# Patient Record
Sex: FEMALE | Race: BLACK OR AFRICAN AMERICAN | NOT HISPANIC OR LATINO
[De-identification: names, ages, dates, MRNs, and addresses within clinical notes are randomized per-mention and may not be internally consistent; named-entity substitution may affect disease eponyms.]

---

## 2017-05-28 ENCOUNTER — HOSPITAL (OUTPATIENT)
Dept: OTHER | Age: 62
End: 2017-05-28
Attending: FAMILY MEDICINE

## 2017-05-28 ENCOUNTER — CHARTING TRANS (OUTPATIENT)
Dept: OTHER | Age: 62
End: 2017-05-28

## 2017-05-28 ENCOUNTER — LAB SERVICES (OUTPATIENT)
Dept: OTHER | Age: 62
End: 2017-05-28

## 2017-05-28 LAB
AMORPH SED URNS QL MICRO: ABNORMAL
ANALYZER ANC (IANC): ABNORMAL
ANALYZER ANC (IANC): ABNORMAL
ANION GAP SERPL CALC-SCNC: 13 MMOL/L (ref 10–20)
ANION GAP SERPL CALC-SCNC: 13 MMOL/L (ref 10–20)
APPEARANCE UR: CLEAR
BACTERIA #/AREA URNS HPF: ABNORMAL /HPF
BASOPHILS # BLD: 0 K/MCL (ref 0–0.3)
BASOPHILS # BLD: 0 THOUSAND/MCL (ref 0–0.3)
BASOPHILS NFR BLD: 0 %
BASOPHILS NFR BLD: 0 %
BILIRUB UR QL: NEGATIVE
BUN SERPL-MCNC: 14 MG/DL (ref 6–20)
BUN SERPL-MCNC: 14 MG/DL (ref 6–20)
BUN/CREAT SERPL: 17 (ref 7–25)
BUN/CREAT SERPL: 17 (ref 7–25)
CALCIUM SERPL-MCNC: 9.9 MG/DL (ref 8.4–10.2)
CALCIUM SERPL-MCNC: 9.9 MG/DL (ref 8.4–10.2)
CAOX CRY URNS QL MICRO: ABNORMAL
CHLORIDE SERPL-SCNC: 101 MMOL/L (ref 98–107)
CHLORIDE: 101 MMOL/L (ref 98–107)
CO2 SERPL-SCNC: 25 MMOL/L (ref 21–32)
CO2 SERPL-SCNC: 25 MMOL/L (ref 21–32)
COLOR UR: ABNORMAL
CREAT SERPL-MCNC: 0.83 MG/DL (ref 0.51–0.95)
CREAT SERPL-MCNC: 0.83 MG/DL (ref 0.51–0.95)
DIFFERENTIAL METHOD BLD: ABNORMAL
DIFFERENTIAL METHOD BLD: ABNORMAL
EOSINOPHIL # BLD: 0.2 K/MCL (ref 0.1–0.5)
EOSINOPHIL # BLD: 0.2 THOUSAND/MCL (ref 0.1–0.5)
EOSINOPHIL NFR BLD: 2 %
EOSINOPHIL NFR BLD: 2 %
EPITH CASTS #/AREA URNS LPF: ABNORMAL /[LPF]
ERYTHROCYTE [DISTWIDTH] IN BLOOD: 18.6 % (ref 11–15)
ERYTHROCYTE [DISTWIDTH] IN BLOOD: 18.6 % (ref 11–15)
FATTY CASTS #/AREA URNS LPF: ABNORMAL /[LPF]
GLUCOSE SERPL-MCNC: 133 MG/DL (ref 65–99)
GLUCOSE SERPL-MCNC: 133 MG/DL (ref 65–99)
GLUCOSE UR-MCNC: NEGATIVE MG/DL
GRAN CASTS #/AREA URNS LPF: ABNORMAL /[LPF]
HEMATOCRIT: 38.3 % (ref 36–46.5)
HEMATOCRIT: 38.3 % (ref 36–46.5)
HEMOGLOBIN: 12.6 G/DL (ref 12–15.5)
HGB BLD-MCNC: 12.6 GM/DL (ref 12–15.5)
HGB UR QL: ABNORMAL
HYALINE CASTS #/AREA URNS LPF: ABNORMAL /LPF (ref 0–5)
KETONES UR-MCNC: NEGATIVE MG/DL
LEUKOCYTE ESTERASE UR QL STRIP: NEGATIVE
LYMPHOCYTES # BLD: 3.8 K/MCL (ref 1–4)
LYMPHOCYTES # BLD: 3.8 THOUSAND/MCL (ref 1–4)
LYMPHOCYTES NFR BLD: 27 %
LYMPHOCYTES NFR BLD: 27 %
MAGNESIUM SERPL-MCNC: 1.6 MG/DL (ref 1.7–2.4)
MAGNESIUM SERPL-MCNC: 1.6 MG/DL (ref 1.7–2.4)
MCH RBC QN AUTO: 25.4 PG (ref 26–34)
MCHC RBC AUTO-ENTMCNC: 32.9 GM/DL (ref 32–36.5)
MCV RBC AUTO: 77.1 FL (ref 78–100)
MEAN CORPUSCULAR HEMOGLOBIN: 25.4 PG (ref 26–34)
MEAN CORPUSCULAR HGB CONC: 32.9 G/DL (ref 32–36.5)
MEAN CORPUSCULAR VOLUME: 77.1 FL (ref 78–100)
MIXED CELL CASTS #/AREA URNS LPF: ABNORMAL /[LPF]
MONOCYTES # BLD: 1.1 K/MCL (ref 0.3–0.9)
MONOCYTES # BLD: 1.1 THOUSAND/MCL (ref 0.3–0.9)
MONOCYTES NFR BLD: 8 %
MONOCYTES NFR BLD: 8 %
MUCOUS THREADS URNS QL MICRO: ABNORMAL
NEUTROPHILS # BLD: 9 K/MCL (ref 1.8–7.7)
NEUTROPHILS # BLD: 9 THOUSAND/MCL (ref 1.8–7.7)
NEUTROPHILS NFR BLD: 63 %
NEUTROPHILS NFR BLD: 63 %
NEUTS SEG NFR BLD: ABNORMAL
NEUTS SEG NFR BLD: ABNORMAL %
NITRITE UR QL: NEGATIVE
NRBC (NRBCRE): ABNORMAL
PERCENT NRBC: ABNORMAL
PH UR: 6 UNIT (ref 5–7)
PLATELET # BLD: 440 THOUSAND/MCL (ref 140–450)
PLATELET COUNT: 440 K/MCL (ref 140–450)
POTASSIUM SERPL-SCNC: 4.2 MMOL/L (ref 3.4–5.1)
POTASSIUM SERPL-SCNC: 4.2 MMOL/L (ref 3.4–5.1)
PROT UR QL: NEGATIVE MG/DL
RBC # BLD: 4.97 MILLION/MCL (ref 4–5.2)
RBC #/AREA URNS HPF: ABNORMAL /HPF (ref 0–3)
RBC CASTS #/AREA URNS LPF: ABNORMAL /[LPF]
RED CELL COUNT: 4.97 MIL/MCL (ref 4–5.2)
RENAL EPI CELLS #/AREA URNS HPF: ABNORMAL /[HPF]
SODIUM SERPL-SCNC: 135 MMOL/L (ref 135–145)
SODIUM SERPL-SCNC: 135 MMOL/L (ref 135–145)
SP GR UR: <1.005 (ref 1–1.03)
SPECIMEN SOURCE: ABNORMAL
SPERM URNS QL MICRO: ABNORMAL
SQUAMOUS #/AREA URNS HPF: ABNORMAL /HPF (ref 0–5)
T VAGINALIS URNS QL MICRO: ABNORMAL
TRI-PHOS CRY URNS QL MICRO: ABNORMAL
TROPONIN I SERPL HS-MCNC: <0.02 NG/ML
URATE CRY URNS QL MICRO: ABNORMAL
URINE REFLEX: ABNORMAL
URNS CMNT MICRO: ABNORMAL
UROBILINOGEN UR QL: 0.2 MG/DL (ref 0–1)
WAXY CASTS #/AREA URNS LPF: ABNORMAL /[LPF]
WBC # BLD: 14.1 THOUSAND/MCL (ref 4.2–11)
WBC #/AREA URNS HPF: ABNORMAL /HPF (ref 0–5)
WBC CASTS #/AREA URNS LPF: ABNORMAL /[LPF]
WHITE BLOOD COUNT: 14.1 K/MCL (ref 4.2–11)
YEAST HYPHAE URNS QL MICRO: ABNORMAL
YEAST URNS QL MICRO: ABNORMAL

## 2017-05-29 ENCOUNTER — DIAGNOSTIC TRANS (OUTPATIENT)
Dept: OTHER | Age: 62
End: 2017-05-29

## 2017-09-12 ENCOUNTER — APPOINTMENT (OUTPATIENT)
Dept: GENERAL RADIOLOGY | Facility: HOSPITAL | Age: 62
End: 2017-09-12
Attending: EMERGENCY MEDICINE
Payer: MEDICARE

## 2017-09-12 ENCOUNTER — HOSPITAL ENCOUNTER (EMERGENCY)
Facility: HOSPITAL | Age: 62
Discharge: LEFT BEFORE DISCHARGE | End: 2017-09-12
Attending: EMERGENCY MEDICINE
Payer: MEDICARE

## 2017-09-12 VITALS
DIASTOLIC BLOOD PRESSURE: 82 MMHG | OXYGEN SATURATION: 95 % | TEMPERATURE: 98 F | WEIGHT: 169 LBS | BODY MASS INDEX: 31.1 KG/M2 | SYSTOLIC BLOOD PRESSURE: 177 MMHG | HEIGHT: 62 IN | HEART RATE: 96 BPM | RESPIRATION RATE: 18 BRPM

## 2017-09-12 DIAGNOSIS — R07.9 CHEST PAIN, UNSPECIFIED TYPE: ICD-10-CM

## 2017-09-12 DIAGNOSIS — R19.7 DIARRHEA, UNSPECIFIED TYPE: Primary | ICD-10-CM

## 2017-09-12 DIAGNOSIS — R53.83 OTHER FATIGUE: ICD-10-CM

## 2017-09-12 LAB
ALBUMIN SERPL-MCNC: 2.8 G/DL (ref 3.5–4.8)
ALP LIVER SERPL-CCNC: 94 U/L (ref 50–130)
ALT SERPL-CCNC: 20 U/L (ref 14–54)
AST SERPL-CCNC: 15 U/L (ref 15–41)
BASOPHILS # BLD AUTO: 0.05 X10(3) UL (ref 0–0.1)
BASOPHILS NFR BLD AUTO: 0.3 %
BILIRUB SERPL-MCNC: 0.3 MG/DL (ref 0.1–2)
BILIRUB UR QL STRIP.AUTO: NEGATIVE
BUN BLD-MCNC: 10 MG/DL (ref 8–20)
CALCIUM BLD-MCNC: 9.4 MG/DL (ref 8.3–10.3)
CHLORIDE: 103 MMOL/L (ref 101–111)
CLARITY UR REFRACT.AUTO: CLEAR
CO2: 22 MMOL/L (ref 22–32)
COLOR UR AUTO: YELLOW
CREAT BLD-MCNC: 0.88 MG/DL (ref 0.55–1.02)
EOSINOPHIL # BLD AUTO: 0.13 X10(3) UL (ref 0–0.3)
EOSINOPHIL NFR BLD AUTO: 0.9 %
ERYTHROCYTE [DISTWIDTH] IN BLOOD BY AUTOMATED COUNT: 19.7 % (ref 11.5–16)
GLUCOSE BLD-MCNC: 205 MG/DL (ref 70–99)
GLUCOSE UR STRIP.AUTO-MCNC: 150 MG/DL
HAV IGM SER QL: 1.7 MG/DL (ref 1.7–3)
HCT VFR BLD AUTO: 34.5 % (ref 34–50)
HGB BLD-MCNC: 11.1 G/DL (ref 12–16)
IMMATURE GRANULOCYTE COUNT: 0.12 X10(3) UL (ref 0–1)
IMMATURE GRANULOCYTE RATIO %: 0.8 %
KETONES UR STRIP.AUTO-MCNC: NEGATIVE MG/DL
LEUKOCYTE ESTERASE UR QL STRIP.AUTO: NEGATIVE
LYMPHOCYTES # BLD AUTO: 1.95 X10(3) UL (ref 0.9–4)
LYMPHOCYTES NFR BLD AUTO: 13.4 %
M PROTEIN MFR SERPL ELPH: 8.2 G/DL (ref 6.1–8.3)
MCH RBC QN AUTO: 25.1 PG (ref 27–33.2)
MCHC RBC AUTO-ENTMCNC: 32.2 G/DL (ref 31–37)
MCV RBC AUTO: 77.9 FL (ref 81–100)
MONOCYTES # BLD AUTO: 0.87 X10(3) UL (ref 0.1–0.6)
MONOCYTES NFR BLD AUTO: 6 %
NEUTROPHIL ABS PRELIM: 11.45 X10 (3) UL (ref 1.3–6.7)
NEUTROPHILS # BLD AUTO: 11.45 X10(3) UL (ref 1.3–6.7)
NEUTROPHILS NFR BLD AUTO: 78.6 %
NITRITE UR QL STRIP.AUTO: NEGATIVE
PH UR STRIP.AUTO: 6 [PH] (ref 4.5–8)
PLATELET # BLD AUTO: 388 10(3)UL (ref 150–450)
POTASSIUM SERPL-SCNC: 3.8 MMOL/L (ref 3.6–5.1)
PROT UR STRIP.AUTO-MCNC: NEGATIVE MG/DL
RBC # BLD AUTO: 4.43 X10(6)UL (ref 3.8–5.1)
RED CELL DISTRIBUTION WIDTH-SD: 54.6 FL (ref 35.1–46.3)
SODIUM SERPL-SCNC: 137 MMOL/L (ref 136–144)
SP GR UR STRIP.AUTO: 1.01 (ref 1–1.03)
TROPONIN: <0.046 NG/ML (ref ?–0.05)
UROBILINOGEN UR STRIP.AUTO-MCNC: <2 MG/DL
WBC # BLD AUTO: 14.6 X10(3) UL (ref 4–13)

## 2017-09-12 PROCEDURE — 99285 EMERGENCY DEPT VISIT HI MDM: CPT

## 2017-09-12 PROCEDURE — 84484 ASSAY OF TROPONIN QUANT: CPT | Performed by: EMERGENCY MEDICINE

## 2017-09-12 PROCEDURE — 83735 ASSAY OF MAGNESIUM: CPT | Performed by: EMERGENCY MEDICINE

## 2017-09-12 PROCEDURE — 80053 COMPREHEN METABOLIC PANEL: CPT | Performed by: EMERGENCY MEDICINE

## 2017-09-12 PROCEDURE — 96361 HYDRATE IV INFUSION ADD-ON: CPT

## 2017-09-12 PROCEDURE — 85025 COMPLETE CBC W/AUTO DIFF WBC: CPT | Performed by: EMERGENCY MEDICINE

## 2017-09-12 PROCEDURE — 96374 THER/PROPH/DIAG INJ IV PUSH: CPT

## 2017-09-12 PROCEDURE — 93005 ELECTROCARDIOGRAM TRACING: CPT

## 2017-09-12 PROCEDURE — 93010 ELECTROCARDIOGRAM REPORT: CPT

## 2017-09-12 PROCEDURE — 71010 XR CHEST AP PORTABLE  (CPT=71010): CPT | Performed by: EMERGENCY MEDICINE

## 2017-09-12 PROCEDURE — 81001 URINALYSIS AUTO W/SCOPE: CPT | Performed by: EMERGENCY MEDICINE

## 2017-09-12 RX ORDER — OMEPRAZOLE 20 MG/1
40 CAPSULE, DELAYED RELEASE ORAL EVERY MORNING
COMMUNITY
End: 2019-06-04

## 2017-09-12 RX ORDER — ONDANSETRON 2 MG/ML
4 INJECTION INTRAMUSCULAR; INTRAVENOUS ONCE
Status: COMPLETED | OUTPATIENT
Start: 2017-09-12 | End: 2017-09-12

## 2017-09-12 RX ORDER — AMLODIPINE, VALSARTAN AND HYDROCHLOROTHIAZIDE 10; 160; 12.5 MG/1; MG/1; MG/1
1 TABLET ORAL DAILY
COMMUNITY
End: 2018-12-03

## 2017-09-12 RX ORDER — HYDROXYCHLOROQUINE SULFATE 200 MG/1
200 TABLET, FILM COATED ORAL 2 TIMES DAILY
COMMUNITY
End: 2019-04-11 | Stop reason: ALTCHOICE

## 2017-09-13 LAB
ATRIAL RATE: 92 BPM
ATRIAL RATE: 96 BPM
P AXIS: 43 DEGREES
P AXIS: 47 DEGREES
P-R INTERVAL: 130 MS
P-R INTERVAL: 132 MS
Q-T INTERVAL: 360 MS
Q-T INTERVAL: 370 MS
QRS DURATION: 82 MS
QRS DURATION: 82 MS
QTC CALCULATION (BEZET): 445 MS
QTC CALCULATION (BEZET): 467 MS
R AXIS: -2 DEGREES
R AXIS: 1 DEGREES
T AXIS: -16 DEGREES
T AXIS: -25 DEGREES
VENTRICULAR RATE: 92 BPM
VENTRICULAR RATE: 96 BPM

## 2017-09-13 NOTE — ED PROVIDER NOTES
Patient Seen in: [facility] Emergency Department    History   Patient presents with:  Medication Reaction    Stated Complaint: think she is having adverse reaction to hydroxychloroquine     HPI    Patient is a 22-year-old woman history of rheumatoid °F (36.7 °C) (Temporal)   Resp 18   Ht 157.5 cm (5' 2\")   Wt 76.7 kg   SpO2 95%   BMI 30.91 kg/m²         Physical Exam   Constitutional: She is oriented to person, place, and time. She appears well-developed and well-nourished. No distress.    HENT:   Lupis Villeda Neutrophil Absolute 11.45 (*)     Monocyte Absolute 0.87 (*)     All other components within normal limits   TROPONIN I - Normal   MAGNESIUM - Normal   CBC WITH DIFFERENTIAL WITH PLATELET    Narrative:      The following orders were created for panel order ---------                               -----------         ------                     CBC W/ DIFFERENTIAL[[id number]]          Abnormal            Final result                 Please view results for these tests on the individual orders.    C. DIFFICILE(T (primary encounter diagnosis)  Other fatigue  Chest pain, unspecified type    Disposition:  Discharge    Follow-up:  Nonstaff, Physician  [address]    In 2 days  Return to the ER if you feel worse in any way, Return to the ER if

## 2017-09-13 NOTE — ED INITIAL ASSESSMENT (HPI)
Pt states she started taking Hydroxychloroquine 200 mg BID yesterday. \"I started having diarrhea and also pain to my abd. \" Pt states pain to RLQ, (+) nausea, no vomiting.  She denies urinary sxs, denies radiation of pain

## 2017-09-13 NOTE — ED NOTES
Pt requesting to ambulate to BR, c/o midsternal chest pain, states \"feels like indigestion\". Repeat EKG ordered, MD aware.   NAD

## 2018-02-26 ENCOUNTER — APPOINTMENT (OUTPATIENT)
Dept: GENERAL RADIOLOGY | Facility: HOSPITAL | Age: 63
End: 2018-02-26
Attending: EMERGENCY MEDICINE
Payer: MEDICARE

## 2018-02-26 ENCOUNTER — HOSPITAL ENCOUNTER (EMERGENCY)
Facility: HOSPITAL | Age: 63
Discharge: HOME | End: 2018-02-26
Attending: EMERGENCY MEDICINE
Payer: MEDICARE

## 2018-02-26 VITALS
SYSTOLIC BLOOD PRESSURE: 204 MMHG | WEIGHT: 170 LBS | BODY MASS INDEX: 31 KG/M2 | HEART RATE: 92 BPM | RESPIRATION RATE: 20 BRPM | DIASTOLIC BLOOD PRESSURE: 90 MMHG | TEMPERATURE: 98 F | OXYGEN SATURATION: 100 %

## 2018-02-26 DIAGNOSIS — J20.9 ACUTE BRONCHITIS, UNSPECIFIED ORGANISM: Primary | ICD-10-CM

## 2018-02-26 PROCEDURE — 71046 X-RAY EXAM CHEST 2 VIEWS: CPT | Performed by: EMERGENCY MEDICINE

## 2018-02-26 PROCEDURE — 99283 EMERGENCY DEPT VISIT LOW MDM: CPT

## 2018-02-26 RX ORDER — AZITHROMYCIN 250 MG/1
TABLET, FILM COATED ORAL
Qty: 1 PACKAGE | Refills: 0 | Status: SHIPPED | OUTPATIENT
Start: 2018-02-26 | End: 2018-03-03

## 2018-02-26 NOTE — ED NOTES
Pt BP noted. MD aware. Reports not taking her BP meds this morning. Denies headache, visual changes, dizziness.

## 2018-02-26 NOTE — ED INITIAL ASSESSMENT (HPI)
Pt presents with complaint of cough x 1 year states when she cough this am noted a little blood streak no yang

## 2018-02-26 NOTE — ED PROVIDER NOTES
Patient Seen in: [facility] Emergency Department    History   Patient presents with:  Cough/URI    Stated Complaint: Cough  Patient is a 80-year-old female smoker with a history of hypertension and rheumatoid arthritis who presents for evaluation of Mouth/Throat: Oropharynx is clear and moist.   Eyes: Conjunctivae and EOM are normal. Pupils are equal, round, and reactive to light. Neck: Normal range of motion. Neck supple. Cardiovascular: Normal rate, regular rhythm and normal heart sounds.     P medications    azithromycin (ZITHROMAX Z-ELMER) 250 MG Oral Tab  500 mg once followed by 250 mg daily x 4 days  Qty: 1 Package Refills: 0

## 2018-07-02 ENCOUNTER — HOSPITAL ENCOUNTER (OUTPATIENT)
Dept: ULTRASOUND IMAGING | Facility: HOSPITAL | Age: 63
Discharge: HOME | End: 2018-07-02
Attending: INTERNAL MEDICINE
Payer: MEDICARE

## 2018-07-02 DIAGNOSIS — I10 HTN (HYPERTENSION): ICD-10-CM

## 2018-07-02 DIAGNOSIS — I70.1 RENAL ARTERY STENOSIS (HCC): ICD-10-CM

## 2018-07-02 PROCEDURE — 93975 VASCULAR STUDY: CPT | Performed by: INTERNAL MEDICINE

## 2018-07-02 PROCEDURE — 76775 US EXAM ABDO BACK WALL LIM: CPT | Performed by: INTERNAL MEDICINE

## 2018-09-20 ENCOUNTER — APPOINTMENT (OUTPATIENT)
Dept: CT IMAGING | Facility: HOSPITAL | Age: 63
End: 2018-09-20
Payer: MEDICARE

## 2018-09-20 ENCOUNTER — APPOINTMENT (OUTPATIENT)
Dept: GENERAL RADIOLOGY | Facility: HOSPITAL | Age: 63
End: 2018-09-20
Payer: MEDICARE

## 2018-09-20 ENCOUNTER — HOSPITAL ENCOUNTER (EMERGENCY)
Facility: HOSPITAL | Age: 63
Discharge: HOME | End: 2018-09-21
Payer: MEDICARE

## 2018-09-20 DIAGNOSIS — R51.9 ACUTE NONINTRACTABLE HEADACHE, UNSPECIFIED HEADACHE TYPE: ICD-10-CM

## 2018-09-20 DIAGNOSIS — I10 HYPERTENSION, UNSPECIFIED TYPE: Primary | ICD-10-CM

## 2018-09-20 LAB
ALBUMIN SERPL-MCNC: 2.8 G/DL (ref 3.5–4.8)
ALBUMIN/GLOB SERPL: 0.5 {RATIO} (ref 1–2)
ALP LIVER SERPL-CCNC: 112 U/L (ref 50–130)
ALT SERPL-CCNC: 21 U/L (ref 14–54)
ANION GAP SERPL CALC-SCNC: 9 MMOL/L (ref 0–18)
APTT PPP: 26.9 SECONDS (ref 26.1–34.6)
BASOPHILS # BLD AUTO: 0.04 X10(3) UL (ref 0–0.1)
BASOPHILS NFR BLD AUTO: 0.4 %
BILIRUB SERPL-MCNC: 0.4 MG/DL (ref 0.1–2)
BUN BLD-MCNC: 10 MG/DL (ref 8–20)
BUN/CREAT SERPL: 10.9 (ref 10–20)
CALCIUM BLD-MCNC: 9.3 MG/DL (ref 8.3–10.3)
CHLORIDE SERPL-SCNC: 102 MMOL/L (ref 101–111)
CO2 SERPL-SCNC: 23 MMOL/L (ref 22–32)
CREAT BLD-MCNC: 0.92 MG/DL (ref 0.55–1.02)
EOSINOPHIL # BLD AUTO: 0.15 X10(3) UL (ref 0–0.3)
EOSINOPHIL NFR BLD AUTO: 1.4 %
ERYTHROCYTE [DISTWIDTH] IN BLOOD BY AUTOMATED COUNT: 19.3 % (ref 11.5–16)
GLOBULIN PLAS-MCNC: 5.9 G/DL (ref 2.5–4)
GLUCOSE BLD-MCNC: 230 MG/DL (ref 70–99)
HCT VFR BLD AUTO: 38.3 % (ref 34–50)
HGB BLD-MCNC: 12 G/DL (ref 12–16)
IMMATURE GRANULOCYTE COUNT: 0.05 X10(3) UL (ref 0–1)
IMMATURE GRANULOCYTE RATIO %: 0.5 %
INR BLD: 0.98 (ref 0.9–1.1)
LYMPHOCYTES # BLD AUTO: 2.19 X10(3) UL (ref 0.9–4)
LYMPHOCYTES NFR BLD AUTO: 20.4 %
M PROTEIN MFR SERPL ELPH: 8.7 G/DL (ref 6.1–8.3)
MCH RBC QN AUTO: 24 PG (ref 27–33.2)
MCHC RBC AUTO-ENTMCNC: 31.3 G/DL (ref 31–37)
MCV RBC AUTO: 76.6 FL (ref 81–100)
MONOCYTES # BLD AUTO: 0.68 X10(3) UL (ref 0.1–1)
MONOCYTES NFR BLD AUTO: 6.3 %
NEUTROPHIL ABS PRELIM: 7.61 X10 (3) UL (ref 1.3–6.7)
NEUTROPHILS # BLD AUTO: 7.61 X10(3) UL (ref 1.3–6.7)
NEUTROPHILS NFR BLD AUTO: 71 %
OSMOLALITY SERPL CALC.SUM OF ELEC: 284 MOSM/KG (ref 275–295)
PLATELET # BLD AUTO: 434 10(3)UL (ref 150–450)
PSA SERPL DL<=0.01 NG/ML-MCNC: 13.4 SECONDS (ref 12.4–14.7)
RBC # BLD AUTO: 5 X10(6)UL (ref 3.8–5.1)
RED CELL DISTRIBUTION WIDTH-SD: 51.2 FL (ref 35.1–46.3)
SODIUM SERPL-SCNC: 134 MMOL/L (ref 136–144)
TROPONIN I SERPL-MCNC: <0.046 NG/ML (ref ?–0.05)
WBC # BLD AUTO: 10.7 X10(3) UL (ref 4–13)

## 2018-09-20 PROCEDURE — 96361 HYDRATE IV INFUSION ADD-ON: CPT

## 2018-09-20 PROCEDURE — 96374 THER/PROPH/DIAG INJ IV PUSH: CPT

## 2018-09-20 PROCEDURE — 93010 ELECTROCARDIOGRAM REPORT: CPT

## 2018-09-20 PROCEDURE — 99285 EMERGENCY DEPT VISIT HI MDM: CPT

## 2018-09-20 PROCEDURE — 85025 COMPLETE CBC W/AUTO DIFF WBC: CPT

## 2018-09-20 PROCEDURE — 71045 X-RAY EXAM CHEST 1 VIEW: CPT

## 2018-09-20 PROCEDURE — 80053 COMPREHEN METABOLIC PANEL: CPT

## 2018-09-20 PROCEDURE — 70450 CT HEAD/BRAIN W/O DYE: CPT

## 2018-09-20 PROCEDURE — 96375 TX/PRO/DX INJ NEW DRUG ADDON: CPT

## 2018-09-20 PROCEDURE — 85610 PROTHROMBIN TIME: CPT

## 2018-09-20 PROCEDURE — 93005 ELECTROCARDIOGRAM TRACING: CPT

## 2018-09-20 PROCEDURE — 85730 THROMBOPLASTIN TIME PARTIAL: CPT

## 2018-09-20 PROCEDURE — 84484 ASSAY OF TROPONIN QUANT: CPT

## 2018-09-20 RX ORDER — METOPROLOL TARTRATE 5 MG/5ML
5 INJECTION INTRAVENOUS ONCE
Status: COMPLETED | OUTPATIENT
Start: 2018-09-20 | End: 2018-09-20

## 2018-09-20 RX ORDER — DIPHENHYDRAMINE HYDROCHLORIDE 50 MG/ML
25 INJECTION INTRAMUSCULAR; INTRAVENOUS ONCE
Status: COMPLETED | OUTPATIENT
Start: 2018-09-20 | End: 2018-09-20

## 2018-09-20 RX ORDER — SODIUM CHLORIDE 9 MG/ML
1000 INJECTION, SOLUTION INTRAVENOUS ONCE
Status: COMPLETED | OUTPATIENT
Start: 2018-09-20 | End: 2018-09-21

## 2018-09-20 RX ORDER — METOCLOPRAMIDE HYDROCHLORIDE 5 MG/ML
5 INJECTION INTRAMUSCULAR; INTRAVENOUS ONCE
Status: COMPLETED | OUTPATIENT
Start: 2018-09-20 | End: 2018-09-20

## 2018-09-21 VITALS
RESPIRATION RATE: 16 BRPM | HEIGHT: 62 IN | TEMPERATURE: 97 F | SYSTOLIC BLOOD PRESSURE: 182 MMHG | OXYGEN SATURATION: 97 % | BODY MASS INDEX: 31.28 KG/M2 | WEIGHT: 170 LBS | HEART RATE: 78 BPM | DIASTOLIC BLOOD PRESSURE: 84 MMHG

## 2018-09-21 LAB
ATRIAL RATE: 101 BPM
P AXIS: 39 DEGREES
P-R INTERVAL: 134 MS
Q-T INTERVAL: 356 MS
QRS DURATION: 80 MS
QTC CALCULATION (BEZET): 461 MS
R AXIS: -6 DEGREES
T AXIS: -32 DEGREES
VENTRICULAR RATE: 101 BPM

## 2018-09-21 NOTE — ED INITIAL ASSESSMENT (HPI)
Patient states she had a headache tonight which she gets when her blood pressure is high. Patient took all her medications but felt her BP was still high and came into the ER. She states she also feels dizzy with positional changes.

## 2018-09-21 NOTE — ED PROVIDER NOTES
Patient Seen in: [facility] Emergency Department    History   Patient presents with:  Hypertension (cardiovascular): pt states she \"didn't check it but has a HA and knows it's high\"    Stated Complaint: HTN    HPI    This pleasant 70-year-old with 96.9 °F (36.1 °C)   Temp src 09/20/18 2246 Temporal   SpO2 09/20/18 2245 98 %   O2 Device 09/20/18 2246 None (Room air)       Current:BP (!) 182/84   Pulse 78   Temp 96.9 °F (36.1 °C) (Temporal)   Resp 16   Ht 157.5 cm (5' 2\")   Wt 77.1 kg   SpO2 97%   BM ACTIVATED - Normal   TROPONIN I - Normal   CBC WITH DIFFERENTIAL WITH PLATELET    Narrative: The following orders were created for panel order CBC WITH DIFFERENTIAL WITH PLATELET.   Procedure                               Abnormality         Status radiograph was obtained. COMPARISON:  [facility] , XR CHEST PA + LAT CHEST (CPT=71046), 2/26/2018, 7:16.   INDICATIONS:  HTN  PATIENT STATED HISTORY: (As transcribed by Technologist)   Patient  with high blood pressure pt. states she had a headache tonight whi Reasonable over the counter and prescription treatment options and Physician follow up plan was discussed. The patient is discharged home in good condition.             Disposition and Plan     Clinical Impression:  Hypertension, unspecified type  (onelia

## 2018-11-28 ENCOUNTER — HOSPITAL ENCOUNTER (EMERGENCY)
Facility: HOSPITAL | Age: 63
Discharge: HOME | End: 2018-11-28
Attending: EMERGENCY MEDICINE
Payer: MEDICARE

## 2018-11-28 VITALS
HEIGHT: 62 IN | SYSTOLIC BLOOD PRESSURE: 167 MMHG | OXYGEN SATURATION: 99 % | HEART RATE: 108 BPM | BODY MASS INDEX: 31.28 KG/M2 | DIASTOLIC BLOOD PRESSURE: 77 MMHG | WEIGHT: 170 LBS | RESPIRATION RATE: 16 BRPM | TEMPERATURE: 98 F

## 2018-11-28 DIAGNOSIS — R73.9 HYPERGLYCEMIA: ICD-10-CM

## 2018-11-28 DIAGNOSIS — J01.90 ACUTE SINUSITIS, RECURRENCE NOT SPECIFIED, UNSPECIFIED LOCATION: ICD-10-CM

## 2018-11-28 DIAGNOSIS — R42 DIZZINESS: Primary | ICD-10-CM

## 2018-11-28 PROCEDURE — 99285 EMERGENCY DEPT VISIT HI MDM: CPT | Performed by: EMERGENCY MEDICINE

## 2018-11-28 PROCEDURE — 93005 ELECTROCARDIOGRAM TRACING: CPT

## 2018-11-28 PROCEDURE — 80053 COMPREHEN METABOLIC PANEL: CPT | Performed by: EMERGENCY MEDICINE

## 2018-11-28 PROCEDURE — 96361 HYDRATE IV INFUSION ADD-ON: CPT | Performed by: EMERGENCY MEDICINE

## 2018-11-28 PROCEDURE — 82962 GLUCOSE BLOOD TEST: CPT

## 2018-11-28 PROCEDURE — 83036 HEMOGLOBIN GLYCOSYLATED A1C: CPT | Performed by: EMERGENCY MEDICINE

## 2018-11-28 PROCEDURE — 96360 HYDRATION IV INFUSION INIT: CPT | Performed by: EMERGENCY MEDICINE

## 2018-11-28 PROCEDURE — 85025 COMPLETE CBC W/AUTO DIFF WBC: CPT | Performed by: EMERGENCY MEDICINE

## 2018-11-28 PROCEDURE — 85025 COMPLETE CBC W/AUTO DIFF WBC: CPT

## 2018-11-28 PROCEDURE — 84443 ASSAY THYROID STIM HORMONE: CPT | Performed by: EMERGENCY MEDICINE

## 2018-11-28 PROCEDURE — 93010 ELECTROCARDIOGRAM REPORT: CPT | Performed by: EMERGENCY MEDICINE

## 2018-11-28 PROCEDURE — 80053 COMPREHEN METABOLIC PANEL: CPT

## 2018-11-28 PROCEDURE — 84439 ASSAY OF FREE THYROXINE: CPT | Performed by: EMERGENCY MEDICINE

## 2018-11-28 RX ORDER — SODIUM CHLORIDE 9 MG/ML
125 INJECTION, SOLUTION INTRAVENOUS CONTINUOUS
Status: DISCONTINUED | OUTPATIENT
Start: 2018-11-28 | End: 2018-11-28

## 2018-11-28 RX ORDER — MECLIZINE HYDROCHLORIDE 25 MG/1
25 TABLET ORAL 3 TIMES DAILY PRN
Qty: 20 TABLET | Refills: 0 | Status: SHIPPED | OUTPATIENT
Start: 2018-11-28 | End: 2019-04-11 | Stop reason: ALTCHOICE

## 2018-11-28 RX ORDER — POTASSIUM CHLORIDE 20 MEQ/1
40 TABLET, EXTENDED RELEASE ORAL ONCE
Status: DISCONTINUED | OUTPATIENT
Start: 2018-11-28 | End: 2018-11-28

## 2018-11-28 RX ORDER — AZITHROMYCIN 250 MG/1
TABLET, FILM COATED ORAL
Qty: 1 PACKAGE | Refills: 0 | Status: SHIPPED | OUTPATIENT
Start: 2018-11-28 | End: 2019-01-03 | Stop reason: ALTCHOICE

## 2018-11-28 NOTE — ED PROVIDER NOTES
Patient Seen in: [facility] Emergency Department    History   Patient presents with:  Dizziness (neurologic)    Stated Complaint: episodes of dizziness, associated with changes in blood sugar    HPI    60-year-old female complaining of dizziness.   Tiffanie Morgan Pulse 102   Resp 20   Temp 98.1 °F (36.7 °C)   Temp src Temporal   SpO2 100 %   O2 Device None (Room air)       Current:BP (!) 167/77   Pulse 108   Temp 98.1 °F (36.7 °C) (Temporal)   Resp 16   Ht 157.5 cm (5' 2\")   Wt 77.1 kg   SpO2 99%   BMI 31.09 kg/ components within normal limits   T4, FREE (S) - Normal   CBC WITH DIFFERENTIAL WITH PLATELET    Narrative: The following orders were created for panel order CBC WITH DIFFERENTIAL WITH PLATELET.   Procedure                               Abnormality sinusitis, recurrence not specified, unspecified location    Disposition:  Discharge  11/28/2018  4:42 pm    Follow-up:  Via Steven Razo , Physician  [address] [facility]          Soni Masterson, [address] [place] [address]

## 2018-11-28 NOTE — ED INITIAL ASSESSMENT (HPI)
Pt states she has been having episodes of dizziness since early November. Pt states she has also been having fluctuations in blood glucose.

## 2018-12-20 PROBLEM — E11.9 TYPE 2 DIABETES MELLITUS WITHOUT COMPLICATION, WITHOUT LONG-TERM CURRENT USE OF INSULIN (HCC): Status: ACTIVE | Noted: 2018-12-20

## 2018-12-20 PROBLEM — Z80.0 FH: GASTRIC CANCER: Status: ACTIVE | Noted: 2018-12-20

## 2018-12-20 PROBLEM — D50.0 IRON DEFICIENCY ANEMIA DUE TO CHRONIC BLOOD LOSS: Status: ACTIVE | Noted: 2018-12-20

## 2018-12-20 PROBLEM — Z79.52 ON PREDNISONE THERAPY: Status: ACTIVE | Noted: 2018-12-20

## 2018-12-20 PROBLEM — M06.9 RHEUMATOID ARTHRITIS, INVOLVING UNSPECIFIED SITE, UNSPECIFIED RHEUMATOID FACTOR PRESENCE: Status: ACTIVE | Noted: 2018-12-20

## 2018-12-20 PROCEDURE — 82043 UR ALBUMIN QUANTITATIVE: CPT | Performed by: INTERNAL MEDICINE

## 2018-12-20 PROCEDURE — 86803 HEPATITIS C AB TEST: CPT | Performed by: INTERNAL MEDICINE

## 2018-12-20 PROCEDURE — 82570 ASSAY OF URINE CREATININE: CPT | Performed by: INTERNAL MEDICINE

## 2019-04-04 PROCEDURE — 84165 PROTEIN E-PHORESIS SERUM: CPT | Performed by: INTERNAL MEDICINE

## 2019-04-04 PROCEDURE — 86334 IMMUNOFIX E-PHORESIS SERUM: CPT | Performed by: INTERNAL MEDICINE

## 2019-04-04 PROCEDURE — 86480 TB TEST CELL IMMUN MEASURE: CPT | Performed by: INTERNAL MEDICINE

## 2019-04-04 PROCEDURE — 83883 ASSAY NEPHELOMETRY NOT SPEC: CPT | Performed by: INTERNAL MEDICINE

## 2019-04-04 PROCEDURE — 80074 ACUTE HEPATITIS PANEL: CPT | Performed by: INTERNAL MEDICINE

## 2019-04-11 PROCEDURE — 88175 CYTOPATH C/V AUTO FLUID REDO: CPT | Performed by: OBSTETRICS & GYNECOLOGY

## 2019-04-11 PROCEDURE — 87624 HPV HI-RISK TYP POOLED RSLT: CPT | Performed by: OBSTETRICS & GYNECOLOGY

## 2019-04-17 RX ORDER — BIOTIN 1 MG
1 TABLET ORAL DAILY
COMMUNITY
End: 2020-12-16 | Stop reason: ALTCHOICE

## 2019-04-17 RX ORDER — SODIUM CHLORIDE, SODIUM LACTATE, POTASSIUM CHLORIDE, CALCIUM CHLORIDE 600; 310; 30; 20 MG/100ML; MG/100ML; MG/100ML; MG/100ML
INJECTION, SOLUTION INTRAVENOUS CONTINUOUS
Status: CANCELLED | OUTPATIENT
Start: 2019-04-17

## 2019-05-06 ENCOUNTER — HOSPITAL ENCOUNTER (OUTPATIENT)
Facility: HOSPITAL | Age: 64
Setting detail: HOSPITAL OUTPATIENT SURGERY
Discharge: HOME | End: 2019-05-06
Attending: INTERNAL MEDICINE | Admitting: INTERNAL MEDICINE
Payer: MEDICARE

## 2019-05-06 ENCOUNTER — ANESTHESIA EVENT (OUTPATIENT)
Dept: ENDOSCOPY | Facility: HOSPITAL | Age: 64
End: 2019-05-06
Payer: MEDICARE

## 2019-05-06 ENCOUNTER — ANESTHESIA (OUTPATIENT)
Dept: ENDOSCOPY | Facility: HOSPITAL | Age: 64
End: 2019-05-06
Payer: MEDICARE

## 2019-05-06 VITALS
WEIGHT: 155 LBS | HEIGHT: 62 IN | OXYGEN SATURATION: 99 % | DIASTOLIC BLOOD PRESSURE: 72 MMHG | HEART RATE: 89 BPM | RESPIRATION RATE: 18 BRPM | BODY MASS INDEX: 28.52 KG/M2 | SYSTOLIC BLOOD PRESSURE: 132 MMHG | TEMPERATURE: 98 F

## 2019-05-06 DIAGNOSIS — Z79.52 ON PREDNISONE THERAPY: ICD-10-CM

## 2019-05-06 DIAGNOSIS — R12 HEARTBURN: ICD-10-CM

## 2019-05-06 DIAGNOSIS — R13.19 ESOPHAGEAL DYSPHAGIA: ICD-10-CM

## 2019-05-06 DIAGNOSIS — Z12.11 SCREEN FOR COLON CANCER: ICD-10-CM

## 2019-05-06 PROCEDURE — 0DB98ZX EXCISION OF DUODENUM, VIA NATURAL OR ARTIFICIAL OPENING ENDOSCOPIC, DIAGNOSTIC: ICD-10-PCS | Performed by: INTERNAL MEDICINE

## 2019-05-06 PROCEDURE — 88305 TISSUE EXAM BY PATHOLOGIST: CPT | Performed by: INTERNAL MEDICINE

## 2019-05-06 PROCEDURE — 88342 IMHCHEM/IMCYTCHM 1ST ANTB: CPT | Performed by: INTERNAL MEDICINE

## 2019-05-06 PROCEDURE — 0DBM8ZX EXCISION OF DESCENDING COLON, VIA NATURAL OR ARTIFICIAL OPENING ENDOSCOPIC, DIAGNOSTIC: ICD-10-PCS | Performed by: INTERNAL MEDICINE

## 2019-05-06 PROCEDURE — 0DB68ZX EXCISION OF STOMACH, VIA NATURAL OR ARTIFICIAL OPENING ENDOSCOPIC, DIAGNOSTIC: ICD-10-PCS | Performed by: INTERNAL MEDICINE

## 2019-05-06 PROCEDURE — 0DBP8ZX EXCISION OF RECTUM, VIA NATURAL OR ARTIFICIAL OPENING ENDOSCOPIC, DIAGNOSTIC: ICD-10-PCS | Performed by: INTERNAL MEDICINE

## 2019-05-06 PROCEDURE — 82962 GLUCOSE BLOOD TEST: CPT

## 2019-05-06 PROCEDURE — 0DB58ZX EXCISION OF ESOPHAGUS, VIA NATURAL OR ARTIFICIAL OPENING ENDOSCOPIC, DIAGNOSTIC: ICD-10-PCS | Performed by: INTERNAL MEDICINE

## 2019-05-06 RX ORDER — DEXTROSE MONOHYDRATE 25 G/50ML
50 INJECTION, SOLUTION INTRAVENOUS
Status: DISCONTINUED | OUTPATIENT
Start: 2019-05-06 | End: 2019-05-06

## 2019-05-06 RX ORDER — SODIUM CHLORIDE, SODIUM LACTATE, POTASSIUM CHLORIDE, CALCIUM CHLORIDE 600; 310; 30; 20 MG/100ML; MG/100ML; MG/100ML; MG/100ML
INJECTION, SOLUTION INTRAVENOUS CONTINUOUS
Status: DISCONTINUED | OUTPATIENT
Start: 2019-05-06 | End: 2019-05-06

## 2019-05-06 NOTE — ANESTHESIA POSTPROCEDURE EVALUATION
[address] [facility] Patient Status:  Hospital Outpatient Surgery   Age/Gender 59year old female MRN [id number]   Location [address] [place] [address]. Attending Radha Richard MD   Hosp Day # 0 PCP Saranyaberonica Otero MD       Anesthesia Post-o

## 2019-05-06 NOTE — H&P
[facility] Department of  Gastroenterology  Update Health History :       Odalis James  female   Amanda Dimas MD     [id number]  4/7/1955 Primary Care Physician  Americo Elizondo MD        HPI :  Patient was seen initially in December 2018 closes up             HAD IN CONJUNCTION WITH ASA- SO AVOIDS BOTH OF             THESE- FACE SWELLED UP  Oxycodone               PAIN, NAUSEA ONLY  Pregabalin              DIARRHEA, RASH    Comment:AND DIARRHEA  Enbrel                  RASH  Fish-Derived P intact      Assessment :  As above    Plan:   Upper endoscopy, possible dilation, and colonoscopy.   The risks and benefits of the procedure were discussed in detail with the patient, alternatives and options were all discussed, all questions were answered,

## 2019-05-09 NOTE — PROGRESS NOTES
5/9/2019  [address] [address]    Dear Jemal Garcia,       Here are the biopsy/pathology findings from your recent EGD (upper endoscopy) and colonoscopy:     The biopsy/pathology findings from your upper endosc

## 2019-07-24 PROCEDURE — 83883 ASSAY NEPHELOMETRY NOT SPEC: CPT | Performed by: INTERNAL MEDICINE

## 2019-07-24 PROCEDURE — 86334 IMMUNOFIX E-PHORESIS SERUM: CPT | Performed by: INTERNAL MEDICINE

## 2019-07-24 PROCEDURE — 84165 PROTEIN E-PHORESIS SERUM: CPT | Performed by: INTERNAL MEDICINE

## 2019-08-29 PROBLEM — J43.2 CENTRILOBULAR EMPHYSEMA (HCC): Status: ACTIVE | Noted: 2019-08-29

## 2019-09-20 ENCOUNTER — ANESTHESIA (OUTPATIENT)
Dept: ENDOSCOPY | Facility: HOSPITAL | Age: 64
End: 2019-09-20

## 2019-09-20 ENCOUNTER — HOSPITAL ENCOUNTER (OUTPATIENT)
Facility: HOSPITAL | Age: 64
Setting detail: HOSPITAL OUTPATIENT SURGERY
Discharge: HOME | End: 2019-09-20
Attending: INTERNAL MEDICINE | Admitting: INTERNAL MEDICINE
Payer: MEDICARE

## 2019-09-20 ENCOUNTER — ANESTHESIA EVENT (OUTPATIENT)
Dept: ENDOSCOPY | Facility: HOSPITAL | Age: 64
End: 2019-09-20

## 2019-09-20 ENCOUNTER — APPOINTMENT (OUTPATIENT)
Dept: GENERAL RADIOLOGY | Facility: HOSPITAL | Age: 64
End: 2019-09-20
Attending: INTERNAL MEDICINE
Payer: MEDICARE

## 2019-09-20 VITALS
DIASTOLIC BLOOD PRESSURE: 61 MMHG | WEIGHT: 148 LBS | RESPIRATION RATE: 18 BRPM | TEMPERATURE: 98 F | OXYGEN SATURATION: 95 % | BODY MASS INDEX: 27.23 KG/M2 | SYSTOLIC BLOOD PRESSURE: 107 MMHG | HEIGHT: 62 IN | HEART RATE: 83 BPM

## 2019-09-20 LAB
BASOPHIL BRONCHIAL WASHING: 0 %
EOSINOPHIL BRONCHIAL WASHING: 0 %
GLUCOSE BLD-MCNC: 108 MG/DL (ref 70–99)
LYMPHOCYTE BRONCHIAL WASHING: 10 %
MON/MACROPHAGE BRONCHIAL WASH: 81 %
NEUTROPHILS BRONCHIAL WASHING: 9 %
RBC # FLD: 222.5 /MM3
RBC BRONCH FOR MAN CT: 2445 /MM3
TOTAL CELLS COUNTED: 100

## 2019-09-20 PROCEDURE — 87206 SMEAR FLUORESCENT/ACID STAI: CPT | Performed by: INTERNAL MEDICINE

## 2019-09-20 PROCEDURE — 87205 SMEAR GRAM STAIN: CPT | Performed by: INTERNAL MEDICINE

## 2019-09-20 PROCEDURE — 87116 MYCOBACTERIA CULTURE: CPT | Performed by: INTERNAL MEDICINE

## 2019-09-20 PROCEDURE — 87075 CULTR BACTERIA EXCEPT BLOOD: CPT | Performed by: INTERNAL MEDICINE

## 2019-09-20 PROCEDURE — 88312 SPECIAL STAINS GROUP 1: CPT | Performed by: INTERNAL MEDICINE

## 2019-09-20 PROCEDURE — 89051 BODY FLUID CELL COUNT: CPT | Performed by: INTERNAL MEDICINE

## 2019-09-20 PROCEDURE — 89050 BODY FLUID CELL COUNT: CPT | Performed by: INTERNAL MEDICINE

## 2019-09-20 PROCEDURE — 87176 TISSUE HOMOGENIZATION CULTR: CPT | Performed by: INTERNAL MEDICINE

## 2019-09-20 PROCEDURE — 0B9H8ZX DRAINAGE OF LUNG LINGULA, VIA NATURAL OR ARTIFICIAL OPENING ENDOSCOPIC, DIAGNOSTIC: ICD-10-PCS | Performed by: INTERNAL MEDICINE

## 2019-09-20 PROCEDURE — 87102 FUNGUS ISOLATION CULTURE: CPT | Performed by: INTERNAL MEDICINE

## 2019-09-20 PROCEDURE — 0BBJ8ZX EXCISION OF LEFT LOWER LUNG LOBE, VIA NATURAL OR ARTIFICIAL OPENING ENDOSCOPIC, DIAGNOSTIC: ICD-10-PCS | Performed by: INTERNAL MEDICINE

## 2019-09-20 PROCEDURE — 82962 GLUCOSE BLOOD TEST: CPT

## 2019-09-20 PROCEDURE — 88305 TISSUE EXAM BY PATHOLOGIST: CPT | Performed by: INTERNAL MEDICINE

## 2019-09-20 PROCEDURE — 87076 CULTURE ANAEROBE IDENT EACH: CPT | Performed by: INTERNAL MEDICINE

## 2019-09-20 PROCEDURE — 88104 CYTOPATH FL NONGYN SMEARS: CPT | Performed by: INTERNAL MEDICINE

## 2019-09-20 PROCEDURE — 87071 CULTURE AEROBIC QUANT OTHER: CPT | Performed by: INTERNAL MEDICINE

## 2019-09-20 PROCEDURE — 87070 CULTURE OTHR SPECIMN AEROBIC: CPT | Performed by: INTERNAL MEDICINE

## 2019-09-20 PROCEDURE — 71045 X-RAY EXAM CHEST 1 VIEW: CPT | Performed by: INTERNAL MEDICINE

## 2019-09-20 PROCEDURE — 88321 CONSLTJ&REPRT SLD PREP ELSWR: CPT | Performed by: INTERNAL MEDICINE

## 2019-09-20 RX ORDER — DEXTROSE MONOHYDRATE 25 G/50ML
50 INJECTION, SOLUTION INTRAVENOUS
Status: DISCONTINUED | OUTPATIENT
Start: 2019-09-20 | End: 2019-09-20

## 2019-09-20 RX ORDER — LIDOCAINE HYDROCHLORIDE 20 MG/ML
INJECTION, SOLUTION INFILTRATION; PERINEURAL
Status: DISCONTINUED | OUTPATIENT
Start: 2019-09-20 | End: 2019-09-20

## 2019-09-20 RX ORDER — SODIUM CHLORIDE, SODIUM LACTATE, POTASSIUM CHLORIDE, CALCIUM CHLORIDE 600; 310; 30; 20 MG/100ML; MG/100ML; MG/100ML; MG/100ML
INJECTION, SOLUTION INTRAVENOUS CONTINUOUS
Status: DISCONTINUED | OUTPATIENT
Start: 2019-09-20 | End: 2019-09-20

## 2019-09-20 NOTE — OPERATIVE REPORT
Bronchoscopy procedure report    Preop diagnosis: abnl ct chest  Postop diagnosis:  abnl CT chest  Procedure performed: Bronchoscopy, Diagnostic  Bronchoalveolar lavage, BAL  Transbronchial biopsy    Sedation used: MAC- please see their documentation  Mode

## 2019-09-20 NOTE — ANESTHESIA PREPROCEDURE EVALUATION
PRE-OP EVALUATION    Patient Name: Cyn Listen    Pre-op Diagnosis: ABNORMAL CT CHEST    Procedure(s):  BRONCHOSCOPY WITH BRONCHOALVEOLAR LAVAGE AND TRANSBRONCHIAL BIOPSY    Surgeon(s) and Role:     * Lis Brantley MD - Primary    Pre-op vital Sarahi Pandey MD at [facility] ENDOSCOPY   • ESOPHAGOGASTRODUODENOSCOPY (EGD) N/A 5/6/2019    Performed by Nobie Brittle, MD at [facility] ENDOSCOPY   • NEEDLE BIOPSY LEFT  2017   • REMOVAL GALLBLADDER     • TOTAL KNEE REPLACEMENT Bilateral    • UPPER GI ENDOSCOPY,EXAM

## 2019-09-20 NOTE — ANESTHESIA POSTPROCEDURE EVALUATION
[address] [facility] Patient Status:  Hospital Outpatient Surgery   Age/Gender 59year old female MRN [id number]   Location [address] [facility]. Attending Krunal Oakes MD   Hosp Day # 0 PCP Varinder Elias MD       Anesthesia P

## 2019-09-20 NOTE — H&P
Clinic note from 8/29 reviewed  Denies changes in symptoms at this time  Continues to smoke 1/2 ppd      BP (!) 191/98 (BP Location: Left arm)   Pulse 101   Temp 98.4 °F (36.9 °C) (Oral)   Resp 16   Ht 5' 2\" (1.575 m)   Wt 148 lb (67.1 kg)   SpO2 100%   B

## 2019-09-24 LAB — NON GYNE INTERPRETATION: NEGATIVE

## 2019-12-06 RX ORDER — DEXTROSE MONOHYDRATE 25 G/50ML
50 INJECTION, SOLUTION INTRAVENOUS
Status: CANCELLED | OUTPATIENT
Start: 2019-12-06

## 2019-12-06 RX ORDER — ACETAMINOPHEN 500 MG
1000 TABLET ORAL ONCE
Status: CANCELLED | OUTPATIENT
Start: 2019-12-06 | End: 2019-12-06

## 2019-12-27 ENCOUNTER — LABORATORY ENCOUNTER (OUTPATIENT)
Dept: LAB | Facility: HOSPITAL | Age: 64
End: 2019-12-27
Payer: MEDICARE

## 2019-12-27 ENCOUNTER — APPOINTMENT (OUTPATIENT)
Dept: LAB | Facility: HOSPITAL | Age: 64
End: 2019-12-27
Payer: MEDICARE

## 2019-12-27 DIAGNOSIS — M16.11 PRIMARY OSTEOARTHRITIS OF RIGHT HIP: ICD-10-CM

## 2019-12-27 PROCEDURE — 36415 COLL VENOUS BLD VENIPUNCTURE: CPT

## 2019-12-27 PROCEDURE — 93005 ELECTROCARDIOGRAM TRACING: CPT

## 2019-12-27 PROCEDURE — 93010 ELECTROCARDIOGRAM REPORT: CPT | Performed by: INTERNAL MEDICINE

## 2019-12-27 PROCEDURE — 86901 BLOOD TYPING SEROLOGIC RH(D): CPT

## 2019-12-27 PROCEDURE — 80048 BASIC METABOLIC PNL TOTAL CA: CPT

## 2019-12-27 PROCEDURE — 86900 BLOOD TYPING SEROLOGIC ABO: CPT

## 2019-12-27 PROCEDURE — 87081 CULTURE SCREEN ONLY: CPT

## 2019-12-27 PROCEDURE — 86850 RBC ANTIBODY SCREEN: CPT

## 2019-12-27 PROCEDURE — 85025 COMPLETE CBC W/AUTO DIFF WBC: CPT

## 2020-01-02 NOTE — H&P
[facility]    PATIENT'S NAME: Mirella Pena   ATTENDING PHYSICIAN: Floresita Lindo M.D.    PATIENT ACCOUNT#:   [de-identified]    LOCATION:    MEDICAL RECORD #:   [id number]       YOB: 1955  ADMISSION DATE:       01/10/2020    HISTORY AND left benign biopsy, 1991; cholecystectomy; colonoscopy; right and left knee replacements by Dr. Alan Anthony at [facility]; upper GI endoscopy. MEDICATIONS:  Atenolol, prednisone, Norvasc, Prilosec, vitamin D, metformin, aspirin.     ALLERGIES:  Celecoxib, diarrhea,

## 2020-01-10 ENCOUNTER — ANESTHESIA (OUTPATIENT)
Dept: SURGERY | Facility: HOSPITAL | Age: 65
DRG: 470 | End: 2020-01-10
Payer: MEDICARE

## 2020-01-10 ENCOUNTER — ANESTHESIA EVENT (OUTPATIENT)
Dept: SURGERY | Facility: HOSPITAL | Age: 65
DRG: 470 | End: 2020-01-10
Payer: MEDICARE

## 2020-01-10 ENCOUNTER — HOSPITAL ENCOUNTER (INPATIENT)
Facility: HOSPITAL | Age: 65
LOS: 3 days | Discharge: NURSING, REHAB OR LONG-TERM CARE | DRG: 470 | End: 2020-01-13
Attending: ORTHOPAEDIC SURGERY | Admitting: ORTHOPAEDIC SURGERY
Payer: MEDICARE

## 2020-01-10 ENCOUNTER — APPOINTMENT (OUTPATIENT)
Dept: GENERAL RADIOLOGY | Facility: HOSPITAL | Age: 65
DRG: 470 | End: 2020-01-10
Attending: ORTHOPAEDIC SURGERY
Payer: MEDICARE

## 2020-01-10 DIAGNOSIS — M16.11 PRIMARY OSTEOARTHRITIS OF RIGHT HIP: Primary | ICD-10-CM

## 2020-01-10 LAB
GLUCOSE BLD-MCNC: 110 MG/DL (ref 70–99)
GLUCOSE BLD-MCNC: 116 MG/DL (ref 70–99)
GLUCOSE BLD-MCNC: 136 MG/DL (ref 70–99)
GLUCOSE BLD-MCNC: 144 MG/DL (ref 70–99)
GLUCOSE BLD-MCNC: 147 MG/DL (ref 70–99)

## 2020-01-10 PROCEDURE — 82962 GLUCOSE BLOOD TEST: CPT

## 2020-01-10 PROCEDURE — 73501 X-RAY EXAM HIP UNI 1 VIEW: CPT | Performed by: ORTHOPAEDIC SURGERY

## 2020-01-10 PROCEDURE — 88342 IMHCHEM/IMCYTCHM 1ST ANTB: CPT | Performed by: ORTHOPAEDIC SURGERY

## 2020-01-10 PROCEDURE — 0SR901A REPLACEMENT OF RIGHT HIP JOINT WITH METAL SYNTHETIC SUBSTITUTE, UNCEMENTED, OPEN APPROACH: ICD-10-PCS | Performed by: ORTHOPAEDIC SURGERY

## 2020-01-10 PROCEDURE — 88365 INSITU HYBRIDIZATION (FISH): CPT | Performed by: ORTHOPAEDIC SURGERY

## 2020-01-10 PROCEDURE — 88304 TISSUE EXAM BY PATHOLOGIST: CPT | Performed by: ORTHOPAEDIC SURGERY

## 2020-01-10 PROCEDURE — 88341 IMHCHEM/IMCYTCHM EA ADD ANTB: CPT | Performed by: ORTHOPAEDIC SURGERY

## 2020-01-10 PROCEDURE — 88311 DECALCIFY TISSUE: CPT | Performed by: ORTHOPAEDIC SURGERY

## 2020-01-10 PROCEDURE — 3E0T3BZ INTRODUCTION OF ANESTHETIC AGENT INTO PERIPHERAL NERVES AND PLEXI, PERCUTANEOUS APPROACH: ICD-10-PCS | Performed by: ANESTHESIOLOGY

## 2020-01-10 DEVICE — TM MOD CUP 50MM CLUSTER: Type: IMPLANTABLE DEVICE | Site: HIP | Status: FUNCTIONAL

## 2020-01-10 DEVICE — BIOLOX® DELTA, CERAMIC FEMORAL HEAD, S, Ø 36/-3.5, TAPER 12/14
Type: IMPLANTABLE DEVICE | Site: HIP | Status: FUNCTIONAL
Brand: BIOLOX® DELTA

## 2020-01-10 DEVICE — BONE SCREW 6.5X40 SELF-TAP: Type: IMPLANTABLE DEVICE | Site: HIP | Status: FUNCTIONAL

## 2020-01-10 DEVICE — BONE SCREW 6.5X25 SELF-TAP: Type: IMPLANTABLE DEVICE | Site: HIP | Status: FUNCTIONAL

## 2020-01-10 DEVICE — SYSTEM THR TRB MTL XLPE CUP HD: Type: IMPLANTABLE DEVICE | Status: FUNCTIONAL

## 2020-01-10 DEVICE — MOD CUP NEU LNR LG 50/52/54X36: Type: IMPLANTABLE DEVICE | Site: HIP | Status: FUNCTIONAL

## 2020-01-10 DEVICE — IMPLANTABLE DEVICE: Type: IMPLANTABLE DEVICE | Site: HIP | Status: FUNCTIONAL

## 2020-01-10 RX ORDER — ACETAMINOPHEN 325 MG/1
650 TABLET ORAL ONCE
Status: COMPLETED | OUTPATIENT
Start: 2020-01-10 | End: 2020-01-10

## 2020-01-10 RX ORDER — HYDROMORPHONE HYDROCHLORIDE 1 MG/ML
0.4 INJECTION, SOLUTION INTRAMUSCULAR; INTRAVENOUS; SUBCUTANEOUS EVERY 5 MIN PRN
Status: DISCONTINUED | OUTPATIENT
Start: 2020-01-10 | End: 2020-01-10 | Stop reason: HOSPADM

## 2020-01-10 RX ORDER — TIZANIDINE 4 MG/1
4 TABLET ORAL 3 TIMES DAILY PRN
Status: DISCONTINUED | OUTPATIENT
Start: 2020-01-10 | End: 2020-01-11

## 2020-01-10 RX ORDER — LOSARTAN POTASSIUM 100 MG/1
100 TABLET ORAL DAILY
Status: DISCONTINUED | OUTPATIENT
Start: 2020-01-10 | End: 2020-01-13

## 2020-01-10 RX ORDER — PREDNISONE 1 MG/1
5 TABLET ORAL 2 TIMES DAILY
COMMUNITY
End: 2020-02-11

## 2020-01-10 RX ORDER — OXYCODONE HYDROCHLORIDE 5 MG/1
5 TABLET ORAL EVERY 4 HOURS PRN
Status: DISCONTINUED | OUTPATIENT
Start: 2020-01-10 | End: 2020-01-10

## 2020-01-10 RX ORDER — PHENYLEPHRINE HCL 10 MG/ML
VIAL (ML) INJECTION AS NEEDED
Status: DISCONTINUED | OUTPATIENT
Start: 2020-01-10 | End: 2020-01-10 | Stop reason: SURG

## 2020-01-10 RX ORDER — SENNOSIDES 8.6 MG
17.2 TABLET ORAL NIGHTLY
Status: DISCONTINUED | OUTPATIENT
Start: 2020-01-10 | End: 2020-01-13

## 2020-01-10 RX ORDER — HYDROCODONE BITARTRATE AND ACETAMINOPHEN 10; 325 MG/1; MG/1
1 TABLET ORAL EVERY 4 HOURS PRN
Status: DISCONTINUED | OUTPATIENT
Start: 2020-01-10 | End: 2020-01-13

## 2020-01-10 RX ORDER — OXYCODONE HYDROCHLORIDE 15 MG/1
15 TABLET ORAL EVERY 4 HOURS PRN
Status: DISCONTINUED | OUTPATIENT
Start: 2020-01-10 | End: 2020-01-10

## 2020-01-10 RX ORDER — CEFAZOLIN SODIUM/WATER 2 G/20 ML
2 SYRINGE (ML) INTRAVENOUS ONCE
Status: COMPLETED | OUTPATIENT
Start: 2020-01-10 | End: 2020-01-10

## 2020-01-10 RX ORDER — ONDANSETRON 2 MG/ML
4 INJECTION INTRAMUSCULAR; INTRAVENOUS EVERY 4 HOURS PRN
Status: ACTIVE | OUTPATIENT
Start: 2020-01-10 | End: 2020-01-12

## 2020-01-10 RX ORDER — MIDAZOLAM HYDROCHLORIDE 1 MG/ML
INJECTION INTRAMUSCULAR; INTRAVENOUS AS NEEDED
Status: DISCONTINUED | OUTPATIENT
Start: 2020-01-10 | End: 2020-01-10 | Stop reason: SURG

## 2020-01-10 RX ORDER — PANTOPRAZOLE SODIUM 40 MG/1
40 TABLET, DELAYED RELEASE ORAL
Status: DISCONTINUED | OUTPATIENT
Start: 2020-01-11 | End: 2020-01-13

## 2020-01-10 RX ORDER — PREDNISONE 1 MG/1
5 TABLET ORAL 2 TIMES DAILY WITH MEALS
Status: DISCONTINUED | OUTPATIENT
Start: 2020-01-10 | End: 2020-01-13

## 2020-01-10 RX ORDER — AMLODIPINE BESYLATE 5 MG/1
10 TABLET ORAL DAILY
Status: DISCONTINUED | OUTPATIENT
Start: 2020-01-10 | End: 2020-01-13

## 2020-01-10 RX ORDER — SODIUM CHLORIDE 9 MG/ML
INJECTION, SOLUTION INTRAVENOUS CONTINUOUS
Status: DISCONTINUED | OUTPATIENT
Start: 2020-01-10 | End: 2020-01-13

## 2020-01-10 RX ORDER — HYDROMORPHONE HYDROCHLORIDE 1 MG/ML
0.8 INJECTION, SOLUTION INTRAMUSCULAR; INTRAVENOUS; SUBCUTANEOUS EVERY 2 HOUR PRN
Status: DISCONTINUED | OUTPATIENT
Start: 2020-01-10 | End: 2020-01-11

## 2020-01-10 RX ORDER — OMEPRAZOLE 40 MG/1
40 CAPSULE, DELAYED RELEASE ORAL DAILY
COMMUNITY
End: 2020-01-22

## 2020-01-10 RX ORDER — SODIUM PHOSPHATE, DIBASIC AND SODIUM PHOSPHATE, MONOBASIC 7; 19 G/133ML; G/133ML
1 ENEMA RECTAL ONCE AS NEEDED
Status: DISCONTINUED | OUTPATIENT
Start: 2020-01-10 | End: 2020-01-13

## 2020-01-10 RX ORDER — DIPHENHYDRAMINE HYDROCHLORIDE 50 MG/ML
12.5 INJECTION INTRAMUSCULAR; INTRAVENOUS EVERY 4 HOURS PRN
Status: DISCONTINUED | OUTPATIENT
Start: 2020-01-10 | End: 2020-01-13

## 2020-01-10 RX ORDER — AMLODIPINE, VALSARTAN AND HYDROCHLOROTHIAZIDE 10; 160; 25 MG/1; MG/1; MG/1
1 TABLET ORAL DAILY
Status: DISCONTINUED | OUTPATIENT
Start: 2020-01-10 | End: 2020-01-10 | Stop reason: SDUPTHER

## 2020-01-10 RX ORDER — AMLODIPINE, VALSARTAN AND HYDROCHLOROTHIAZIDE 10; 160; 25 MG/1; MG/1; MG/1
1 TABLET ORAL DAILY
COMMUNITY
Start: 2020-01-07 | End: 2020-02-11

## 2020-01-10 RX ORDER — ONDANSETRON 2 MG/ML
4 INJECTION INTRAMUSCULAR; INTRAVENOUS AS NEEDED
Status: DISCONTINUED | OUTPATIENT
Start: 2020-01-10 | End: 2020-01-10 | Stop reason: HOSPADM

## 2020-01-10 RX ORDER — ACETAMINOPHEN 325 MG/1
650 TABLET ORAL 4 TIMES DAILY
Status: DISCONTINUED | OUTPATIENT
Start: 2020-01-10 | End: 2020-01-10

## 2020-01-10 RX ORDER — NALOXONE HYDROCHLORIDE 0.4 MG/ML
80 INJECTION, SOLUTION INTRAMUSCULAR; INTRAVENOUS; SUBCUTANEOUS AS NEEDED
Status: DISCONTINUED | OUTPATIENT
Start: 2020-01-10 | End: 2020-01-10 | Stop reason: HOSPADM

## 2020-01-10 RX ORDER — CEFAZOLIN SODIUM/WATER 2 G/20 ML
2 SYRINGE (ML) INTRAVENOUS EVERY 8 HOURS
Status: COMPLETED | OUTPATIENT
Start: 2020-01-10 | End: 2020-01-11

## 2020-01-10 RX ORDER — BUPIVACAINE HYDROCHLORIDE 7.5 MG/ML
INJECTION, SOLUTION INTRASPINAL AS NEEDED
Status: DISCONTINUED | OUTPATIENT
Start: 2020-01-10 | End: 2020-01-10 | Stop reason: SURG

## 2020-01-10 RX ORDER — METOCLOPRAMIDE HYDROCHLORIDE 5 MG/ML
10 INJECTION INTRAMUSCULAR; INTRAVENOUS EVERY 6 HOURS PRN
Status: ACTIVE | OUTPATIENT
Start: 2020-01-10 | End: 2020-01-12

## 2020-01-10 RX ORDER — OXYCODONE HYDROCHLORIDE 10 MG/1
10 TABLET ORAL EVERY 4 HOURS PRN
Status: DISCONTINUED | OUTPATIENT
Start: 2020-01-10 | End: 2020-01-10

## 2020-01-10 RX ORDER — HYDROCHLOROTHIAZIDE 25 MG/1
25 TABLET ORAL DAILY
Status: DISCONTINUED | OUTPATIENT
Start: 2020-01-10 | End: 2020-01-13

## 2020-01-10 RX ORDER — DEXTROSE MONOHYDRATE 25 G/50ML
50 INJECTION, SOLUTION INTRAVENOUS
Status: DISCONTINUED | OUTPATIENT
Start: 2020-01-10 | End: 2020-01-13

## 2020-01-10 RX ORDER — DOCUSATE SODIUM 100 MG/1
100 CAPSULE, LIQUID FILLED ORAL 2 TIMES DAILY
Status: DISCONTINUED | OUTPATIENT
Start: 2020-01-10 | End: 2020-01-13

## 2020-01-10 RX ORDER — BISACODYL 10 MG
10 SUPPOSITORY, RECTAL RECTAL
Status: DISCONTINUED | OUTPATIENT
Start: 2020-01-10 | End: 2020-01-13

## 2020-01-10 RX ORDER — SCOLOPAMINE TRANSDERMAL SYSTEM 1 MG/1
1 PATCH, EXTENDED RELEASE TRANSDERMAL ONCE
Status: COMPLETED | OUTPATIENT
Start: 2020-01-10 | End: 2020-01-13

## 2020-01-10 RX ORDER — TRAMADOL HYDROCHLORIDE 50 MG/1
50 TABLET ORAL EVERY 12 HOURS
Status: DISCONTINUED | OUTPATIENT
Start: 2020-01-10 | End: 2020-01-13

## 2020-01-10 RX ORDER — POLYETHYLENE GLYCOL 3350 17 G/17G
17 POWDER, FOR SOLUTION ORAL DAILY PRN
Status: DISCONTINUED | OUTPATIENT
Start: 2020-01-10 | End: 2020-01-13

## 2020-01-10 RX ORDER — HYDROCODONE BITARTRATE AND ACETAMINOPHEN 10; 325 MG/1; MG/1
1-2 TABLET ORAL EVERY 6 HOURS PRN
Qty: 30 TABLET | Refills: 0 | Status: SHIPPED | OUTPATIENT
Start: 2020-01-10 | End: 2020-09-10 | Stop reason: ALTCHOICE

## 2020-01-10 RX ORDER — HYDROMORPHONE HYDROCHLORIDE 1 MG/ML
0.4 INJECTION, SOLUTION INTRAMUSCULAR; INTRAVENOUS; SUBCUTANEOUS EVERY 2 HOUR PRN
Status: DISPENSED | OUTPATIENT
Start: 2020-01-10 | End: 2020-01-12

## 2020-01-10 RX ORDER — DEXTROSE MONOHYDRATE 25 G/50ML
50 INJECTION, SOLUTION INTRAVENOUS
Status: DISCONTINUED | OUTPATIENT
Start: 2020-01-10 | End: 2020-01-10 | Stop reason: HOSPADM

## 2020-01-10 RX ORDER — ATENOLOL 50 MG/1
50 TABLET ORAL DAILY
Status: DISCONTINUED | OUTPATIENT
Start: 2020-01-10 | End: 2020-01-13

## 2020-01-10 RX ORDER — ACETAMINOPHEN 325 MG/1
TABLET ORAL
Status: COMPLETED
Start: 2020-01-10 | End: 2020-01-10

## 2020-01-10 RX ORDER — SODIUM CHLORIDE, SODIUM LACTATE, POTASSIUM CHLORIDE, CALCIUM CHLORIDE 600; 310; 30; 20 MG/100ML; MG/100ML; MG/100ML; MG/100ML
INJECTION, SOLUTION INTRAVENOUS CONTINUOUS
Status: DISCONTINUED | OUTPATIENT
Start: 2020-01-10 | End: 2020-01-10 | Stop reason: HOSPADM

## 2020-01-10 RX ORDER — HYDROMORPHONE HYDROCHLORIDE 1 MG/ML
0.2 INJECTION, SOLUTION INTRAMUSCULAR; INTRAVENOUS; SUBCUTANEOUS EVERY 2 HOUR PRN
Status: DISPENSED | OUTPATIENT
Start: 2020-01-10 | End: 2020-01-12

## 2020-01-10 RX ORDER — ACETAMINOPHEN 325 MG/1
650 TABLET ORAL EVERY 4 HOURS PRN
Status: DISCONTINUED | OUTPATIENT
Start: 2020-01-10 | End: 2020-01-11

## 2020-01-10 RX ORDER — SODIUM CHLORIDE, SODIUM LACTATE, POTASSIUM CHLORIDE, CALCIUM CHLORIDE 600; 310; 30; 20 MG/100ML; MG/100ML; MG/100ML; MG/100ML
INJECTION, SOLUTION INTRAVENOUS CONTINUOUS
Status: DISCONTINUED | OUTPATIENT
Start: 2020-01-10 | End: 2020-01-10

## 2020-01-10 RX ORDER — DIPHENHYDRAMINE HCL 25 MG
25 CAPSULE ORAL EVERY 4 HOURS PRN
Status: DISCONTINUED | OUTPATIENT
Start: 2020-01-10 | End: 2020-01-13

## 2020-01-10 RX ORDER — DIPHENHYDRAMINE HYDROCHLORIDE 50 MG/ML
25 INJECTION INTRAMUSCULAR; INTRAVENOUS ONCE AS NEEDED
Status: ACTIVE | OUTPATIENT
Start: 2020-01-10 | End: 2020-01-10

## 2020-01-10 RX ORDER — LIDOCAINE HYDROCHLORIDE 10 MG/ML
INJECTION, SOLUTION EPIDURAL; INFILTRATION; INTRACAUDAL; PERINEURAL AS NEEDED
Status: DISCONTINUED | OUTPATIENT
Start: 2020-01-10 | End: 2020-01-10 | Stop reason: SURG

## 2020-01-10 RX ADMIN — SODIUM CHLORIDE, SODIUM LACTATE, POTASSIUM CHLORIDE, CALCIUM CHLORIDE: 600; 310; 30; 20 INJECTION, SOLUTION INTRAVENOUS at 09:05:00

## 2020-01-10 RX ADMIN — SODIUM CHLORIDE, SODIUM LACTATE, POTASSIUM CHLORIDE, CALCIUM CHLORIDE: 600; 310; 30; 20 INJECTION, SOLUTION INTRAVENOUS at 08:16:00

## 2020-01-10 RX ADMIN — PHENYLEPHRINE HCL 50 MCG: 10 MG/ML VIAL (ML) INJECTION at 08:40:00

## 2020-01-10 RX ADMIN — SODIUM CHLORIDE, SODIUM LACTATE, POTASSIUM CHLORIDE, CALCIUM CHLORIDE: 600; 310; 30; 20 INJECTION, SOLUTION INTRAVENOUS at 09:04:00

## 2020-01-10 RX ADMIN — LIDOCAINE HYDROCHLORIDE 50 MG: 10 INJECTION, SOLUTION EPIDURAL; INFILTRATION; INTRACAUDAL; PERINEURAL at 08:29:00

## 2020-01-10 RX ADMIN — BUPIVACAINE HYDROCHLORIDE 1.6 ML: 7.5 INJECTION, SOLUTION INTRASPINAL at 08:20:00

## 2020-01-10 RX ADMIN — CEFAZOLIN SODIUM/WATER 2 G: 2 G/20 ML SYRINGE (ML) INTRAVENOUS at 08:33:00

## 2020-01-10 RX ADMIN — MIDAZOLAM HYDROCHLORIDE 2 MG: 1 INJECTION INTRAMUSCULAR; INTRAVENOUS at 08:16:00

## 2020-01-10 NOTE — ANESTHESIA POSTPROCEDURE EVALUATION
[address] Patient Status:  Surgery Admit - Inpt   Age/Gender 59year old female MRN [id number]   Location [address] [facility] Attending Ramon Carlos MD   [facility] Day # 0 PCP Colt Bernstein MD       Anesthesia Post-op Note

## 2020-01-10 NOTE — PLAN OF CARE
S/p Lt PUNEET  Admitted via bed. Oriented to room. States allergy to Oxycodone. Anesthesia notified- pain meds changed. Dr. Susan maguire.

## 2020-01-10 NOTE — ANESTHESIA PROCEDURE NOTES
Regional Block  Performed by: Anjali Hebert MD  Authorized by: Anjali Hebert MD       General Information and Staff    Start Time:  1/10/2020 8:24 AM  End Time:  1/10/2020 8:26 AM  Anesthesiologist:  Anjali Hebert MD  Performed by:   Anesthes

## 2020-01-10 NOTE — INTERVAL H&P NOTE
Pre-op Diagnosis: Primary osteoarthritis of right hip [M16.11]    The above referenced H&P was reviewed by SILVERIO Samuels on 3/45/5197, the patient was examined and no significant changes have occurred in the patient's condition since the H&P was perfo

## 2020-01-10 NOTE — BRIEF OP NOTE
Pre-Operative Diagnosis: Primary osteoarthritis of right hip [M16.11]     Post-Operative Diagnosis: Primary osteoarthritis of right hip [M16.11]      Procedure Performed:   Procedure(s):  RIGHT TOTAL HIP ARTHROPLASTY    Surgeon(s) and Role:     * Meghan Muir

## 2020-01-10 NOTE — ANESTHESIA PROCEDURE NOTES
Spinal Block  Performed by: Matthew Orlando MD  Authorized by: Matthew Orlando MD       General Information and Staff    Start Time:  1/10/2020 8:20 AM  End Time:  1/10/2020 8:23 AM  Anesthesiologist:  Matthew Orlando MD  Performed by:   Anesthesio

## 2020-01-10 NOTE — CONSULTS
[facility] Hospitalist Team  Initial Consult          Assessment/Plan:       S/P L PUNEET  - Plan per surgery. Continue PT/OT. Pain is currently controlled.        DM2- hold oral, start low dose ssi    RA- cont prednisone    HTN- cont bp meds    GERD- ppi    Preven arthritis ([address]    • Visual impairment     glasses      Past Surgical History:   Procedure Laterality Date   • BENIGN BIOPSY LEFT  1991   • BRONCHOSCOPY N/A 9/20/2019    Performed by Beverley Schlatter, MD at [facility] ENDOSCOPY   • CHOLECYSTECTOMY     • Oniel Moraes atraumatic. Eyes:  Sclera anicteric, No conjunctival pallor, EOMs intact. Nose: Nares normal. Septum midline. Mucosa normal. No drainage.    Throat: Lips, mucosa, and tongue normal. Teeth and gums normal.   Neck: Supple, symmetrical, trachea midline

## 2020-01-10 NOTE — ANESTHESIA PREPROCEDURE EVALUATION
PRE-OP EVALUATION    Patient Name: Shanda Olivia    Pre-op Diagnosis: Primary osteoarthritis of right hip [M16.11]    Procedure(s):  RIGHT TOTAL HIP ARTHROPLASTY    Surgeon(s) and Role:     Kiki Christopher MD - Primary    Pre-op vitals reviewed.   Temp Cardiovascular      ECG reviewed.   Exercise tolerance: poor           (+) hypertension                                     Endo/Other      (+) diabetes         (+) anemia              (+) rheumatoid arthritis     Pulmonary to auscultation bilaterally. Other findings            ASA: 3   Plan: spinal and regional  NPO status verified and     Post-procedure pain management plan discussed with surgeon and patient.     Comment: Risks and benefits of neuraxial anesthe

## 2020-01-10 NOTE — PHYSICAL THERAPY NOTE
Attempted to see pt x2 for PT evaluation, 1400 and 1500, pt cont to have minimal sensation right thigh/hip, unable to perform quality right quad set, and unable to perform right SAQ, insufficient motor return to participate with therapy.   Will re attempt 1

## 2020-01-11 LAB
DEPRECATED RDW RBC AUTO: 50.7 FL (ref 35.1–46.3)
ERYTHROCYTE [DISTWIDTH] IN BLOOD BY AUTOMATED COUNT: 18.7 % (ref 11–15)
GLUCOSE BLD-MCNC: 136 MG/DL (ref 70–99)
GLUCOSE BLD-MCNC: 159 MG/DL (ref 70–99)
GLUCOSE BLD-MCNC: 202 MG/DL (ref 70–99)
GLUCOSE BLD-MCNC: 241 MG/DL (ref 70–99)
HCT VFR BLD AUTO: 31.7 % (ref 35–48)
HGB BLD-MCNC: 9.9 G/DL (ref 12–16)
MCH RBC QN AUTO: 23.7 PG (ref 26–34)
MCHC RBC AUTO-ENTMCNC: 31.2 G/DL (ref 31–37)
MCV RBC AUTO: 75.8 FL (ref 80–100)
PLATELET # BLD AUTO: 360 10(3)UL (ref 150–450)
RBC # BLD AUTO: 4.18 X10(6)UL (ref 3.8–5.3)
WBC # BLD AUTO: 14 X10(3) UL (ref 4–11)

## 2020-01-11 PROCEDURE — 97150 GROUP THERAPEUTIC PROCEDURES: CPT

## 2020-01-11 PROCEDURE — 97162 PT EVAL MOD COMPLEX 30 MIN: CPT

## 2020-01-11 PROCEDURE — 97116 GAIT TRAINING THERAPY: CPT

## 2020-01-11 PROCEDURE — 85027 COMPLETE CBC AUTOMATED: CPT | Performed by: PHYSICIAN ASSISTANT

## 2020-01-11 PROCEDURE — 97166 OT EVAL MOD COMPLEX 45 MIN: CPT

## 2020-01-11 PROCEDURE — 82962 GLUCOSE BLOOD TEST: CPT

## 2020-01-11 PROCEDURE — 97535 SELF CARE MNGMENT TRAINING: CPT

## 2020-01-11 RX ORDER — TIZANIDINE 2 MG/1
2 TABLET ORAL 3 TIMES DAILY PRN
Status: DISCONTINUED | OUTPATIENT
Start: 2020-01-11 | End: 2020-01-13

## 2020-01-11 RX ORDER — ACETAMINOPHEN 325 MG/1
650 TABLET ORAL EVERY 4 HOURS PRN
Status: DISCONTINUED | OUTPATIENT
Start: 2020-01-11 | End: 2020-01-13

## 2020-01-11 RX ORDER — ACETAMINOPHEN 325 MG/1
325 TABLET ORAL EVERY 4 HOURS PRN
Status: DISCONTINUED | OUTPATIENT
Start: 2020-01-11 | End: 2020-01-13

## 2020-01-11 NOTE — PROGRESS NOTES
[address] [facility] Patient Status:  Inpatient    1955 MRN [id number]   [facility] 3SW-A Attending Kwabena Rachel MD   Hosp Day # 1 PCP Azucena Wan MD         S:  Patient doing well. No nausea.   No SOB, CP or calf No

## 2020-01-11 NOTE — PROGRESS NOTES
Post Op Day 1 Ortho Note: Right PUNEET    Status Post Nerve Block:  Type of Nerve Block: Right Fascia Iliaca  Single Injection Nerve Block    Post op review: No evidence of immediate block related complications, No paresthesia noted, Able to lift leg(s), Able

## 2020-01-11 NOTE — PHYSICAL THERAPY NOTE
PHYSICAL THERAPY HIP EVALUATION - INPATIENT     Room Number: 373/373-A  Evaluation Date: 1/11/2020  Type of Evaluation: Initial  Physician Order: PT Eval and Treat    Presenting Problem: s/p right PUNEET 1/10/2020  Reason for Therapy: Mobility Dysfunction and move that leg\"    Patient self-stated goal is to go home with her sister, discussed recommendation currently for LINDA due to difficulty at this time with all fxal mobility.     OBJECTIVE     Fall Risk: High fall risk    WEIGHT BEARING RESTRICTION  Weight Be Standardized Score (AM-PAC Scale): 33.86   CMS Modifier (G-Code): CL    FUNCTIONAL ABILITY STATUS  Gait Assessment   Gait Assistance: Dependent assistance(actual mod a with verbal and manual cueing)  Distance (ft): 3  Assistive Device: Rolling walker  Pa evaluation, the patient presents with the following impairments right LE pain, dec ROM and strength right LE, dec activity tolerance, and pt currently requires mod/max a for all fxal mobility, limited tolerance for gait.   These impairments and comorbiditie

## 2020-01-11 NOTE — OCCUPATIONAL THERAPY NOTE
OCCUPATIONAL THERAPY EVALUATION - INPATIENT     Room Number: 373/373-A  Evaluation Date: 1/11/2020  Type of Evaluation: Initial  Presenting Problem: L PUNEET elective 1/10/20    Physician Order: IP Consult to Occupational Therapy  Reason for Therapy: ADL/IADL crutches in home depending on what she had near by. Pt uses [employer] delivery for grocery and home goods. Pt manages own laundry, cooking (tv dinners), and light housekeeping. SUBJECTIVE   \"My sister was going to come stay with me when I get home. \" Toileting, which includes using toilet, bedpan or urinal? : A Lot  -   Putting on and taking off regular upper body clothing?: A Little  -   Taking care of personal grooming such as brushing teeth?: A Little  -   Eating meals?: None    AM-PAC Score:  Score hip precautions and AE/RW use and incorporation into basic ADLs. . These deficits impact the patient’s ability to participate in  functional transfers, functional mobility, LB ADLs including toileting, dressing, and bathing, instrumental activities of daily sit-stand transfers w/ supervision keeping hip precautions  Pt will participate in tub/shower transfer training/education. Pt will recall all hip precautions w/o cueing.

## 2020-01-11 NOTE — PROGRESS NOTES
[facility] Hospitalist Team  Progress Note      Madina Mcgill  4/7/1955    Assessment/Plan:          S/P L PUNEET  - Plan per surgery. Continue PT/OT.   Pain is currently controlled.        DM2- hold oral, start low dose ssi     RA- cont prednisone    Anemia- exp Potassium  100 mg Oral Daily    And   • hydrochlorothiazide  25 mg Oral Daily     Continuous Infusions:   • sodium chloride 125 mL/hr at 01/10/20 1005     PRN: tiZANidine HCl, acetaminophen **OR** acetaminophen, sodium chloride 0.9%, PEG 3350, magnesium hy

## 2020-01-11 NOTE — CM/SW NOTE
60 yo sp total hip replacement. Post op protocol order for discharge planning. No preop JOint journey plan noted. PT is recommending LINDA.     HOME SITUATION  Type of Home: Apartment   Home Layout: (second floor elevator bldg)     Lives With: Alone(sist

## 2020-01-11 NOTE — PHYSICAL THERAPY NOTE
PHYSICAL THERAPY HIP TREATMENT NOTE - INPATIENT      Room Number: 373/373-A     Session: 1&2  Number of Visits to Meet Established Goals: 3    Presenting Problem: s/p right PUNEET 1/10/2020  History related to current admission: Pt admitted 1/10/2020 for elec (including adjusting bedclothes, sheets and blankets)?: A Lot   -   Sitting down on and standing up from a chair with arms (e.g., wheelchair, bedside commode, etc.): A Lot   -   Moving from lying on back to sitting on the side of the bed?: A Lot   How much with UE. Pt remains fearful of bearing weight on right LE to advance left LE. Manual assist to flex right knee and advance, then to shift wt onto right to advance left LE, this improved with practice.   Pt does admit that she is apprehensive about standin training;Range of motion;Strengthening;Transfer training  Rehab Potential : Good  Frequency (Obs): BID    CURRENT GOALS  Goal #1  Patient is able to demonstrate supine - sit EOB @ level: supervision      Goal #2  Patient is able to demonstrate transfers Si

## 2020-01-11 NOTE — PLAN OF CARE
Pain was intense after nerve block worn off. Given oral and IV pain med with good relief. Able to dorsi/plantar flex ankles with mod strength. KI to be worn when up. VSS, spo2 wnl on RA. MF cdi to rt hip. PT/OT, d/c plan TBD.

## 2020-01-11 NOTE — PLAN OF CARE
Oxycodone changed to Hydrocodone yest, tolerating w/o any nausea or rash, Dbfrpjdbs=855, covered per sliding scale, states has been on Prednisone for RA, PT /OT to see, knee immobilizer in room, states rt leg still feels heavy, declined ice bag, Mcf dsg cl

## 2020-01-12 LAB
DEPRECATED RDW RBC AUTO: 52.1 FL (ref 35.1–46.3)
ERYTHROCYTE [DISTWIDTH] IN BLOOD BY AUTOMATED COUNT: 18.6 % (ref 11–15)
GLUCOSE BLD-MCNC: 118 MG/DL (ref 70–99)
GLUCOSE BLD-MCNC: 150 MG/DL (ref 70–99)
GLUCOSE BLD-MCNC: 180 MG/DL (ref 70–99)
GLUCOSE BLD-MCNC: 194 MG/DL (ref 70–99)
HCT VFR BLD AUTO: 31.2 % (ref 35–48)
HGB BLD-MCNC: 9.8 G/DL (ref 12–16)
MCH RBC QN AUTO: 24.1 PG (ref 26–34)
MCHC RBC AUTO-ENTMCNC: 31.4 G/DL (ref 31–37)
MCV RBC AUTO: 76.8 FL (ref 80–100)
PLATELET # BLD AUTO: 347 10(3)UL (ref 150–450)
RBC # BLD AUTO: 4.06 X10(6)UL (ref 3.8–5.3)
WBC # BLD AUTO: 17.7 X10(3) UL (ref 4–11)

## 2020-01-12 PROCEDURE — 85027 COMPLETE CBC AUTOMATED: CPT | Performed by: PHYSICIAN ASSISTANT

## 2020-01-12 PROCEDURE — 97535 SELF CARE MNGMENT TRAINING: CPT

## 2020-01-12 PROCEDURE — 97150 GROUP THERAPEUTIC PROCEDURES: CPT

## 2020-01-12 PROCEDURE — 82962 GLUCOSE BLOOD TEST: CPT

## 2020-01-12 PROCEDURE — 97116 GAIT TRAINING THERAPY: CPT

## 2020-01-12 NOTE — PROGRESS NOTES
[facility] Hospitalist Team  Progress Note      Rand Martir  4/7/1955    Assessment/Plan:          S/P L PUNEET  - Plan per surgery. Continue PT/OT.   Pain is currently controlled.        DM2- hold oral, start low dose ssi     RA- cont prednisone    Leukocytosi Pantoprazole Sodium  40 mg Oral QAM AC   • predniSONE  5 mg Oral BID with meals   • Insulin Aspart Pen  1-5 Units Subcutaneous TID CC and HS   • amLODIPine Besylate  10 mg Oral Daily    And   • Losartan Potassium  100 mg Oral Daily    And   • hydrochloroth

## 2020-01-12 NOTE — PLAN OF CARE
Pain relief adequate with oral medication. VSS, afebrile, spo2 96% on RA. Encouraged use of IS and ankle pump exercise. MF to rt hip cdi. Plan is d/c home with [place] today, pt refused LINDA.

## 2020-01-12 NOTE — HOME CARE LIAISON
MET WITH PTNT AND OFFERED CHOICE  OF AGENCIES. PTNT AGREEABLE TO [facility]. MET WITH PTNT TO DISCUSS HOME HEALTH SERVICES AND COVERAGE CRITERIA. PTNT AGREEABLE TO [facility]. PTNT GIVEN RESIDENTIAL BROCHURE.  RESIDENTIAL WITH PROVIDE SN/PT ON DISC

## 2020-01-12 NOTE — PROGRESS NOTES
Post Op Day 2 Ortho Note: Right PUNEET    Assessed patient in chair. Rates pain 0-2/10 at rest and 3-4/10 with activity. States pain is managed with medications; denies itching/nausea/dizziness. Able to bear weight on sx leg; equal sensation in BLE.      No

## 2020-01-12 NOTE — PLAN OF CARE
Per ot/pt recommending lana, pt not wiling ot go to [facility], therefore home health has been set up, pt stating she will have help 24hrs once she does go home, ot/pt recommending then that pt gets an additional day of therapy, ortho pa in formed, pt to stay over

## 2020-01-12 NOTE — PHYSICAL THERAPY NOTE
PHYSICAL THERAPY HIP TREATMENT NOTE - INPATIENT      Room Number: 373/373-A     Session: 3  Number of Visits to Meet Established Goals: 3    Presenting Problem: s/p right PUNEET 1/10/2020  History related to current admission: Pt admitted 1/10/2020 for electi currently have. ..  -   Turning over in bed (including adjusting bedclothes, sheets and blankets)?: A Little   -   Sitting down on and standing up from a chair with arms (e.g., wheelchair, bedside commode, etc.): A Little   -   Moving from lying on back to was present no   is a no    Patient End of Session: Up in chair; With [facility] staff;Needs met;Call light within reach;RN aware of session/findings; All patient questions and concerns addressed    ASSESSMENT   Pt participated in group PT BID today with fair t

## 2020-01-12 NOTE — CM/SW NOTE
MSW reviewed chart. [facility]. Per CM note on 1/11 PT rec is [facility], but patient unsure. MSW attempted to reach pt via room phone. Will attempt to see for Final DC plan of LINDA or C as time allows.     Rn paged MSW to set

## 2020-01-12 NOTE — PROGRESS NOTES
[address] [facility] Patient Status:  Inpatient    1955 MRN [id number]   [facility] 3SW-A Attending Kwabena Rachel MD   Hosp Day # 2 PCP Azucena Wan MD         S:  Patient doing well. No nausea.   No SOB, CP or calf

## 2020-01-12 NOTE — OCCUPATIONAL THERAPY NOTE
OCCUPATIONAL THERAPY TREATMENT NOTE - INPATIENT     Room Number: 373/373-A  Session: 1   Number of Visits to Meet Established Goals: 5    Presenting Problem: L PUNEET elective 1/10/20    History related to current admission: : Pt is 59year old female admitte lower body clothing?: A Lot  -   Bathing (including washing, rinsing, drying)?: A Lot  -   Toileting, which includes using toilet, bedpan or urinal? : A Lot  -   Putting on and taking off regular upper body clothing?: A Little  -   Taking care of personal Treatment Plan: Balance activities; Energy conservation/work simplification techniques;ADL training; Compensatory technique education;Patient/Family training;Equipment eval/education; Endurance training;Functional transfer training  Rehab Potential : Lisseth Daly

## 2020-01-13 VITALS
SYSTOLIC BLOOD PRESSURE: 109 MMHG | OXYGEN SATURATION: 98 % | WEIGHT: 147.06 LBS | HEART RATE: 74 BPM | TEMPERATURE: 99 F | RESPIRATION RATE: 20 BRPM | HEIGHT: 62 IN | BODY MASS INDEX: 27.06 KG/M2 | DIASTOLIC BLOOD PRESSURE: 71 MMHG

## 2020-01-13 LAB
ANION GAP SERPL CALC-SCNC: 9 MMOL/L (ref 0–18)
BUN BLD-MCNC: 17 MG/DL (ref 7–18)
BUN/CREAT SERPL: 20.5 (ref 10–20)
CALCIUM BLD-MCNC: 9.4 MG/DL (ref 8.5–10.1)
CHLORIDE SERPL-SCNC: 100 MMOL/L (ref 98–112)
CO2 SERPL-SCNC: 24 MMOL/L (ref 21–32)
CREAT BLD-MCNC: 0.83 MG/DL (ref 0.55–1.02)
DEPRECATED RDW RBC AUTO: 50.7 FL (ref 35.1–46.3)
ERYTHROCYTE [DISTWIDTH] IN BLOOD BY AUTOMATED COUNT: 18.6 % (ref 11–15)
GLUCOSE BLD-MCNC: 118 MG/DL (ref 70–99)
GLUCOSE BLD-MCNC: 168 MG/DL (ref 70–99)
GLUCOSE BLD-MCNC: 189 MG/DL (ref 70–99)
HCT VFR BLD AUTO: 32.2 % (ref 35–48)
HGB BLD-MCNC: 9.9 G/DL (ref 12–16)
MCH RBC QN AUTO: 23.6 PG (ref 26–34)
MCHC RBC AUTO-ENTMCNC: 30.7 G/DL (ref 31–37)
MCV RBC AUTO: 76.7 FL (ref 80–100)
OSMOLALITY SERPL CALC.SUM OF ELEC: 283 MOSM/KG (ref 275–295)
PLATELET # BLD AUTO: 386 10(3)UL (ref 150–450)
POTASSIUM SERPL-SCNC: 3.1 MMOL/L (ref 3.5–5.1)
RBC # BLD AUTO: 4.2 X10(6)UL (ref 3.8–5.3)
SODIUM SERPL-SCNC: 133 MMOL/L (ref 136–145)
WBC # BLD AUTO: 15.7 X10(3) UL (ref 4–11)

## 2020-01-13 PROCEDURE — 80048 BASIC METABOLIC PNL TOTAL CA: CPT | Performed by: HOSPITALIST

## 2020-01-13 PROCEDURE — 97150 GROUP THERAPEUTIC PROCEDURES: CPT

## 2020-01-13 PROCEDURE — 85027 COMPLETE CBC AUTOMATED: CPT | Performed by: PHYSICIAN ASSISTANT

## 2020-01-13 PROCEDURE — 97535 SELF CARE MNGMENT TRAINING: CPT

## 2020-01-13 PROCEDURE — 82962 GLUCOSE BLOOD TEST: CPT

## 2020-01-13 PROCEDURE — 97116 GAIT TRAINING THERAPY: CPT

## 2020-01-13 RX ORDER — POTASSIUM CHLORIDE 20 MEQ/1
40 TABLET, EXTENDED RELEASE ORAL EVERY 4 HOURS
Status: DISCONTINUED | OUTPATIENT
Start: 2020-01-13 | End: 2020-01-13

## 2020-01-13 RX ORDER — MAGNESIUM OXIDE 400 MG (241.3 MG MAGNESIUM) TABLET
400 TABLET ONCE
Status: COMPLETED | OUTPATIENT
Start: 2020-01-13 | End: 2020-01-13

## 2020-01-13 NOTE — OCCUPATIONAL THERAPY NOTE
OCCUPATIONAL THERAPY TREATMENT NOTE - INPATIENT     Room Number: 373/373-A  Session: 2/5  Number of Visits to Meet Established Goals: 5    Presenting Problem: L PUNEET elective 1/10/20    History related to current admission: : Pt is 59year old female admitt washing, rinsing, drying)?: A Lot  -   Toileting, which includes using toilet, bedpan or urinal? : A Little  -   Putting on and taking off regular upper body clothing?: A Little  -   Taking care of personal grooming such as brushing teeth?: A Little  -   E training;Functional transfer training  Rehab Potential : Fair  Frequency (Obs): 3-5x/week       OT Goals:   ADL Goals  Pt will perform LB dressing w/ supervision and AE as needed keeping hip precautions  -on going 1/13  Pt will perform toileting: with supe

## 2020-01-13 NOTE — PLAN OF CARE
Received awake, alert and oriented x 4. Reported having minimal pain to right surgical hip. Right hip incision with Aquacel dressing in place, clean, dry and intact. Declined to take oral narcotic, prefers to have Tylenol only.   Dr. Bonnie Hodge seen patient

## 2020-01-13 NOTE — DISCHARGE SUMMARY
Discharge Summary  Patient ID:  Stephanie Mcpherson  [id number]  59year old  4/7/1955    Admit date: 1/10/2020    Discharge date and time: 1/13/20    Attending Physician: No att. providers found     Reason for admission: Primary osteoarthritis of right hip [M

## 2020-01-13 NOTE — OPERATIVE REPORT
[facility]    PATIENT'S NAME: Alondra Haynes   ATTENDING PHYSICIAN: Nathan Dominguez M.D. OPERATING PHYSICIAN: Nathan Dominguez M.D.    PATIENT ACCOUNT#:   [de-identified]    LOCATION:  [address] [place] [address]  MEDICAL RECORD #:   [id number]       DATE OF BIRTH:  0 gloved digit. Self-retaining Charnley retractor was positioned with small blades. The hip was placed in marked internal rotation. Exposure was made difficult by the tight iliotibial band which was released.   Bovie cautery was used to release the short e calcar. The neck was fairly proud and later in the surgical procedure in order to not lengthen the leg, I cut the neck shorter and went back to the size 4 broach with excellent rotational stability. I withdrew the broach.   I turned my attention towards t femoral component was impacted in position. I had excellent rotational stability. I then chose a 36 mm ceramic head with a -3.5 mm neck. I impacted the head onto the trunnion of the femoral component.   I reduced the hip, had excellent stability with ful

## 2020-01-13 NOTE — PROGRESS NOTES
NURSING DISCHARGE NOTE    Discharged Nursing home via Wheelchair. Accompanied by Support staff  Belongings Taken by patient/family. Report called to [facility] [place] and given to [employer]. Transfer paper sent with the [facility] .

## 2020-01-13 NOTE — CM/SW NOTE
Spoke with [employer] from admissions at [facility] who confirmed pt can be accepted for admission today to private room. Await medical clearance for DC. / to remain available for support and/or discharge planning.      Ashley Castillo

## 2020-01-13 NOTE — PHYSICAL THERAPY NOTE
PHYSICAL THERAPY HIP TREATMENT NOTE - INPATIENT      Room Number: 373/373-A     Session: 4  Number of Visits to Meet Established Goals: 3    Presenting Problem: s/p right PUNEET 1/10/2020  History related to current admission: Pt admitted 1/10/2020 for electi difficulty does the patient currently have. ..  -   Turning over in bed (including adjusting bedclothes, sheets and blankets)?: A Little   -   Sitting down on and standing up from a chair with arms (e.g., wheelchair, bedside commode, etc.): A Little   -   M Comments:  Pt participated in group session, tolerance was fair    was present no      Patient End of Session: Up in chair;Needs met;Call light within reach;RN aware of session/findings; All patient questions and concerns addressed    ASSESSMEN

## 2020-01-13 NOTE — PROGRESS NOTES
[facility] hospitalist daily note    Pt was seen/examined on 1/13/20    S; no chest pain, no SOB, no abd pain, no nausea/emesis  No diarrhea  No burning with urination  Per pt she is passing gas and urinating    Medications in Epic    PE       01/13/20  0800   BP take other drugs when taking pain medication.  Seek medical attention if any questions or concerns  Do not exceed 4 grams acetaminophen within 24 hours from ALL sources    Total time spent on care > 35 minutes with ~50% time face to face    Brent Bhandari MD

## 2020-01-14 NOTE — CM/SW NOTE
01/13/20 1400   Discharge disposition   Expected discharge disposition Skilled Nurs   Name of Tucker Troy   Discharge transportation [facility] 1/13/2020

## 2020-02-17 PROBLEM — Z96.641 STATUS POST RIGHT HIP REPLACEMENT: Status: ACTIVE | Noted: 2020-02-17

## 2020-02-17 PROBLEM — M25.651 DECREASED RANGE OF RIGHT HIP MOVEMENT: Status: ACTIVE | Noted: 2020-02-17

## 2020-02-17 PROBLEM — M25.551 ACUTE PAIN OF RIGHT HIP: Status: ACTIVE | Noted: 2020-02-17

## 2020-09-14 PROBLEM — R80.9 TYPE 2 DIABETES MELLITUS WITH MICROALBUMINURIA, WITHOUT LONG-TERM CURRENT USE OF INSULIN  (HCC): Status: ACTIVE | Noted: 2018-12-20

## 2020-09-14 PROBLEM — E11.29 TYPE 2 DIABETES MELLITUS WITH MICROALBUMINURIA, WITHOUT LONG-TERM CURRENT USE OF INSULIN  (HCC): Status: ACTIVE | Noted: 2018-12-20

## 2020-09-14 PROBLEM — R80.9 TYPE 2 DIABETES MELLITUS WITH MICROALBUMINURIA, WITHOUT LONG-TERM CURRENT USE OF INSULIN (HCC): Status: ACTIVE | Noted: 2018-12-20

## 2020-09-14 PROBLEM — E11.29 TYPE 2 DIABETES MELLITUS WITH MICROALBUMINURIA, WITHOUT LONG-TERM CURRENT USE OF INSULIN (HCC): Status: ACTIVE | Noted: 2018-12-20

## 2020-09-14 PROBLEM — E11.59 HYPERTENSION ASSOCIATED WITH DIABETES (HCC): Status: ACTIVE | Noted: 2020-09-14

## 2020-09-14 PROBLEM — E78.5 HYPERLIPIDEMIA ASSOCIATED WITH TYPE 2 DIABETES MELLITUS  (HCC): Status: ACTIVE | Noted: 2020-09-14

## 2020-09-14 PROBLEM — E11.59 HYPERTENSION ASSOCIATED WITH DIABETES  (HCC): Status: ACTIVE | Noted: 2020-09-14

## 2020-09-14 PROBLEM — I15.2 HYPERTENSION ASSOCIATED WITH DIABETES (HCC): Status: ACTIVE | Noted: 2020-09-14

## 2020-09-14 PROBLEM — I15.2 HYPERTENSION ASSOCIATED WITH DIABETES  (HCC): Status: ACTIVE | Noted: 2020-09-14

## 2020-09-14 PROBLEM — E11.69 HYPERLIPIDEMIA ASSOCIATED WITH TYPE 2 DIABETES MELLITUS  (HCC): Status: ACTIVE | Noted: 2020-09-14

## 2020-09-14 PROBLEM — E78.5 HYPERLIPIDEMIA ASSOCIATED WITH TYPE 2 DIABETES MELLITUS (HCC): Status: ACTIVE | Noted: 2020-09-14

## 2020-09-14 PROBLEM — E11.69 HYPERLIPIDEMIA ASSOCIATED WITH TYPE 2 DIABETES MELLITUS (HCC): Status: ACTIVE | Noted: 2020-09-14

## 2020-09-14 PROBLEM — E11.65 TYPE 2 DIABETES MELLITUS WITH HYPERGLYCEMIA, WITHOUT LONG-TERM CURRENT USE OF INSULIN (HCC): Status: ACTIVE | Noted: 2020-09-14

## 2021-07-08 PROBLEM — E78.5 DYSLIPIDEMIA, GOAL LDL BELOW 100: Status: ACTIVE | Noted: 2021-07-08

## 2021-07-08 PROBLEM — I10 HYPERTENSION, UNSPECIFIED TYPE: Status: ACTIVE | Noted: 2020-09-14

## 2021-08-22 ENCOUNTER — APPOINTMENT (OUTPATIENT)
Dept: MRI IMAGING | Facility: HOSPITAL | Age: 66
End: 2021-08-22
Attending: EMERGENCY MEDICINE
Payer: MEDICARE

## 2021-08-22 ENCOUNTER — HOSPITAL ENCOUNTER (EMERGENCY)
Facility: HOSPITAL | Age: 66
Discharge: HOME | End: 2021-08-22
Attending: EMERGENCY MEDICINE
Payer: MEDICARE

## 2021-08-22 ENCOUNTER — APPOINTMENT (OUTPATIENT)
Dept: CT IMAGING | Facility: HOSPITAL | Age: 66
End: 2021-08-22
Attending: EMERGENCY MEDICINE
Payer: MEDICARE

## 2021-08-22 VITALS
OXYGEN SATURATION: 97 % | HEART RATE: 67 BPM | RESPIRATION RATE: 18 BRPM | HEIGHT: 62 IN | DIASTOLIC BLOOD PRESSURE: 85 MMHG | SYSTOLIC BLOOD PRESSURE: 120 MMHG | BODY MASS INDEX: 27.05 KG/M2 | TEMPERATURE: 98 F | WEIGHT: 147 LBS

## 2021-08-22 DIAGNOSIS — R42 DIZZINESS: Primary | ICD-10-CM

## 2021-08-22 LAB
ALBUMIN SERPL-MCNC: 2.7 G/DL (ref 3.4–5)
ALBUMIN/GLOB SERPL: 0.6 {RATIO} (ref 1–2)
ALP LIVER SERPL-CCNC: 78 U/L
ALT SERPL-CCNC: 17 U/L
ANION GAP SERPL CALC-SCNC: 5 MMOL/L (ref 0–18)
AST SERPL-CCNC: 11 U/L (ref 15–37)
ATRIAL RATE: 77 BPM
BASOPHILS # BLD AUTO: 0.05 X10(3) UL (ref 0–0.2)
BASOPHILS NFR BLD AUTO: 0.4 %
BILIRUB SERPL-MCNC: 0.3 MG/DL (ref 0.1–2)
BUN BLD-MCNC: 16 MG/DL (ref 7–18)
CALCIUM BLD-MCNC: 9 MG/DL (ref 8.5–10.1)
CHLORIDE SERPL-SCNC: 107 MMOL/L (ref 98–112)
CO2 SERPL-SCNC: 25 MMOL/L (ref 21–32)
CREAT BLD-MCNC: 0.86 MG/DL
EOSINOPHIL # BLD AUTO: 0.14 X10(3) UL (ref 0–0.7)
EOSINOPHIL NFR BLD AUTO: 1.2 %
ERYTHROCYTE [DISTWIDTH] IN BLOOD BY AUTOMATED COUNT: 19.2 %
GLOBULIN PLAS-MCNC: 4.8 G/DL (ref 2.8–4.4)
GLUCOSE BLD-MCNC: 134 MG/DL (ref 70–99)
HCT VFR BLD AUTO: 31.3 %
HGB BLD-MCNC: 9.4 G/DL
IMM GRANULOCYTES # BLD AUTO: 0.1 X10(3) UL (ref 0–1)
IMM GRANULOCYTES NFR BLD: 0.9 %
LYMPHOCYTES # BLD AUTO: 2.78 X10(3) UL (ref 1–4)
LYMPHOCYTES NFR BLD AUTO: 23.7 %
M PROTEIN MFR SERPL ELPH: 7.5 G/DL (ref 6.4–8.2)
MCH RBC QN AUTO: 22.8 PG (ref 26–34)
MCHC RBC AUTO-ENTMCNC: 30 G/DL (ref 31–37)
MCV RBC AUTO: 76 FL
MONOCYTES # BLD AUTO: 0.82 X10(3) UL (ref 0.1–1)
MONOCYTES NFR BLD AUTO: 7 %
NEUTROPHILS # BLD AUTO: 7.86 X10 (3) UL (ref 1.5–7.7)
NEUTROPHILS # BLD AUTO: 7.86 X10(3) UL (ref 1.5–7.7)
NEUTROPHILS NFR BLD AUTO: 66.8 %
OSMOLALITY SERPL CALC.SUM OF ELEC: 287 MOSM/KG (ref 275–295)
P AXIS: 33 DEGREES
P-R INTERVAL: 130 MS
PLATELET # BLD AUTO: 425 10(3)UL (ref 150–450)
POTASSIUM SERPL-SCNC: 3.6 MMOL/L (ref 3.5–5.1)
Q-T INTERVAL: 446 MS
QRS DURATION: 152 MS
QTC CALCULATION (BEZET): 504 MS
R AXIS: -9 DEGREES
RBC # BLD AUTO: 4.12 X10(6)UL
SODIUM SERPL-SCNC: 137 MMOL/L (ref 136–145)
T AXIS: -22 DEGREES
TROPONIN I SERPL-MCNC: <0.045 NG/ML (ref ?–0.04)
VENTRICULAR RATE: 77 BPM
WBC # BLD AUTO: 11.8 X10(3) UL (ref 4–11)

## 2021-08-22 PROCEDURE — A9575 INJ GADOTERATE MEGLUMI 0.1ML: HCPCS | Performed by: EMERGENCY MEDICINE

## 2021-08-22 PROCEDURE — 70553 MRI BRAIN STEM W/O & W/DYE: CPT | Performed by: EMERGENCY MEDICINE

## 2021-08-22 PROCEDURE — 93005 ELECTROCARDIOGRAM TRACING: CPT

## 2021-08-22 PROCEDURE — 70498 CT ANGIOGRAPHY NECK: CPT | Performed by: EMERGENCY MEDICINE

## 2021-08-22 PROCEDURE — 99285 EMERGENCY DEPT VISIT HI MDM: CPT

## 2021-08-22 PROCEDURE — 36415 COLL VENOUS BLD VENIPUNCTURE: CPT

## 2021-08-22 PROCEDURE — 70546 MR ANGIOGRAPH HEAD W/O&W/DYE: CPT | Performed by: EMERGENCY MEDICINE

## 2021-08-22 PROCEDURE — 80053 COMPREHEN METABOLIC PANEL: CPT | Performed by: EMERGENCY MEDICINE

## 2021-08-22 PROCEDURE — 70549 MR ANGIOGRAPH NECK W/O&W/DYE: CPT | Performed by: EMERGENCY MEDICINE

## 2021-08-22 PROCEDURE — 70496 CT ANGIOGRAPHY HEAD: CPT | Performed by: EMERGENCY MEDICINE

## 2021-08-22 PROCEDURE — 93010 ELECTROCARDIOGRAM REPORT: CPT

## 2021-08-22 PROCEDURE — 84484 ASSAY OF TROPONIN QUANT: CPT | Performed by: EMERGENCY MEDICINE

## 2021-08-22 PROCEDURE — 85025 COMPLETE CBC W/AUTO DIFF WBC: CPT | Performed by: EMERGENCY MEDICINE

## 2021-08-22 RX ORDER — MECLIZINE HYDROCHLORIDE 25 MG/1
25 TABLET ORAL ONCE
Status: COMPLETED | OUTPATIENT
Start: 2021-08-22 | End: 2021-08-22

## 2021-08-22 RX ORDER — MECLIZINE HYDROCHLORIDE 25 MG/1
25 TABLET ORAL 3 TIMES DAILY PRN
Qty: 30 TABLET | Refills: 0 | Status: SHIPPED | OUTPATIENT
Start: 2021-08-22

## 2021-08-22 NOTE — ED PROVIDER NOTES
Patient Seen in: [facility] Emergency Department      History   Patient presents with:  Dizziness    Stated Complaint: dizziness for 3 days    HPI/Subjective:   HPI       Patient is a 68-year-old woman who presents to emergency department today comp °C)   Temp src Temporal   SpO2 100 %   O2 Device None (Room air)       Current:/71   Pulse 53   Temp 97.6 °F (36.4 °C) (Temporal)   Resp 20   Ht 157.5 cm (5' 2\")   Wt 66.7 kg   SpO2 99%   BMI 26.89 kg/m²         Physical Exam  Vitals and nursing not Tenderness: There is no abdominal tenderness. Musculoskeletal:         General: No tenderness. Normal range of motion. Cervical back: Normal range of motion and neck supple. Skin:     General: Skin is warm and dry. Findings: No rash.    Neuro evidence of acute intracranial abnormality. No hemorrhage or mass.       Mild to moderate periventricular and subcortical regions and pontine of T2 hyperintensities likely representing chronic small vessel ischemic disease, no evidence for acute infarction 0

## 2021-08-22 NOTE — ED INITIAL ASSESSMENT (HPI)
Pt reports she is here for having dizzy episodes for the past 2-3 days. Patient reports right lower head pain. Denies chest pain or changes of vision.

## 2021-08-22 NOTE — ED PROVIDER NOTES
Patient Seen in: [facility] Emergency Department      History   Patient presents with:  Dizziness    Stated Complaint: dizziness for 3 days    HPI/Subjective:   HPI    Patient is a 22-year-old woman who presents to emergency department today complai place, and time. Motor: No abnormal muscle tone.       Coordination: Coordination normal.   Psychiatric:         Behavior: Behavior normal.              ED Course     Labs Reviewed   CBC WITH DIFFERENTIAL WITH PLATELET   TROPONIN I   COMP METABOLIC PAN

## 2021-09-22 NOTE — PROGRESS NOTES
Finally reached patient to advise her  wanted to see her in clinic. Patient stated she did not need to see , her primary care  Is taking care of this issue.

## 2021-09-29 PROBLEM — H81.11 BPPV (BENIGN PAROXYSMAL POSITIONAL VERTIGO), RIGHT: Status: ACTIVE | Noted: 2021-09-29

## 2023-03-22 ENCOUNTER — LAB REQUISITION (OUTPATIENT)
Age: 68
End: 2023-03-22
Payer: MEDICARE

## 2023-03-22 DIAGNOSIS — R59.0 LYMPHADENOPATHY, AXILLARY: ICD-10-CM

## 2023-03-22 PROCEDURE — 88341 IMHCHEM/IMCYTCHM EA ADD ANTB: CPT | Performed by: PATHOLOGY

## 2023-03-22 PROCEDURE — 88184 FLOWCYTOMETRY/ TC 1 MARKER: CPT | Performed by: PATHOLOGY

## 2023-03-22 PROCEDURE — 88185 FLOWCYTOMETRY/TC ADD-ON: CPT | Performed by: PATHOLOGY

## 2023-03-22 PROCEDURE — 88342 IMHCHEM/IMCYTCHM 1ST ANTB: CPT | Performed by: PATHOLOGY

## 2023-03-23 ENCOUNTER — LAB REQUISITION (OUTPATIENT)
Age: 68
End: 2023-03-23
Payer: MEDICARE

## 2023-03-23 DIAGNOSIS — D48.9 NEOPLASM OF UNCERTAIN BEHAVIOR: ICD-10-CM

## 2023-03-24 LAB
CD10 CELLS NFR SPEC: <1 %
CD10/CD19: <1 %
CD19 CELLS NFR SPEC: 18 %
CD19+/CD200+: 1 %
CD2 CELLS NFR SPEC: 80 %
CD20 CELLS NFR SPEC: 18 %
CD200 CELLS: 4 %
CD3 CELLS NFR SPEC: 79 %
CD3+/TCRGD+: 1 %
CD3+CD4+ CELLS NFR SPEC: 54 %
CD3+CD4+ CELLS/CD3+CD8+ CLL SPEC: 2.3
CD3+CD8+ CELLS NFR SPEC: 23 %
CD3-/CD56+: 1 %
CD34 CELLS NFR SPEC: <1 %
CD38 CELLS NFR SPEC: 1 %
CD45 CELLS NFR SPEC: 100 %
CD5 CELLS NFR SPEC: 80 %
CD5/CD19 CELLS: <1 %
CD7 CELLS NFR SPEC: 76 %
CELL SURF KAPPA/LAMBDA RATIO: 1.3
CELL SURF LAMBDA LIGHT CHAIN: 8 %
CELL SURFACE KAPPA LIGHT CHAIN: 10 %
TCR G-D CELLS NFR SPEC: 1 %

## 2024-01-26 ENCOUNTER — APPOINTMENT (OUTPATIENT)
Dept: CT IMAGING | Facility: HOSPITAL | Age: 69
End: 2024-01-26
Attending: EMERGENCY MEDICINE
Payer: MEDICARE

## 2024-01-26 ENCOUNTER — HOSPITAL ENCOUNTER (EMERGENCY)
Facility: HOSPITAL | Age: 69
Discharge: HOME | End: 2024-01-26
Attending: EMERGENCY MEDICINE
Payer: MEDICARE

## 2024-01-26 VITALS
HEIGHT: 61 IN | HEART RATE: 85 BPM | SYSTOLIC BLOOD PRESSURE: 134 MMHG | DIASTOLIC BLOOD PRESSURE: 77 MMHG | RESPIRATION RATE: 18 BRPM | WEIGHT: 153 LBS | OXYGEN SATURATION: 96 % | BODY MASS INDEX: 28.89 KG/M2 | TEMPERATURE: 98 F

## 2024-01-26 DIAGNOSIS — E87.1 HYPONATREMIA: ICD-10-CM

## 2024-01-26 DIAGNOSIS — K57.92 ACUTE DIVERTICULITIS: ICD-10-CM

## 2024-01-26 DIAGNOSIS — N28.9 RENAL INSUFFICIENCY: ICD-10-CM

## 2024-01-26 LAB
ALBUMIN SERPL-MCNC: 3 G/DL (ref 3.4–5)
ALBUMIN/GLOB SERPL: 0.6 {RATIO} (ref 1–2)
ALP LIVER SERPL-CCNC: 107 U/L
ANION GAP SERPL CALC-SCNC: 3 MMOL/L (ref 0–18)
AST SERPL-CCNC: 15 U/L (ref 15–37)
BASOPHILS # BLD AUTO: 0.04 X10(3) UL (ref 0–0.2)
BASOPHILS NFR BLD AUTO: 0.3 %
BILIRUB SERPL-MCNC: 0.5 MG/DL (ref 0.1–2)
BILIRUB UR QL STRIP.AUTO: NEGATIVE
BUN BLD-MCNC: 22 MG/DL (ref 9–23)
CALCIUM BLD-MCNC: 9.5 MG/DL (ref 8.5–10.1)
CHLORIDE SERPL-SCNC: 102 MMOL/L (ref 98–112)
CLARITY UR REFRACT.AUTO: CLEAR
CO2 SERPL-SCNC: 27 MMOL/L (ref 21–32)
CREAT BLD-MCNC: 1.33 MG/DL
EGFRCR SERPLBLD CKD-EPI 2021: 44 ML/MIN/1.73M2 (ref 60–?)
EOSINOPHIL # BLD AUTO: 0.04 X10(3) UL (ref 0–0.7)
EOSINOPHIL NFR BLD AUTO: 0.3 %
ERYTHROCYTE [DISTWIDTH] IN BLOOD BY AUTOMATED COUNT: 15 %
GLOBULIN PLAS-MCNC: 5 G/DL (ref 2.8–4.4)
GLUCOSE BLD-MCNC: 277 MG/DL (ref 70–99)
GLUCOSE UR STRIP.AUTO-MCNC: 500 MG/DL
HCT VFR BLD AUTO: 40.4 %
HGB BLD-MCNC: 13.6 G/DL
IMM GRANULOCYTES # BLD AUTO: 0.07 X10(3) UL (ref 0–1)
IMM GRANULOCYTES NFR BLD: 0.6 %
KETONES UR STRIP.AUTO-MCNC: NEGATIVE MG/DL
LEUKOCYTE ESTERASE UR QL STRIP.AUTO: NEGATIVE
LIPASE SERPL-CCNC: 39 U/L (ref 13–75)
LYMPHOCYTES # BLD AUTO: 1.96 X10(3) UL (ref 1–4)
LYMPHOCYTES NFR BLD AUTO: 16.7 %
MCH RBC QN AUTO: 28.2 PG (ref 26–34)
MCHC RBC AUTO-ENTMCNC: 33.7 G/DL (ref 31–37)
MCV RBC AUTO: 83.6 FL
MONOCYTES # BLD AUTO: 0.66 X10(3) UL (ref 0.1–1)
MONOCYTES NFR BLD AUTO: 5.6 %
NEUTROPHILS # BLD AUTO: 8.96 X10 (3) UL (ref 1.5–7.7)
NEUTROPHILS # BLD AUTO: 8.96 X10(3) UL (ref 1.5–7.7)
NEUTROPHILS NFR BLD AUTO: 76.5 %
NITRITE UR QL STRIP.AUTO: NEGATIVE
OSMOLALITY SERPL CALC.SUM OF ELEC: 287 MOSM/KG (ref 275–295)
PH UR STRIP.AUTO: 6 [PH] (ref 5–8)
PLATELET # BLD AUTO: 306 10(3)UL (ref 150–450)
POTASSIUM SERPL-SCNC: 4.4 MMOL/L (ref 3.5–5.1)
PROT SERPL-MCNC: 8 G/DL (ref 6.4–8.2)
PROT UR STRIP.AUTO-MCNC: 50 MG/DL
RBC # BLD AUTO: 4.83 X10(6)UL
SODIUM SERPL-SCNC: 132 MMOL/L (ref 136–145)
SP GR UR STRIP.AUTO: 1.01 (ref 1–1.03)
UROBILINOGEN UR STRIP.AUTO-MCNC: NORMAL MG/DL
WBC # BLD AUTO: 11.7 X10(3) UL (ref 4–11)

## 2024-01-26 PROCEDURE — 85025 COMPLETE CBC W/AUTO DIFF WBC: CPT | Performed by: EMERGENCY MEDICINE

## 2024-01-26 PROCEDURE — 81001 URINALYSIS AUTO W/SCOPE: CPT | Performed by: EMERGENCY MEDICINE

## 2024-01-26 PROCEDURE — 99285 EMERGENCY DEPT VISIT HI MDM: CPT

## 2024-01-26 PROCEDURE — 83690 ASSAY OF LIPASE: CPT | Performed by: EMERGENCY MEDICINE

## 2024-01-26 PROCEDURE — 74176 CT ABD & PELVIS W/O CONTRAST: CPT | Performed by: EMERGENCY MEDICINE

## 2024-01-26 PROCEDURE — 36415 COLL VENOUS BLD VENIPUNCTURE: CPT

## 2024-01-26 PROCEDURE — 99284 EMERGENCY DEPT VISIT MOD MDM: CPT

## 2024-01-26 PROCEDURE — 80053 COMPREHEN METABOLIC PANEL: CPT | Performed by: EMERGENCY MEDICINE

## 2024-01-26 RX ORDER — METRONIDAZOLE 500 MG/1
500 TABLET ORAL ONCE
Status: COMPLETED | OUTPATIENT
Start: 2024-01-26 | End: 2024-01-26

## 2024-01-26 RX ORDER — LEVOFLOXACIN 750 MG/1
750 TABLET, FILM COATED ORAL ONCE
Status: COMPLETED | OUTPATIENT
Start: 2024-01-26 | End: 2024-01-26

## 2024-01-26 RX ORDER — CEFPODOXIME PROXETIL 200 MG/1
400 TABLET, FILM COATED ORAL 2 TIMES DAILY
Qty: 28 TABLET | Refills: 0 | Status: SHIPPED | OUTPATIENT
Start: 2024-01-26 | End: 2024-02-02

## 2024-01-26 RX ORDER — METRONIDAZOLE 500 MG/1
500 TABLET ORAL 2 TIMES DAILY
Qty: 14 TABLET | Refills: 0 | Status: SHIPPED | OUTPATIENT
Start: 2024-01-26 | End: 2024-02-02

## 2024-01-26 RX ORDER — ONDANSETRON 4 MG/1
4 TABLET, ORALLY DISINTEGRATING ORAL EVERY 8 HOURS PRN
Qty: 20 TABLET | Refills: 0 | Status: SHIPPED | OUTPATIENT
Start: 2024-01-26 | End: 2024-02-02

## 2024-01-26 NOTE — ED INITIAL ASSESSMENT (HPI)
Pt arrives ambulatory, states she developed LLQ abdominal pain since yesterday. Normal BM's. Denies n/v/d. A&O x4

## 2024-01-26 NOTE — ED PROVIDER NOTES
Patient Seen in: [facility] Emergency Department      History     Chief Complaint   Patient presents with    Abdomen/Flank Pain     Stated Complaint: LLQ pain    Subjective:   HPI    Patient is a 60-year-old female presents emergency room for nausea left lower quadrant pain.  Symptoms started 2 days ago.  Describes a stinging pain left lower quadrant which radiates slightly to her left flank.  She gets pain for about 10 or 15 minutes and then goes away for about 10 or 15 minutes.  Patient denies fever, nausea, vomiting or diarrhea.  No urinary symptoms such as frequency, hematuria or dysuria.  Denies history of kidney stone in the past.  She has had prior cholecystectomy many years ago.  She denies any pain currently    Objective:   Past Medical History:   Diagnosis Date    Diabetes (HCC)     DVT (deep venous thrombosis) (HCC)     Essential hypertension     High blood pressure     Nicotine use disorder     Osteoarthritis     hip, wrist    Rheumatoid arthritis (HCC)     Visual impairment     glasses              Past Surgical History:   Procedure Laterality Date    BENIGN BIOPSY LEFT  1991    CHOLECYSTECTOMY      COLONOSCOPY N/A 05/06/2019    Procedure: COLONOSCOPY;  Surgeon: Freeman Oshea MD;  Location:  ENDOSCOPY    COLONOSCOPY      HIP REPLACEMENT SURGERY Right 01/10/2020    KNEE REPLACEMENT SURGERY      NEEDLE BIOPSY LEFT  2017    REMOVAL GALLBLADDER      TOTAL KNEE REPLACEMENT Bilateral     UPPER GI ENDOSCOPY,EXAM                  Social History     Socioeconomic History    Marital status: Single   Tobacco Use    Smoking status: Every Day     Packs/day: 0.50     Years: 40.00     Additional pack years: 0.00     Total pack years: 20.00     Types: Cigarettes     Start date: 1/29/1973    Smokeless tobacco: Never   Vaping Use    Vaping Use: Never used   Substance and Sexual Activity    Alcohol use: No    Drug use: No    Sexual activity: Not Currently     Social Determinants of Health     Physical  Activity: Insufficiently Active (3/9/2022)    Exercise Vital Sign     Days of Exercise per Week: 2 days     Minutes of Exercise per Session: 30 min    Social Connections              Review of Systems    Positive for stated complaint: LLQ pain  Other systems are as noted in HPI.  Constitutional and vital signs reviewed.      All other systems reviewed and negative except as noted above.    Physical Exam     ED Triage Vitals [01/26/24 0030]   BP (!) 202/81   Pulse 106   Resp 18   Temp 97.6 °F (36.4 °C)   Temp src Temporal   SpO2 97 %   O2 Device None (Room air)       Current:/77   Pulse 85   Temp 97.6 °F (36.4 °C) (Temporal)   Resp 18   Ht 154.9 cm (5' 1\")   Wt 69.4 kg   SpO2 96%   BMI 28.91 kg/m²         Physical Exam    GENERAL: No acute distress, well appearing and non-toxic, Alert and oriented X 3   HEENT: Normocephalic, atraumatic.  Moist mucous membranes.  Pupils equal round reactive to light and accommodation, extraocular motion is intact, sclerae white, conjunctiva is pink.  Oropharynx is unremarkable, no exudate.  NECK: Supple, trachea midline, no lymphadenopathy.   LUNG: Lungs clear to auscultation bilaterally, no wheezing, no rales, no rhonchi.  CARDIOVASCULAR: Regular rate and rhythm.  Normal S1S2.  No S3S4 or murmur.  ABDOMEN: Bowel sounds are present. Soft. nondistended, no pulsatile masses. nontender  MUSCULOSKELETAL: No calf tenderness.  Dorsalis and Posterior Tibial pulses present. No clubbing. No cyanosis.  No edema.   SKIN EXAMINATIoN: Warm and dry with normal appearance.  No rashes or lesions.  NEUROLOGICAL:  Motor strength intact all groups.  normal sensation, speech intact    ED Course     Labs Reviewed   URINALYSIS WITH CULTURE REFLEX - Abnormal; Notable for the following components:       Result Value    Glucose Urine 500 (*)     Blood Urine Trace (*)     Protein Urine 50 (*)     RBC Urine 3-5 (*)     Squamous Epi. Cells Few (*)     All other components within normal limits   COMP  METABOLIC PANEL (14) - Abnormal; Notable for the following components:    Glucose 277 (*)     Sodium 132 (*)     Creatinine 1.33 (*)     eGFR-Cr 44 (*)     Albumin 3.0 (*)     Globulin  5.0 (*)     A/G Ratio 0.6 (*)     All other components within normal limits   CBC W/ DIFFERENTIAL - Abnormal; Notable for the following components:    WBC 11.7 (*)     Neutrophil Absolute Prelim 8.96 (*)     Neutrophil Absolute 8.96 (*)     All other components within normal limits   LIPASE - Normal   CBC WITH DIFFERENTIAL WITH PLATELET    Narrative:     The following orders were created for panel order CBC With Differential With Platelet.  Procedure                               Abnormality         Status                     ---------                               -----------         ------                     CBC W/ DIFFERENTIAL[[id number]]          Abnormal            Final result                 Please view results for these tests on the individual orders.   RAINBOW DRAW LAVENDER   RAINBOW DRAW LIGHT GREEN   Sodium 132.  Creatinine 1.33.  White blood cell count 11.7          CT abdomen pelvis read by [facility] rad radiology showing very subtle fat haziness along the distal terminal ileum diverticulum in the right lower quadrant could be incidental or represent very mild acute uncomplicated small bowel diverticulitis.  No free air or perforation.  Appendix was visualized and normal.       Medications   levoFLOXacin (Levaquin) tab 750 mg (750 mg Oral Given 1/26/24 0316)   metRONIDAZOLE (Flagyl) tab 500 mg (500 mg Oral Given 1/26/24 0316)         MDM      Patient is a 68-year-old female presents emergency room for colicky left lower quadrant pain.  Differential includes kidney stone, diverticulitis, urinary tract infection.  Patient laboratory test performed showing leukocytosis with hyponatremia and slight renal insufficiency.  Patient CT scan performed showing normal appendix.  Potential right lower quadrant diverticulitis.  Patient  declined pain medication here and was asymptomatic on repeat exam.  Will treat with cefpodoxime and Flagyl for 7 days.  Recommend GI follow-up.  No surgical cause for symptoms noted.    Patient was screened and evaluated during this visit.   As a treating physician attending to the patient, I determined, within reasonable clinical confidence and prior to discharge, that an emergency medical condition was not or was no longer present.  There was no indication for further evaluation, treatment or admission on an emergency basis.  Comprehensive verbal and written discharge and follow-up instructions were provided to help prevent relapse or worsening.  Patient was instructed to follow-up with her primary care provider for further evaluation and treatment, but to return immediately to the ER for worsening, concerning, new, changing or persisting symptoms.  I discussed the case with the patient and they had no questions, complaints, or concerns.  Patient felt comfortable going home.                                       Medical Decision Making      Disposition and Plan     Clinical Impression:  1. Acute diverticulitis    2. Renal insufficiency    3. Hyponatremia         Disposition:  Discharge  1/26/2024  3:25 am    Follow-up:  Carlitos Restrepo MD  [address] [place] [id number]  [phone]    Follow up in 2 week(s)            Medications Prescribed:  Discharge Medication List as of 1/26/2024  3:27 AM        START taking these medications    Details   metRONIDAZOLE 500 MG Oral Tab Take 1 tablet (500 mg total) by mouth in the morning and 1 tablet (500 mg total) before bedtime. Do all this for 7 days., Normal, Disp-14 tablet, R-0      cefpodoxime 200 MG Oral Tab Take 2 tablets (400 mg total) by mouth 2 (two) times daily for 7 days., Normal, Disp-28 tablet, R-0      ondansetron 4 MG Oral Tablet Dispersible Take 1 tablet (4 mg total) by mouth every 8 (eight) hours as needed for Nausea., Normal, Disp-20  tablet, R-0

## 2024-01-26 NOTE — DISCHARGE INSTRUCTIONS
Begin antibiotics    Follow-up with GI specialist in a couple weeks    Ondansetron for nausea/vomiting

## 2024-06-06 RX ORDER — GLIPIZIDE 10 MG/1
10 TABLET ORAL
Status: ON HOLD | COMMUNITY
End: 2024-06-13

## 2024-06-06 RX ORDER — ASPIRIN 81 MG/1
81 TABLET, CHEWABLE ORAL DAILY
COMMUNITY
End: 2024-07-08

## 2024-06-06 RX ORDER — ROSUVASTATIN CALCIUM 10 MG/1
10 TABLET, COATED ORAL NIGHTLY
COMMUNITY

## 2024-06-06 RX ORDER — AZATHIOPRINE 50 MG/1
50 TABLET ORAL DAILY
COMMUNITY
End: 2024-07-08

## 2024-06-06 NOTE — PAT NURSING NOTE
PreOp Instructions     Date of Procedure: 06/13/24     Diet Instructions: Do not eat or drink anything after midnight     Medications: Take Aspirin 81 mg x 4 tablets the day of your procedure, Medications you are allowed to take can be taken with a sip of water the morning of your procedure     Other Medications: hold hydrochlorathiazide morning of procedure     Diabetic Instructions: Do not take morning dose of your diabetic medications, Metformin needs to be held 24 hours prior to procedure, your last dose should be the morning dose the day before procedure. Last dose Wednesday morning    Skin Prep: Shower with antibacterial soap using a clean washcloth, prior to procedure     Arrival Time: The day prior to your procedure you will receive a phone call before 6:00 pm with your arrival time. If you haven't received a phone call, please check your voicemail messages.    Driving After Procedure: If sedation is given, you WILL NOT be able to drive home. You will need a responsible adult  to drive you home.     Discharge Teaching: Your nurse will give you specific instructions before discharge, Most people can resume normal activities in 2-3 days, Any questions, please call the physician's office

## 2024-06-13 ENCOUNTER — HOSPITAL ENCOUNTER (INPATIENT)
Dept: INTERVENTIONAL RADIOLOGY/VASCULAR | Facility: HOSPITAL | Age: 69
LOS: 15 days | Discharge: HOME HEALTH | DRG: 252 | End: 2024-06-29
Attending: SURGERY | Admitting: SURGERY
Payer: MEDICARE

## 2024-06-13 ENCOUNTER — APPOINTMENT (OUTPATIENT)
Dept: INTERVENTIONAL RADIOLOGY/VASCULAR | Facility: HOSPITAL | Age: 69
DRG: 252 | End: 2024-06-13
Attending: SURGERY
Payer: MEDICARE

## 2024-06-13 ENCOUNTER — HOSPITAL ENCOUNTER (INPATIENT)
Dept: INTERVENTIONAL RADIOLOGY/VASCULAR | Facility: HOSPITAL | Age: 69
LOS: 15 days | Discharge: HOME | DRG: 252 | End: 2024-06-29
Attending: SURGERY | Admitting: SURGERY
Payer: MEDICARE

## 2024-06-13 DIAGNOSIS — I74.5 ILIAC ARTERY OCCLUSION, BILATERAL (HCC): ICD-10-CM

## 2024-06-13 LAB
ANION GAP SERPL CALC-SCNC: 9 MMOL/L (ref 0–18)
APTT PPP: >240 SECONDS (ref 23–36)
BUN BLD-MCNC: 24 MG/DL (ref 9–23)
CALCIUM BLD-MCNC: 7.7 MG/DL (ref 8.5–10.1)
CHLORIDE SERPL-SCNC: 106 MMOL/L (ref 98–112)
CO2 SERPL-SCNC: 19 MMOL/L (ref 21–32)
CREAT BLD-MCNC: 1.16 MG/DL
EGFRCR SERPLBLD CKD-EPI 2021: 51 ML/MIN/1.73M2 (ref 60–?)
ERYTHROCYTE [DISTWIDTH] IN BLOOD BY AUTOMATED COUNT: 15.6 %
EST. AVERAGE GLUCOSE BLD GHB EST-MCNC: 306 MG/DL (ref 68–126)
GLUCOSE BLD-MCNC: 194 MG/DL (ref 70–99)
GLUCOSE BLD-MCNC: 315 MG/DL (ref 70–99)
GLUCOSE BLD-MCNC: 351 MG/DL (ref 70–99)
HBA1C MFR BLD: 12.3 % (ref ?–5.7)
HCT VFR BLD AUTO: 31.3 %
HGB BLD-MCNC: 10 G/DL
ISTAT ACTIVATED CLOTTING TIME: 207 SECONDS (ref 74–137)
ISTAT ACTIVATED CLOTTING TIME: 244 SECONDS (ref 74–137)
ISTAT ACTIVATED CLOTTING TIME: 256 SECONDS (ref 74–137)
ISTAT ACTIVATED CLOTTING TIME: 262 SECONDS (ref 74–137)
MCH RBC QN AUTO: 29.1 PG (ref 26–34)
MCHC RBC AUTO-ENTMCNC: 31.9 G/DL (ref 31–37)
MCV RBC AUTO: 91 FL
OSMOLALITY SERPL CALC.SUM OF ELEC: 294 MOSM/KG (ref 275–295)
PLATELET # BLD AUTO: 254 10(3)UL (ref 150–450)
POTASSIUM SERPL-SCNC: 3.6 MMOL/L (ref 3.5–5.1)
RBC # BLD AUTO: 3.44 X10(6)UL
SODIUM SERPL-SCNC: 134 MMOL/L (ref 136–145)
WBC # BLD AUTO: 18.5 X10(3) UL (ref 4–11)

## 2024-06-13 PROCEDURE — 3E033XZ INTRODUCTION OF VASOPRESSOR INTO PERIPHERAL VEIN, PERCUTANEOUS APPROACH: ICD-10-PCS | Performed by: SURGERY

## 2024-06-13 PROCEDURE — 36140 INTRO NDL ICATH UPR/LXTR ART: CPT | Performed by: SURGERY

## 2024-06-13 PROCEDURE — 86920 COMPATIBILITY TEST SPIN: CPT

## 2024-06-13 PROCEDURE — 37184 PRIM ART M-THRMBC 1ST VSL: CPT | Performed by: SURGERY

## 2024-06-13 PROCEDURE — 047J3DZ DILATION OF LEFT EXTERNAL ILIAC ARTERY WITH INTRALUMINAL DEVICE, PERCUTANEOUS APPROACH: ICD-10-PCS | Performed by: SURGERY

## 2024-06-13 PROCEDURE — 85347 COAGULATION TIME ACTIVATED: CPT

## 2024-06-13 PROCEDURE — B44HZZ3 ULTRASONOGRAPHY OF BILATERAL LOWER EXTREMITY ARTERIES, INTRAVASCULAR: ICD-10-PCS | Performed by: SURGERY

## 2024-06-13 PROCEDURE — 37226 HC TRANSLUM ANGIOPLASTY W STENT FEM POP UNILAT: CPT | Performed by: SURGERY

## 2024-06-13 PROCEDURE — B44BZZ3 ULTRASONOGRAPHY OF OTHER INTRA-ABDOMINAL ARTERIES, INTRAVASCULAR: ICD-10-PCS | Performed by: SURGERY

## 2024-06-13 PROCEDURE — 3E03317 INTRODUCTION OF OTHER THROMBOLYTIC INTO PERIPHERAL VEIN, PERCUTANEOUS APPROACH: ICD-10-PCS | Performed by: SURGERY

## 2024-06-13 PROCEDURE — 047D3DZ DILATION OF LEFT COMMON ILIAC ARTERY WITH INTRALUMINAL DEVICE, PERCUTANEOUS APPROACH: ICD-10-PCS | Performed by: SURGERY

## 2024-06-13 PROCEDURE — 37253 INTRVASC US NONCORONARY ADDL: CPT | Performed by: SURGERY

## 2024-06-13 PROCEDURE — 86900 BLOOD TYPING SEROLOGIC ABO: CPT | Performed by: SURGERY

## 2024-06-13 PROCEDURE — 047K3DZ DILATION OF RIGHT FEMORAL ARTERY WITH INTRALUMINAL DEVICE, PERCUTANEOUS APPROACH: ICD-10-PCS | Performed by: SURGERY

## 2024-06-13 PROCEDURE — 75716 ARTERY X-RAYS ARMS/LEGS: CPT | Performed by: SURGERY

## 2024-06-13 PROCEDURE — 86850 RBC ANTIBODY SCREEN: CPT | Performed by: SURGERY

## 2024-06-13 PROCEDURE — 85730 THROMBOPLASTIN TIME PARTIAL: CPT | Performed by: SURGERY

## 2024-06-13 PROCEDURE — 82962 GLUCOSE BLOOD TEST: CPT

## 2024-06-13 PROCEDURE — 80048 BASIC METABOLIC PNL TOTAL CA: CPT | Performed by: SURGERY

## 2024-06-13 PROCEDURE — 37185 PRIM ART M-THRMBC SBSQ VSL: CPT | Performed by: SURGERY

## 2024-06-13 PROCEDURE — 047L3DZ DILATION OF LEFT FEMORAL ARTERY WITH INTRALUMINAL DEVICE, PERCUTANEOUS APPROACH: ICD-10-PCS | Performed by: SURGERY

## 2024-06-13 PROCEDURE — 37221 HC TRANSLUMINAL ANGIOPLASTY W STENT ILIAC UNILAT INIT: CPT | Performed by: SURGERY

## 2024-06-13 PROCEDURE — B41GYZZ FLUOROSCOPY OF LEFT LOWER EXTREMITY ARTERIES USING OTHER CONTRAST: ICD-10-PCS | Performed by: SURGERY

## 2024-06-13 PROCEDURE — 37228 HC TRANSLUMINAL ANGIO TIBIAL PERONEAL UNILAT INIT: CPT | Performed by: SURGERY

## 2024-06-13 PROCEDURE — B41CYZZ FLUOROSCOPY OF PELVIC ARTERIES USING OTHER CONTRAST: ICD-10-PCS | Performed by: SURGERY

## 2024-06-13 PROCEDURE — 99153 MOD SED SAME PHYS/QHP EA: CPT | Performed by: SURGERY

## 2024-06-13 PROCEDURE — 83036 HEMOGLOBIN GLYCOSYLATED A1C: CPT | Performed by: INTERNAL MEDICINE

## 2024-06-13 PROCEDURE — 99152 MOD SED SAME PHYS/QHP 5/>YRS: CPT | Performed by: SURGERY

## 2024-06-13 PROCEDURE — 37223 HC TRANSLUMINAL ANGIOPLASTY W STENT ILIAC EA ADDL: CPT | Performed by: SURGERY

## 2024-06-13 PROCEDURE — 37252 INTRVASC US NONCORONARY 1ST: CPT | Performed by: SURGERY

## 2024-06-13 PROCEDURE — 86901 BLOOD TYPING SEROLOGIC RH(D): CPT | Performed by: SURGERY

## 2024-06-13 PROCEDURE — 85027 COMPLETE CBC AUTOMATED: CPT | Performed by: SURGERY

## 2024-06-13 RX ORDER — CLOPIDOGREL BISULFATE 75 MG/1
75 TABLET ORAL DAILY
Qty: 90 TABLET | Refills: 0 | Status: SHIPPED | OUTPATIENT
Start: 2024-06-13 | End: 2024-09-11

## 2024-06-13 RX ORDER — NICOTINE POLACRILEX 4 MG
15 LOZENGE BUCCAL
Status: DISCONTINUED | OUTPATIENT
Start: 2024-06-13 | End: 2024-06-29

## 2024-06-13 RX ORDER — NITROGLYCERIN 20 MG/100ML
INJECTION INTRAVENOUS
Status: COMPLETED
Start: 2024-06-13 | End: 2024-06-13

## 2024-06-13 RX ORDER — PREDNISONE 5 MG/1
5 TABLET ORAL 2 TIMES DAILY
Status: DISCONTINUED | OUTPATIENT
Start: 2024-06-13 | End: 2024-06-14

## 2024-06-13 RX ORDER — LOSARTAN POTASSIUM 100 MG/1
100 TABLET ORAL DAILY
Status: DISCONTINUED | OUTPATIENT
Start: 2024-06-13 | End: 2024-06-14

## 2024-06-13 RX ORDER — HYDROCODONE BITARTRATE AND ACETAMINOPHEN 10; 325 MG/1; MG/1
2 TABLET ORAL EVERY 6 HOURS PRN
Status: DISCONTINUED | OUTPATIENT
Start: 2024-06-13 | End: 2024-06-21

## 2024-06-13 RX ORDER — HEPARIN SODIUM AND DEXTROSE 10000; 5 [USP'U]/100ML; G/100ML
INJECTION INTRAVENOUS CONTINUOUS
Status: DISCONTINUED | OUTPATIENT
Start: 2024-06-14 | End: 2024-06-19

## 2024-06-13 RX ORDER — MORPHINE SULFATE 4 MG/ML
6 INJECTION, SOLUTION INTRAMUSCULAR; INTRAVENOUS EVERY 2 HOUR PRN
Status: DISCONTINUED | OUTPATIENT
Start: 2024-06-13 | End: 2024-06-29

## 2024-06-13 RX ORDER — ATORVASTATIN CALCIUM 20 MG/1
20 TABLET, FILM COATED ORAL NIGHTLY
Status: DISCONTINUED | OUTPATIENT
Start: 2024-06-13 | End: 2024-06-13

## 2024-06-13 RX ORDER — NICOTINE POLACRILEX 4 MG
30 LOZENGE BUCCAL
Status: DISCONTINUED | OUTPATIENT
Start: 2024-06-13 | End: 2024-06-29

## 2024-06-13 RX ORDER — ATENOLOL 25 MG/1
50 TABLET ORAL DAILY
Status: DISCONTINUED | OUTPATIENT
Start: 2024-06-13 | End: 2024-06-14

## 2024-06-13 RX ORDER — ROSUVASTATIN CALCIUM 10 MG/1
10 TABLET, COATED ORAL NIGHTLY
Status: DISCONTINUED | OUTPATIENT
Start: 2024-06-13 | End: 2024-06-29

## 2024-06-13 RX ORDER — GLIPIZIDE 5 MG/1
10 TABLET ORAL
Status: DISCONTINUED | OUTPATIENT
Start: 2024-06-14 | End: 2024-06-13

## 2024-06-13 RX ORDER — HEPARIN SODIUM AND DEXTROSE 10000; 5 [USP'U]/100ML; G/100ML
18 INJECTION INTRAVENOUS ONCE
Status: COMPLETED | OUTPATIENT
Start: 2024-06-13 | End: 2024-06-14

## 2024-06-13 RX ORDER — GLIPIZIDE 10 MG/1
10 TABLET, FILM COATED, EXTENDED RELEASE ORAL 2 TIMES DAILY
Status: DISCONTINUED | OUTPATIENT
Start: 2024-06-13 | End: 2024-06-14

## 2024-06-13 RX ORDER — MORPHINE SULFATE 4 MG/ML
4 INJECTION, SOLUTION INTRAMUSCULAR; INTRAVENOUS EVERY 2 HOUR PRN
Status: DISCONTINUED | OUTPATIENT
Start: 2024-06-13 | End: 2024-06-29

## 2024-06-13 RX ORDER — GLIPIZIDE 5 MG/1
10 TABLET, FILM COATED, EXTENDED RELEASE ORAL 2 TIMES DAILY
COMMUNITY
Start: 2023-12-20

## 2024-06-13 RX ORDER — IODIXANOL 320 MG/ML
100 INJECTION, SOLUTION INTRAVASCULAR
Status: COMPLETED | OUTPATIENT
Start: 2024-06-13 | End: 2024-06-13

## 2024-06-13 RX ORDER — MORPHINE SULFATE 2 MG/ML
2 INJECTION, SOLUTION INTRAMUSCULAR; INTRAVENOUS EVERY 2 HOUR PRN
Status: DISCONTINUED | OUTPATIENT
Start: 2024-06-13 | End: 2024-06-29

## 2024-06-13 RX ORDER — LIDOCAINE HYDROCHLORIDE 10 MG/ML
INJECTION, SOLUTION EPIDURAL; INFILTRATION; INTRACAUDAL; PERINEURAL
Status: COMPLETED
Start: 2024-06-13 | End: 2024-06-13

## 2024-06-13 RX ORDER — ONDANSETRON 2 MG/ML
4 INJECTION INTRAMUSCULAR; INTRAVENOUS EVERY 6 HOURS PRN
Status: DISCONTINUED | OUTPATIENT
Start: 2024-06-13 | End: 2024-06-15 | Stop reason: SDUPTHER

## 2024-06-13 RX ORDER — AMLODIPINE BESYLATE 5 MG/1
10 TABLET ORAL DAILY
Status: DISCONTINUED | OUTPATIENT
Start: 2024-06-13 | End: 2024-06-14

## 2024-06-13 RX ORDER — HEPARIN SODIUM 5000 [USP'U]/ML
INJECTION, SOLUTION INTRAVENOUS; SUBCUTANEOUS
Status: COMPLETED
Start: 2024-06-13 | End: 2024-06-13

## 2024-06-13 RX ORDER — ASPIRIN 81 MG/1
81 TABLET, CHEWABLE ORAL DAILY
Status: DISCONTINUED | OUTPATIENT
Start: 2024-06-14 | End: 2024-06-27

## 2024-06-13 RX ORDER — CLOPIDOGREL BISULFATE 75 MG/1
75 TABLET ORAL DAILY
Status: DISCONTINUED | OUTPATIENT
Start: 2024-06-13 | End: 2024-06-29

## 2024-06-13 RX ORDER — MIDAZOLAM HYDROCHLORIDE 1 MG/ML
INJECTION INTRAMUSCULAR; INTRAVENOUS
Status: COMPLETED
Start: 2024-06-13 | End: 2024-06-13

## 2024-06-13 RX ORDER — CLOPIDOGREL BISULFATE 75 MG/1
TABLET ORAL
Status: COMPLETED
Start: 2024-06-13 | End: 2024-06-13

## 2024-06-13 RX ORDER — HEPARIN SODIUM AND DEXTROSE 10000; 5 [USP'U]/100ML; G/100ML
INJECTION INTRAVENOUS
Status: COMPLETED
Start: 2024-06-13 | End: 2024-06-13

## 2024-06-13 RX ORDER — HYDROCHLOROTHIAZIDE 25 MG/1
25 TABLET ORAL DAILY
Status: DISCONTINUED | OUTPATIENT
Start: 2024-06-13 | End: 2024-06-14

## 2024-06-13 RX ORDER — DEXTROSE MONOHYDRATE 25 G/50ML
50 INJECTION, SOLUTION INTRAVENOUS
Status: DISCONTINUED | OUTPATIENT
Start: 2024-06-13 | End: 2024-06-29

## 2024-06-13 RX ORDER — SODIUM CHLORIDE 9 MG/ML
INJECTION, SOLUTION INTRAVENOUS CONTINUOUS
Status: DISCONTINUED | OUTPATIENT
Start: 2024-06-13 | End: 2024-06-16

## 2024-06-13 RX ORDER — WATER 10 ML/10ML
INJECTION INTRAMUSCULAR; INTRAVENOUS; SUBCUTANEOUS
Status: COMPLETED
Start: 2024-06-13 | End: 2024-06-13

## 2024-06-13 RX ORDER — SODIUM CHLORIDE 9 MG/ML
INJECTION, SOLUTION INTRAVENOUS CONTINUOUS
Status: DISCONTINUED | OUTPATIENT
Start: 2024-06-13 | End: 2024-06-13

## 2024-06-13 RX ORDER — HYDROCODONE BITARTRATE AND ACETAMINOPHEN 10; 325 MG/1; MG/1
1 TABLET ORAL EVERY 6 HOURS PRN
Status: DISCONTINUED | OUTPATIENT
Start: 2024-06-13 | End: 2024-06-21

## 2024-06-13 RX ADMIN — MORPHINE SULFATE 2 MG: 2 INJECTION, SOLUTION INTRAMUSCULAR; INTRAVENOUS at 22:41:00

## 2024-06-13 RX ADMIN — IODIXANOL 50 ML: 320 INJECTION, SOLUTION INTRAVASCULAR at 13:05:00

## 2024-06-13 RX ADMIN — HEPARIN SODIUM AND DEXTROSE 18 UNITS/KG/HR: 10000; 5 INJECTION INTRAVENOUS at 20:05:00

## 2024-06-13 RX ADMIN — SODIUM CHLORIDE: 9 INJECTION, SOLUTION INTRAVENOUS at 11:40:00

## 2024-06-13 RX ADMIN — HEPARIN SODIUM AND DEXTROSE 1000 UNITS/HR: 10000; 5 INJECTION INTRAVENOUS at 22:23:00

## 2024-06-13 NOTE — DISCHARGE INSTRUCTIONS
81 mg aspirin daily lifelong  75 mg plaix daily for 90 days     HOME CARE INSTRUCTIONS FOLLOWING CORONARY ANGIOGRAPHY,  PERIPHERAL ANGIOGRAPHY, ANGIOPLASTY (PTCA/PTA) OR INSERTION  OF STENT IN THE CORONARY, CAROTID, AND/OR PERIPHERAL ARTERIES      Activity:   DO NOT drive after the procedure. You may resume driving late the following day according to the nurse or physician’s instructions   Plan on resting and relaxing tonight and tomorrow   Resume your normal activity after 48 hours, or as instructed by your physician   Do not lift anything over 10 pounds for the next 24 hours   Avoid sexual activity for the next 24 hours   Avoid drinking alcohol for the next 24 hours   If the groin site was used, avoid repeated stair use and excessive walking for the next 24 hours       What is Normal?   A small lump at the procedure site associated with mild tenderness when touched   The procedure site may be bruised or discolored   There may be a small amount of drainage on the bandage    Special Instructions:   Drink plenty of fluids during the next 24 hours to “flush” the contrast from your system   The bandage is to remain in place for 24 hours   Keep the bandage clean and dry   DO NOT submerge the procedure site for 72 hours (no bath tubs or pools).    After 24 hours, you must remove the bandage   You should shower after removing the bandage, and wash the procedure site gently with soap and water   If you choose to wear a bandage for a few days, make sure it remains clean and dry and that it is changed daily    When you should NOTIFY YOUR PHYSICIAN:   Bleeding can occur at the procedure site - both on the outside of the skin and/or beneath the surface of the skin   Swelling or a large lump at the procedure site can occur, which may be accompanied by moderate to severe pain. If either of the above occurs, lie down flat. Have someone apply pressure to the procedure site with both hands, as instructed by the nurse. Hold pressure  for 20 minutes and the bleeding should stop. Notify your physician of the occurrence. If the bleeding does not stop, call 911 and continue to apply pressure   If you experience signs of a fever, temperature >101 degrees, chills, infection (redness, swelling, thick yellow drainage, or a foul odor from the procedure site)   If you notice any numbness, tingling, or loss of feeling to your leg or foot for groin access    If you Received a Stent:  - You will remain on an antiplatelet drug and/or aspirin. Antiplatelet medications are usually taken for six months to one year and should not be stopped unless your cardiologist directs you to do so. These medications help to prevent blockage at the stent site. If another physician or dentist asks you to stop your antiplatelet medication, you need to consult your cardiologist first. Together, your cardiologist and other physician can discuss the risks that may be involved if you are not taking the antiplatelet medication.   - If a MRI is necessary, it may be done 4-6 weeks after your procedure. Verify this with your cardiologist.  - Keep the stent card with you at all times! If you need a MRI in the future, your stent card will need to be shown to the technologist before performing the MRI. A duplicate card CANNOT be reproduced.     Other:  - You may resume your present diet, unless otherwise specified by your physician.   - You may resume all of your medications as prescribed, unless otherwise directed by your physician. A list of your medications was provided to you at discharge.    Sometimes managing your health at home requires assistance.  The [facility] team has recognized your preference to use [facility].  They can be reached by phone at [phone].  The fax number for your reference is [phone].  A representative from the home health agency will contact you or your family to schedule your first visit.

## 2024-06-13 NOTE — BRIEF OP NOTE
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 US percutaneous access left common femoral artery   2. Selection of right common femoral artery and angiogram   3. Penumbra percutaneous thrombectomy of right common femoral artery, profunda, and SFA   4. Balloon angioplasty and stent of mid right SFA with 7x120 innova  5. Balloon angioplasty and stent of proximal SFA with 7x60  6. US percutaneous access right common femoral artery   7. Penumbra percutaneous thrombectomy of left SFA and left anterior tibial artery   8. Balloon angioplasty of left tp trunk and posterior tibial with 3x80  9. Balloon angioplasty and stent of left common femoral artery with 8x60 innova   10 IVUS of right common femoral artery, SFA, popliteal, and peroneal   11. IVUS of left external iliac artery, common femoral artery, SFA and popliteal       Anes 5 V 200 F start 1436 finish 1837    Assistant(s):        Surgical Findings:   Occlusion of right common femoral artery with embolism to SFA and profunda treated with penumbra  Right SFA with high grade stenosis treated with angioplasty and stent in proximal and mid SFA   Left SFA thrombus/embolus treated with penumbra   Left common femoral artery flow limiting dissection treated with stent      Specimen: none      Estimated Blood Loss: 200 mL     Yariel Du MD  6/13/2024  6:48 PM

## 2024-06-13 NOTE — BRIEF OP NOTE
Pre-Operative Diagnosis: left iliac chronic total occlusion, atherosclerosis with claudication     Post-Operative Diagnosis:  left iliac chronic total occlusion, atherosclerosis with claudication     Procedure Performed:   1. US percutaneous access right and left common femoral artery  2. Angiogram left iliac artery   3. Balloon angioplasty and stent of left common iliac artery and external iliac artery with 8x37 balloon expandable and 8x100 self expanding stent     Anesthesia 4 v 75 f start 1228 finish 1300    Assistant(s):        Surgical Findings:   Long segment external iliac artery chronic total occlusion      Specimen: none     Estimated Blood Loss: 50 mL    Yariel Du MD  6/13/2024  1:08 PM

## 2024-06-14 PROBLEM — I74.5 ILIAC ARTERY OCCLUSION, BILATERAL (HCC): Status: ACTIVE | Noted: 2024-06-14

## 2024-06-14 LAB
ANTIBODY SCREEN: NEGATIVE
APTT PPP: 146.9 SECONDS (ref 23–36)
APTT PPP: 58.5 SECONDS (ref 23–36)
APTT PPP: 98.6 SECONDS (ref 23–36)
BASOPHILS # BLD AUTO: 0.08 X10(3) UL (ref 0–0.2)
BASOPHILS NFR BLD AUTO: 0.4 %
EOSINOPHIL # BLD AUTO: 0.09 X10(3) UL (ref 0–0.7)
EOSINOPHIL NFR BLD AUTO: 0.4 %
ERYTHROCYTE [DISTWIDTH] IN BLOOD BY AUTOMATED COUNT: 15.9 %
GLUCOSE BLD-MCNC: 149 MG/DL (ref 70–99)
GLUCOSE BLD-MCNC: 168 MG/DL (ref 70–99)
GLUCOSE BLD-MCNC: 328 MG/DL (ref 70–99)
GLUCOSE BLD-MCNC: 441 MG/DL (ref 70–99)
HCT VFR BLD AUTO: 24.9 %
HGB BLD-MCNC: 8 G/DL
HGB BLD-MCNC: 8.2 G/DL
HGB BLD-MCNC: 9.4 G/DL
IMM GRANULOCYTES # BLD AUTO: 0.32 X10(3) UL (ref 0–1)
IMM GRANULOCYTES NFR BLD: 1.5 %
LYMPHOCYTES # BLD AUTO: 3.84 X10(3) UL (ref 1–4)
LYMPHOCYTES NFR BLD AUTO: 17.9 %
MCH RBC QN AUTO: 29.2 PG (ref 26–34)
MCHC RBC AUTO-ENTMCNC: 32.1 G/DL (ref 31–37)
MCV RBC AUTO: 90.9 FL
MONOCYTES # BLD AUTO: 1.29 X10(3) UL (ref 0.1–1)
MONOCYTES NFR BLD AUTO: 6 %
MRSA DNA SPEC QL NAA+PROBE: NEGATIVE
NEUTROPHILS # BLD AUTO: 15.85 X10 (3) UL (ref 1.5–7.7)
NEUTROPHILS # BLD AUTO: 15.85 X10(3) UL (ref 1.5–7.7)
NEUTROPHILS NFR BLD AUTO: 73.8 %
PLATELET # BLD AUTO: 235 10(3)UL (ref 150–450)
PLATELET # BLD AUTO: 235 10(3)UL (ref 150–450)
RBC # BLD AUTO: 2.74 X10(6)UL
RH BLOOD TYPE: POSITIVE
WBC # BLD AUTO: 21.5 X10(3) UL (ref 4–11)

## 2024-06-14 PROCEDURE — 85025 COMPLETE CBC W/AUTO DIFF WBC: CPT | Performed by: SURGERY

## 2024-06-14 PROCEDURE — 85018 HEMOGLOBIN: CPT | Performed by: HOSPITALIST

## 2024-06-14 PROCEDURE — 82962 GLUCOSE BLOOD TEST: CPT

## 2024-06-14 PROCEDURE — 85730 THROMBOPLASTIN TIME PARTIAL: CPT | Performed by: SURGERY

## 2024-06-14 PROCEDURE — 30233N1 TRANSFUSION OF NONAUTOLOGOUS RED BLOOD CELLS INTO PERIPHERAL VEIN, PERCUTANEOUS APPROACH: ICD-10-PCS | Performed by: SURGERY

## 2024-06-14 PROCEDURE — S0028 INJECTION, FAMOTIDINE, 20 MG: HCPCS | Performed by: HOSPITALIST

## 2024-06-14 PROCEDURE — 85049 AUTOMATED PLATELET COUNT: CPT | Performed by: SURGERY

## 2024-06-14 PROCEDURE — 87641 MR-STAPH DNA AMP PROBE: CPT | Performed by: HOSPITALIST

## 2024-06-14 PROCEDURE — 36430 TRANSFUSION BLD/BLD COMPNT: CPT

## 2024-06-14 RX ORDER — POTASSIUM CHLORIDE 1.5 G/1.58G
40 POWDER, FOR SOLUTION ORAL EVERY 4 HOURS
Status: DISCONTINUED | OUTPATIENT
Start: 2024-06-14 | End: 2024-06-14

## 2024-06-14 RX ORDER — INSULIN DEGLUDEC 100 U/ML
20 INJECTION, SOLUTION SUBCUTANEOUS DAILY
Status: DISCONTINUED | OUTPATIENT
Start: 2024-06-15 | End: 2024-06-29

## 2024-06-14 RX ORDER — FAMOTIDINE 20 MG/1
20 TABLET, FILM COATED ORAL DAILY
Status: DISCONTINUED | OUTPATIENT
Start: 2024-06-14 | End: 2024-06-29

## 2024-06-14 RX ORDER — FAMOTIDINE 10 MG/ML
20 INJECTION, SOLUTION INTRAVENOUS DAILY
Status: DISCONTINUED | OUTPATIENT
Start: 2024-06-14 | End: 2024-06-29

## 2024-06-14 RX ORDER — INSULIN DEGLUDEC 100 U/ML
10 INJECTION, SOLUTION SUBCUTANEOUS ONCE
Status: COMPLETED | OUTPATIENT
Start: 2024-06-14 | End: 2024-06-14

## 2024-06-14 RX ORDER — SODIUM CHLORIDE 9 MG/ML
INJECTION, SOLUTION INTRAVENOUS ONCE
Status: COMPLETED | OUTPATIENT
Start: 2024-06-14 | End: 2024-06-14

## 2024-06-14 RX ORDER — INSULIN DEGLUDEC 100 U/ML
10 INJECTION, SOLUTION SUBCUTANEOUS DAILY
Status: DISCONTINUED | OUTPATIENT
Start: 2024-06-14 | End: 2024-06-14

## 2024-06-14 RX ADMIN — SODIUM CHLORIDE: 9 INJECTION, SOLUTION INTRAVENOUS at 09:02:00

## 2024-06-14 RX ADMIN — INSULIN DEGLUDEC 10 UNITS: 100 INJECTION, SOLUTION SUBCUTANEOUS at 15:55:00

## 2024-06-14 RX ADMIN — ONDANSETRON 4 MG: 2 INJECTION INTRAMUSCULAR; INTRAVENOUS at 02:20:00

## 2024-06-14 RX ADMIN — FAMOTIDINE 20 MG: 10 INJECTION, SOLUTION INTRAVENOUS at 11:14:00

## 2024-06-14 RX ADMIN — HYDROCODONE BITARTRATE AND ACETAMINOPHEN 1 TABLET: 10; 325 TABLET ORAL at 23:41:00

## 2024-06-14 RX ADMIN — POTASSIUM CHLORIDE 40 MEQ: 1.5 POWDER, FOR SOLUTION ORAL at 08:57:00

## 2024-06-14 RX ADMIN — SODIUM CHLORIDE: 9 INJECTION, SOLUTION INTRAVENOUS at 19:43:00

## 2024-06-14 RX ADMIN — HEPARIN SODIUM AND DEXTROSE 900 UNITS/HR: 10000; 5 INJECTION INTRAVENOUS at 13:34:00

## 2024-06-14 RX ADMIN — CLOPIDOGREL BISULFATE 75 MG: 75 TABLET ORAL at 08:16:00

## 2024-06-14 RX ADMIN — HYDROCODONE BITARTRATE AND ACETAMINOPHEN 1 TABLET: 10; 325 TABLET ORAL at 03:21:00

## 2024-06-14 RX ADMIN — ROSUVASTATIN CALCIUM 10 MG: 10 TABLET, COATED ORAL at 20:25:00

## 2024-06-14 RX ADMIN — HEPARIN SODIUM AND DEXTROSE 800 UNITS/HR: 10000; 5 INJECTION INTRAVENOUS at 05:56:00

## 2024-06-14 RX ADMIN — SODIUM CHLORIDE: 9 INJECTION, SOLUTION INTRAVENOUS at 00:40:00

## 2024-06-14 RX ADMIN — SODIUM CHLORIDE: 9 INJECTION, SOLUTION INTRAVENOUS at 10:32:00

## 2024-06-14 RX ADMIN — INSULIN DEGLUDEC 10 UNITS: 100 INJECTION, SOLUTION SUBCUTANEOUS at 09:38:00

## 2024-06-14 RX ADMIN — ASPIRIN 81 MG: 81 TABLET, CHEWABLE ORAL at 08:15:00

## 2024-06-14 RX ADMIN — HEPARIN SODIUM AND DEXTROSE 900 UNITS/HR: 10000; 5 INJECTION INTRAVENOUS at 23:39:00

## 2024-06-14 RX ADMIN — PREDNISONE 5 MG: 5 TABLET ORAL at 08:15:00

## 2024-06-14 NOTE — SPIRITUAL CARE NOTE
Spiritual Care Visit Note    Patient Name: Emilie John Date of Spiritual Care Visit: 24   : 1955 Primary Dx: <principal problem not specified>       Referred By: Treatment team    Visit Type/Summary:     - PoA: Patient unable to complete PoAH at this time:  remains available for follow up.    Spiritual Care support can be requested via an Epic consult.   For urgent/immediate needs, please contact the On Call  at: [facility]: ext [phone]

## 2024-06-14 NOTE — PLAN OF CARE
Assumed pt care this am approx 0730. Pt is alert and oriented but falls asleep quickly, PITTS, neuro intact. L groin site soft, c/d/I. R groin site firm with ecchymosis, Dr. Du aware, site monitored closely with no changes. Pulses by doppler. Heparin gtt continued per pe/dvt protocol. Two units PRBC given as ordered. Able to titrate off levophed around 1600.

## 2024-06-14 NOTE — CONSULTS
[facility] H&P/Consult note       CC: post op med management  Asked to see pt by Dr Du     PCP: Tay Alexandra MD    History of Present Illness: Patient is a 69 year old female with PMH sig for HTN, HLD, DM2, CKD, RA on prednisone, PAD here for schedueld vascular procedure  She is currently in ICU, on pressors. Overnight had groin hematom , hypotension. She is drowsy but easily arousable. Complains of pain in her 'buttock'. No sob, cp. Follows commands.     PMH  Past Medical History:    Diabetes (HCC)    DVT (deep venous thrombosis) (HCC)    Essential hypertension    High blood pressure    Nicotine use disorder    Osteoarthritis    hip, wrist    Peripheral vascular disease (HCC)    Rheumatoid arthritis (HCC)    Visual impairment    glasses        PSH  Past Surgical History:   Procedure Laterality Date    Benign biopsy left  1991    Cholecystectomy      Colonoscopy N/A 05/06/2019    Procedure: COLONOSCOPY;  Surgeon: Freeman Oshea MD;  Location:  ENDOSCOPY    Colonoscopy      Hip replacement surgery Right 01/10/2020    Knee replacement surgery      Needle biopsy left  2017    Removal gallbladder      Total hip replacement      Total knee replacement Bilateral     Upper gi endoscopy,exam          ALL:  Allergies   Allergen Reactions    Celecoxib DIARRHEA and RASH     AND DIARRHEA      Esomeprazole DIARRHEA and RASH     DIARRHEA      Etanercept HIVES and RASH    Infliximab HIVES and ANAPHYLAXIS    Other ANAPHYLAXIS, SWELLING and HIVES     Throat closes up  HAD IN CONJUNCTION WITH ASA- SO AVOIDS BOTH OF THESE- FACE SWELLED UP    Oxycodone PAIN and NAUSEA ONLY    Pregabalin DIARRHEA and RASH     AND DIARRHEA      Enbrel RASH    Fish-Derived Products SWELLING    Orencia [Abatacept] ITCHING    Xeljanz [Tofacitinib] DIARRHEA        Home Medications:  Outpatient Medications Marked as Taking for the 6/13/24 encounter (Hospital Encounter) with  IVS  HYBRID   Medication Sig Dispense Refill    clopidogrel 75  MG Oral Tab Take 1 tablet (75 mg total) by mouth daily. 90 tablet 0    glipiZIDE ER 5 MG Oral Tablet 24 Hr Take 2 tablets (10 mg total) by mouth 2 (two) times daily.      aspirin 81 MG Oral Chew Tab Chew 1 tablet (81 mg total) by mouth daily.      azaTHIOprine 50 MG Oral Tab Take 1 tablet (50 mg total) by mouth daily.      rosuvastatin 10 MG Oral Tab Take 1 tablet (10 mg total) by mouth nightly.      amLODIPine 10 MG Oral Tab Take 1 tablet (10 mg total) by mouth daily. 90 tablet 1    hydroCHLOROthiazide 25 MG Oral Tab Take 1 tablet (25 mg total) by mouth daily. 90 tablet 1    valsartan 160 MG Oral Tab Take 1 tablet (160 mg total) by mouth daily. 90 tablet 1    atenolol 50 MG Oral Tab Take 1 tablet (50 mg total) by mouth daily. 90 tablet 1    metFORMIN 500 MG Oral Tab Take 2 tablets (1,000 mg total) by mouth 2 (two) times daily with meals. 2 tab twice a day 180 tablet 3    predniSONE 5 MG Oral Tab Take 1 tablet (5 mg total) by mouth 2 (two) times daily. 60 tablet 3         Soc Hx  Social History     Tobacco Use    Smoking status: Every Day     Current packs/day: 0.50     Average packs/day: 0.5 packs/day for 51.4 years (25.7 ttl pk-yrs)     Types: Cigarettes     Start date: 1/29/1973    Smokeless tobacco: Never   Substance Use Topics    Alcohol use: No        Fam Hx  Family History   Problem Relation Age of Onset    Other (CVA) Father     Cancer Mother 60        STOMACH CANCER    Cancer Brother         LUNG CANCER       Review of Systems  Comprehensive ROS reviewed and negative except for what's stated above.        OBJECTIVE:  /49   Pulse 103   Temp 97.6 °F (36.4 °C) (Temporal)   Resp (!) 36   Ht 5' 1\" (1.549 m)   Wt 149 lb (67.6 kg)   SpO2 98%   BMI 28.15 kg/m²   General:  Alert, no distress, appears stated age.   Head:  Normocephalic,    Eyes:  Sclera anicteric, No conjunctival pallor, EOMs intact.    Throat: dry   Neck: Supple,    Lungs:   Clear to auscultation bilaterally    Chest wall:  No  tenderness or deformity.   Heart:  Regular rate and rhythm    Abdomen:   Soft, NT/ND    Extremities: Atraumatic, bl groin with ecchymosis, swelling / hardness in R groin.    Skin: No visible rashes or lesions.    Neurologic: Follows commands, no facial asymmetry, no dysarhtria     Diagnostic Data:    CBC/Chem  Recent Labs   Lab 06/13/24 2013 06/14/24  0408   WBC 18.5*  --    HGB 10.0*  --    MCV 91.0  --    .0 235.0       Recent Labs   Lab 06/13/24 2014   *   K 3.6      CO2 19.0*   BUN 24*   CREATSERUM 1.16*   *   CA 7.7*       No results for input(s): \"ALT\", \"AST\", \"ALB\", \"AMYLASE\", \"LIPASE\", \"LDH\" in the last 168 hours.    Invalid input(s): \"ALPHOS\", \"TBIL\", \"DBIL\", \"TPROT\"    No results for input(s): \"TROP\" in the last 168 hours.         Radiology: CATH PV    Result Date: 6/13/2024  This exam has been completed. Please refer to Notes for the results to this procedure.       ASSESSMENT / PLAN:     Patient is a 69 year old female with PMH sig for HTN, HLD, DM2, CKD, RA on prednisone, PAD here for schedueld vascular procedure    Impression    -left iliac chronic occlusion with claudication sp balloon angioplasty and stent of left common iliac artery and external iliac artery 06/13  -right common femoral artery occlusion sp thrombectomy, balloon angioplasty, stent; thrombectomy of left SFA and left anterior tibial artery, balloon angioplasty of left TP trunk and posterior tibial 06/13  -post op pain  -post op leukocytosis    -post op shock, on pressors, possibly hemorrhagic  -left groin hematoma  -anemia    -DM2, uncontrolled - A1c 12s    -nicotine dependence, smokes 1ppd    -HTN  -HLD    -RA on chronic prednisone  -CKD 3, baseline Cr ~1.2    Plan    *vascular  -post op surgical management per vascular  -currently on heparin , plavix , aspirin    *post op hypotension / shock  -on pressors, wean off as able  -possibly from blood loss. Stable on pressors and per dw RN will start weaning. If  remains on pressors will add stress dose steroids given prednisone use.     *anemia  -preop hgb 13.9, post op 10-> 8  -transfuse today  -serial H/H    *DM 2  -uncontrolled  -follows with endo as outpt, recently glipizide was increased  -start ISS.   Add tresiba 10 units     *HTN hx  -hold home meds (at home on atenolol, hydrochlorothiazide, norvasc, losartan)    *HLD  -statin    DVT proph  On heparin    GI ppx  -pepcid          Further recommendations pending patient's clinical course. [facility] hospitalist to continue to follow patient while in house    Patient and/or patient's family given opportunity to ask questions and note understanding and agreeing with therapeutic plan as outlined    Avel Veras Hospitalist  [phone]  Answering Service: [phone]

## 2024-06-14 NOTE — PLAN OF CARE
Patient arrived to room 6619 about 1930, hypotensive upon arrival. Per Dr. Du, 0.9% bolus given and IV fluids started. Patient remained hypotensive, second bolus given and started on levo. L groin site C/D/I, soft, no hematoma. R groin soft but edematous after repositioning, Dr. Du notified. Bilateral pulses doppler overnight, see flowsheets. Heparin gtt adjusted per results, see MAR. Emesis/nausea x2 overnight, zofran given. Son at bedside on arrival, informed of plan of cares and updated on patient status overnight. No further concerns at this time.    Problem: NEUROLOGICAL - ADULT  Goal: Achieves stable or improved neurological status  Description: INTERVENTIONS  - Assess for and report changes in neurological status  - Initiate measures to prevent increased intracranial pressure  - Maintain blood pressure and fluid volume within ordered parameters to optimize cerebral perfusion and minimize risk of hemorrhage  - Monitor temperature, glucose, and sodium. Initiate appropriate interventions as ordered  Outcome: Progressing  Goal: Absence of seizures  Description: INTERVENTIONS  - Monitor for seizure activity  - Administer anti-seizure medications as ordered  - Monitor neurological status  Outcome: Progressing  Goal: Remains free of injury related to seizure activity  Description: INTERVENTIONS:  - Maintain airway, patient safety  and administer oxygen as ordered  - Monitor patient for seizure activity, document and report duration and description of seizure to MD/LIP  - If seizure occurs, turn patient to side and suction secretions as needed  - Reorient patient post seizure  - Seizure pads on all 4 side rails  - Instruct patient/family to notify RN of any seizure activity  - Instruct patient/family to call for assistance with activity based on assessment  Outcome: Progressing  Goal: Achieves maximal functionality and self care  Description: INTERVENTIONS  - Monitor swallowing and airway patency with patient  fatigue and changes in neurological status  - Encourage and assist patient to increase activity and self care with guidance from PT/OT  - Encourage visually impaired, hearing impaired and aphasic patients to use assistive/communication devices  Outcome: Progressing     Problem: Patient/Family Goals  Goal: Patient/Family Long Term Goal  Description: Patient's Long Term Goal: Stay out of hospital    Interventions:  - Med compliance, follow up appointments, PT/OT, pain management  - See additional Care Plan goals for specific interventions  Outcome: Progressing  Goal: Patient/Family Short Term Goal  Description: Patient's Short Term Goal: SBP maintain goal >90    Interventions:   - titrate levo to achieve goal, encourage PO fluids when more awake  - See additional Care Plan goals for specific interventions  Outcome: Progressing     Problem: PAIN - ADULT  Goal: Verbalizes/displays adequate comfort level or patient's stated pain goal  Description: INTERVENTIONS:  - Encourage pt to monitor pain and request assistance  - Assess pain using appropriate pain scale  - Administer analgesics based on type and severity of pain and evaluate response  - Implement non-pharmacological measures as appropriate and evaluate response  - Consider cultural and social influences on pain and pain management  - Manage/alleviate anxiety  - Utilize distraction and/or relaxation techniques  - Monitor for opioid side effects  - Notify MD/LIP if interventions unsuccessful or patient reports new pain  - Anticipate increased pain with activity and pre-medicate as appropriate  Outcome: Progressing     Problem: SAFETY ADULT - FALL  Goal: Free from fall injury  Description: INTERVENTIONS:  - Assess pt frequently for physical needs  - Identify cognitive and physical deficits and behaviors that affect risk of falls.  - [place] fall precautions as indicated by assessment.  - Educate pt/family on patient safety including physical limitations  - Instruct  pt to call for assistance with activity based on assessment  - Modify environment to reduce risk of injury  - Provide assistive devices as appropriate  - Consider OT/PT consult to assist with strengthening/mobility  - Encourage toileting schedule  Outcome: Progressing     Problem: DISCHARGE PLANNING  Goal: Discharge to home or other facility with appropriate resources  Description: INTERVENTIONS:  - Identify barriers to discharge w/pt and caregiver  - Include patient/family/discharge partner in discharge planning  - Arrange for needed discharge resources and transportation as appropriate  - Identify discharge learning needs (meds, wound care, etc)  - Arrange for interpreters to assist at discharge as needed  - Consider post-discharge preferences of patient/family/discharge partner  - Complete POLST form as appropriate  - Assess patient's ability to be responsible for managing their own health  - Refer to Case Management Department for coordinating discharge planning if the patient needs post-hospital services based on physician/LIP order or complex needs related to functional status, cognitive ability or social support system  Outcome: Progressing     Problem: SKIN/TISSUE INTEGRITY - ADULT  Goal: Skin integrity remains intact  Description: INTERVENTIONS  - Assess and document risk factors for pressure ulcer development  - Assess and document skin integrity  - Monitor for areas of redness and/or skin breakdown  - Initiate interventions, skin care algorithm/standards of care as needed  Outcome: Progressing  Goal: Incision(s), wounds(s) or drain site(s) healing without S/S of infection  Description: INTERVENTIONS:  - Assess and document risk factors for pressure ulcer development  - Assess and document skin integrity  - Assess and document dressing/incision, wound bed, drain sites and surrounding tissue  - Implement wound care per orders  - Initiate isolation precautions as appropriate  - Initiate Pressure Ulcer  prevention bundle as indicated  Outcome: Progressing     Problem: Impaired Functional Mobility  Goal: Achieve highest/safest level of mobility/gait  Description: Interventions:  - Assess patient's functional ability and stability  - Promote increasing activity/tolerance for mobility and gait  - Educate and engage patient/family in tolerated activity level and precautions    Outcome: Progressing     Problem: Impaired Activities of Daily Living  Goal: Achieve highest/safest level of independence in self care  Description: Interventions:  - Assess ability and encourage patient to participate in ADLs to maximize function  - Promote sitting position while performing ADLs such as feeding, grooming, and bathing  - Educate and encourage patient/family in tolerated functional activity level and precautions during self-care    Outcome: Progressing

## 2024-06-14 NOTE — PROGRESS NOTES
Resting comfortably  Having expected back pain  No pain in feet    No palpable retroperitoneal hematoma on exam   Left groin hematoma unchanged   Doppler signals in both feet - left -ankle anterior tibial artery , right post tib    Requiring vasopressors   Hemoglobin 10 last night - got 2 liters  Likely needs transfusion     Will prepare blood

## 2024-06-14 NOTE — PLAN OF CARE
Pt received from Neuro lab at 1440. VSS, NSR on monitor, normotensive. SBP goal 100-140. Mike and mari in place upon arrival. R groin accessed in neuro lab; dressing c/d/I, small hematoma present upon arrival, RN held pressure for 5 minutes after which hematoma dissipated, RN circled site on pt's groin for reference. Pedal pulses palpable. Neuros q1; L facial droop, L sided weakness, and LLE decreased sensation. D/t L facial droop RN unable to perform post-op dysphasia screen so deferred to SLP; Conrad SLP saw pt, cleared for regular cardiac diet with nectar thick liquids. Pt able to take pills one at a time with sips of water or apple sauce.     Problem: NEUROLOGICAL - ADULT  Goal: Achieves stable or improved neurological status  Description: INTERVENTIONS  - Assess for and report changes in neurological status  - Initiate measures to prevent increased intracranial pressure  - Maintain blood pressure and fluid volume within ordered parameters to optimize cerebral perfusion and minimize risk of hemorrhage  - Monitor temperature, glucose, and sodium. Initiate appropriate interventions as ordered  Outcome: Progressing  Goal: Absence of seizures  Description: INTERVENTIONS  - Monitor for seizure activity  - Administer anti-seizure medications as ordered  - Monitor neurological status  Outcome: Progressing  Goal: Remains free of injury related to seizure activity  Description: INTERVENTIONS:  - Maintain airway, patient safety  and administer oxygen as ordered  - Monitor patient for seizure activity, document and report duration and description of seizure to MD/LIP  - If seizure occurs, turn patient to side and suction secretions as needed  - Reorient patient post seizure  - Seizure pads on all 4 side rails  - Instruct patient/family to notify RN of any seizure activity  - Instruct patient/family to call for assistance with activity based on assessment  Outcome: Progressing  Goal: Achieves maximal functionality and self  care  Description: INTERVENTIONS  - Monitor swallowing and airway patency with patient fatigue and changes in neurological status  - Encourage and assist patient to increase activity and self care with guidance from PT/OT  - Encourage visually impaired, hearing impaired and aphasic patients to use assistive/communication devices  Outcome: Progressing

## 2024-06-15 ENCOUNTER — APPOINTMENT (OUTPATIENT)
Dept: CT IMAGING | Facility: HOSPITAL | Age: 69
DRG: 252 | End: 2024-06-15
Attending: SURGERY
Payer: MEDICARE

## 2024-06-15 ENCOUNTER — APPOINTMENT (OUTPATIENT)
Dept: ULTRASOUND IMAGING | Facility: HOSPITAL | Age: 69
DRG: 252 | End: 2024-06-15
Attending: SURGERY
Payer: MEDICARE

## 2024-06-15 ENCOUNTER — ANESTHESIA (OUTPATIENT)
Dept: CARDIAC SURGERY | Facility: HOSPITAL | Age: 69
DRG: 252 | End: 2024-06-15
Payer: MEDICARE

## 2024-06-15 ENCOUNTER — ANESTHESIA EVENT (OUTPATIENT)
Dept: CARDIAC SURGERY | Facility: HOSPITAL | Age: 69
DRG: 252 | End: 2024-06-15
Payer: MEDICARE

## 2024-06-15 LAB
ALBUMIN SERPL-MCNC: 1.7 G/DL (ref 3.4–5)
ALBUMIN/GLOB SERPL: 0.5 {RATIO} (ref 1–2)
ALP LIVER SERPL-CCNC: 53 U/L
ALT SERPL-CCNC: 22 U/L
ANION GAP SERPL CALC-SCNC: 6 MMOL/L (ref 0–18)
ANION GAP SERPL CALC-SCNC: 7 MMOL/L (ref 0–18)
APTT PPP: 140.5 SECONDS (ref 23–36)
APTT PPP: 25.5 SECONDS (ref 23–36)
AST SERPL-CCNC: 83 U/L (ref 15–37)
BASE EXCESS BLDA CALC-SCNC: -10.2 MMOL/L (ref ?–2)
BASE EXCESS BLDA CALC-SCNC: -11.6 MMOL/L (ref ?–2)
BILIRUB SERPL-MCNC: 0.3 MG/DL (ref 0.1–2)
BLOOD TYPE BARCODE: 5100
BODY TEMPERATURE: 98.6 F
BODY TEMPERATURE: 98.6 F
BUN BLD-MCNC: 26 MG/DL (ref 9–23)
BUN BLD-MCNC: 27 MG/DL (ref 9–23)
CA-I BLD-SCNC: 1.31 MMOL/L (ref 0.95–1.32)
CA-I BLD-SCNC: 1.34 MMOL/L (ref 0.95–1.32)
CALCIUM BLD-MCNC: 6.9 MG/DL (ref 8.5–10.1)
CALCIUM BLD-MCNC: 8 MG/DL (ref 8.5–10.1)
CHLORIDE SERPL-SCNC: 116 MMOL/L (ref 98–112)
CHLORIDE SERPL-SCNC: 121 MMOL/L (ref 98–112)
CO2 SERPL-SCNC: 14 MMOL/L (ref 21–32)
CO2 SERPL-SCNC: 15 MMOL/L (ref 21–32)
COHGB MFR BLD: 2.1 % SAT (ref 0–3)
COHGB MFR BLD: 2.2 % SAT (ref 0–3)
CREAT BLD-MCNC: 1.52 MG/DL
CREAT BLD-MCNC: 1.58 MG/DL
EGFRCR SERPLBLD CKD-EPI 2021: 35 ML/MIN/1.73M2 (ref 60–?)
EGFRCR SERPLBLD CKD-EPI 2021: 37 ML/MIN/1.73M2 (ref 60–?)
ERYTHROCYTE [DISTWIDTH] IN BLOOD BY AUTOMATED COUNT: 15.4 %
ERYTHROCYTE [DISTWIDTH] IN BLOOD BY AUTOMATED COUNT: 16 %
FIO2: 40 %
FIO2: 50 %
GLOBULIN PLAS-MCNC: 3.1 G/DL (ref 2.8–4.4)
GLUCOSE BLD-MCNC: 136 MG/DL (ref 70–99)
GLUCOSE BLD-MCNC: 142 MG/DL (ref 70–99)
GLUCOSE BLD-MCNC: 166 MG/DL (ref 70–99)
GLUCOSE BLD-MCNC: 167 MG/DL (ref 70–99)
GLUCOSE BLD-MCNC: 183 MG/DL (ref 70–99)
HCO3 BLDA-SCNC: 15.9 MEQ/L (ref 21–27)
HCO3 BLDA-SCNC: 17 MEQ/L (ref 21–27)
HCT VFR BLD AUTO: 23.4 %
HCT VFR BLD AUTO: 36.4 %
HGB BLD-MCNC: 10.5 G/DL
HGB BLD-MCNC: 11.7 G/DL
HGB BLD-MCNC: 12.2 G/DL
HGB BLD-MCNC: 7.5 G/DL
INR BLD: 1.12 (ref 0.8–1.2)
INSPIRATION SETTING TIME VENT: 0.9 %
ISTAT ACTIVATED CLOTTING TIME: 146 SECONDS (ref 74–137)
LACTATE BLD-SCNC: 0.6 MMOL/L (ref 0.5–2)
LACTATE BLD-SCNC: 0.8 MMOL/L (ref 0.5–2)
MAGNESIUM SERPL-MCNC: 1.7 MG/DL (ref 1.6–2.6)
MCH RBC QN AUTO: 29.2 PG (ref 26–34)
MCH RBC QN AUTO: 29.4 PG (ref 26–34)
MCHC RBC AUTO-ENTMCNC: 32.1 G/DL (ref 31–37)
MCHC RBC AUTO-ENTMCNC: 33.5 G/DL (ref 31–37)
MCV RBC AUTO: 87.1 FL
MCV RBC AUTO: 91.8 FL
METHGB MFR BLD: 0 % SAT (ref 0.4–1.5)
METHGB MFR BLD: 0.5 % SAT (ref 0.4–1.5)
OSMOLALITY SERPL CALC.SUM OF ELEC: 295 MOSM/KG (ref 275–295)
OSMOLALITY SERPL CALC.SUM OF ELEC: 299 MOSM/KG (ref 275–295)
OXYHGB MFR BLDA: 97 % (ref 92–100)
OXYHGB MFR BLDA: 97.2 % (ref 92–100)
PCO2 BLDA: 24 MM HG (ref 35–45)
PCO2 BLDA: 31 MM HG (ref 35–45)
PEEP: 5 CM H2O
PEEP: 5 CM H2O
PH BLDA: 7.27 [PH] (ref 7.35–7.45)
PH BLDA: 7.36 [PH] (ref 7.35–7.45)
PLATELET # BLD AUTO: 157 10(3)UL (ref 150–450)
PLATELET # BLD AUTO: 99 10(3)UL (ref 150–450)
PLATELETS.RETICULATED NFR BLD AUTO: 5.4 % (ref 0–7)
PO2 BLDA: 100 MM HG (ref 80–100)
PO2 BLDA: 88 MM HG (ref 80–100)
POTASSIUM BLD-SCNC: 4.2 MMOL/L (ref 3.6–5.1)
POTASSIUM BLD-SCNC: 4.4 MMOL/L (ref 3.6–5.1)
POTASSIUM SERPL-SCNC: 4.4 MMOL/L (ref 3.5–5.1)
POTASSIUM SERPL-SCNC: 4.7 MMOL/L (ref 3.5–5.1)
PROT SERPL-MCNC: 4.8 G/DL (ref 6.4–8.2)
PROTHROMBIN TIME: 14.4 SECONDS (ref 11.6–14.8)
RBC # BLD AUTO: 2.55 X10(6)UL
RBC # BLD AUTO: 4.18 X10(6)UL
SODIUM BLD-SCNC: 134 MMOL/L (ref 135–145)
SODIUM BLD-SCNC: 136 MMOL/L (ref 135–145)
SODIUM SERPL-SCNC: 138 MMOL/L (ref 136–145)
SODIUM SERPL-SCNC: 141 MMOL/L (ref 136–145)
TIDAL VOLUME: 450 ML
TIDAL VOLUME: 450 ML
UNIT VOLUME: 350 ML
VENT RATE: 12 /MIN
VENT RATE: 20 /MIN
WBC # BLD AUTO: 23.3 X10(3) UL (ref 4–11)
WBC # BLD AUTO: 25.7 X10(3) UL (ref 4–11)

## 2024-06-15 PROCEDURE — 83605 ASSAY OF LACTIC ACID: CPT | Performed by: INTERNAL MEDICINE

## 2024-06-15 PROCEDURE — 85730 THROMBOPLASTIN TIME PARTIAL: CPT | Performed by: SURGERY

## 2024-06-15 PROCEDURE — 83605 ASSAY OF LACTIC ACID: CPT | Performed by: STUDENT IN AN ORGANIZED HEALTH CARE EDUCATION/TRAINING PROGRAM

## 2024-06-15 PROCEDURE — 82962 GLUCOSE BLOOD TEST: CPT

## 2024-06-15 PROCEDURE — 83735 ASSAY OF MAGNESIUM: CPT | Performed by: HOSPITALIST

## 2024-06-15 PROCEDURE — 84295 ASSAY OF SERUM SODIUM: CPT

## 2024-06-15 PROCEDURE — 85018 HEMOGLOBIN: CPT | Performed by: HOSPITALIST

## 2024-06-15 PROCEDURE — 85014 HEMATOCRIT: CPT

## 2024-06-15 PROCEDURE — 82330 ASSAY OF CALCIUM: CPT

## 2024-06-15 PROCEDURE — 0KUQ07Z SUPPLEMENT RIGHT UPPER LEG MUSCLE WITH AUTOLOGOUS TISSUE SUBSTITUTE, OPEN APPROACH: ICD-10-PCS | Performed by: SURGERY

## 2024-06-15 PROCEDURE — 94002 VENT MGMT INPAT INIT DAY: CPT

## 2024-06-15 PROCEDURE — 82330 ASSAY OF CALCIUM: CPT | Performed by: STUDENT IN AN ORGANIZED HEALTH CARE EDUCATION/TRAINING PROGRAM

## 2024-06-15 PROCEDURE — 83050 HGB METHEMOGLOBIN QUAN: CPT | Performed by: STUDENT IN AN ORGANIZED HEALTH CARE EDUCATION/TRAINING PROGRAM

## 2024-06-15 PROCEDURE — 82803 BLOOD GASES ANY COMBINATION: CPT

## 2024-06-15 PROCEDURE — 85018 HEMOGLOBIN: CPT | Performed by: INTERNAL MEDICINE

## 2024-06-15 PROCEDURE — 84132 ASSAY OF SERUM POTASSIUM: CPT

## 2024-06-15 PROCEDURE — 76882 US LMTD JT/FCL EVL NVASC XTR: CPT | Performed by: SURGERY

## 2024-06-15 PROCEDURE — 82330 ASSAY OF CALCIUM: CPT | Performed by: INTERNAL MEDICINE

## 2024-06-15 PROCEDURE — 84132 ASSAY OF SERUM POTASSIUM: CPT | Performed by: INTERNAL MEDICINE

## 2024-06-15 PROCEDURE — 84295 ASSAY OF SERUM SODIUM: CPT | Performed by: INTERNAL MEDICINE

## 2024-06-15 PROCEDURE — 85347 COAGULATION TIME ACTIVATED: CPT

## 2024-06-15 PROCEDURE — 84295 ASSAY OF SERUM SODIUM: CPT | Performed by: STUDENT IN AN ORGANIZED HEALTH CARE EDUCATION/TRAINING PROGRAM

## 2024-06-15 PROCEDURE — 36430 TRANSFUSION BLD/BLD COMPNT: CPT

## 2024-06-15 PROCEDURE — 80053 COMPREHEN METABOLIC PANEL: CPT | Performed by: HOSPITALIST

## 2024-06-15 PROCEDURE — 82803 BLOOD GASES ANY COMBINATION: CPT | Performed by: INTERNAL MEDICINE

## 2024-06-15 PROCEDURE — 82803 BLOOD GASES ANY COMBINATION: CPT | Performed by: STUDENT IN AN ORGANIZED HEALTH CARE EDUCATION/TRAINING PROGRAM

## 2024-06-15 PROCEDURE — 85018 HEMOGLOBIN: CPT | Performed by: STUDENT IN AN ORGANIZED HEALTH CARE EDUCATION/TRAINING PROGRAM

## 2024-06-15 PROCEDURE — 84132 ASSAY OF SERUM POTASSIUM: CPT | Performed by: STUDENT IN AN ORGANIZED HEALTH CARE EDUCATION/TRAINING PROGRAM

## 2024-06-15 PROCEDURE — 04LK0ZZ OCCLUSION OF RIGHT FEMORAL ARTERY, OPEN APPROACH: ICD-10-PCS | Performed by: SURGERY

## 2024-06-15 PROCEDURE — 0KBQ0ZZ EXCISION OF RIGHT UPPER LEG MUSCLE, OPEN APPROACH: ICD-10-PCS | Performed by: SURGERY

## 2024-06-15 PROCEDURE — 82375 ASSAY CARBOXYHB QUANT: CPT | Performed by: INTERNAL MEDICINE

## 2024-06-15 PROCEDURE — 85027 COMPLETE CBC AUTOMATED: CPT | Performed by: HOSPITALIST

## 2024-06-15 PROCEDURE — 85610 PROTHROMBIN TIME: CPT | Performed by: SURGERY

## 2024-06-15 PROCEDURE — 82375 ASSAY CARBOXYHB QUANT: CPT | Performed by: STUDENT IN AN ORGANIZED HEALTH CARE EDUCATION/TRAINING PROGRAM

## 2024-06-15 PROCEDURE — 74174 CTA ABD&PLVS W/CONTRAST: CPT | Performed by: SURGERY

## 2024-06-15 PROCEDURE — 83050 HGB METHEMOGLOBIN QUAN: CPT | Performed by: INTERNAL MEDICINE

## 2024-06-15 PROCEDURE — 85027 COMPLETE CBC AUTOMATED: CPT | Performed by: SURGERY

## 2024-06-15 RX ORDER — LIDOCAINE HYDROCHLORIDE 10 MG/ML
INJECTION, SOLUTION EPIDURAL; INFILTRATION; INTRACAUDAL; PERINEURAL AS NEEDED
Status: DISCONTINUED | OUTPATIENT
Start: 2024-06-15 | End: 2024-06-15 | Stop reason: SURG

## 2024-06-15 RX ORDER — SODIUM CHLORIDE 9 MG/ML
INJECTION, SOLUTION INTRAVENOUS ONCE
Status: COMPLETED | OUTPATIENT
Start: 2024-06-15 | End: 2024-06-15

## 2024-06-15 RX ORDER — CEFAZOLIN SODIUM 1 G/3ML
INJECTION, POWDER, FOR SOLUTION INTRAMUSCULAR; INTRAVENOUS AS NEEDED
Status: DISCONTINUED | OUTPATIENT
Start: 2024-06-15 | End: 2024-06-15 | Stop reason: SURG

## 2024-06-15 RX ORDER — SODIUM CHLORIDE, SODIUM LACTATE, POTASSIUM CHLORIDE, CALCIUM CHLORIDE 600; 310; 30; 20 MG/100ML; MG/100ML; MG/100ML; MG/100ML
INJECTION, SOLUTION INTRAVENOUS CONTINUOUS
Status: DISCONTINUED | OUTPATIENT
Start: 2024-06-15 | End: 2024-06-16

## 2024-06-15 RX ORDER — CHLORHEXIDINE GLUCONATE ORAL RINSE 1.2 MG/ML
15 SOLUTION DENTAL
Status: DISCONTINUED | OUTPATIENT
Start: 2024-06-15 | End: 2024-06-16

## 2024-06-15 RX ORDER — CALCIUM CHLORIDE 100 MG/ML
INJECTION INTRAVENOUS; INTRAVENTRICULAR AS NEEDED
Status: DISCONTINUED | OUTPATIENT
Start: 2024-06-15 | End: 2024-06-15 | Stop reason: SURG

## 2024-06-15 RX ORDER — MIDAZOLAM HYDROCHLORIDE 1 MG/ML
1 INJECTION INTRAMUSCULAR; INTRAVENOUS EVERY 30 MIN PRN
Status: DISCONTINUED | OUTPATIENT
Start: 2024-06-15 | End: 2024-06-16

## 2024-06-15 RX ORDER — MIDAZOLAM HYDROCHLORIDE 1 MG/ML
INJECTION INTRAMUSCULAR; INTRAVENOUS AS NEEDED
Status: DISCONTINUED | OUTPATIENT
Start: 2024-06-15 | End: 2024-06-15 | Stop reason: SURG

## 2024-06-15 RX ORDER — CALCIUM CHLORIDE 100 MG/ML
2 INJECTION INTRAVENOUS; INTRAVENTRICULAR ONCE
Status: DISCONTINUED | OUTPATIENT
Start: 2024-06-15 | End: 2024-06-15 | Stop reason: HOSPADM

## 2024-06-15 RX ORDER — DEXAMETHASONE SODIUM PHOSPHATE 4 MG/ML
VIAL (ML) INJECTION AS NEEDED
Status: DISCONTINUED | OUTPATIENT
Start: 2024-06-15 | End: 2024-06-15 | Stop reason: SURG

## 2024-06-15 RX ORDER — PHENYLEPHRINE HCL 10 MG/ML
VIAL (ML) INJECTION AS NEEDED
Status: DISCONTINUED | OUTPATIENT
Start: 2024-06-15 | End: 2024-06-15 | Stop reason: SURG

## 2024-06-15 RX ORDER — ROCURONIUM BROMIDE 10 MG/ML
INJECTION, SOLUTION INTRAVENOUS AS NEEDED
Status: DISCONTINUED | OUTPATIENT
Start: 2024-06-15 | End: 2024-06-15 | Stop reason: SURG

## 2024-06-15 RX ORDER — ONDANSETRON 2 MG/ML
4 INJECTION INTRAMUSCULAR; INTRAVENOUS EVERY 6 HOURS PRN
Status: DISCONTINUED | OUTPATIENT
Start: 2024-06-15 | End: 2024-06-21

## 2024-06-15 RX ORDER — SODIUM CHLORIDE 9 MG/ML
INJECTION, SOLUTION INTRAVENOUS CONTINUOUS PRN
Status: DISCONTINUED | OUTPATIENT
Start: 2024-06-15 | End: 2024-06-15 | Stop reason: SURG

## 2024-06-15 RX ADMIN — PHENYLEPHRINE HCL 100 MCG: 10 MG/ML VIAL (ML) INJECTION at 14:38:00

## 2024-06-15 RX ADMIN — CEFAZOLIN SODIUM 2 G: 1 INJECTION, POWDER, FOR SOLUTION INTRAMUSCULAR; INTRAVENOUS at 15:11:00

## 2024-06-15 RX ADMIN — LIDOCAINE HYDROCHLORIDE 20 MG: 10 INJECTION, SOLUTION EPIDURAL; INFILTRATION; INTRACAUDAL; PERINEURAL at 14:35:00

## 2024-06-15 RX ADMIN — SODIUM CHLORIDE: 9 INJECTION, SOLUTION INTRAVENOUS at 14:28:00

## 2024-06-15 RX ADMIN — HEPARIN SODIUM AND DEXTROSE 700 UNITS/HR: 10000; 5 INJECTION INTRAVENOUS at 08:00:00

## 2024-06-15 RX ADMIN — CHLORHEXIDINE GLUCONATE ORAL RINSE 15 ML: 1.2 SOLUTION DENTAL at 21:35:00

## 2024-06-15 RX ADMIN — SODIUM CHLORIDE: 9 INJECTION, SOLUTION INTRAVENOUS at 15:07:00

## 2024-06-15 RX ADMIN — SODIUM CHLORIDE, SODIUM LACTATE, POTASSIUM CHLORIDE, CALCIUM CHLORIDE: 600; 310; 30; 20 INJECTION, SOLUTION INTRAVENOUS at 17:12:00

## 2024-06-15 RX ADMIN — INSULIN DEGLUDEC 20 UNITS: 100 INJECTION, SOLUTION SUBCUTANEOUS at 10:20:00

## 2024-06-15 RX ADMIN — SODIUM CHLORIDE: 9 INJECTION, SOLUTION INTRAVENOUS at 11:19:00

## 2024-06-15 RX ADMIN — CLOPIDOGREL BISULFATE 75 MG: 75 TABLET ORAL at 07:51:00

## 2024-06-15 RX ADMIN — SODIUM CHLORIDE: 9 INJECTION, SOLUTION INTRAVENOUS at 17:56:00

## 2024-06-15 RX ADMIN — PHENYLEPHRINE HCL 200 MCG: 10 MG/ML VIAL (ML) INJECTION at 14:47:00

## 2024-06-15 RX ADMIN — FAMOTIDINE 20 MG: 20 TABLET, FILM COATED ORAL at 07:51:00

## 2024-06-15 RX ADMIN — CALCIUM CHLORIDE 0.5 G: 100 INJECTION INTRAVENOUS; INTRAVENTRICULAR at 15:44:00

## 2024-06-15 RX ADMIN — DEXAMETHASONE SODIUM PHOSPHATE 10 MG: 4 MG/ML VIAL (ML) INJECTION at 15:20:00

## 2024-06-15 RX ADMIN — CALCIUM CHLORIDE 0.5 G: 100 INJECTION INTRAVENOUS; INTRAVENTRICULAR at 15:19:00

## 2024-06-15 RX ADMIN — ASPIRIN 81 MG: 81 TABLET, CHEWABLE ORAL at 07:51:00

## 2024-06-15 RX ADMIN — MORPHINE SULFATE 2 MG: 2 INJECTION, SOLUTION INTRAMUSCULAR; INTRAVENOUS at 12:05:00

## 2024-06-15 RX ADMIN — CALCIUM CHLORIDE 0.5 G: 100 INJECTION INTRAVENOUS; INTRAVENTRICULAR at 15:14:00

## 2024-06-15 RX ADMIN — CALCIUM CHLORIDE 0.5 G: 100 INJECTION INTRAVENOUS; INTRAVENTRICULAR at 15:28:00

## 2024-06-15 RX ADMIN — ROCURONIUM BROMIDE 50 MG: 10 INJECTION, SOLUTION INTRAVENOUS at 14:35:00

## 2024-06-15 RX ADMIN — SODIUM CHLORIDE: 9 INJECTION, SOLUTION INTRAVENOUS at 15:08:00

## 2024-06-15 RX ADMIN — MIDAZOLAM HYDROCHLORIDE 2 MG: 1 INJECTION INTRAMUSCULAR; INTRAVENOUS at 16:12:00

## 2024-06-15 RX ADMIN — PHENYLEPHRINE HCL 200 MCG: 10 MG/ML VIAL (ML) INJECTION at 14:49:00

## 2024-06-15 RX ADMIN — PHENYLEPHRINE HCL 100 MCG: 10 MG/ML VIAL (ML) INJECTION at 14:50:00

## 2024-06-15 RX ADMIN — SODIUM CHLORIDE: 9 INJECTION, SOLUTION INTRAVENOUS at 16:13:00

## 2024-06-15 RX ADMIN — PHENYLEPHRINE HCL 100 MCG: 10 MG/ML VIAL (ML) INJECTION at 14:43:00

## 2024-06-15 RX ADMIN — HYDROCODONE BITARTRATE AND ACETAMINOPHEN 2 TABLET: 10; 325 TABLET ORAL at 12:52:00

## 2024-06-15 RX ADMIN — Medication 50 ML: at 15:22:00

## 2024-06-15 RX ADMIN — PHENYLEPHRINE HCL 100 MCG: 10 MG/ML VIAL (ML) INJECTION at 14:39:00

## 2024-06-15 RX ADMIN — PHENYLEPHRINE HCL 200 MCG: 10 MG/ML VIAL (ML) INJECTION at 14:48:00

## 2024-06-15 NOTE — ANESTHESIA PROCEDURE NOTES
Central Line    Performed by: Dedrick Sewell MD  Authorized by: Dedrick Sewell MD    General Information and Staff    Procedure Start:   Procedure End:  6/15/2024 3:02 PM  Anesthesiologist:  Dedrick Sewell MD  Performed by:  Anesthesiologist  Patient Location:  OR  Indication: central venous access and CVP monitoring    Site Identification: real time ultrasound guided and image stored and retrievable        Procedure Detail    Patient Position:  Trendelenburg  Laterality:  Right  Site:  Internal jugular  Prep:  Chloraprep  Catheter Size:  9 Fr  Catheter Type:  MAC introducer  Number of Lumens:  Double lumen  Oximetric Catheter?: No    Procedure Detail: target vein identified, needle advanced into vein and blood aspirated and guidewire advanced into vein    Seldinger Technique?: Yes    Intravenous Verification: verified by ultrasound and venous blood return    Post Insertion: all ports aspirated, all ports flushed easily, guidewire was removed intact, line was sutured in place and dressing was applied      Assessment    Events: patient tolerated procedure well with no complications      PA Catheter Placement    PA Catheter Placed?: No      Additional Comments

## 2024-06-15 NOTE — OPERATIVE REPORT
Patients Name: Emilie John  Attending Physician: Yariel Du MD  Operating Physician: Safia Mcneil MD  CSN: [id number]     Location:  OR  MRN: [id number]    YOB: 1955  Admission Date: 6/13/2024  Operation Date: 6/15/2024    Vascular Surgery Operative Report   [facility]     Pre-Operative Diagnosis: Right common femoral pseudoaneurysm    Post-Operative Diagnosis: Ruptured femoral pseudoaneurysm     Procedure Performed:   Right groin exploration   Ligation of femoral artery branch   Sartorius muscle myoplasty     Anesthesia: General    Assistants: Tiffanie Lema SA    EBL: 200mL hematoma and fluid     Specimens: * No specimens in log *    Complications: None    Summary of Case: Ruptured pseudoaneurysm under extreme tension. Bleeding branch of femoral artery, ligated. No further bleeding noted. Closed using sartorius flap coverage    Indication for Procedure: Emilie is a 69-year-old female status post angiogram for iliac intervention.  She has had an expanding groin hematoma and presents with evidence of pseudoaneurysm on CT scan.  She presents today for an emergent groin evacuation and repair of pseudoaneurysm.  Risk and benefits of the procedure were explained to the patient she elected to proceed.    Description of Procedure: The patient was brought to the operating room, she was placed supine on the operating table.  General anesthesia was induced.  She immediately had a drop of her blood pressure to the 50s systolic.  An emergent central line was placed by the anesthesia team.  Her right groin was then prepped and draped in the usual sterile fashion.  I was unable to palpate anatomic landmarks due to the amount of swelling.  A skin incision was made directly over the bulge in the groin.  There was immediate rupture of the pseudoaneurysm given that it was under so much tension.  I immediately placed my hand into the cavity and held pressure to control any further bleeding.  The remainder  of the hematoma was suctioned and the cavity was irrigated.  There was a small branch of the femoral artery that was noted and this was ligated.  There was no further evidence of bleeding.  I was able to dissect down through the remainder of the tissues and identified the common femoral artery.  There was no evidence of any defect within the vessel.  The incision was copiously irrigated.  Due to the size of the hematoma there was no tissue to bring together.  As such the sartorius muscle was mobilized laterally and superiorly up to its insertion on the anterior superior iliac spine.  It was divided and brought to lay over the femoral artery and vein.  Care was taken to avoid direct injury to the nerve.  The muscle was sutured in place using a 2-0 Vicryl suture.  The muscle bed was irrigated copiously.  Floseal was placed within the cavity.  A Ankit drain was then placed in the muscle harvest site and secured in place using a 2-0 Vicryl suture.  I brought some of the soft tissue together using a 3-0 Vicryl suture.  The skin was closed using 2-0 nylon vertical mattress sutures.  A sterile dressing was applied.  The patient was taken to the surgical intensive care unit and remained intubated.    Safia Mcneil MD  6/15/2024  4:15 PM

## 2024-06-15 NOTE — ANESTHESIA POSTPROCEDURE EVALUATION
[facility]    Emilie John Patient Status:  Inpatient   Age/Gender 69 year old female MRN [id number]   Location [facility] 6NE-A Attending Yariel Du MD   Hosp Day # 1 PCP Tay Alexandra MD       Anesthesia Post-op Note    FEMORAL PSEUDOANEURYSM REPIAR, EVACUATION OF HEMATOMA    Procedure Summary       Date: 06/15/24 Room / Location:  [facility] 03 HYBRID /  CVOR    Anesthesia Start: 1427 Anesthesia Stop: 1632    Procedure: FEMORAL PSEUDOANEURYSM REPIAR, EVACUATION OF HEMATOMA (Right: Groin) Diagnosis: (Right pseudoaneurysm and hematoma)    Surgeons: Safia Mcneil MD Anesthesiologist: Dedrick Sewell MD    Anesthesia Type: general ASA Status: 3 - Emergent            Anesthesia Type: general    Vitals Value Taken Time   /72 06/15/24 1653   Temp 98.1 °F (36.7 °C) 06/15/24 1629   Pulse 74 06/15/24 1652   Resp 14 06/15/24 1653   SpO2 99 % 06/15/24 1652   Vitals shown include unfiled device data.    Patient Location: ICU    Anesthesia Type: general    Airway Patency: patent and intubated    Postop Pain Control: sedated until time of extubation    Mental Status: sedated until time of extubation    Nausea/Vomiting: none    Cardiopulmonary/Hydration status: stable euvolemic    Complications: no apparent anesthesia related complications    Postop vital signs: stable    Dental Exam: Unchanged from Preop    Sign out given to ICU staff.

## 2024-06-15 NOTE — ANESTHESIA PROCEDURE NOTES
Arterial Line    Performed by: Dedrick Sewell MD  Authorized by: Dedrick Sewell MD    General Information and Staff    Procedure Start:   Procedure End:  6/15/2024 2:47 PM  Anesthesiologist:  Dedrick Sewell MD  Performed By:  Anesthesiologist  Patient Location:  OR  Indication: continuous blood pressure monitoring    Site Identification: real time ultrasound guided    Preanesthetic Checklist: 2 patient identifiers, IV checked, risks and benefits discussed, monitors and equipment checked, pre-op evaluation, timeout performed, anesthesia consent and sterile technique used    Procedure Details    Catheter Size:  20 G  Catheter Length:  1 and 3/4 inch  Catheter Type:  Angiocath  Seldinger Technique?: No    Laterality:  Right  Site:  Radial artery  Site Prep: alcohol swabs    Line Secured:  Tape and Tegaderm    Assessment    Events: patient tolerated procedure well with no complications      Medications      Additional Comments

## 2024-06-15 NOTE — ANESTHESIA PREPROCEDURE EVALUATION
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  Temp: 97.3 °F (36.3 °C)  Pulse: 94  Resp: 17  BP: 133/61  SpO2: 100 %  Body mass index is 31.16 kg/m².    Current medications reviewed.  Hospital Medications:   [COMPLETED] sodium chloride 0.9% infusion   Intravenous Once    [COMPLETED] iopamidol 76% (ISOVUE-370) injection for power injector  100 mL Intravenous ONCE PRN    [COMPLETED] insulin aspart (NovoLOG) 100 Units/mL FlexPen 25 Units  25 Units Subcutaneous Once    [COMPLETED] sodium chloride 0.9% infusion   Intravenous Once    famotidine (Pepcid) tab 20 mg  20 mg Oral Daily    Or    famotidine (Pepcid) 20 mg/2mL injection 20 mg  20 mg Intravenous Daily    [COMPLETED] potassium chloride 40 mEq in 250mL sodium chloride 0.9% IVPB premix  40 mEq Intravenous Once    insulin aspart (NovoLOG) 100 Units/mL FlexPen 4-20 Units  4-20 Units Subcutaneous TID AC and HS    insulin degludec (Tresiba) 100 units/mL flextouch 20 Units  20 Units Subcutaneous Daily    hydrocortisone Na succinate PF (Solu-CORTEF) injection 50 mg  50 mg Intravenous Q8H LIZZ    [COMPLETED] insulin degludec (Tresiba) 100 units/mL flextouch 10 Units  10 Units Subcutaneous Once    [COMPLETED] lidocaine PF (Xylocaine-MPF) 1 % injection        [COMPLETED] heparin (Porcine) 5000 UNIT/ML injection        [COMPLETED] iodixanol (VISIPAQUE) 320 MG/ML injection 100 mL  100 mL Injection ONCE PRN    [COMPLETED] fentaNYL (Sublimaze) 50 mcg/mL injection        [COMPLETED] midazolam (Versed) 2 MG/2ML injection        [COMPLETED] heparin (Porcine) 5000 UNIT/ML injection        [COMPLETED] heparin (Porcine) 5000 UNIT/ML injection        [COMPLETED] clopidogrel (Plavix)  75 MG tab        [COMPLETED] fentaNYL (Sublimaze) 50 mcg/mL injection        [COMPLETED] lidocaine PF (Xylocaine-MPF) 1 % injection        [COMPLETED] heparin (Porcine) 5000 UNIT/ML injection        [COMPLETED] fentaNYL (Sublimaze) 50 mcg/mL injection        [COMPLETED] midazolam (Versed) 2 MG/2ML injection        [COMPLETED] heparin (Porcine) 5000 UNIT/ML injection        [COMPLETED] heparin (Porcine) 5000 UNIT/ML injection        [COMPLETED] sterile water for injection (PF) injection        [COMPLETED] sterile water for injection (PF) injection        [COMPLETED] alteplase (Activase) 2 mg injection        [COMPLETED] sterile water for injection (PF) injection        [COMPLETED] alteplase (Activase) 2 mg injection        [COMPLETED] Nitroglycerin in D5W 200-5 MCG/ML-% injection        [COMPLETED] heparin (Porcine) 5000 UNIT/ML injection        [COMPLETED] fentaNYL (Sublimaze) 50 mcg/mL injection        [COMPLETED] midazolam (Versed) 2 MG/2ML injection        [COMPLETED] heparin (Porcine) 5000 UNIT/ML injection        [COMPLETED] heparin (Porcine) 5000 UNIT/ML injection        [COMPLETED] midazolam (Versed) 2 MG/2ML injection        [COMPLETED] alteplase (Activase) 2 mg injection        [COMPLETED] sterile water for injection (PF) injection        [COMPLETED] heparin (Porcine) 5000 UNIT/ML injection        [COMPLETED] heparin (Porcine) 25,000 Units/250mL infusion premix        aspirin chewable tab 81 mg  81 mg Oral Daily    rosuvastatin (Crestor) tab 10 mg  10 mg Oral Nightly    [COMPLETED] heparin (Porcine) 39369 units/250mL infusion (PE/DVT/THROMBUS) INITIAL DOSE  18 Units/kg/hr Intravenous Once    heparin (Porcine) 72077 units/250mL infusion PE/DVT/THROMBUS CONTINUOUS  200-3,000 Units/hr Intravenous Continuous    HYDROcodone-acetaminophen (Norco)  MG per tab 1 tablet  1 tablet Oral Q6H PRN    Or    HYDROcodone-acetaminophen (Norco)  MG per tab 2 tablet  2 tablet Oral Q6H PRN    morphINE PF 2 MG/ML  injection 2 mg  2 mg Intravenous Q2H PRN    Or    morphINE PF 4 MG/ML injection 4 mg  4 mg Intravenous Q2H PRN    Or    morphINE PF 4 MG/ML injection 6 mg  6 mg Intravenous Q2H PRN    clopidogrel (Plavix) tab 75 mg  75 mg Oral Daily    glucose (Dex4) 15 GM/59ML oral liquid 15 g  15 g Oral Q15 Min PRN    Or    glucose (Glutose) 40% oral gel 15 g  15 g Oral Q15 Min PRN    Or    glucose-vitamin C (Dex-4) chewable tab 4 tablet  4 tablet Oral Q15 Min PRN    Or    dextrose 50% injection 50 mL  50 mL Intravenous Q15 Min PRN    Or    glucose (Dex4) 15 GM/59ML oral liquid 30 g  30 g Oral Q15 Min PRN    Or    glucose (Glutose) 40% oral gel 30 g  30 g Oral Q15 Min PRN    Or    glucose-vitamin C (Dex-4) chewable tab 8 tablet  8 tablet Oral Q15 Min PRN    [COMPLETED] sodium chloride 0.9 % IV bolus 1,000 mL  1,000 mL Intravenous Once    Followed by    sodium chloride 0.9% infusion   Intravenous Continuous    norepinephrine (Levophed) 4 mg/250mL infusion premix  0.5-30 mcg/min Intravenous Continuous    ondansetron (Zofran) 4 MG/2ML injection 4 mg  4 mg Intravenous Q6H PRN    [COMPLETED] sodium chloride 0.9 % IV bolus 1,000 mL  1,000 mL Intravenous Once       Outpatient Medications:     Medications Prior to Admission   Medication Sig Dispense Refill Last Dose    glipiZIDE ER 5 MG Oral Tablet 24 Hr Take 2 tablets (10 mg total) by mouth 2 (two) times daily.   Past Week    aspirin 81 MG Oral Chew Tab Chew 1 tablet (81 mg total) by mouth daily.   6/13/2024 at 0700    azaTHIOprine 50 MG Oral Tab Take 1 tablet (50 mg total) by mouth daily.   Past Week    rosuvastatin 10 MG Oral Tab Take 1 tablet (10 mg total) by mouth nightly.   Past Week    amLODIPine 10 MG Oral Tab Take 1 tablet (10 mg total) by mouth daily. 90 tablet 1 6/12/2024    hydroCHLOROthiazide 25 MG Oral Tab Take 1 tablet (25 mg total) by mouth daily. 90 tablet 1 Past Week    valsartan 160 MG Oral Tab Take 1 tablet (160 mg total) by mouth daily. 90 tablet 1 6/12/2024     atenolol 50 MG Oral Tab Take 1 tablet (50 mg total) by mouth daily. 90 tablet 1 6/12/2024    metFORMIN 500 MG Oral Tab Take 2 tablets (1,000 mg total) by mouth 2 (two) times daily with meals. 2 tab twice a day 180 tablet 3 Past Week    predniSONE 5 MG Oral Tab Take 1 tablet (5 mg total) by mouth 2 (two) times daily. 60 tablet 3 6/12/2024    Glucose Blood (ACCU-CHEK LUPE PLUS) In Vitro Strip Use to check blood sugar before and after breakfast and dinner (4x per day) 400 strip 3     [DISCONTINUED] Accu-Chek Softclix Lancets Does not apply Misc Use to test blood sugar before and after each meal (6x daily) 600 each 0        Allergies: Celecoxib, Esomeprazole, Etanercept, Infliximab, Other, Oxycodone, Pregabalin, Enbrel, Fish-derived products, Orencia [abatacept], and Xeljanz [tofacitinib]      Anesthesia Evaluation  Past Surgical History:   Procedure Laterality Date    Benign biopsy left  1991    Cholecystectomy      Colonoscopy N/A 05/06/2019    Procedure: COLONOSCOPY;  Surgeon: Freeman Oshea MD;  Location:  ENDOSCOPY    Colonoscopy      Hip replacement surgery Right 01/10/2020    Knee replacement surgery      Needle biopsy left  2017    Removal gallbladder      Total hip replacement      Total knee replacement Bilateral     Upper gi endoscopy,exam       Social History     Socioeconomic History    Marital status: Single   Tobacco Use    Smoking status: Every Day     Current packs/day: 0.50     Average packs/day: 0.5 packs/day for 51.4 years (25.7 ttl pk-yrs)     Types: Cigarettes     Start date: 1/29/1973    Smokeless tobacco: Never   Vaping Use    Vaping status: Never Used   Substance and Sexual Activity    Alcohol use: No    Drug use: No    Sexual activity: Not Currently     History   Drug Use No     Available pre-op labs reviewed.  Lab Results   Component Value Date    WBC 25.7 (H) 06/15/2024    RBC 2.55 (L) 06/15/2024    HGB 7.5 (L) 06/15/2024    HCT 23.4 (L) 06/15/2024    MCV 91.8 06/15/2024    MCH 29.4  06/15/2024    MCHC 32.1 06/15/2024    RDW 16.0 06/15/2024    .0 06/15/2024     Lab Results   Component Value Date     06/15/2024    K 4.7 06/15/2024     (H) 06/15/2024    CO2 15.0 (L) 06/15/2024    BUN 27 (H) 06/15/2024    CREATSERUM 1.58 (H) 06/15/2024     (H) 06/15/2024    CA 6.9 (L) 06/15/2024            Airway      Mallampati: II  Mouth opening: >3 FB  TM distance: 4 - 6 cm  Neck ROM: full Cardiovascular      Rhythm: regular  Rate: normal     Dental    Dentition appears grossly intact         Pulmonary    Pulmonary exam normal.  Breath sounds clear to auscultation bilaterally.               Other findings              ASA: 3 and emergent  Plan: general  NPO status verified and     Post-procedure pain management plan discussed with surgeon and patient.    Comment:  I explained intrinsic risks of general anesthesia, including nausea, dental damage, sore throat, mouth injury,and hoarseness from airway management.  All questions were answered and understanding was demonstrated of risks.  Informed permission was obtained to proceed as documented in the signed consent form.        Pt ate apple sauce ~2 hrs prior, will perform RSI given emergent nature of procedure and aspiration risk.     Plan/risks discussed with: patient  Use of blood product(s) discussed with: patient    Consented to blood products.          Present on Admission:  **None**

## 2024-06-15 NOTE — CONSULTS
[facility] Note:  Renal Adjustment for cefazolin (ANCEF)    Emilie Jonh is a 69 year old patient who has been prescribed cefazolin (ANCEF) 2 g every 8 hrs post-op x 2 days.  The estimated creatinine clearance is 25.4 mL/min (A) (based on SCr of 1.58 mg/dL (H)). The dose has been adjusted to cefazolin (ANCEF) 2 g every 12 hrs x2 days post-oop per hospital renal dose adjustment protocol for treatment of surgical prophylaxis.  Pharmacy will follow and adjust dose as warranted for additional renal function changes.    Thank you,    Melodie Vidal, PharmD  6/15/2024  6:06 PM

## 2024-06-15 NOTE — ANESTHESIA PROCEDURE NOTES
Airway  Date/Time: 6/15/2024 2:36 PM  Urgency: elective      General Information and Staff    Patient location during procedure: OR  Anesthesiologist: Dedrick Sewell MD  Performed: anesthesiologist   Performed by: Dedrick Sewell MD  Authorized by: Dedrick Sewell MD      Indications and Patient Condition  Indications for airway management: anesthesia  Spontaneous Ventilation: absent  Sedation level: deep  Preoxygenated: yes  Patient position: sniffing  Mask difficulty assessment: 0 - not attempted    Final Airway Details  Final airway type: endotracheal airway      Successful airway: ETT  Cuffed: yes   Successful intubation technique: direct laryngoscopy  Facilitating devices/methods: intubating stylet  Endotracheal tube insertion site: oral  Blade: Lela  Blade size: #3  ETT size (mm): 7.0    Cormack-Lehane Classification: grade IIB - view of arytenoids or posterior of glottis only  Placement verified by: capnometry   Measured from: teeth  ETT to teeth (cm): 22  Number of attempts at approach: 1    Additional Comments  Pt ate applesauce 2 hrs prior to procedure; emergent procedure; RSI performed with cricoid pressure maintained throughout, HOB elevated 30 deg, suction on standby.       Detail Level: Detailed Detail Level: Zone Detail Level: Simple Use Enhanced Medication Counseling?: No Erythromycin Pregnancy And Lactation Text: This medication is Pregnancy Category B and is considered safe during pregnancy. It is also excreted in breast milk. Birth Control Pills Counseling: Birth Control Pill Counseling: I discussed with the patient the potential side effects of OCPs including but not limited to increased risk of stroke, heart attack, thrombophlebitis, deep venous thrombosis, hepatic adenomas, breast changes, GI upset, headaches, and depression.  The patient verbalized understanding of the proper use and possible adverse effects of OCPs. All of the patient's questions and concerns were addressed. Sarecycline Counseling: Patient advised regarding possible photosensitivity and discoloration of the teeth, skin, lips, tongue and gums.  Patient instructed to avoid sunlight, if possible.  When exposed to sunlight, patients should wear protective clothing, sunglasses, and sunscreen.  The patient was instructed to call the office immediately if the following severe adverse effects occur:  hearing changes, easy bruising/bleeding, severe headache, or vision changes.  The patient verbalized understanding of the proper use and possible adverse effects of sarecycline.  All of the patient's questions and concerns were addressed. Tazorac Counseling:  Patient advised that medication is irritating and drying.  Patient may need to apply sparingly and wash off after an hour before eventually leaving it on overnight.  The patient verbalized understanding of the proper use and possible adverse effects of tazorac.  All of the patient's questions and concerns were addressed. Winlevi Pregnancy And Lactation Text: This medication is considered safe during pregnancy and breastfeeding. Doxycycline Pregnancy And Lactation Text: This medication is Pregnancy Category D and not consider safe during pregnancy. It is also excreted in breast milk but is considered safe for shorter treatment courses. Minocycline Counseling: Patient advised regarding possible photosensitivity and discoloration of the teeth, skin, lips, tongue and gums.  Patient instructed to avoid sunlight, if possible.  When exposed to sunlight, patients should wear protective clothing, sunglasses, and sunscreen.  The patient was instructed to call the office immediately if the following severe adverse effects occur:  hearing changes, easy bruising/bleeding, severe headache, or vision changes.  The patient verbalized understanding of the proper use and possible adverse effects of minocycline.  All of the patient's questions and concerns were addressed. Bactrim Counseling:  I discussed with the patient the risks of sulfa antibiotics including but not limited to GI upset, allergic reaction, drug rash, diarrhea, dizziness, photosensitivity, and yeast infections.  Rarely, more serious reactions can occur including but not limited to aplastic anemia, agranulocytosis, methemoglobinemia, blood dyscrasias, liver or kidney failure, lung infiltrates or desquamative/blistering drug rashes. Aklief Pregnancy And Lactation Text: It is unknown if this medication is safe to use during pregnancy.  It is unknown if this medication is excreted in breast milk.  Breastfeeding women should use the topical cream on the smallest area of the skin for the shortest time needed while breastfeeding.  Do not apply to nipple and areola. Tetracycline Pregnancy And Lactation Text: This medication is Pregnancy Category D and not consider safe during pregnancy. It is also excreted in breast milk. Topical Retinoid counseling:  Patient advised to apply a pea-sized amount only at bedtime and wait 30 minutes after washing their face before applying.  If too drying, patient may add a non-comedogenic moisturizer. The patient verbalized understanding of the proper use and possible adverse effects of retinoids.  All of the patient's questions and concerns were addressed. Topical Sulfur Applications Pregnancy And Lactation Text: This medication is Pregnancy Category C and has an unknown safety profile during pregnancy. It is unknown if this topical medication is excreted in breast milk. Azithromycin Counseling:  I discussed with the patient the risks of azithromycin including but not limited to GI upset, allergic reaction, drug rash, diarrhea, and yeast infections. Isotretinoin Counseling: Patient should get monthly blood tests, not donate blood, not drive at night if vision affected, not share medication, and not undergo elective surgery for 6 months after tx completed. Side effects reviewed, pt to contact office should one occur. Benzoyl Peroxide Counseling: Patient counseled that medicine may cause skin irritation and bleach clothing.  In the event of skin irritation, the patient was advised to reduce the amount of the drug applied or use it less frequently.   The patient verbalized understanding of the proper use and possible adverse effects of benzoyl peroxide.  All of the patient's questions and concerns were addressed. Topical Clindamycin Pregnancy And Lactation Text: This medication is Pregnancy Category B and is considered safe during pregnancy. It is unknown if it is excreted in breast milk. Birth Control Pills Pregnancy And Lactation Text: This medication should be avoided if pregnant and for the first 30 days post-partum. Dapsone Pregnancy And Lactation Text: This medication is Pregnancy Category C and is not considered safe during pregnancy or breast feeding. High Dose Vitamin A Counseling: Side effects reviewed, pt to contact office should one occur. Spironolactone Pregnancy And Lactation Text: This medication can cause feminization of the male fetus and should be avoided during pregnancy. The active metabolite is also found in breast milk. Azelaic Acid Counseling: Patient counseled that medicine may cause skin irritation and to avoid applying near the eyes.  In the event of skin irritation, the patient was advised to reduce the amount of the drug applied or use it less frequently.   The patient verbalized understanding of the proper use and possible adverse effects of azelaic acid.  All of the patient's questions and concerns were addressed. Tazorac Pregnancy And Lactation Text: This medication is not safe during pregnancy. It is unknown if this medication is excreted in breast milk. Aklief counseling:  Patient advised to apply a pea-sized amount only at bedtime and wait 30 minutes after washing their face before applying.  If too drying, patient may add a non-comedogenic moisturizer.  The most commonly reported side effects including irritation, redness, scaling, dryness, stinging, burning, itching, and increased risk of sunburn.  The patient verbalized understanding of the proper use and possible adverse effects of retinoids.  All of the patient's questions and concerns were addressed. Bactrim Pregnancy And Lactation Text: This medication is Pregnancy Category D and is known to cause fetal risk.  It is also excreted in breast milk. Topical Retinoid Pregnancy And Lactation Text: This medication is Pregnancy Category C. It is unknown if this medication is excreted in breast milk. Winlevi Counseling:  I discussed with the patient the risks of topical clascoterone including but not limited to erythema, scaling, itching, and stinging. Patient voiced their understanding. Erythromycin Counseling:  I discussed with the patient the risks of erythromycin including but not limited to GI upset, allergic reaction, drug rash, diarrhea, increase in liver enzymes, and yeast infections. Topical Sulfur Applications Counseling: Topical Sulfur Counseling: Patient counseled that this medication may cause skin irritation or allergic reactions.  In the event of skin irritation, the patient was advised to reduce the amount of the drug applied or use it less frequently.   The patient verbalized understanding of the proper use and possible adverse effects of topical sulfur application.  All of the patient's questions and concerns were addressed. Azithromycin Pregnancy And Lactation Text: This medication is considered safe during pregnancy and is also secreted in breast milk. Benzoyl Peroxide Pregnancy And Lactation Text: This medication is Pregnancy Category C. It is unknown if benzoyl peroxide is excreted in breast milk. Doxycycline Counseling:  Patient counseled regarding possible photosensitivity and increased risk for sunburn.  Patient instructed to avoid sunlight, if possible.  When exposed to sunlight, patients should wear protective clothing, sunglasses, and sunscreen.  The patient was instructed to call the office immediately if the following severe adverse effects occur:  hearing changes, easy bruising/bleeding, severe headache, or vision changes.  The patient verbalized understanding of the proper use and possible adverse effects of doxycycline.  All of the patient's questions and concerns were addressed. High Dose Vitamin A Pregnancy And Lactation Text: High dose vitamin A therapy is contraindicated during pregnancy and breast feeding. Tetracycline Counseling: Patient counseled regarding possible photosensitivity and increased risk for sunburn.  Patient instructed to avoid sunlight, if possible.  When exposed to sunlight, patients should wear protective clothing, sunglasses, and sunscreen.  The patient was instructed to call the office immediately if the following severe adverse effects occur:  hearing changes, easy bruising/bleeding, severe headache, or vision changes.  The patient verbalized understanding of the proper use and possible adverse effects of tetracycline.  All of the patient's questions and concerns were addressed. Patient understands to avoid pregnancy while on therapy due to potential birth defects. Isotretinoin Pregnancy And Lactation Text: This medication is Pregnancy Category X and is considered extremely dangerous during pregnancy. It is unknown if it is excreted in breast milk. Spironolactone Counseling: Patient advised regarding risks of diarrhea, abdominal pain, hyperkalemia, birth defects (for female patients), liver toxicity and renal toxicity. The patient may need blood work to monitor liver and kidney function and potassium levels while on therapy. The patient verbalized understanding of the proper use and possible adverse effects of spironolactone.  All of the patient's questions and concerns were addressed. Azelaic Acid Pregnancy And Lactation Text: This medication is considered safe during pregnancy and breast feeding. Dapsone Counseling: I discussed with the patient the risks of dapsone including but not limited to hemolytic anemia, agranulocytosis, rashes, methemoglobinemia, kidney failure, peripheral neuropathy, headaches, GI upset, and liver toxicity.  Patients who start dapsone require monitoring including baseline LFTs and weekly CBCs for the first month, then every month thereafter.  The patient verbalized understanding of the proper use and possible adverse effects of dapsone.  All of the patient's questions and concerns were addressed. Topical Clindamycin Counseling: Patient counseled that this medication may cause skin irritation or allergic reactions.  In the event of skin irritation, the patient was advised to reduce the amount of the drug applied or use it less frequently.   The patient verbalized understanding of the proper use and possible adverse effects of clindamycin.  All of the patient's questions and concerns were addressed.

## 2024-06-15 NOTE — PROGRESS NOTES
.[facility]ist note    PCP: Tay Alexandra MD    Chief Complaint:  F/u groin hematoma    SUBJECTIVE:  R groin noted to be expanding more this morning. Pseudoaneurysm seen on imaging. Going to OR today. Pt denies sob.    OBJECTIVE:  Temp:  [96.3 °F (35.7 °C)-97.6 °F (36.4 °C)] 97.3 °F (36.3 °C)  Pulse:  [] 94  Resp:  [14-25] 17  BP: ()/(45-81) 133/61  SpO2:  [96 %-100 %] 100 %    Intake/Output:    Intake/Output Summary (Last 24 hours) at 6/15/2024 1341  Last data filed at 6/15/2024 1326  Gross per 24 hour   Intake 1249.33 ml   Output 495 ml   Net 754.33 ml       Last 3 Weights   06/15/24 1134 164 lb 14.5 oz (74.8 kg)   06/06/24 1318 149 lb (67.6 kg)   01/26/24 0030 153 lb (69.4 kg)   03/09/22 1139 154 lb (69.9 kg)       Exam  Gen: No acute distress  HEENT: anicteric sclera, MMM  Pulm: Lungs clear, normal respiratory effort  CV: Heart with regular rate and rhythm, no peripheral edema  Abd: Abdomen soft, nontender, nondistended, no organomegaly, bowel sounds present  MSK: R groin with area of induration  Skin: no rashes or lesions  Neuro: A&OX3, no focal deficits    Data Review:         Labs:     Recent Labs   Lab 06/13/24 2013 06/14/24  0408 06/14/24  1155 06/14/24  1800 06/15/24  0540   WBC 18.5* 21.5*  --   --  25.7*   HGB 10.0* 8.0* 8.2* 9.4* 7.5*   MCV 91.0 90.9  --   --  91.8   .0 235.0  235.0  --   --  157.0   NE  --  15.85*  --   --   --    LYMABS  --  3.84  --   --   --        Recent Labs   Lab 06/13/24  2014 06/15/24  0540   * 138   K 3.6 4.7    116*   CO2 19.0* 15.0*   BUN 24* 27*   CREATSERUM 1.16* 1.58*   CA 7.7* 6.9*   MG  --  1.7   * 166*       Recent Labs   Lab 06/15/24  0540   ALT 22   AST 83*   ALB 1.7*       No results for input(s): \"TROP\", \"CK\", \"PBNP\", \"PCT\" in the last 168 hours.    No results for input(s): \"CRP\", \"SALAS\", \"LDH\", \"DDIMER\" in the last 168 hours.    Recent Labs   Lab 06/14/24  1059 06/14/24  1740 06/14/24  2027 06/15/24  0634 06/15/24  1200    PGLU 328* 168* 149* 183* 167*       Meds:   Scheduled Medication:   famotidine  20 mg Oral Daily    Or    famotidine  20 mg Intravenous Daily    insulin aspart  4-20 Units Subcutaneous TID AC and HS    insulin degludec  20 Units Subcutaneous Daily    hydrocortisone sodium succinate  50 mg Intravenous Q8H LIZZ    aspirin  81 mg Oral Daily    rosuvastatin  10 mg Oral Nightly    clopidogrel  75 mg Oral Daily     Continuous Infusing Medication:   continuous dose heparin Stopped (06/15/24 1046)    sodium chloride 100 mL/hr at 06/15/24 0400    norepinephrine Stopped (06/14/24 1636)     PRN Medication:  HYDROcodone-acetaminophen **OR** HYDROcodone-acetaminophen    morphINE **OR** morphINE **OR** morphINE    glucose **OR** glucose **OR** glucose-vitamin C **OR** dextrose **OR** glucose **OR** glucose **OR** glucose-vitamin C    ondansetron       Microbiology:    No results found for this visit on 06/13/24.    No results found for: \"COVID19\"     Assessment/Plan:     Patient is a 69 year old female with PMH sig for HTN, HLD, DM2, CKD, RA on prednisone, PAD here for schedueld vascular procedure     Impression     -left iliac chronic occlusion with claudication sp balloon angioplasty and stent of left common iliac artery and external iliac artery 06/13  -right common femoral artery occlusion sp thrombectomy, balloon angioplasty, stent; thrombectomy of left SFA and left anterior tibial artery, balloon angioplasty of left TP trunk and posterior tibial 06/13  -post op pain  -post op leukocytosis  -large R inguinal region pseudoaneurysm 6/15 noted     -post op shock, on pressors, possibly hemorrhagic  -left groin hematoma  -anemia     -DM2, uncontrolled - A1c 12s     -nicotine dependence, smokes 1ppd     -HTN  -HLD     -RA on chronic prednisone  -CKD 3, baseline Cr ~1.2     Plan     *vascular  -post op surgical management per vascular  -going back to OR today for R groin pseudoaneurysm  -cont supportive mgmt  -hep gtt held      *post op hypotension / shock  -pressors prn  -hydrocortisone  -trend hgb    *anemia  -preop hgb 13.9, post op 10-> 8--> 7.5  -pseudoaneurysm noted     *DM 2  -follows with endo as outpt, recently glipizide was increased  -ISS + tresiba 10 units     *HTN hx  -hold home meds (at home on atenolol, hydrochlorothiazide, norvasc, losartan)     *HLD  -statin     DVT proph  On heparin     GI ppx  -pepcid              Nusrat Don MD  [facility] Hospitalist  Pager: [phone]

## 2024-06-15 NOTE — PLAN OF CARE
Assumed care @ 1930. Patient is drowsy but easily arousable. Pt is A&O x4. Follows command. On tele-NSR. On RA. Normotensive. R groin-with hematoma(stable). L groin-CDI, soft and no hematoma. Doppler pulses. Heparin gtt per PE/DVT protocol. Accuchecks. Mike in place, see flowsheet for ouput. Moderate pain to R groin-repositioned and hot pack applied with relief. Call light within reach. All needs met at this time.

## 2024-06-15 NOTE — PLAN OF CARE
Assumed pt care this am approx 0730. Received pt with stable vitals. Groin site this am is firm and tender to touch but not larger than outlined dontrell.     Later this am, R groin site appears larger and more firm than morning assessment, pulses remain by doppler and sensation intact. Dr. Du updated on pt status, US changed to STAT, 1 unit PRBC ordered and given, CTA abd/pelvis stat. Heparin gtt stopped. Critical results from CTA abd/pelvis called to Dr. Du. Pt seen by Dr. Mcneil at bedside.     Out of OR approx 1630. Received pt w/ CVC, art line, marcano, intubated and sedated with propofol. Levophed infusing at 6mcg. Additional unit of PRBC ordered and given per Dr. Sewell. R surgical dressing is clean, site is soft, JILLIAN drain to bulb suction. [facility] pulelijah consulted and updated via telephone.    Son at bedside and updated;

## 2024-06-16 ENCOUNTER — APPOINTMENT (OUTPATIENT)
Dept: GENERAL RADIOLOGY | Facility: HOSPITAL | Age: 69
DRG: 252 | End: 2024-06-16
Attending: INTERNAL MEDICINE
Payer: MEDICARE

## 2024-06-16 LAB
ALBUMIN SERPL-MCNC: 1.5 G/DL (ref 3.4–5)
ALBUMIN/GLOB SERPL: 0.5 {RATIO} (ref 1–2)
ALP LIVER SERPL-CCNC: 51 U/L
ALT SERPL-CCNC: 12 U/L
ANION GAP SERPL CALC-SCNC: 7 MMOL/L (ref 0–18)
AST SERPL-CCNC: 42 U/L (ref 15–37)
BASE EXCESS BLDA CALC-SCNC: -11.3 MMOL/L (ref ?–2)
BASE EXCESS BLDA CALC-SCNC: -9.9 MMOL/L (ref ?–2)
BILIRUB SERPL-MCNC: 0.2 MG/DL (ref 0.1–2)
BLOOD TYPE BARCODE: 5100
BODY TEMPERATURE: 98.6 F
BODY TEMPERATURE: 98.6 F
BUN BLD-MCNC: 27 MG/DL (ref 9–23)
CA-I BLD-SCNC: 1.2 MMOL/L (ref 0.95–1.32)
CA-I BLD-SCNC: 1.26 MMOL/L (ref 0.95–1.32)
CALCIUM BLD-MCNC: 7.8 MG/DL (ref 8.5–10.1)
CHLORIDE SERPL-SCNC: 120 MMOL/L (ref 98–112)
CO2 SERPL-SCNC: 15 MMOL/L (ref 21–32)
COHGB MFR BLD: 1.5 % SAT (ref 0–3)
COHGB MFR BLD: 1.6 % SAT (ref 0–3)
CREAT BLD-MCNC: 1.85 MG/DL
EGFRCR SERPLBLD CKD-EPI 2021: 29 ML/MIN/1.73M2 (ref 60–?)
ERYTHROCYTE [DISTWIDTH] IN BLOOD BY AUTOMATED COUNT: 15.7 %
FIO2: 30 %
FIO2: 30 %
GLOBULIN PLAS-MCNC: 2.9 G/DL (ref 2.8–4.4)
GLUCOSE BLD-MCNC: 120 MG/DL (ref 70–99)
GLUCOSE BLD-MCNC: 152 MG/DL (ref 70–99)
GLUCOSE BLD-MCNC: 165 MG/DL (ref 70–99)
GLUCOSE BLD-MCNC: 218 MG/DL (ref 70–99)
HCO3 BLDA-SCNC: 16.1 MEQ/L (ref 21–27)
HCO3 BLDA-SCNC: 17.2 MEQ/L (ref 21–27)
HCT VFR BLD AUTO: 34.8 %
HGB BLD-MCNC: 10.3 G/DL
HGB BLD-MCNC: 10.9 G/DL
HGB BLD-MCNC: 11.8 G/DL
LACTATE BLD-SCNC: 0.7 MMOL/L (ref 0.5–2)
LACTATE BLD-SCNC: 0.9 MMOL/L (ref 0.5–2)
MAGNESIUM SERPL-MCNC: 1.6 MG/DL (ref 1.6–2.6)
MCH RBC QN AUTO: 29.7 PG (ref 26–34)
MCHC RBC AUTO-ENTMCNC: 33.9 G/DL (ref 31–37)
MCV RBC AUTO: 87.7 FL
METHGB MFR BLD: 0.3 % SAT (ref 0.4–1.5)
METHGB MFR BLD: 0.4 % SAT (ref 0.4–1.5)
OSMOLALITY SERPL CALC.SUM OF ELEC: 303 MOSM/KG (ref 275–295)
OXYHGB MFR BLDA: 97.1 % (ref 92–100)
OXYHGB MFR BLDA: 97.5 % (ref 92–100)
PCO2 BLDA: 23 MM HG (ref 35–45)
PCO2 BLDA: 25 MM HG (ref 35–45)
PEEP: 5 CM H2O
PEEP: 5 CM H2O
PH BLDA: 7.35 [PH] (ref 7.35–7.45)
PH BLDA: 7.36 [PH] (ref 7.35–7.45)
PLATELET # BLD AUTO: 99 10(3)UL (ref 150–450)
PO2 BLDA: 82 MM HG (ref 80–100)
PO2 BLDA: 85 MM HG (ref 80–100)
POTASSIUM BLD-SCNC: 3.9 MMOL/L (ref 3.6–5.1)
POTASSIUM BLD-SCNC: 4.2 MMOL/L (ref 3.6–5.1)
POTASSIUM SERPL-SCNC: 4.3 MMOL/L (ref 3.5–5.1)
PRESSURE SUPPORT: 5 CM H2O
PROT SERPL-MCNC: 4.4 G/DL (ref 6.4–8.2)
RBC # BLD AUTO: 3.97 X10(6)UL
SODIUM BLD-SCNC: 136 MMOL/L (ref 135–145)
SODIUM BLD-SCNC: 137 MMOL/L (ref 135–145)
SODIUM SERPL-SCNC: 142 MMOL/L (ref 136–145)
TIDAL VOLUME: 450 ML
UNIT VOLUME: 350 ML
VENT RATE: 20 /MIN
WBC # BLD AUTO: 21.4 X10(3) UL (ref 4–11)

## 2024-06-16 PROCEDURE — 84295 ASSAY OF SERUM SODIUM: CPT | Performed by: INTERNAL MEDICINE

## 2024-06-16 PROCEDURE — 82330 ASSAY OF CALCIUM: CPT | Performed by: INTERNAL MEDICINE

## 2024-06-16 PROCEDURE — 83605 ASSAY OF LACTIC ACID: CPT | Performed by: INTERNAL MEDICINE

## 2024-06-16 PROCEDURE — 71045 X-RAY EXAM CHEST 1 VIEW: CPT | Performed by: INTERNAL MEDICINE

## 2024-06-16 PROCEDURE — 83050 HGB METHEMOGLOBIN QUAN: CPT | Performed by: INTERNAL MEDICINE

## 2024-06-16 PROCEDURE — 83735 ASSAY OF MAGNESIUM: CPT | Performed by: SURGERY

## 2024-06-16 PROCEDURE — 82962 GLUCOSE BLOOD TEST: CPT

## 2024-06-16 PROCEDURE — 84132 ASSAY OF SERUM POTASSIUM: CPT | Performed by: INTERNAL MEDICINE

## 2024-06-16 PROCEDURE — 85027 COMPLETE CBC AUTOMATED: CPT | Performed by: SURGERY

## 2024-06-16 PROCEDURE — 82803 BLOOD GASES ANY COMBINATION: CPT | Performed by: INTERNAL MEDICINE

## 2024-06-16 PROCEDURE — 82375 ASSAY CARBOXYHB QUANT: CPT | Performed by: INTERNAL MEDICINE

## 2024-06-16 PROCEDURE — 80053 COMPREHEN METABOLIC PANEL: CPT | Performed by: SURGERY

## 2024-06-16 PROCEDURE — 94003 VENT MGMT INPAT SUBQ DAY: CPT

## 2024-06-16 PROCEDURE — 94150 VITAL CAPACITY TEST: CPT

## 2024-06-16 PROCEDURE — 85018 HEMOGLOBIN: CPT | Performed by: INTERNAL MEDICINE

## 2024-06-16 RX ORDER — ATENOLOL 50 MG/1
50 TABLET ORAL
Status: DISCONTINUED | OUTPATIENT
Start: 2024-06-16 | End: 2024-06-29

## 2024-06-16 RX ORDER — LOSARTAN POTASSIUM 100 MG/1
100 TABLET ORAL DAILY
Status: DISCONTINUED | OUTPATIENT
Start: 2024-06-16 | End: 2024-06-29

## 2024-06-16 RX ORDER — HYDROCHLOROTHIAZIDE 25 MG/1
25 TABLET ORAL DAILY
Status: DISCONTINUED | OUTPATIENT
Start: 2024-06-16 | End: 2024-06-18

## 2024-06-16 RX ORDER — DEXMEDETOMIDINE HYDROCHLORIDE 4 UG/ML
INJECTION, SOLUTION INTRAVENOUS CONTINUOUS
Status: DISCONTINUED | OUTPATIENT
Start: 2024-06-16 | End: 2024-06-16

## 2024-06-16 RX ORDER — AMLODIPINE BESYLATE 10 MG/1
10 TABLET ORAL DAILY
Status: DISCONTINUED | OUTPATIENT
Start: 2024-06-16 | End: 2024-06-29

## 2024-06-16 RX ORDER — DEXMEDETOMIDINE HYDROCHLORIDE 4 UG/ML
INJECTION, SOLUTION INTRAVENOUS
Status: COMPLETED
Start: 2024-06-16 | End: 2024-06-16

## 2024-06-16 RX ORDER — MAGNESIUM SULFATE HEPTAHYDRATE 40 MG/ML
2 INJECTION, SOLUTION INTRAVENOUS ONCE
Status: COMPLETED | OUTPATIENT
Start: 2024-06-16 | End: 2024-06-16

## 2024-06-16 RX ADMIN — DEXMEDETOMIDINE HYDROCHLORIDE 0.3 MCG/KG/HR: 4 INJECTION, SOLUTION INTRAVENOUS at 09:08:00

## 2024-06-16 RX ADMIN — INSULIN DEGLUDEC 20 UNITS: 100 INJECTION, SOLUTION SUBCUTANEOUS at 11:37:00

## 2024-06-16 RX ADMIN — HEPARIN SODIUM AND DEXTROSE 500 UNITS/HR: 10000; 5 INJECTION INTRAVENOUS at 06:00:00

## 2024-06-16 RX ADMIN — MAGNESIUM SULFATE HEPTAHYDRATE 2 G: 40 INJECTION, SOLUTION INTRAVENOUS at 05:58:00

## 2024-06-16 RX ADMIN — CLOPIDOGREL BISULFATE 75 MG: 75 TABLET ORAL at 08:42:00

## 2024-06-16 RX ADMIN — CHLORHEXIDINE GLUCONATE ORAL RINSE 15 ML: 1.2 SOLUTION DENTAL at 08:42:00

## 2024-06-16 RX ADMIN — HYDROCODONE BITARTRATE AND ACETAMINOPHEN 1 TABLET: 10; 325 TABLET ORAL at 20:29:00

## 2024-06-16 RX ADMIN — ATENOLOL 50 MG: 50 TABLET ORAL at 13:04:00

## 2024-06-16 RX ADMIN — ONDANSETRON 4 MG: 2 INJECTION INTRAMUSCULAR; INTRAVENOUS at 08:58:00

## 2024-06-16 RX ADMIN — FAMOTIDINE 20 MG: 20 TABLET, FILM COATED ORAL at 08:42:00

## 2024-06-16 RX ADMIN — AMLODIPINE BESYLATE 10 MG: 10 TABLET ORAL at 12:01:00

## 2024-06-16 RX ADMIN — LOSARTAN POTASSIUM 100 MG: 100 TABLET ORAL at 11:47:00

## 2024-06-16 RX ADMIN — HYDROCHLOROTHIAZIDE 25 MG: 25 TABLET ORAL at 13:04:00

## 2024-06-16 RX ADMIN — ASPIRIN 81 MG: 81 TABLET, CHEWABLE ORAL at 08:42:00

## 2024-06-16 RX ADMIN — ROSUVASTATIN CALCIUM 10 MG: 10 TABLET, COATED ORAL at 21:35:00

## 2024-06-16 NOTE — CONSULTS
[facility]    Emilie John Patient Status:  Inpatient    1955 MRN [id number]   Location [facility] 6NE-A Attending Yariel Du MD   Hosp Day # 2 PCP Tay Alexandra MD     Date of Admission: 2024  Admission Diagnosis: Iliac artery occlusion, bilateral (HCC) [I74.5]     History of Present Illness: 68 yo female with history of HTN, DM, DVT, RA on prednisone, PAD with left iliac chronic occlusion with claudication sp balloon angioplasty and stent of left common iliac artery and external iliac artery , right common femoral artery occlusion sp thrombectomy, balloon angioplasty, stent; thrombectomy of left SFA and left anterior tibial artery, balloon angioplasty of left TP trunk and posterior tibial .  Following OR pt was noted to have worsening right groin hematoma, CT scan was completed with large collection in the right inguinal region measuring 13.5 x 7.1 x 8.0 cm is highly suspicious for a large pseudoaneurysm.  Pt was then taken back to the OR for right groin exploration with ligation of femoral artery branch, and sartorius muscle myoplasty.  Pt was kept intubated post operatively.  Pt was weaned off levophed overnight.  This am pt is awake, alert on the vent, having issues with gagging as sedation weaned for SBT     Past Medical History:    Diabetes (HCC)    DVT (deep venous thrombosis) (HCC)    Essential hypertension    High blood pressure    Nicotine use disorder    Osteoarthritis    hip, wrist    Peripheral vascular disease (HCC)    Rheumatoid arthritis (HCC)    Visual impairment    glasses      Past Surgical History:   Procedure Laterality Date    Benign biopsy left      Cholecystectomy      Colonoscopy N/A 2019    Procedure: COLONOSCOPY;  Surgeon: Freeman Oshea MD;  Location:  ENDOSCOPY    Colonoscopy      Hip replacement surgery Right 01/10/2020    Knee replacement surgery      Needle biopsy left  2017    Removal gallbladder      Total hip replacement       Total knee replacement Bilateral     Upper gi endoscopy,exam           Allergies   Allergen Reactions    Celecoxib DIARRHEA and RASH     AND DIARRHEA      Esomeprazole DIARRHEA and RASH     DIARRHEA      Etanercept HIVES and RASH    Infliximab HIVES and ANAPHYLAXIS    Other ANAPHYLAXIS, SWELLING and HIVES     Throat closes up  HAD IN CONJUNCTION WITH ASA- SO AVOIDS BOTH OF THESE- FACE SWELLED UP    Oxycodone PAIN and NAUSEA ONLY    Pregabalin DIARRHEA and RASH     AND DIARRHEA      Enbrel RASH    Fish-Derived Products SWELLING    Orencia [Abatacept] ITCHING    Xeljanz [Tofacitinib] DIARRHEA        Social History:   reports that she has been smoking cigarettes. She started smoking about 51 years ago. She has a 25.7 pack-year smoking history. She has never used smokeless tobacco. She reports that she does not drink alcohol and does not use drugs.      Family History:  Family History   Problem Relation Age of Onset    Other (CVA) Father     Cancer Mother 60        STOMACH CANCER    Cancer Brother         LUNG CANCER         Home Medications:  Outpatient Medications Marked as Taking for the 6/13/24 encounter (Hospital Encounter) with  PHYLICIA  HYBRID   Medication Sig Dispense Refill    clopidogrel 75 MG Oral Tab Take 1 tablet (75 mg total) by mouth daily. 90 tablet 0    glipiZIDE ER 5 MG Oral Tablet 24 Hr Take 2 tablets (10 mg total) by mouth 2 (two) times daily.      aspirin 81 MG Oral Chew Tab Chew 1 tablet (81 mg total) by mouth daily.      azaTHIOprine 50 MG Oral Tab Take 1 tablet (50 mg total) by mouth daily.      rosuvastatin 10 MG Oral Tab Take 1 tablet (10 mg total) by mouth nightly.      amLODIPine 10 MG Oral Tab Take 1 tablet (10 mg total) by mouth daily. 90 tablet 1    hydroCHLOROthiazide 25 MG Oral Tab Take 1 tablet (25 mg total) by mouth daily. 90 tablet 1    valsartan 160 MG Oral Tab Take 1 tablet (160 mg total) by mouth daily. 90 tablet 1    atenolol 50 MG Oral Tab Take 1 tablet (50 mg total) by mouth  daily. 90 tablet 1    metFORMIN 500 MG Oral Tab Take 2 tablets (1,000 mg total) by mouth 2 (two) times daily with meals. 2 tab twice a day 180 tablet 3    predniSONE 5 MG Oral Tab Take 1 tablet (5 mg total) by mouth 2 (two) times daily. 60 tablet 3        Current Medications:    Current Facility-Administered Medications:     ondansetron (Zofran) 4 MG/2ML injection 4 mg, 4 mg, Intravenous, Q6H PRN    lactated ringers infusion, , Intravenous, Continuous    midazolam (Versed) 2 MG/2ML injection 1 mg, 1 mg, Intravenous, Q30 Min PRN    propofol (Diprivan) 10 mg/mL infusion premix, 5-50 mcg/kg/min (Dosing Weight), Intravenous, Continuous    chlorhexidine gluconate (Peridex) 0.12 % oral solution 15 mL, 15 mL, Mouth/Throat, BID@0800,2000    ceFAZolin (Ancef) 2g in 10mL IV syringe premix, 2 g, Intravenous, Q12H    famotidine (Pepcid) tab 20 mg, 20 mg, Oral, Daily **OR** famotidine (Pepcid) 20 mg/2mL injection 20 mg, 20 mg, Intravenous, Daily    insulin aspart (NovoLOG) 100 Units/mL FlexPen 4-20 Units, 4-20 Units, Subcutaneous, TID AC and HS    insulin degludec (Tresiba) 100 units/mL flextouch 20 Units, 20 Units, Subcutaneous, Daily    hydrocortisone Na succinate PF (Solu-CORTEF) injection 50 mg, 50 mg, Intravenous, Q8H LIZZ    aspirin chewable tab 81 mg, 81 mg, Oral, Daily    rosuvastatin (Crestor) tab 10 mg, 10 mg, Oral, Nightly    heparin (Porcine) 00140 units/250mL infusion PE/DVT/THROMBUS CONTINUOUS, 200-3,000 Units/hr, Intravenous, Continuous    HYDROcodone-acetaminophen (Norco)  MG per tab 1 tablet, 1 tablet, Oral, Q6H PRN **OR** HYDROcodone-acetaminophen (Norco)  MG per tab 2 tablet, 2 tablet, Oral, Q6H PRN    morphINE PF 2 MG/ML injection 2 mg, 2 mg, Intravenous, Q2H PRN **OR** morphINE PF 4 MG/ML injection 4 mg, 4 mg, Intravenous, Q2H PRN **OR** morphINE PF 4 MG/ML injection 6 mg, 6 mg, Intravenous, Q2H PRN    clopidogrel (Plavix) tab 75 mg, 75 mg, Oral, Daily    glucose (Dex4) 15 GM/59ML oral liquid 15  g, 15 g, Oral, Q15 Min PRN **OR** glucose (Glutose) 40% oral gel 15 g, 15 g, Oral, Q15 Min PRN **OR** glucose-vitamin C (Dex-4) chewable tab 4 tablet, 4 tablet, Oral, Q15 Min PRN **OR** dextrose 50% injection 50 mL, 50 mL, Intravenous, Q15 Min PRN **OR** glucose (Dex4) 15 GM/59ML oral liquid 30 g, 30 g, Oral, Q15 Min PRN **OR** glucose (Glutose) 40% oral gel 30 g, 30 g, Oral, Q15 Min PRN **OR** glucose-vitamin C (Dex-4) chewable tab 8 tablet, 8 tablet, Oral, Q15 Min PRN    [COMPLETED] sodium chloride 0.9 % IV bolus 1,000 mL, 1,000 mL, Intravenous, Once **FOLLOWED BY** sodium chloride 0.9% infusion, , Intravenous, Continuous    norepinephrine (Levophed) 4 mg/250mL infusion premix, 0.5-30 mcg/min, Intravenous, Continuous     REVIEW OF SYSTEMS:   Unable to obtain - pt intubated     OBJECTIVE:  BP (!) 161/63   Pulse 86   Temp 97.9 °F (36.6 °C) (Temporal)   Resp 23   Ht 5' 1\" (1.549 m)   Wt 164 lb 14.5 oz (74.8 kg)   SpO2 98%   BMI 31.16 kg/m²      Ventilator Settings: Vent Mode: VC+  FiO2 (%):  [30 %-50 %] 30 %  S RR:  [12-20] 20  S VT:  [450 mL] 450 mL  PEEP/CPAP (cm H2O):  [5 cm H20] 5 cm H20  MAP (cm H2O):  [8-11] 11        Wt Readings from Last 3 Encounters:   06/15/24 164 lb 14.5 oz (74.8 kg)   01/26/24 153 lb (69.4 kg)   03/09/22 154 lb (69.9 kg)        I/O last 3 completed shifts:  In: 6500.3 [P.O.:100; I.V.:4542; Blood:1858.3]  Out: 940 [Urine:635; Drains:105; Blood:200]  No intake/output data recorded.      Physical Exam:                          General: alert, cooperative, in NAD                          HEENT: ETT in place                          Lungs: Clear to auscultation bilaterally, no wheezes or crackles                           Chest wall: No tenderness or deformity.                          Heart: Regular rate and rhythm, normal S1S2, no murmur.                          Abdomen: soft, non-tender, non-distended, positive BS.                          Extremity: No clubbing or cyanosis. Right  groin dressing in place, slightly firm at site of hematoma                          Skin: No rashes or lesions.       Lab Results   Component Value Date    WBC 21.4 06/16/2024    RBC 3.97 06/16/2024    HGB 11.8 06/16/2024    HCT 34.8 06/16/2024    MCV 87.7 06/16/2024    MCH 29.7 06/16/2024    MCHC 33.9 06/16/2024    RDW 15.7 06/16/2024    PLT 99.0 06/16/2024     Lab Results   Component Value Date     06/16/2024    K 4.3 06/16/2024     06/16/2024    CO2 15.0 06/16/2024    BUN 27 06/16/2024    CREATSERUM 1.85 06/16/2024     06/16/2024    CA 7.8 06/16/2024    ALKPHO 51 06/16/2024    ALT 12 06/16/2024    AST 42 06/16/2024    BILT 0.2 06/16/2024    ALB 1.5 06/16/2024    TP 4.4 06/16/2024     Lab Results   Component Value Date    INR 1.12 06/15/2024    INR 0.98 09/20/2018          Imaging: I have independently visualized all relevant chest imaging in PACS.  I agree with the radiology interpretation except where noted.      ASSESSMENT/PLAN:  Acute hypoxemic respiratory failure: expected in the post operative period  -CXR without focal infiltrate  -repeat ABG this am reviewed  -will proceed with SBT and extubation as appropriate  PAD: left iliac chronic occlusion with claudication sp balloon angioplasty and stent of left common iliac artery and external iliac artery 06/13, right common femoral artery occlusion sp thrombectomy, balloon angioplasty, stent; thrombectomy of left SFA and left anterior tibial artery, balloon angioplasty of left TP trunk and posterior tibial 06/13  -pain control  -per vascular   Ruptured femoral Pseudoaneurysm:  -s/p ligation of femoral artery brach with sartorius muscle myoplasty 6/15  -monitor H/H  DM  -insulin per IM  RA:  -on chronic prednisone, now on stress dose steroids  HTN:  -resume home antihypertensives as BP tolerates  FEN:  -ADAT  Proph:  -hep gtt  Dispo:  -ICU    Critical Care Time: 35 minutes    Nina Driver MD  6/16/2024  8:08 AM

## 2024-06-16 NOTE — PROGRESS NOTES
.[facility] Hospitalist note    PCP: Tay Alexandra MD    Chief Complaint:  F/u groin hematoma    SUBJECTIVE:  Went to OR yesterday for R groin pseudoaneurysm s/p ligation of femoral artery branch with sartorius muscle myoplasty.  Feels okay post extubation. Pain controlled.     OBJECTIVE:  Temp:  [96.9 °F (36.1 °C)-98.3 °F (36.8 °C)] 98.1 °F (36.7 °C)  Pulse:  [69-96] 83  Resp:  [14-24] 21  BP: (133-180)/(58-79) 149/69  SpO2:  [94 %-100 %] 94 %  AO: ()/() 150/58  FiO2 (%):  [30 %-50 %] 30 %    Intake/Output:    Intake/Output Summary (Last 24 hours) at 6/16/2024 1504  Last data filed at 6/16/2024 1300  Gross per 24 hour   Intake 5661 ml   Output 975 ml   Net 4686 ml       Last 3 Weights   06/15/24 1134 164 lb 14.5 oz (74.8 kg)   06/06/24 1318 149 lb (67.6 kg)   01/26/24 0030 153 lb (69.4 kg)   03/09/22 1139 154 lb (69.9 kg)       Exam  Gen: No acute distress  HEENT: anicteric sclera, MMM  Pulm: Lungs clear, normal respiratory effort  CV: Heart with regular rate and rhythm, no peripheral edema  Abd: Abdomen soft, nontender, nondistended, no organomegaly, bowel sounds present  MSK: R groin bandaged, drain in place with sanginous drainage  Skin: no rashes or lesions  Neuro: A&OX3, no focal deficits    Data Review:         Labs:     Recent Labs   Lab 06/13/24  2013 06/14/24  0408 06/14/24  1155 06/14/24  1800 06/15/24  0540 06/15/24  2127 06/16/24  0420   WBC 18.5* 21.5*  --   --  25.7* 23.3* 21.4*   HGB 10.0* 8.0* 8.2* 9.4* 7.5* 12.2 11.8*   MCV 91.0 90.9  --   --  91.8 87.1 87.7   .0 235.0  235.0  --   --  157.0 99.0* 99.0*   NE  --  15.85*  --   --   --   --   --    LYMABS  --  3.84  --   --   --   --   --    INR  --   --   --   --   --  1.12  --        Recent Labs   Lab 06/13/24  2014 06/15/24  0540 06/15/24  1825 06/16/24  0420   * 138 141 142   K 3.6 4.7 4.4 4.3    116* 121* 120*   CO2 19.0* 15.0* 14.0* 15.0*   BUN 24* 27* 26* 27*   CREATSERUM 1.16* 1.58* 1.52* 1.85*   CA 7.7* 6.9* 8.0*  7.8*   MG  --  1.7  --  1.6   * 166* 142* 165*       Recent Labs   Lab 06/15/24  0540 06/16/24  0420   ALT 22 12*   AST 83* 42*   ALB 1.7* 1.5*       No results for input(s): \"TROP\", \"CK\", \"PBNP\", \"PCT\" in the last 168 hours.    No results for input(s): \"CRP\", \"SALAS\", \"LDH\", \"DDIMER\" in the last 168 hours.    Recent Labs   Lab 06/15/24  1200 06/15/24  1825 06/16/24  0014 06/16/24  0544 06/16/24  1151   PGLU 167* 136* 218* 152* 120*       Meds:   Scheduled Medication:   amLODIPine  10 mg Oral Daily    losartan  100 mg Oral Daily    atenolol  50 mg Oral Daily Beta Blocker    hydrochlorothiazide  25 mg Oral Daily    ceFAZolin  2 g Intravenous Q12H    famotidine  20 mg Oral Daily    Or    famotidine  20 mg Intravenous Daily    insulin aspart  4-20 Units Subcutaneous TID AC and HS    insulin degludec  20 Units Subcutaneous Daily    hydrocortisone sodium succinate  50 mg Intravenous Q8H LIZZ    aspirin  81 mg Oral Daily    rosuvastatin  10 mg Oral Nightly    clopidogrel  75 mg Oral Daily     Continuous Infusing Medication:   continuous dose heparin 500 Units/hr (06/16/24 0600)    norepinephrine Stopped (06/16/24 0304)     PRN Medication:  ondansetron    HYDROcodone-acetaminophen **OR** HYDROcodone-acetaminophen    morphINE **OR** morphINE **OR** morphINE    glucose **OR** glucose **OR** glucose-vitamin C **OR** dextrose **OR** glucose **OR** glucose **OR** glucose-vitamin C       Microbiology:    No results found for this visit on 06/13/24.    No results found for: \"COVID19\"     Assessment/Plan:     Patient is a 69 year old female with PMH sig for HTN, HLD, DM2, CKD, RA on prednisone, PAD here for schedueld vascular procedure     Impression     -left iliac chronic occlusion with claudication sp balloon angioplasty and stent of left common iliac artery and external iliac artery 06/13  -right common femoral artery occlusion sp thrombectomy, balloon angioplasty, stent; thrombectomy of left SFA and left anterior tibial  artery, balloon angioplasty of left TP trunk and posterior tibial 06/13  -post op pain  -post op leukocytosis  -large R inguinal region pseudoaneurysm 6/15 noted     -post op shock, on pressors, possibly hemorrhagic  -left groin hematoma  -anemia     -DM2, uncontrolled - A1c 12s     -nicotine dependence, smokes 1ppd     -HTN  -HLD     -RA on chronic prednisone  -CKD 3, baseline Cr ~1.2     Plan     *vascular  -Went back to OR 6/15 for R groin pseudoaneurysm s/p ligation of femoral artery branch with sartorius muscle myoplasty.  -hep gtt     *post op hypotension / shock  -pressors prn  -hydrocortisone  -supportive mgmt with prbc, received 6/15 and now trending hgb    *anemia  -preop hgb 13.9, trending postop anemia, has received several prbc    *thrombocytopenia- likely 2/2 blood loss, trend plts     *DM 2  -follows with endo as outpt, recently glipizide was increased  -ISS + tresiba 10 units     *HTN hx  -hold home meds (at home on atenolol, hydrochlorothiazide, norvasc, losartan)     *HLD  -statin     DVT proph  On heparin     GI ppx  -pepcid              Nusrat Don MD  [facility] Hospitalist  Pager: [phone]

## 2024-06-16 NOTE — PLAN OF CARE
Assumed care of patient this morning. Patient was vented. Sedation weaned. Cpap trial completed. Extubated approx 1025. Rihgt groin with dressing intact. Slight ooze noted. JILLIAN to bulb suction. Mike remains to gravity. Strict bedrest per vascular surgery. Heparin gtt at current rate to be continued. No protocol. Turned q2h. C/o on/off discomfort to right lower abdomen. Groin site/abdomen remain soft/intact. Prn pain med offered but patient declined at this time. Vss. See assessment for complete details. Will continue to monitor.

## 2024-06-16 NOTE — PLAN OF CARE
Assumed care @ 1930. Patient is intubated and sedated with propofol. On tele-NSR. Pt normotensive, levo gtt for bp support. ABG at 2000-results relayed to pulm. 1u PRBC finished infusing, labs sent afterwards. R groin with dressing but soft and joey drain. JOEY drainage-see flowsheet. Pedal and PT pulses with doppler. Kaleb hugger remained on. Art line/marcano in place. Son updated over phone. All needs met at this time.

## 2024-06-16 NOTE — PROGRESS NOTES
Vascular Surgery Note    No acute events overnight. Total 5 units PRBC given yesterday for initial Hg of 5.6 in the OR. Patient stable overnight and norepi weaned off. Right groin with dressing in place, soft. JILLIAN drain with serosang output. Both feet are very warm with good doppler signals. Hemoglobin has remained stable, platelet count has dropped; will monitor. Keep heparin drip just at 500u/hr today with no titration. Will plan to extubate today if patient passes breathing trial. Keep bedrest for today and tomorrow due to muscle flap. Can advance diet when patient is extubated. Can stop IV fluids once she is tolerating a diet. Discussed with RN    Safia Mcneil MD  06/16/24  9:46 AM

## 2024-06-16 NOTE — PLAN OF CARE
Problem: NEUROLOGICAL - ADULT  Goal: Achieves stable or improved neurological status  Description: INTERVENTIONS  - Assess for and report changes in neurological status  - Initiate measures to prevent increased intracranial pressure  - Maintain blood pressure and fluid volume within ordered parameters to optimize cerebral perfusion and minimize risk of hemorrhage  - Monitor temperature, glucose, and sodium. Initiate appropriate interventions as ordered  Outcome: Completed  Goal: Absence of seizures  Description: INTERVENTIONS  - Monitor for seizure activity  - Administer anti-seizure medications as ordered  - Monitor neurological status  Outcome: Completed  Goal: Remains free of injury related to seizure activity  Description: INTERVENTIONS:  - Maintain airway, patient safety  and administer oxygen as ordered  - Monitor patient for seizure activity, document and report duration and description of seizure to MD/LIP  - If seizure occurs, turn patient to side and suction secretions as needed  - Reorient patient post seizure  - Seizure pads on all 4 side rails  - Instruct patient/family to notify RN of any seizure activity  - Instruct patient/family to call for assistance with activity based on assessment  Outcome: Completed  Goal: Achieves maximal functionality and self care  Description: INTERVENTIONS  - Monitor swallowing and airway patency with patient fatigue and changes in neurological status  - Encourage and assist patient to increase activity and self care with guidance from PT/OT  - Encourage visually impaired, hearing impaired and aphasic patients to use assistive/communication devices  Outcome: Completed     Problem: SAFETY ADULT - FALL  Goal: Free from fall injury  Description: INTERVENTIONS:  - Assess pt frequently for physical needs  - Identify cognitive and physical deficits and behaviors that affect risk of falls.  - [place] fall precautions as indicated by assessment.  - Educate pt/family on patient  safety including physical limitations  - Instruct pt to call for assistance with activity based on assessment  - Modify environment to reduce risk of injury  - Provide assistive devices as appropriate  - Consider OT/PT consult to assist with strengthening/mobility  - Encourage toileting schedule  Outcome: Completed     Problem: SKIN/TISSUE INTEGRITY - ADULT  Goal: Skin integrity remains intact  Description: INTERVENTIONS  - Assess and document risk factors for pressure ulcer development  - Assess and document skin integrity  - Monitor for areas of redness and/or skin breakdown  - Initiate interventions, skin care algorithm/standards of care as needed  Outcome: Progressing  Goal: Incision(s), wounds(s) or drain site(s) healing without S/S of infection  Description: INTERVENTIONS:  - Assess and document risk factors for pressure ulcer development  - Assess and document skin integrity  - Assess and document dressing/incision, wound bed, drain sites and surrounding tissue  - Implement wound care per orders  - Initiate isolation precautions as appropriate  - Initiate Pressure Ulcer prevention bundle as indicated  Outcome: Progressing     Problem: Impaired Activities of Daily Living  Goal: Achieve highest/safest level of independence in self care  Description: Interventions:  - Assess ability and encourage patient to participate in ADLs to maximize function  - Promote sitting position while performing ADLs such as feeding, grooming, and bathing  - Educate and encourage patient/family in tolerated functional activity level and precautions during self-care  Outcome: Progressing     Problem: Safety Risk - Non-Violent Restraints  Goal: Patient will remain free from self-harm  Description: INTERVENTIONS:  - Apply the least restrictive restraint to prevent harm  - Notify patient and family of reasons restraints applied  - Assess for any contributing factors to confusion (electrolyte disturbances, delirium, medications)  -  Discontinue any unnecessary medical devices as soon as possible  - Assess the patient's physical comfort, circulation, skin condition, hydration, nutrition and elimination needs   - Reorient and redirection as needed  - Assess for the need to continue restraints  Outcome: Completed     Problem: Impaired Functional Mobility  Goal: Achieve highest/safest level of mobility/gait  Description: Interventions:  - Assess patient's functional ability and stability  - Promote increasing activity/tolerance for mobility and gait  - Educate and engage patient/family in tolerated activity level and precautions  Outcome: Progressing

## 2024-06-17 LAB
ALBUMIN SERPL-MCNC: 1.7 G/DL (ref 3.4–5)
ALBUMIN/GLOB SERPL: 0.5 {RATIO} (ref 1–2)
ALP LIVER SERPL-CCNC: 55 U/L
ALT SERPL-CCNC: 10 U/L
ANION GAP SERPL CALC-SCNC: 8 MMOL/L (ref 0–18)
AST SERPL-CCNC: 52 U/L (ref 15–37)
BILIRUB SERPL-MCNC: 0.4 MG/DL (ref 0.1–2)
BLOOD TYPE BARCODE: 5100
BUN BLD-MCNC: 26 MG/DL (ref 9–23)
CALCIUM BLD-MCNC: 7.4 MG/DL (ref 8.5–10.1)
CHLORIDE SERPL-SCNC: 115 MMOL/L (ref 98–112)
CO2 SERPL-SCNC: 18 MMOL/L (ref 21–32)
CREAT BLD-MCNC: 1.44 MG/DL
EGFRCR SERPLBLD CKD-EPI 2021: 39 ML/MIN/1.73M2 (ref 60–?)
ERYTHROCYTE [DISTWIDTH] IN BLOOD BY AUTOMATED COUNT: 15.6 %
GLOBULIN PLAS-MCNC: 3.3 G/DL (ref 2.8–4.4)
GLUCOSE BLD-MCNC: 119 MG/DL (ref 70–99)
GLUCOSE BLD-MCNC: 126 MG/DL (ref 70–99)
GLUCOSE BLD-MCNC: 159 MG/DL (ref 70–99)
GLUCOSE BLD-MCNC: 160 MG/DL (ref 70–99)
GLUCOSE BLD-MCNC: 259 MG/DL (ref 70–99)
GLUCOSE BLD-MCNC: 99 MG/DL (ref 70–99)
HCT VFR BLD AUTO: 34.2 %
HGB BLD-MCNC: 11.6 G/DL
MAGNESIUM SERPL-MCNC: 1.9 MG/DL (ref 1.6–2.6)
MCH RBC QN AUTO: 29.3 PG (ref 26–34)
MCHC RBC AUTO-ENTMCNC: 33.9 G/DL (ref 31–37)
MCV RBC AUTO: 86.4 FL
OSMOLALITY SERPL CALC.SUM OF ELEC: 297 MOSM/KG (ref 275–295)
PLATELET # BLD AUTO: 132 10(3)UL (ref 150–450)
PLATELETS.RETICULATED NFR BLD AUTO: 4.7 % (ref 0–7)
POTASSIUM SERPL-SCNC: 3.2 MMOL/L (ref 3.5–5.1)
POTASSIUM SERPL-SCNC: 3.4 MMOL/L (ref 3.5–5.1)
PROT SERPL-MCNC: 5 G/DL (ref 6.4–8.2)
RBC # BLD AUTO: 3.96 X10(6)UL
SODIUM SERPL-SCNC: 141 MMOL/L (ref 136–145)
UNIT VOLUME: 350 ML
WBC # BLD AUTO: 19.5 X10(3) UL (ref 4–11)

## 2024-06-17 PROCEDURE — 83735 ASSAY OF MAGNESIUM: CPT | Performed by: SURGERY

## 2024-06-17 PROCEDURE — 80053 COMPREHEN METABOLIC PANEL: CPT | Performed by: SURGERY

## 2024-06-17 PROCEDURE — 82962 GLUCOSE BLOOD TEST: CPT

## 2024-06-17 PROCEDURE — 85027 COMPLETE CBC AUTOMATED: CPT | Performed by: SURGERY

## 2024-06-17 PROCEDURE — 84132 ASSAY OF SERUM POTASSIUM: CPT | Performed by: SURGERY

## 2024-06-17 RX ORDER — PREDNISONE 5 MG/1
5 TABLET ORAL 2 TIMES DAILY
Status: DISCONTINUED | OUTPATIENT
Start: 2024-06-18 | End: 2024-06-29

## 2024-06-17 RX ORDER — POTASSIUM CHLORIDE 20 MEQ/1
40 TABLET, EXTENDED RELEASE ORAL EVERY 4 HOURS
Status: DISPENSED | OUTPATIENT
Start: 2024-06-17 | End: 2024-06-17

## 2024-06-17 RX ADMIN — FAMOTIDINE 20 MG: 20 TABLET, FILM COATED ORAL at 08:04:00

## 2024-06-17 RX ADMIN — AMLODIPINE BESYLATE 10 MG: 10 TABLET ORAL at 08:04:00

## 2024-06-17 RX ADMIN — HYDROCODONE BITARTRATE AND ACETAMINOPHEN 2 TABLET: 10; 325 TABLET ORAL at 03:26:00

## 2024-06-17 RX ADMIN — ATENOLOL 50 MG: 50 TABLET ORAL at 06:59:00

## 2024-06-17 RX ADMIN — ASPIRIN 81 MG: 81 TABLET, CHEWABLE ORAL at 08:04:00

## 2024-06-17 RX ADMIN — HYDROCHLOROTHIAZIDE 25 MG: 25 TABLET ORAL at 08:04:00

## 2024-06-17 RX ADMIN — INSULIN DEGLUDEC 20 UNITS: 100 INJECTION, SOLUTION SUBCUTANEOUS at 09:44:00

## 2024-06-17 RX ADMIN — CLOPIDOGREL BISULFATE 75 MG: 75 TABLET ORAL at 08:04:00

## 2024-06-17 RX ADMIN — POTASSIUM CHLORIDE 40 MEQ: 20 TABLET, EXTENDED RELEASE ORAL at 06:59:00

## 2024-06-17 RX ADMIN — ROSUVASTATIN CALCIUM 10 MG: 10 TABLET, COATED ORAL at 20:37:00

## 2024-06-17 RX ADMIN — LOSARTAN POTASSIUM 100 MG: 100 TABLET ORAL at 08:04:00

## 2024-06-17 NOTE — PROGRESS NOTES
[facility]    Emilie John Patient Status:  Inpatient    1955 MRN [id number]   Location [facility] 6NE-A Attending Yariel Du MD   Hosp Day # 3 PCP Tay Alexandra MD     SUBJECTIVE: extubated yesterday.  Has occ cough.     OBJECTIVE:  /82   Pulse 76   Temp 97 °F (36.1 °C)   Resp 15   Ht 154.9 cm (5' 1\")   Wt 166 lb 14.2 oz (75.7 kg)   SpO2 96%   BMI 31.53 kg/m²   O2 requirement: 3L nc     I/O last 3 completed shifts:  In: 3632.7 [P.O.:240; I.V.:3017.7; Blood:365; IV PIGGYBACK:10]  Out: 3130 [Urine:2900; Drains:230]  I/O this shift:  In: -   Out: 445 [Urine:425; Drains:20]     Current Medications:   Current Facility-Administered Medications:     amLODIPine (Norvasc) tab 10 mg, 10 mg, Oral, Daily    losartan (Cozaar) tab 100 mg, 100 mg, Oral, Daily    atenolol (Tenormin) tab 50 mg, 50 mg, Oral, Daily Beta Blocker    hydroCHLOROthiazide tab 25 mg, 25 mg, Oral, Daily    ondansetron (Zofran) 4 MG/2ML injection 4 mg, 4 mg, Intravenous, Q6H PRN    ceFAZolin (Ancef) 2g in 10mL IV syringe premix, 2 g, Intravenous, Q12H    famotidine (Pepcid) tab 20 mg, 20 mg, Oral, Daily **OR** famotidine (Pepcid) 20 mg/2mL injection 20 mg, 20 mg, Intravenous, Daily    insulin aspart (NovoLOG) 100 Units/mL FlexPen 4-20 Units, 4-20 Units, Subcutaneous, TID AC and HS    insulin degludec (Tresiba) 100 units/mL flextouch 20 Units, 20 Units, Subcutaneous, Daily    hydrocortisone Na succinate PF (Solu-CORTEF) injection 50 mg, 50 mg, Intravenous, Q8H LIZZ    aspirin chewable tab 81 mg, 81 mg, Oral, Daily    rosuvastatin (Crestor) tab 10 mg, 10 mg, Oral, Nightly    heparin (Porcine) 88190 units/250mL infusion PE/DVT/THROMBUS CONTINUOUS, 200-3,000 Units/hr, Intravenous, Continuous    HYDROcodone-acetaminophen (Norco)  MG per tab 1 tablet, 1 tablet, Oral, Q6H PRN **OR** HYDROcodone-acetaminophen (Norco)  MG per tab 2 tablet, 2 tablet, Oral, Q6H PRN    morphINE PF 2 MG/ML injection 2 mg, 2 mg,  Intravenous, Q2H PRN **OR** morphINE PF 4 MG/ML injection 4 mg, 4 mg, Intravenous, Q2H PRN **OR** morphINE PF 4 MG/ML injection 6 mg, 6 mg, Intravenous, Q2H PRN    clopidogrel (Plavix) tab 75 mg, 75 mg, Oral, Daily    glucose (Dex4) 15 GM/59ML oral liquid 15 g, 15 g, Oral, Q15 Min PRN **OR** glucose (Glutose) 40% oral gel 15 g, 15 g, Oral, Q15 Min PRN **OR** glucose-vitamin C (Dex-4) chewable tab 4 tablet, 4 tablet, Oral, Q15 Min PRN **OR** dextrose 50% injection 50 mL, 50 mL, Intravenous, Q15 Min PRN **OR** glucose (Dex4) 15 GM/59ML oral liquid 30 g, 30 g, Oral, Q15 Min PRN **OR** glucose (Glutose) 40% oral gel 30 g, 30 g, Oral, Q15 Min PRN **OR** glucose-vitamin C (Dex-4) chewable tab 8 tablet, 8 tablet, Oral, Q15 Min PRN    norepinephrine (Levophed) 4 mg/250mL infusion premix, 0.5-30 mcg/min, Intravenous, Continuous     General appearance: alert, appears stated age, and cooperative  Lungs: clear to auscultation bilaterally  Heart: regular rate and rhythm  Abdomen: soft, non-tender; bowel sounds normal; no masses,  no organomegaly  Extremities: extremities normal, atraumatic, no cyanosis or edema     Lab Results   Component Value Date    WBC 19.5 06/17/2024    RBC 3.96 06/17/2024    HGB 11.6 06/17/2024    HCT 34.2 06/17/2024    MCV 86.4 06/17/2024    MCH 29.3 06/17/2024    MCHC 33.9 06/17/2024    RDW 15.6 06/17/2024    .0 06/17/2024     Lab Results   Component Value Date     06/17/2024    K 3.2 06/17/2024     06/17/2024    CO2 18.0 06/17/2024    BUN 26 06/17/2024    CREATSERUM 1.44 06/17/2024    GLU 99 06/17/2024    CA 7.4 06/17/2024    ALKPHO 55 06/17/2024    ALT 10 06/17/2024    AST 52 06/17/2024    BILT 0.4 06/17/2024    ALB 1.7 06/17/2024    TP 5.0 06/17/2024     Lab Results   Component Value Date    INR 1.12 06/15/2024    INR 0.98 09/20/2018        Imaging: CXR: clear     ASSESSMENT/PLAN:  Acute hypoxemic respiratory failure: secondary to post op changes along with background emphysema-  resolved  -CXR without focal infiltrate  -extubated to nasal canula; wean to room air- on 1L nc  -encourage IS/bronchial hygiene  PAD: left iliac chronic occlusion with claudication sp balloon angioplasty and stent of left common iliac artery and external iliac artery 06/13, right common femoral artery occlusion sp thrombectomy, balloon angioplasty, stent; thrombectomy of left SFA and left anterior tibial artery, balloon angioplasty of left TP trunk and posterior tibial 06/13  -pain control  -per PV    Ruptured femoral Pseudoaneurysm:  -s/p ligation of femoral artery brach with sartorius muscle myoplasty 6/15  -monitor H/H  RA:  -on chronic prednisone, now on stress dose steroids  Emphysema- has long smoking hx; >40ppy. Smoking 1ppd up until admission  - encouraged complete cessation  - PFTs as OP showed moderate decrease in DLCO w/o obstruction. Hold on inhalers for now  - follows with Dr. West IV  FEN: ADAT  Proph: hep gtt  Dispo: Full code  - no further pulm/critical care issues. Will sign off, please call with questions.    Osmar López MD  6/17/2024  10:49 AM

## 2024-06-17 NOTE — PROGRESS NOTES
Formerly [facility] AND [facility]   part of [facility]     Progress Note  Emilie John Patient Status:  Inpatient    1955 MRN [id number]   Location [facility] 6NE-A Attending Yariel Du MD   Hosp Day # 3 PCP Tay Alexandra MD       SEE ATTENDING NOTE AT BOTTOM OF PAGE    Is this a shared or split note between Advanced Practice Provider and Physician? Yes    Assessment and Plan:    Patient is a 69 year old female with PMH sig for HTN, HLD, DM2, CKD, RA on prednisone, PAD here for schedueld vascular procedure     Impression     -left iliac chronic occlusion with claudication sp balloon angioplasty and stent of left common iliac artery and external iliac artery   -right common femoral artery occlusion sp thrombectomy, balloon angioplasty, stent; thrombectomy of left SFA and left anterior tibial artery, balloon angioplasty of left TP trunk and posterior tibial   -post op pain  -post op leukocytosis  -large R inguinal region pseudoaneurysm 6/15 noted     -post op shock, on pressors, possibly hemorrhagic  -left groin hematoma  -anemia     -DM2, uncontrolled - A1c 12s     -nicotine dependence, smokes 1ppd     -HTN  -HLD     -RA on chronic prednisone  -CKD 3, baseline Cr ~1.2     Plan     *vascular  -Went back to OR 6/15 for R groin pseudoaneurysm s/p ligation of femoral artery branch with sartorius muscle myoplasty.  -hep gtt  -strict bedrest until  in the afternoon     *post op hypotension / shock  -pressors prn  -hydrocortisone  -supportive mgmt with prbc, received 6/15 and now trending hgb     *anemia  -preop hgb 13.9, trending postop anemia, has received several prbc     *thrombocytopenia- likely 2/2 blood loss, trend plts     *DM 2  -follows with endo as outpt, recently glipizide was increased  -ISS + tresiba 10 units     *HTN hx  -hold home meds (at home on atenolol, hydrochlorothiazide, norvasc, losartan)     *HLD  -statin     DVT proph  On heparin     GI  ppx  -pepcid    PCP: Tay Alexandra MD    Concerns regarding plan of care were discussed with patient. Patient agrees with plan as detailed above. Discussed plan of care with Dr. Don    Note: This chart was prepared using voice recognition software and may contain unintended word substitution errors.          Gurvinder Tucker DALE  [facility] Hospitalist Team  Contact via [employer] (Check Availability)  6/17/2024             SUBJECTIVE:   In bed, resting. Left groin soft, no bleeding, no bruising, no hematoma.  Pulses per doppler, LE's warm and perfused.  Deneis cp/sob, n/v, f/c.                OBJECTIVE:   Blood pressure 158/63, pulse 78, temperature 97 °F (36.1 °C), temperature source Temporal, resp. rate 15, height 5' 1\" (1.549 m), weight 166 lb 14.2 oz (75.7 kg), SpO2 98%, not currently breastfeeding.    GENERAL: no apparent distress  NEURO: A/A Ox3  RESP: non labored, CTA  CARDIO: Regular, no murmur  ABD: soft, NT, ND, BS+  EXTREMITIES: no edema, no calf tenderness, left groin soft, no bleeding, no bruising, no hematoma.  Pulse per doppler    DIAGNOSTIC DATA:   Labs:     Recent Labs   Lab 06/14/24  0408 06/14/24  1155 06/14/24  1800 06/15/24  0540 06/15/24  2127 06/16/24 0420 06/17/24  0349   WBC 21.5*  --   --  25.7* 23.3* 21.4* 19.5*   HGB 8.0*   < > 9.4* 7.5* 12.2 11.8* 11.6*   MCV 90.9  --   --  91.8 87.1 87.7 86.4   .0  235.0  --   --  157.0 99.0* 99.0* 132.0*   INR  --   --   --   --  1.12  --   --     < > = values in this interval not displayed.       Recent Labs   Lab 06/13/24  2014 06/15/24  0540 06/15/24  1825 06/16/24  0420 06/17/24  0349   * 138 141 142 141   K 3.6 4.7 4.4 4.3 3.2*    116* 121* 120* 115*   CO2 19.0* 15.0* 14.0* 15.0* 18.0*   BUN 24* 27* 26* 27* 26*   CREATSERUM 1.16* 1.58* 1.52* 1.85* 1.44*   CA 7.7* 6.9* 8.0* 7.8* 7.4*   MG  --  1.7  --  1.6 1.9   * 166* 142* 165* 99       Recent Labs   Lab 06/15/24  0540 06/16/24  0420  06/17/24  0349   ALT 22 12* 10*   AST 83* 42* 52*   ALB 1.7* 1.5* 1.7*       Recent Labs   Lab 06/16/24  0544 06/16/24  1151 06/16/24  1706 06/16/24  2132 06/17/24  0635   PGLU 152* 120* 120* 120* 119*       No results for input(s): \"TROP\" in the last 168 hours.      MEDICATIONS      Current Facility-Administered Medications   Medication Dose Route Frequency    amLODIPine (Norvasc) tab 10 mg  10 mg Oral Daily    losartan (Cozaar) tab 100 mg  100 mg Oral Daily    atenolol (Tenormin) tab 50 mg  50 mg Oral Daily Beta Blocker    hydroCHLOROthiazide tab 25 mg  25 mg Oral Daily    ondansetron (Zofran) 4 MG/2ML injection 4 mg  4 mg Intravenous Q6H PRN    ceFAZolin (Ancef) 2g in 10mL IV syringe premix  2 g Intravenous Q12H    famotidine (Pepcid) tab 20 mg  20 mg Oral Daily    Or    famotidine (Pepcid) 20 mg/2mL injection 20 mg  20 mg Intravenous Daily    insulin aspart (NovoLOG) 100 Units/mL FlexPen 4-20 Units  4-20 Units Subcutaneous TID AC and HS    insulin degludec (Tresiba) 100 units/mL flextouch 20 Units  20 Units Subcutaneous Daily    hydrocortisone Na succinate PF (Solu-CORTEF) injection 50 mg  50 mg Intravenous Q8H LIZZ    aspirin chewable tab 81 mg  81 mg Oral Daily    rosuvastatin (Crestor) tab 10 mg  10 mg Oral Nightly    heparin (Porcine) 12061 units/250mL infusion PE/DVT/THROMBUS CONTINUOUS  200-3,000 Units/hr Intravenous Continuous    HYDROcodone-acetaminophen (Norco)  MG per tab 1 tablet  1 tablet Oral Q6H PRN    Or    HYDROcodone-acetaminophen (Norco)  MG per tab 2 tablet  2 tablet Oral Q6H PRN    morphINE PF 2 MG/ML injection 2 mg  2 mg Intravenous Q2H PRN    Or    morphINE PF 4 MG/ML injection 4 mg  4 mg Intravenous Q2H PRN    Or    morphINE PF 4 MG/ML injection 6 mg  6 mg Intravenous Q2H PRN    clopidogrel (Plavix) tab 75 mg  75 mg Oral Daily    glucose (Dex4) 15 GM/59ML oral liquid 15 g  15 g Oral Q15 Min PRN    Or    glucose (Glutose) 40% oral gel 15 g  15 g Oral Q15 Min PRN    Or     glucose-vitamin C (Dex-4) chewable tab 4 tablet  4 tablet Oral Q15 Min PRN    Or    dextrose 50% injection 50 mL  50 mL Intravenous Q15 Min PRN    Or    glucose (Dex4) 15 GM/59ML oral liquid 30 g  30 g Oral Q15 Min PRN    Or    glucose (Glutose) 40% oral gel 30 g  30 g Oral Q15 Min PRN    Or    glucose-vitamin C (Dex-4) chewable tab 8 tablet  8 tablet Oral Q15 Min PRN    norepinephrine (Levophed) 4 mg/250mL infusion premix  0.5-30 mcg/min Intravenous Continuous                  IMAGING     XR CHEST AP PORTABLE  (CPT=71045)    Result Date: 6/16/2024  CONCLUSION:  Endotracheal tube 4 cm above the tanya.   LOCATION:  [facility]      Dictated by (CST): Gadiel De La Garza MD on 6/16/2024 at 9:43 AM     Finalized by (CST): Gadiel De La Garza MD on 6/16/2024 at 9:43 AM       CTA ABD/PEL (CPT=74174)    Result Date: 6/15/2024  CONCLUSION:   1. Numerous cysts the lung bases along with mild bronchiectasis at the lung bases is noted.  This appears more prominent than on prior examination.  2. Large collection in the right inguinal region measuring 13.5 x 7.1 x 8.0 cm is highly suspicious for a large pseudoaneurysm.  Noncontrast images demonstrate fluid high density level that is likely due to varying ages of blood products.  There is hyperenhancement on arterial phase imaging with a branch extending from the right common femoral artery into this collection.  Delayed images demonstrate accumulation of high density contrast within this collection.  This critical result was discussed with Nurse Allison at 1249 hours on 6/15/2024. Read back was performed.   LOCATION:  [facility]   Dictated by (CST): Jonathan Isaacs MD on 6/15/2024 at 12:43 PM     Finalized by (CST): Jonathan Isaacs MD on 6/15/2024 at 12:49 PM       US GROIN BILATERAL (CPT=76882-50)    Result Date: 6/15/2024  CONCLUSION:  Large fluid collection in the right inguinal region is suspicious for a large hematoma.  Infection cannot be entirely excluded.  The vessels adjacent to the  hematoma are widely patent.  No significant vascularity within the likely hematoma.   LOCATION:  [facility]   Dictated by (CST): Jonathan Isaacs MD on 6/15/2024 at 11:47 AM     Finalized by (CST): Jonathan Isaacs MD on 6/15/2024 at 11:48 AM          SEE ATTENDING NOTE BELOW:   Pt seen and independently examined and discussed with PIETRO Tucker.     She continues to experience significant improvement. Notes that her joints hurt her related to RA. Bp stable. No sig pain otherwise, groin feels okay. Eating.    Gen- nad, alert  Heent- anicteric  Cv- rrr  Lungs- ctab  Abd- R groin soft, joey in place    A/P:   Continue with present postop mgmt, off of bed rest tomorrow.  Plts better today, hgb stable  Wean stress dose steroids and restart usual pred

## 2024-06-18 LAB
ALBUMIN SERPL-MCNC: 1.6 G/DL (ref 3.4–5)
ALBUMIN/GLOB SERPL: 0.5 {RATIO} (ref 1–2)
ALP LIVER SERPL-CCNC: 56 U/L
ALT SERPL-CCNC: 6 U/L
ANION GAP SERPL CALC-SCNC: 6 MMOL/L (ref 0–18)
AST SERPL-CCNC: 32 U/L (ref 15–37)
BASE EXCESS BLD CALC-SCNC: -13 MMOL/L
BASE EXCESS BLD CALC-SCNC: -14 MMOL/L
BILIRUB SERPL-MCNC: 0.3 MG/DL (ref 0.1–2)
BUN BLD-MCNC: 20 MG/DL (ref 9–23)
CA-I BLD-SCNC: 1.03 MMOL/L (ref 1.12–1.32)
CA-I BLD-SCNC: 1.36 MMOL/L (ref 1.12–1.32)
CALCIUM BLD-MCNC: 7.7 MG/DL (ref 8.5–10.1)
CHLORIDE SERPL-SCNC: 111 MMOL/L (ref 98–112)
CO2 BLD-SCNC: 15 MMOL/L (ref 22–32)
CO2 BLD-SCNC: 16 MMOL/L (ref 22–32)
CO2 SERPL-SCNC: 22 MMOL/L (ref 21–32)
CREAT BLD-MCNC: 0.95 MG/DL
EGFRCR SERPLBLD CKD-EPI 2021: 65 ML/MIN/1.73M2 (ref 60–?)
ERYTHROCYTE [DISTWIDTH] IN BLOOD BY AUTOMATED COUNT: 15.4 %
GLOBULIN PLAS-MCNC: 3.3 G/DL (ref 2.8–4.4)
GLUCOSE BLD-MCNC: 128 MG/DL (ref 70–99)
GLUCOSE BLD-MCNC: 130 MG/DL (ref 70–99)
GLUCOSE BLD-MCNC: 140 MG/DL (ref 70–99)
GLUCOSE BLD-MCNC: 141 MG/DL (ref 70–99)
GLUCOSE BLD-MCNC: 192 MG/DL (ref 70–99)
GLUCOSE BLD-MCNC: 251 MG/DL (ref 70–99)
GLUCOSE BLD-MCNC: 84 MG/DL (ref 70–99)
HCO3 BLD-SCNC: 14.2 MEQ/L
HCO3 BLD-SCNC: 15.1 MEQ/L
HCT VFR BLD AUTO: 30.8 %
HCT VFR BLD CALC: 16 %
HCT VFR BLD CALC: 23 %
HGB BLD-MCNC: 10.8 G/DL
MAGNESIUM SERPL-MCNC: 1.8 MG/DL (ref 1.6–2.6)
MCH RBC QN AUTO: 29.6 PG (ref 26–34)
MCHC RBC AUTO-ENTMCNC: 35.1 G/DL (ref 31–37)
MCV RBC AUTO: 84.4 FL
OSMOLALITY SERPL CALC.SUM OF ELEC: 290 MOSM/KG (ref 275–295)
PCO2 BLD: 36.9 MMHG
PCO2 BLD: 37.7 MMHG
PH BLD: 7.18 [PH]
PH BLD: 7.22 [PH]
PLATELET # BLD AUTO: 146 10(3)UL (ref 150–450)
PO2 BLD: 379 MMHG
PO2 BLD: 420 MMHG
POTASSIUM BLD-SCNC: 4.1 MMOL/L (ref 3.6–5.1)
POTASSIUM BLD-SCNC: 4.2 MMOL/L (ref 3.6–5.1)
POTASSIUM SERPL-SCNC: 3.1 MMOL/L (ref 3.5–5.1)
POTASSIUM SERPL-SCNC: 3.1 MMOL/L (ref 3.5–5.1)
POTASSIUM SERPL-SCNC: 3.9 MMOL/L (ref 3.5–5.1)
PROT SERPL-MCNC: 4.9 G/DL (ref 6.4–8.2)
RBC # BLD AUTO: 3.65 X10(6)UL
SAO2 % BLD: 100 %
SAO2 % BLD: 100 %
SODIUM BLD-SCNC: 139 MMOL/L (ref 136–145)
SODIUM BLD-SCNC: 140 MMOL/L (ref 136–145)
SODIUM SERPL-SCNC: 139 MMOL/L (ref 136–145)
WBC # BLD AUTO: 14.8 X10(3) UL (ref 4–11)

## 2024-06-18 PROCEDURE — 85027 COMPLETE CBC AUTOMATED: CPT | Performed by: SURGERY

## 2024-06-18 PROCEDURE — 83735 ASSAY OF MAGNESIUM: CPT | Performed by: SURGERY

## 2024-06-18 PROCEDURE — 84132 ASSAY OF SERUM POTASSIUM: CPT | Performed by: HOSPITALIST

## 2024-06-18 PROCEDURE — 80053 COMPREHEN METABOLIC PANEL: CPT | Performed by: SURGERY

## 2024-06-18 PROCEDURE — 82962 GLUCOSE BLOOD TEST: CPT

## 2024-06-18 RX ORDER — FUROSEMIDE 10 MG/ML
20 INJECTION INTRAMUSCULAR; INTRAVENOUS ONCE
Status: COMPLETED | OUTPATIENT
Start: 2024-06-18 | End: 2024-06-18

## 2024-06-18 RX ORDER — POTASSIUM CHLORIDE 14.9 MG/ML
20 INJECTION INTRAVENOUS ONCE
Status: COMPLETED | OUTPATIENT
Start: 2024-06-18 | End: 2024-06-18

## 2024-06-18 RX ORDER — POTASSIUM CHLORIDE 14.9 MG/ML
20 INJECTION INTRAVENOUS ONCE
Status: DISCONTINUED | OUTPATIENT
Start: 2024-06-18 | End: 2024-06-18 | Stop reason: SDUPTHER

## 2024-06-18 RX ORDER — MAGNESIUM SULFATE HEPTAHYDRATE 40 MG/ML
2 INJECTION, SOLUTION INTRAVENOUS ONCE
Status: COMPLETED | OUTPATIENT
Start: 2024-06-18 | End: 2024-06-18

## 2024-06-18 RX ORDER — POTASSIUM CHLORIDE 29.8 MG/ML
40 INJECTION INTRAVENOUS ONCE
Status: COMPLETED | OUTPATIENT
Start: 2024-06-18 | End: 2024-06-18

## 2024-06-18 RX ADMIN — LOSARTAN POTASSIUM 100 MG: 100 TABLET ORAL at 08:57:00

## 2024-06-18 RX ADMIN — MAGNESIUM SULFATE HEPTAHYDRATE 2 G: 40 INJECTION, SOLUTION INTRAVENOUS at 05:41:00

## 2024-06-18 RX ADMIN — AMLODIPINE BESYLATE 10 MG: 10 TABLET ORAL at 08:57:00

## 2024-06-18 RX ADMIN — HYDROCODONE BITARTRATE AND ACETAMINOPHEN 1 TABLET: 10; 325 TABLET ORAL at 04:43:00

## 2024-06-18 RX ADMIN — HEPARIN SODIUM AND DEXTROSE 500 UNITS/HR: 10000; 5 INJECTION INTRAVENOUS at 00:16:00

## 2024-06-18 RX ADMIN — FAMOTIDINE 20 MG: 20 TABLET, FILM COATED ORAL at 08:57:00

## 2024-06-18 RX ADMIN — INSULIN DEGLUDEC 20 UNITS: 100 INJECTION, SOLUTION SUBCUTANEOUS at 08:57:00

## 2024-06-18 RX ADMIN — POTASSIUM CHLORIDE 20 MEQ: 14.9 INJECTION INTRAVENOUS at 10:27:00

## 2024-06-18 RX ADMIN — PREDNISONE 5 MG: 5 TABLET ORAL at 08:57:00

## 2024-06-18 RX ADMIN — ATENOLOL 50 MG: 50 TABLET ORAL at 10:31:00

## 2024-06-18 RX ADMIN — ROSUVASTATIN CALCIUM 10 MG: 10 TABLET, COATED ORAL at 22:05:00

## 2024-06-18 RX ADMIN — POTASSIUM CHLORIDE 40 MEQ: 29.8 INJECTION INTRAVENOUS at 06:40:00

## 2024-06-18 RX ADMIN — PREDNISONE 5 MG: 5 TABLET ORAL at 22:05:00

## 2024-06-18 RX ADMIN — CLOPIDOGREL BISULFATE 75 MG: 75 TABLET ORAL at 08:57:00

## 2024-06-18 RX ADMIN — ASPIRIN 81 MG: 81 TABLET, CHEWABLE ORAL at 08:57:00

## 2024-06-18 RX ADMIN — FUROSEMIDE 20 MG: 10 INJECTION INTRAMUSCULAR; INTRAVENOUS at 18:37:00

## 2024-06-18 NOTE — DIETARY NOTE
[facility]   part of [facility]   CLINICAL NUTRITION    Emilie John     Admitting diagnosis:  Iliac artery occlusion, bilateral (HCC) [I74.5]    Ht: 154.9 cm (5' 1\")  Wt: 74.7 kg (164 lb 10.9 oz).   Body mass index is 31.12 kg/m².  IBW: 47.7 kg    Wt Readings from Last 6 Encounters:   06/18/24 74.7 kg (164 lb 10.9 oz)   01/26/24 69.4 kg (153 lb)   03/09/22 69.9 kg (154 lb)   02/22/22 69.3 kg (152 lb 12.8 oz)   12/01/21 71.2 kg (157 lb)   10/22/21 70.8 kg (156 lb)        Labs/Meds reviewed    Diet:       Procedures    Cardiac diet Cardiac; Calorie Restriction/Carb Controlled: 1800 kcal/60 grams; Is Patient on Accuchecks? Yes     Percent Meals Eaten (last 3 days)       Date/Time Percent Meals Eaten (%)    06/17/24 0800 100 %     Percent Meals Eaten (%): Toast, eggs at 06/17/24 0800    06/17/24 1200 50 %     Percent Meals Eaten (%): Grilled cheese, pudding at 06/17/24 1200          Pt chart reviewed d/t Elevated A1C.  Patient reports good appetite at this time.  Nursing notes reports Percent Meals Eaten (%): 50 % (Grilled cheese, pudding) intake for last meal.  Tolerating po diet without diarrhea, emesis, or constipation.   No significant weight changes noted.     PMH includes HTN, DM, PVD.  Pt seen for A1C 12.3%.  PT lying in bed, reports good appetite. Denies n/v/d. Denies recent wt changes. Pt appears well nourished.  Reviewed current A1C with pt.  She notes she is working with Endo APRN in OP setting and just had her meds adjusted.  However, she notes intolerance to her medications (diarrhea, abdominal pain). She also is on chronic steroids. Encouraged pt to discuss medications with MD further.  PT does not follow any particular diet PTA, and does all her own shopping and cooking.  She is limited by RA in her hands. Reviewed foods that contain carbs and advised portion control.  Pt noted to be drinking sugar sweetened beverages (iced coffee) which she knows she shouldn't drink.  Encouraged pt to  focus on limiting her beverages to unsweetened beverages.  Practiced identifying and counting carbs using her every day meals.  PT would benefit from ongoing education and support.       Patient is at low nutrition risk at this time.    Please consult if patient status changes or nutrition issues arise.    Madelaine King RD, LDN, [facility]  Clinical Dietitian  Phone [phone]

## 2024-06-18 NOTE — PLAN OF CARE
Assumed pt care this am. Pt is alert and oriented, titrated to O2 off, VSS. Lower extremity pulses by doppler, sensation intact. R groin surgical dressing dry, intact, soft. Good appetite today. Heparin gtt continued at 500.

## 2024-06-18 NOTE — PLAN OF CARE
Assumed cares overnight about 1930, VS stable on RA. Pain managed with repositioning as tolerated. Heparin gtt infusing at set rate, see MAR. A line removed this AM. Mike in place, high urine output, fluids encouraged. Patient remained bedrest overnight. R groin dressing C/D/I, old drainage noted but no new drainage. JILLIAN drain emptied on assessments, see flowsheets. No further concerns at this time. Yoni updated on patient status and plan of cares.    Problem: Patient/Family Goals  Goal: Patient/Family Long Term Goal  Description: Patient's Long Term Goal: Stay out of hospital    Interventions:  - Med compliance, follow up appointments, PT/OT, pain management  - See additional Care Plan goals for specific interventions  Outcome: Progressing  Goal: Patient/Family Short Term Goal  Description: Patient's Short Term Goal: SBP maintain goal >90    Interventions:   - titrate levo to achieve goal, encourage PO fluids when more awake  - See additional Care Plan goals for specific interventions  Outcome: Progressing     Problem: PAIN - ADULT  Goal: Verbalizes/displays adequate comfort level or patient's stated pain goal  Description: INTERVENTIONS:  - Encourage pt to monitor pain and request assistance  - Assess pain using appropriate pain scale  - Administer analgesics based on type and severity of pain and evaluate response  - Implement non-pharmacological measures as appropriate and evaluate response  - Consider cultural and social influences on pain and pain management  - Manage/alleviate anxiety  - Utilize distraction and/or relaxation techniques  - Monitor for opioid side effects  - Notify MD/LIP if interventions unsuccessful or patient reports new pain  - Anticipate increased pain with activity and pre-medicate as appropriate  Outcome: Progressing     Problem: DISCHARGE PLANNING  Goal: Discharge to home or other facility with appropriate resources  Description: INTERVENTIONS:  - Identify barriers to discharge w/pt  and caregiver  - Include patient/family/discharge partner in discharge planning  - Arrange for needed discharge resources and transportation as appropriate  - Identify discharge learning needs (meds, wound care, etc)  - Arrange for interpreters to assist at discharge as needed  - Consider post-discharge preferences of patient/family/discharge partner  - Complete POLST form as appropriate  - Assess patient's ability to be responsible for managing their own health  - Refer to Case Management Department for coordinating discharge planning if the patient needs post-hospital services based on physician/LIP order or complex needs related to functional status, cognitive ability or social support system  Outcome: Progressing     Problem: SKIN/TISSUE INTEGRITY - ADULT  Goal: Skin integrity remains intact  Description: INTERVENTIONS  - Assess and document risk factors for pressure ulcer development  - Assess and document skin integrity  - Monitor for areas of redness and/or skin breakdown  - Initiate interventions, skin care algorithm/standards of care as needed  Outcome: Progressing  Goal: Incision(s), wounds(s) or drain site(s) healing without S/S of infection  Description: INTERVENTIONS:  - Assess and document risk factors for pressure ulcer development  - Assess and document skin integrity  - Assess and document dressing/incision, wound bed, drain sites and surrounding tissue  - Implement wound care per orders  - Initiate isolation precautions as appropriate  - Initiate Pressure Ulcer prevention bundle as indicated  Outcome: Progressing     Problem: Impaired Functional Mobility  Goal: Achieve highest/safest level of mobility/gait  Description: Interventions:  - Assess patient's functional ability and stability  - Promote increasing activity/tolerance for mobility and gait  - Educate and engage patient/family in tolerated activity level and precautions    Outcome: Progressing     Problem: Impaired Activities of Daily  Living  Goal: Achieve highest/safest level of independence in self care  Description: Interventions:  - Assess ability and encourage patient to participate in ADLs to maximize function  - Promote sitting position while performing ADLs such as feeding, grooming, and bathing  - Educate and encourage patient/family in tolerated functional activity level and precautions during self-care    Outcome: Progressing

## 2024-06-18 NOTE — PROGRESS NOTES
[facility] AND [facility]   part of [facility]     Progress Note  Emilie John Patient Status:  Inpatient    1955 MRN [id number]   Location [facility] 6NE-A Attending Yariel Du MD   Hosp Day # 4 PCP Tay Alexandra MD       SEE ATTENDING NOTE AT BOTTOM OF PAGE    Is this a shared or split note between Advanced Practice Provider and Physician? Yes    Assessment and Plan:    Patient is a 69 year old female with PMH sig for HTN, HLD, DM2, CKD, RA on prednisone, PAD here for schedueld vascular procedure     Impression     -left iliac chronic occlusion with claudication sp balloon angioplasty and stent of left common iliac artery and external iliac artery   -right common femoral artery occlusion sp thrombectomy, balloon angioplasty, stent; thrombectomy of left SFA and left anterior tibial artery, balloon angioplasty of left TP trunk and posterior tibial   -post op pain  -post op leukocytosis  -large R inguinal region pseudoaneurysm 6/15 noted     -post op shock, on pressors, possibly hemorrhagic  -left groin hematoma  -anemia     -DM2, uncontrolled - A1c 12s     -nicotine dependence, smokes 1ppd     -HTN  -HLD     -RA on chronic prednisone  -CKD 3, baseline Cr ~1.2     Plan     *vascular  -Went back to OR 6/15 for R groin pseudoaneurysm s/p ligation of femoral artery branch with sartorius muscle myoplasty.  -hep gtt  -strict bedrest until  in the afternoon     *post op hypotension / shock  -pressors prn  -taper hydrocortisone, restart home po prednisone  -supportive mgmt with prbc, received 6/15 and now trending hgb     *anemia  -preop hgb 13.9, trending postop anemia, has received several prbc     *thrombocytopenia- likely 2/2 blood loss, trend plts     *DM 2  -follows with endo as outpt, recently glipizide was increased  -ISS + tresiba 10 units     *HTN hx  -restart home meds (at home on atenolol, hydrochlorothiazide, norvasc, losartan)     *HLD  -statin     DVT  proph  On heparin     GI ppx  -pepcid    PCP: Tay Alexandra MD    Concerns regarding plan of care were discussed with patient. Patient agrees with plan as detailed above. Discussed plan of care with Dr. Don    Note: This chart was prepared using voice recognition software and may contain unintended word substitution errors.          Gurvinder WESTBROOK  [facility] Hospitalist Team  Contact via [employer] (Check Availability)  6/18/2024             SUBJECTIVE:   In bed, resting.  Denies pain, cp/sob, n/v, f/c.  Left groin soft, no bleeding, no bruising, no hematoma.                 OBJECTIVE:   Blood pressure 116/59, pulse 60, temperature 96.9 °F (36.1 °C), temperature source Temporal, resp. rate 12, height 5' 1\" (1.549 m), weight 164 lb 10.9 oz (74.7 kg), SpO2 97%, not currently breastfeeding.    GENERAL: no apparent distress  NEURO: A/A Ox3  RESP: non labored, CTA  CARDIO: Regular, no murmur  ABD: soft, NT, ND, BS+  EXTREMITIES:  no edema, no calf tenderness, left groin soft, no bleeding, no bruising, no hematoma.  Pulse per doppler     DIAGNOSTIC DATA:   Labs:     Recent Labs   Lab 06/15/24  0540 06/15/24  2127 06/16/24  0420 06/17/24  0349 06/18/24  0447   WBC 25.7* 23.3* 21.4* 19.5* 14.8*   HGB 7.5* 12.2 11.8* 11.6* 10.8*   MCV 91.8 87.1 87.7 86.4 84.4   .0 99.0* 99.0* 132.0* 146.0*   INR  --  1.12  --   --   --        Recent Labs   Lab 06/15/24  0540 06/15/24  1825 06/16/24  0420 06/17/24  0349 06/17/24  1243 06/18/24  0447    141 142 141  --  139   K 4.7 4.4 4.3 3.2* 3.4* 3.1*  3.1*   * 121* 120* 115*  --  111   CO2 15.0* 14.0* 15.0* 18.0*  --  22.0   BUN 27* 26* 27* 26*  --  20   CREATSERUM 1.58* 1.52* 1.85* 1.44*  --  0.95   CA 6.9* 8.0* 7.8* 7.4*  --  7.7*   MG 1.7  --  1.6 1.9  --  1.8   * 142* 165* 99  --  84       Recent Labs   Lab 06/15/24  0540 06/16/24  0420 06/17/24  0349 06/18/24  0447   ALT 22 12* 10* 6*   AST 83* 42* 52* 32   ALB 1.7* 1.5*  1.7* 1.6*       Recent Labs   Lab 06/17/24  1209 06/17/24  1608 06/17/24  1739 06/17/24 2031 06/18/24  0641   PGLU 160* 159* 126* 259* 130*       No results for input(s): \"TROP\" in the last 168 hours.      MEDICATIONS      Current Facility-Administered Medications   Medication Dose Route Frequency    potassium chloride 40 mEq/100mL IVPB premix (central line) 40 mEq  40 mEq Intravenous Once    And    potassium chloride 20 mEq/100mL IVPB premix 20 mEq  20 mEq Intravenous Once    predniSONE (Deltasone) tab 5 mg  5 mg Oral BID    amLODIPine (Norvasc) tab 10 mg  10 mg Oral Daily    losartan (Cozaar) tab 100 mg  100 mg Oral Daily    atenolol (Tenormin) tab 50 mg  50 mg Oral Daily Beta Blocker    hydroCHLOROthiazide tab 25 mg  25 mg Oral Daily    ondansetron (Zofran) 4 MG/2ML injection 4 mg  4 mg Intravenous Q6H PRN    famotidine (Pepcid) tab 20 mg  20 mg Oral Daily    Or    famotidine (Pepcid) 20 mg/2mL injection 20 mg  20 mg Intravenous Daily    insulin aspart (NovoLOG) 100 Units/mL FlexPen 4-20 Units  4-20 Units Subcutaneous TID AC and HS    insulin degludec (Tresiba) 100 units/mL flextouch 20 Units  20 Units Subcutaneous Daily    aspirin chewable tab 81 mg  81 mg Oral Daily    rosuvastatin (Crestor) tab 10 mg  10 mg Oral Nightly    heparin (Porcine) 76879 units/250mL infusion PE/DVT/THROMBUS CONTINUOUS  200-3,000 Units/hr Intravenous Continuous    HYDROcodone-acetaminophen (Norco)  MG per tab 1 tablet  1 tablet Oral Q6H PRN    Or    HYDROcodone-acetaminophen (Norco)  MG per tab 2 tablet  2 tablet Oral Q6H PRN    morphINE PF 2 MG/ML injection 2 mg  2 mg Intravenous Q2H PRN    Or    morphINE PF 4 MG/ML injection 4 mg  4 mg Intravenous Q2H PRN    Or    morphINE PF 4 MG/ML injection 6 mg  6 mg Intravenous Q2H PRN    clopidogrel (Plavix) tab 75 mg  75 mg Oral Daily    glucose (Dex4) 15 GM/59ML oral liquid 15 g  15 g Oral Q15 Min PRN    Or    glucose (Glutose) 40% oral gel 15 g  15 g Oral Q15 Min PRN    Or     glucose-vitamin C (Dex-4) chewable tab 4 tablet  4 tablet Oral Q15 Min PRN    Or    dextrose 50% injection 50 mL  50 mL Intravenous Q15 Min PRN    Or    glucose (Dex4) 15 GM/59ML oral liquid 30 g  30 g Oral Q15 Min PRN    Or    glucose (Glutose) 40% oral gel 30 g  30 g Oral Q15 Min PRN    Or    glucose-vitamin C (Dex-4) chewable tab 8 tablet  8 tablet Oral Q15 Min PRN    norepinephrine (Levophed) 4 mg/250mL infusion premix  0.5-30 mcg/min Intravenous Continuous                  IMAGING     XR CHEST AP PORTABLE  (CPT=71045)    Result Date: 6/16/2024  CONCLUSION:  Endotracheal tube 4 cm above the tanya.   LOCATION:  [facility]      Dictated by (CST): Gadiel De La Garza MD on 6/16/2024 at 9:43 AM     Finalized by (CST): Gadiel De La Garza MD on 6/16/2024 at 9:43 AM       CTA ABD/PEL (CPT=74174)    Result Date: 6/15/2024  CONCLUSION:   1. Numerous cysts the lung bases along with mild bronchiectasis at the lung bases is noted.  This appears more prominent than on prior examination.  2. Large collection in the right inguinal region measuring 13.5 x 7.1 x 8.0 cm is highly suspicious for a large pseudoaneurysm.  Noncontrast images demonstrate fluid high density level that is likely due to varying ages of blood products.  There is hyperenhancement on arterial phase imaging with a branch extending from the right common femoral artery into this collection.  Delayed images demonstrate accumulation of high density contrast within this collection.  This critical result was discussed with Nurse Allison at 1249 hours on 6/15/2024. Read back was performed.   LOCATION:  [facility]   Dictated by (CST): Jonathan Isaacs MD on 6/15/2024 at 12:43 PM     Finalized by (CST): Jonathan Isaacs MD on 6/15/2024 at 12:49 PM       US GROIN BILATERAL (CPT=76882-50)    Result Date: 6/15/2024  CONCLUSION:  Large fluid collection in the right inguinal region is suspicious for a large hematoma.  Infection cannot be entirely excluded.  The vessels adjacent to the  hematoma are widely patent.  No significant vascularity within the likely hematoma.   LOCATION:  [facility]   Dictated by (CST): Jonathan Isaacs MD on 6/15/2024 at 11:47 AM     Finalized by (CST): Jonathan Isaacs MD on 6/15/2024 at 11:48 AM          SEE ATTENDING NOTE BELOW:   Pt seen and examined    Doing well. Eating. No sig sob    Nad, comfortable  Rrr  Ctab  Extr warm, well perfused    Plan is to begin to mobilize when okay with vascular  Cont to trend hgb  Wean off stress dose steroids

## 2024-06-18 NOTE — PLAN OF CARE
Assumed pt care this am approx 0730. A&Ox4, on room air, VSS. R groin surgical site is c/d/I, lower extremity pulses by doppler w/ good signals, sensation intact.     Pt to stay bed rest for one more day per Dr. Du. Heparin gtt to remain at 500. Transfer orders.

## 2024-06-19 LAB
ALBUMIN SERPL-MCNC: 2 G/DL (ref 3.4–5)
ALBUMIN/GLOB SERPL: 0.5 {RATIO} (ref 1–2)
ALP LIVER SERPL-CCNC: 68 U/L
ALT SERPL-CCNC: 11 U/L
ANION GAP SERPL CALC-SCNC: 7 MMOL/L (ref 0–18)
AST SERPL-CCNC: 37 U/L (ref 15–37)
BILIRUB SERPL-MCNC: 0.6 MG/DL (ref 0.1–2)
BUN BLD-MCNC: 18 MG/DL (ref 9–23)
CALCIUM BLD-MCNC: 8 MG/DL (ref 8.5–10.1)
CHLORIDE SERPL-SCNC: 105 MMOL/L (ref 98–112)
CO2 SERPL-SCNC: 23 MMOL/L (ref 21–32)
CREAT BLD-MCNC: 0.95 MG/DL
EGFRCR SERPLBLD CKD-EPI 2021: 65 ML/MIN/1.73M2 (ref 60–?)
ERYTHROCYTE [DISTWIDTH] IN BLOOD BY AUTOMATED COUNT: 15.3 %
GLOBULIN PLAS-MCNC: 3.7 G/DL (ref 2.8–4.4)
GLUCOSE BLD-MCNC: 116 MG/DL (ref 70–99)
GLUCOSE BLD-MCNC: 117 MG/DL (ref 70–99)
GLUCOSE BLD-MCNC: 194 MG/DL (ref 70–99)
GLUCOSE BLD-MCNC: 198 MG/DL (ref 70–99)
GLUCOSE BLD-MCNC: 249 MG/DL (ref 70–99)
HCT VFR BLD AUTO: 33.1 %
HGB BLD-MCNC: 11.4 G/DL
MAGNESIUM SERPL-MCNC: 2 MG/DL (ref 1.6–2.6)
MCH RBC QN AUTO: 29.5 PG (ref 26–34)
MCHC RBC AUTO-ENTMCNC: 34.4 G/DL (ref 31–37)
MCV RBC AUTO: 85.5 FL
OSMOLALITY SERPL CALC.SUM OF ELEC: 283 MOSM/KG (ref 275–295)
PLATELET # BLD AUTO: 194 10(3)UL (ref 150–450)
POTASSIUM SERPL-SCNC: 3.9 MMOL/L (ref 3.5–5.1)
PROT SERPL-MCNC: 5.7 G/DL (ref 6.4–8.2)
RBC # BLD AUTO: 3.87 X10(6)UL
SODIUM SERPL-SCNC: 135 MMOL/L (ref 136–145)
WBC # BLD AUTO: 16.5 X10(3) UL (ref 4–11)

## 2024-06-19 PROCEDURE — 85027 COMPLETE CBC AUTOMATED: CPT | Performed by: SURGERY

## 2024-06-19 PROCEDURE — 80053 COMPREHEN METABOLIC PANEL: CPT | Performed by: SURGERY

## 2024-06-19 PROCEDURE — 83735 ASSAY OF MAGNESIUM: CPT | Performed by: SURGERY

## 2024-06-19 PROCEDURE — 82962 GLUCOSE BLOOD TEST: CPT

## 2024-06-19 RX ORDER — FUROSEMIDE 10 MG/ML
20 INJECTION INTRAMUSCULAR; INTRAVENOUS ONCE
Status: COMPLETED | OUTPATIENT
Start: 2024-06-19 | End: 2024-06-19

## 2024-06-19 RX ORDER — HEPARIN SODIUM AND DEXTROSE 10000; 5 [USP'U]/100ML; G/100ML
500 INJECTION INTRAVENOUS CONTINUOUS
Status: DISCONTINUED | OUTPATIENT
Start: 2024-06-19 | End: 2024-06-21

## 2024-06-19 RX ADMIN — HYDROCODONE BITARTRATE AND ACETAMINOPHEN 2 TABLET: 10; 325 TABLET ORAL at 09:15:00

## 2024-06-19 RX ADMIN — CLOPIDOGREL BISULFATE 75 MG: 75 TABLET ORAL at 09:15:00

## 2024-06-19 RX ADMIN — PREDNISONE 5 MG: 5 TABLET ORAL at 09:15:00

## 2024-06-19 RX ADMIN — FUROSEMIDE 20 MG: 10 INJECTION INTRAMUSCULAR; INTRAVENOUS at 09:15:00

## 2024-06-19 RX ADMIN — ASPIRIN 81 MG: 81 TABLET, CHEWABLE ORAL at 09:16:00

## 2024-06-19 RX ADMIN — ATENOLOL 50 MG: 50 TABLET ORAL at 05:20:00

## 2024-06-19 RX ADMIN — ROSUVASTATIN CALCIUM 10 MG: 10 TABLET, COATED ORAL at 22:15:00

## 2024-06-19 RX ADMIN — HYDROCODONE BITARTRATE AND ACETAMINOPHEN 2 TABLET: 10; 325 TABLET ORAL at 00:55:00

## 2024-06-19 RX ADMIN — HEPARIN SODIUM AND DEXTROSE 500 UNITS/HR: 10000; 5 INJECTION INTRAVENOUS at 09:21:00

## 2024-06-19 RX ADMIN — LOSARTAN POTASSIUM 100 MG: 100 TABLET ORAL at 09:16:00

## 2024-06-19 RX ADMIN — MORPHINE SULFATE 4 MG: 4 INJECTION, SOLUTION INTRAMUSCULAR; INTRAVENOUS at 13:15:00

## 2024-06-19 RX ADMIN — INSULIN DEGLUDEC 20 UNITS: 100 INJECTION, SOLUTION SUBCUTANEOUS at 09:24:00

## 2024-06-19 RX ADMIN — AMLODIPINE BESYLATE 10 MG: 10 TABLET ORAL at 09:15:00

## 2024-06-19 RX ADMIN — PREDNISONE 5 MG: 5 TABLET ORAL at 20:58:00

## 2024-06-19 RX ADMIN — FAMOTIDINE 20 MG: 20 TABLET, FILM COATED ORAL at 09:15:00

## 2024-06-19 NOTE — PROGRESS NOTES
No complaints  Pain controlled    Dressing changed   Old blood draining from incision     Can ambulate today   May need washout Friday

## 2024-06-19 NOTE — PLAN OF CARE
Assumed care at 0730  A/Ox4  Rm Air  NSR on tele  PRN Norco, Morphine per order for pain  X1 assist w/ walker  Rt groin dressing-CDI  Safety precautions in place      Problem: Patient/Family Goals  Goal: Patient/Family Long Term Goal  Description: Patient's Long Term Goal: Stay out of hospital    Interventions:  - Med compliance, follow up appointments, PT/OT, pain management  - See additional Care Plan goals for specific interventions  Outcome: Progressing  Goal: Patient/Family Short Term Goal  Description: Patient's Short Term Goal: SBP maintain goal >90    Interventions:   - titrate levo to achieve goal, encourage PO fluids when more awake  - See additional Care Plan goals for specific interventions  Outcome: Progressing     Problem: PAIN - ADULT  Goal: Verbalizes/displays adequate comfort level or patient's stated pain goal  Description: INTERVENTIONS:  - Encourage pt to monitor pain and request assistance  - Assess pain using appropriate pain scale  - Administer analgesics based on type and severity of pain and evaluate response  - Implement non-pharmacological measures as appropriate and evaluate response  - Consider cultural and social influences on pain and pain management  - Manage/alleviate anxiety  - Utilize distraction and/or relaxation techniques  - Monitor for opioid side effects  - Notify MD/LIP if interventions unsuccessful or patient reports new pain  - Anticipate increased pain with activity and pre-medicate as appropriate  Outcome: Progressing     Problem: DISCHARGE PLANNING  Goal: Discharge to home or other facility with appropriate resources  Description: INTERVENTIONS:  - Identify barriers to discharge w/pt and caregiver  - Include patient/family/discharge partner in discharge planning  - Arrange for needed discharge resources and transportation as appropriate  - Identify discharge learning needs (meds, wound care, etc)  - Arrange for interpreters to assist at discharge as needed  - Consider  post-discharge preferences of patient/family/discharge partner  - Complete POLST form as appropriate  - Assess patient's ability to be responsible for managing their own health  - Refer to Case Management Department for coordinating discharge planning if the patient needs post-hospital services based on physician/LIP order or complex needs related to functional status, cognitive ability or social support system  Outcome: Progressing     Problem: SKIN/TISSUE INTEGRITY - ADULT  Goal: Skin integrity remains intact  Description: INTERVENTIONS  - Assess and document risk factors for pressure ulcer development  - Assess and document skin integrity  - Monitor for areas of redness and/or skin breakdown  - Initiate interventions, skin care algorithm/standards of care as needed  Outcome: Progressing  Goal: Incision(s), wounds(s) or drain site(s) healing without S/S of infection  Description: INTERVENTIONS:  - Assess and document risk factors for pressure ulcer development  - Assess and document skin integrity  - Assess and document dressing/incision, wound bed, drain sites and surrounding tissue  - Implement wound care per orders  - Initiate isolation precautions as appropriate  - Initiate Pressure Ulcer prevention bundle as indicated  Outcome: Progressing     Problem: Impaired Functional Mobility  Goal: Achieve highest/safest level of mobility/gait  Description: Interventions:  - Assess patient's functional ability and stability  - Promote increasing activity/tolerance for mobility and gait  - Educate and engage patient/family in tolerated activity level and precautions    Outcome: Progressing     Problem: Impaired Activities of Daily Living  Goal: Achieve highest/safest level of independence in self care  Description: Interventions:  - Assess ability and encourage patient to participate in ADLs to maximize function  - Promote sitting position while performing ADLs such as feeding, grooming, and bathing  - Educate and  encourage patient/family in tolerated functional activity level and precautions during self-care    Outcome: Progressing

## 2024-06-19 NOTE — PROGRESS NOTES
[facility] AND [facility]   part of [facility]     Progress Note  Emilie John Patient Status:  Inpatient    1955 MRN [id number]   Location [facility] 7NE-A Attending Yariel Du MD   Hosp Day # 5 PCP Tay Alexandra MD       SEE ATTENDING NOTE AT BOTTOM OF PAGE    Is this a shared or split note between Advanced Practice Provider and Physician? Yes    Assessment and Plan:    Patient is a 69 year old female with PMH sig for HTN, HLD, DM2, CKD, RA on prednisone, PAD here for schedueld vascular procedure     Impression     -left iliac chronic occlusion with claudication sp balloon angioplasty and stent of left common iliac artery and external iliac artery   -right common femoral artery occlusion sp thrombectomy, balloon angioplasty, stent; thrombectomy of left SFA and left anterior tibial artery, balloon angioplasty of left TP trunk and posterior tibial   -post op pain  -post op leukocytosis  -large R inguinal region pseudoaneurysm 6/15 noted     -post op shock, on pressors, possibly hemorrhagic  -left groin hematoma  -anemia     -DM2, uncontrolled - A1c 12s     -nicotine dependence, smokes 1ppd     -HTN  -HLD     -RA on chronic prednisone  -CKD 3, baseline Cr ~1.2     Plan     *vascular  -Went back to OR 6/15 for R groin pseudoaneurysm s/p ligation of femoral artery branch with sartorius muscle myoplasty.  -hep gtt  -able to ambulate today per vascular  -possible washout on Friday      *post op hypotension / shock, resolved  -s/p iv hydrocortisone, restart home po prednisone  -supportive mgmt with prbc, received 6/15 and now trending hgb     *anemia  -preop hgb 13.9, trending postop anemia, has received several prbc     *thrombocytopenia- likely 2/2 blood loss, trend plts     *DM 2  -follows with endo as outpt, recently glipizide was increased  -ISS + tresiba 10 units     *HTN hx  -restart home meds (at home on atenolol, hydrochlorothiazide, norvasc, losartan)      *HLD  -statin     DVT proph  On heparin     GI ppx  -pepcid    PCP: Tay Alexandra MD    Concerns regarding plan of care were discussed with patient. Patient agrees with plan as detailed above. Discussed plan of care with Dr. Don    Note: This chart was prepared using voice recognition software and may contain unintended word substitution errors.          Gurvinder WESTBROOK  [facility] Hospitalist Team  Contact via Perfect Serve and Bubble (Check Availability)  6/19/2024             SUBJECTIVE:   Dressing changes this morning per vascular.  Left groin with old drainage on dressings, will need washout on Friday if it continues. Pain controlled with current regime.               OBJECTIVE:   Blood pressure 143/72, pulse 57, temperature 98 °F (36.7 °C), temperature source Oral, resp. rate 12, height 5' 1\" (1.549 m), weight 164 lb 10.9 oz (74.7 kg), SpO2 94%, not currently breastfeeding.    GENERAL: no apparent distress  NEURO: A/A Ox3  RESP: non labored, CTA  CARDIO: Regular, no murmur  ABD: soft, NT, ND, BS+  EXTREMITIES: no edema, no calf tenderness, pulses per doppler    DIAGNOSTIC DATA:   Labs:     Recent Labs   Lab 06/15/24  2127 06/16/24  0420 06/17/24  0349 06/18/24  0447 06/19/24  0532   WBC 23.3* 21.4* 19.5* 14.8* 16.5*   HGB 12.2 11.8* 11.6* 10.8* 11.4*   MCV 87.1 87.7 86.4 84.4 85.5   PLT 99.0* 99.0* 132.0* 146.0* 194.0   INR 1.12  --   --   --   --        Recent Labs   Lab 06/15/24  0540 06/15/24  1825 06/16/24  0420 06/17/24  0349 06/17/24  1243 06/18/24  0447 06/18/24  1909 06/19/24  0532    141 142 141  --  139  --  135*   K 4.7 4.4 4.3 3.2* 3.4* 3.1*  3.1* 3.9 3.9   * 121* 120* 115*  --  111  --  105   CO2 15.0* 14.0* 15.0* 18.0*  --  22.0  --  23.0   BUN 27* 26* 27* 26*  --  20  --  18   CREATSERUM 1.58* 1.52* 1.85* 1.44*  --  0.95  --  0.95   CA 6.9* 8.0* 7.8* 7.4*  --  7.7*  --  8.0*   MG 1.7  --  1.6 1.9  --  1.8  --  2.0   * 142* 165* 99  --  84  --  117*        Recent Labs   Lab 06/15/24  0540 06/16/24  0420 06/17/24  0349 06/18/24  0447 06/19/24  0532   ALT 22 12* 10* 6* 11*   AST 83* 42* 52* 32 37   ALB 1.7* 1.5* 1.7* 1.6* 2.0*       Recent Labs   Lab 06/18/24  0641 06/18/24  1216 06/18/24  1829 06/18/24  2155 06/19/24  0515   PGLU 130* 140* 251* 192* 116*       No results for input(s): \"TROP\" in the last 168 hours.      MEDICATIONS      Current Facility-Administered Medications   Medication Dose Route Frequency    heparin (Porcine) 25,000 Units/250mL infusion premix  500 Units/hr Intravenous Continuous    furosemide (Lasix) 10 mg/mL injection 20 mg  20 mg Intravenous Once    predniSONE (Deltasone) tab 5 mg  5 mg Oral BID    amLODIPine (Norvasc) tab 10 mg  10 mg Oral Daily    losartan (Cozaar) tab 100 mg  100 mg Oral Daily    atenolol (Tenormin) tab 50 mg  50 mg Oral Daily Beta Blocker    ondansetron (Zofran) 4 MG/2ML injection 4 mg  4 mg Intravenous Q6H PRN    famotidine (Pepcid) tab 20 mg  20 mg Oral Daily    Or    famotidine (Pepcid) 20 mg/2mL injection 20 mg  20 mg Intravenous Daily    insulin aspart (NovoLOG) 100 Units/mL FlexPen 4-20 Units  4-20 Units Subcutaneous TID AC and HS    insulin degludec (Tresiba) 100 units/mL flextouch 20 Units  20 Units Subcutaneous Daily    aspirin chewable tab 81 mg  81 mg Oral Daily    rosuvastatin (Crestor) tab 10 mg  10 mg Oral Nightly    HYDROcodone-acetaminophen (Norco)  MG per tab 1 tablet  1 tablet Oral Q6H PRN    Or    HYDROcodone-acetaminophen (Norco)  MG per tab 2 tablet  2 tablet Oral Q6H PRN    morphINE PF 2 MG/ML injection 2 mg  2 mg Intravenous Q2H PRN    Or    morphINE PF 4 MG/ML injection 4 mg  4 mg Intravenous Q2H PRN    Or    morphINE PF 4 MG/ML injection 6 mg  6 mg Intravenous Q2H PRN    clopidogrel (Plavix) tab 75 mg  75 mg Oral Daily    glucose (Dex4) 15 GM/59ML oral liquid 15 g  15 g Oral Q15 Min PRN    Or    glucose (Glutose) 40% oral gel 15 g  15 g Oral Q15 Min PRN    Or    glucose-vitamin C  (Dex-4) chewable tab 4 tablet  4 tablet Oral Q15 Min PRN    Or    dextrose 50% injection 50 mL  50 mL Intravenous Q15 Min PRN    Or    glucose (Dex4) 15 GM/59ML oral liquid 30 g  30 g Oral Q15 Min PRN    Or    glucose (Glutose) 40% oral gel 30 g  30 g Oral Q15 Min PRN    Or    glucose-vitamin C (Dex-4) chewable tab 8 tablet  8 tablet Oral Q15 Min PRN    norepinephrine (Levophed) 4 mg/250mL infusion premix  0.5-30 mcg/min Intravenous Continuous                  IMAGING     XR CHEST AP PORTABLE  (CPT=71045)    Result Date: 6/16/2024  CONCLUSION:  Endotracheal tube 4 cm above the tanya.   LOCATION:  [facility]      Dictated by (CST): Gadiel De La Garza MD on 6/16/2024 at 9:43 AM     Finalized by (CST): Gadiel De La Garza MD on 6/16/2024 at 9:43 AM          SEE ATTENDING NOTE BELOW:   Pt seen and examined.    Got up to bathroom a couple of times, did have some bloody drainage during this.     Exam  Nad  Rrr  Ctab  Abd soft  Lino in place with serosanginous drainage at R groin    Vascular following closely, may need washout on Friday if continued drainage  20 mg iv lasix given yesterday  Stress dose steroids weaned off

## 2024-06-19 NOTE — PLAN OF CARE
Assumed care at 1930  A&O x4, RA, Tele- NSR  Norco prn given for pain  R.groin intact. Dressing is D/ C/I   L.groin intact  Voiding per purewick  Bed at lowest position. Call ligt within reach  Patient updated on POC, all questions answered.    Problem: Patient/Family Goals  Goal: Patient/Family Long Term Goal  Description: Patient's Long Term Goal: Stay out of hospital    Interventions:  - Med compliance, follow up appointments, PT/OT, pain management  - See additional Care Plan goals for specific interventions  Outcome: Progressing  Goal: Patient/Family Short Term Goal  Description: Patient's Short Term Goal: SBP maintain goal >90    Interventions:   - titrate levo to achieve goal, encourage PO fluids when more awake  - See additional Care Plan goals for specific interventions  Outcome: Progressing     Problem: PAIN - ADULT  Goal: Verbalizes/displays adequate comfort level or patient's stated pain goal  Description: INTERVENTIONS:  - Encourage pt to monitor pain and request assistance  - Assess pain using appropriate pain scale  - Administer analgesics based on type and severity of pain and evaluate response  - Implement non-pharmacological measures as appropriate and evaluate response  - Consider cultural and social influences on pain and pain management  - Manage/alleviate anxiety  - Utilize distraction and/or relaxation techniques  - Monitor for opioid side effects  - Notify MD/LIP if interventions unsuccessful or patient reports new pain  - Anticipate increased pain with activity and pre-medicate as appropriate  Outcome: Progressing     Problem: DISCHARGE PLANNING  Goal: Discharge to home or other facility with appropriate resources  Description: INTERVENTIONS:  - Identify barriers to discharge w/pt and caregiver  - Include patient/family/discharge partner in discharge planning  - Arrange for needed discharge resources and transportation as appropriate  - Identify discharge learning needs (meds, wound care,  etc)  - Arrange for interpreters to assist at discharge as needed  - Consider post-discharge preferences of patient/family/discharge partner  - Complete POLST form as appropriate  - Assess patient's ability to be responsible for managing their own health  - Refer to Case Management Department for coordinating discharge planning if the patient needs post-hospital services based on physician/LIP order or complex needs related to functional status, cognitive ability or social support system  Outcome: Progressing     Problem: SKIN/TISSUE INTEGRITY - ADULT  Goal: Skin integrity remains intact  Description: INTERVENTIONS  - Assess and document risk factors for pressure ulcer development  - Assess and document skin integrity  - Monitor for areas of redness and/or skin breakdown  - Initiate interventions, skin care algorithm/standards of care as needed  Outcome: Progressing  Goal: Incision(s), wounds(s) or drain site(s) healing without S/S of infection  Description: INTERVENTIONS:  - Assess and document risk factors for pressure ulcer development  - Assess and document skin integrity  - Assess and document dressing/incision, wound bed, drain sites and surrounding tissue  - Implement wound care per orders  - Initiate isolation precautions as appropriate  - Initiate Pressure Ulcer prevention bundle as indicated  Outcome: Progressing

## 2024-06-19 NOTE — PLAN OF CARE
Assumed care at 0730  Orientated x4, NSR, RA   Denies pain     VSS; afebrile   Strict bed rest   Heparin monitored and maintained   Left groin soft; dressing intact   Right groin soft, small blood drainage; staples intact   Voided per purewick   IV lasix   Needs met     Plan for continued groin check, heparin; further vascular recommendations

## 2024-06-20 ENCOUNTER — ANESTHESIA EVENT (OUTPATIENT)
Dept: CARDIAC SURGERY | Facility: HOSPITAL | Age: 69
DRG: 252 | End: 2024-06-20
Payer: MEDICARE

## 2024-06-20 LAB
ALBUMIN SERPL-MCNC: 2.1 G/DL (ref 3.4–5)
ALBUMIN/GLOB SERPL: 0.5 {RATIO} (ref 1–2)
ALP LIVER SERPL-CCNC: 70 U/L
ALT SERPL-CCNC: 20 U/L
ANION GAP SERPL CALC-SCNC: 10 MMOL/L (ref 0–18)
ANTIBODY SCREEN: NEGATIVE
AST SERPL-CCNC: 37 U/L (ref 15–37)
BILIRUB SERPL-MCNC: 0.8 MG/DL (ref 0.1–2)
BUN BLD-MCNC: 16 MG/DL (ref 9–23)
CALCIUM BLD-MCNC: 8.3 MG/DL (ref 8.5–10.1)
CHLORIDE SERPL-SCNC: 106 MMOL/L (ref 98–112)
CO2 SERPL-SCNC: 22 MMOL/L (ref 21–32)
CREAT BLD-MCNC: 0.87 MG/DL
EGFRCR SERPLBLD CKD-EPI 2021: 72 ML/MIN/1.73M2 (ref 60–?)
ERYTHROCYTE [DISTWIDTH] IN BLOOD BY AUTOMATED COUNT: 15.5 %
GLOBULIN PLAS-MCNC: 4.1 G/DL (ref 2.8–4.4)
GLUCOSE BLD-MCNC: 206 MG/DL (ref 70–99)
GLUCOSE BLD-MCNC: 261 MG/DL (ref 70–99)
GLUCOSE BLD-MCNC: 275 MG/DL (ref 70–99)
GLUCOSE BLD-MCNC: 91 MG/DL (ref 70–99)
GLUCOSE BLD-MCNC: 96 MG/DL (ref 70–99)
HCT VFR BLD AUTO: 33.6 %
HGB BLD-MCNC: 11.2 G/DL
MAGNESIUM SERPL-MCNC: 2 MG/DL (ref 1.6–2.6)
MCH RBC QN AUTO: 29.5 PG (ref 26–34)
MCHC RBC AUTO-ENTMCNC: 33.3 G/DL (ref 31–37)
MCV RBC AUTO: 88.4 FL
OSMOLALITY SERPL CALC.SUM OF ELEC: 287 MOSM/KG (ref 275–295)
PLATELET # BLD AUTO: 254 10(3)UL (ref 150–450)
POTASSIUM SERPL-SCNC: 3.8 MMOL/L (ref 3.5–5.1)
PROT SERPL-MCNC: 6.2 G/DL (ref 6.4–8.2)
RBC # BLD AUTO: 3.8 X10(6)UL
RH BLOOD TYPE: POSITIVE
SODIUM SERPL-SCNC: 138 MMOL/L (ref 136–145)
WBC # BLD AUTO: 17.2 X10(3) UL (ref 4–11)

## 2024-06-20 PROCEDURE — 80053 COMPREHEN METABOLIC PANEL: CPT | Performed by: SURGERY

## 2024-06-20 PROCEDURE — 97165 OT EVAL LOW COMPLEX 30 MIN: CPT

## 2024-06-20 PROCEDURE — 97161 PT EVAL LOW COMPLEX 20 MIN: CPT

## 2024-06-20 PROCEDURE — 86850 RBC ANTIBODY SCREEN: CPT | Performed by: SURGERY

## 2024-06-20 PROCEDURE — 86901 BLOOD TYPING SEROLOGIC RH(D): CPT | Performed by: SURGERY

## 2024-06-20 PROCEDURE — 86920 COMPATIBILITY TEST SPIN: CPT

## 2024-06-20 PROCEDURE — 97116 GAIT TRAINING THERAPY: CPT

## 2024-06-20 PROCEDURE — 83735 ASSAY OF MAGNESIUM: CPT | Performed by: SURGERY

## 2024-06-20 PROCEDURE — 86900 BLOOD TYPING SEROLOGIC ABO: CPT | Performed by: SURGERY

## 2024-06-20 PROCEDURE — 85027 COMPLETE CBC AUTOMATED: CPT | Performed by: SURGERY

## 2024-06-20 PROCEDURE — 82962 GLUCOSE BLOOD TEST: CPT

## 2024-06-20 PROCEDURE — 97535 SELF CARE MNGMENT TRAINING: CPT

## 2024-06-20 RX ORDER — POTASSIUM CHLORIDE 20 MEQ/1
40 TABLET, EXTENDED RELEASE ORAL ONCE
Status: COMPLETED | OUTPATIENT
Start: 2024-06-20 | End: 2024-06-20

## 2024-06-20 RX ORDER — FUROSEMIDE 10 MG/ML
20 INJECTION INTRAMUSCULAR; INTRAVENOUS ONCE
Status: COMPLETED | OUTPATIENT
Start: 2024-06-20 | End: 2024-06-20

## 2024-06-20 RX ADMIN — ROSUVASTATIN CALCIUM 10 MG: 10 TABLET, COATED ORAL at 21:19:00

## 2024-06-20 RX ADMIN — HEPARIN SODIUM AND DEXTROSE 500 UNITS/HR: 10000; 5 INJECTION INTRAVENOUS at 03:01:00

## 2024-06-20 RX ADMIN — LOSARTAN POTASSIUM 100 MG: 100 TABLET ORAL at 08:50:00

## 2024-06-20 RX ADMIN — ASPIRIN 81 MG: 81 TABLET, CHEWABLE ORAL at 08:49:00

## 2024-06-20 RX ADMIN — POTASSIUM CHLORIDE 40 MEQ: 20 TABLET, EXTENDED RELEASE ORAL at 08:50:00

## 2024-06-20 RX ADMIN — HYDROCODONE BITARTRATE AND ACETAMINOPHEN 1 TABLET: 10; 325 TABLET ORAL at 06:37:00

## 2024-06-20 RX ADMIN — CLOPIDOGREL BISULFATE 75 MG: 75 TABLET ORAL at 08:49:00

## 2024-06-20 RX ADMIN — FUROSEMIDE 20 MG: 10 INJECTION INTRAMUSCULAR; INTRAVENOUS at 08:50:00

## 2024-06-20 RX ADMIN — PREDNISONE 5 MG: 5 TABLET ORAL at 08:49:00

## 2024-06-20 RX ADMIN — ATENOLOL 50 MG: 50 TABLET ORAL at 05:10:00

## 2024-06-20 RX ADMIN — HYDROCODONE BITARTRATE AND ACETAMINOPHEN 2 TABLET: 10; 325 TABLET ORAL at 15:31:00

## 2024-06-20 RX ADMIN — FAMOTIDINE 20 MG: 20 TABLET, FILM COATED ORAL at 08:50:00

## 2024-06-20 RX ADMIN — PREDNISONE 5 MG: 5 TABLET ORAL at 21:18:00

## 2024-06-20 RX ADMIN — INSULIN DEGLUDEC 20 UNITS: 100 INJECTION, SOLUTION SUBCUTANEOUS at 08:57:00

## 2024-06-20 RX ADMIN — MORPHINE SULFATE 4 MG: 4 INJECTION, SOLUTION INTRAMUSCULAR; INTRAVENOUS at 11:39:00

## 2024-06-20 RX ADMIN — AMLODIPINE BESYLATE 10 MG: 10 TABLET ORAL at 08:50:00

## 2024-06-20 NOTE — PROGRESS NOTES
[facility] AND [facility]   part of [facility]     Progress Note  Emilie John Patient Status:  Inpatient    1955 MRN [id number]   Location [facility] 7NE-A Attending Yariel Du MD   Hosp Day # 6 PCP Tay Alexandra MD       SEE ATTENDING NOTE AT BOTTOM OF PAGE    Is this a shared or split note between Advanced Practice Provider and Physician? Yes    Assessment and Plan:    Patient is a 69 year old female with PMH sig for HTN, HLD, DM2, CKD, RA on prednisone, PAD here for schedueld vascular procedure     Impression     -left iliac chronic occlusion with claudication sp balloon angioplasty and stent of left common iliac artery and external iliac artery   -right common femoral artery occlusion sp thrombectomy, balloon angioplasty, stent; thrombectomy of left SFA and left anterior tibial artery, balloon angioplasty of left TP trunk and posterior tibial   -post op pain  -post op leukocytosis  -large R inguinal region pseudoaneurysm 6/15 noted     -post op shock, on pressors, possibly hemorrhagic  -left groin hematoma  -anemia     -DM2, uncontrolled - A1c 12s     -nicotine dependence, smokes 1ppd     -HTN  -HLD     -RA on chronic prednisone  -CKD 3, baseline Cr ~1.2     Plan     *vascular  -Went back to OR 6/15 for R groin pseudoaneurysm s/p ligation of femoral artery branch with sartorius muscle myoplasty.  -hep gtt  -able to ambulate per vascular  -possible washout on Friday      *post op hypotension / shock, resolved  -s/p iv hydrocortisone, restart home po prednisone  -supportive mgmt with prbc, received 6/15 and now trending hgb     *anemia  -preop hgb 13.9, trending postop anemia, has received several prbc     *thrombocytopenia- likely 2/2 blood loss, trend plts     *DM 2  -follows with endo as outpt, recently glipizide was increased  -ISS + tresiba 10 units     *HTN hx  -restart home meds (at home on atenolol, hydrochlorothiazide, norvasc, losartan)      *HLD  -statin     DVT proph  On heparin     GI ppx  -pepcid    PCP: Tay Alexandra MD    Concerns regarding plan of care were discussed with patient. Patient agrees with plan as detailed above. Discussed plan of care with Dr. Muller    Note: This chart was prepared using voice recognition software and may contain unintended word substitution errors.          Gurvinder WESTBROOK  [facility] Hospitalist Team  Contact via Perfect Serve and Bubble (Check Availability)  6/20/2024             SUBJECTIVE:   In bed, resting.  Denies pain, cp/sob, n/v, f/c.  Wants to go home.               OBJECTIVE:   Blood pressure 111/50, pulse 70, temperature 96.5 °F (35.8 °C), temperature source Oral, resp. rate 12, height 5' 1\" (1.549 m), weight 164 lb 10.9 oz (74.7 kg), SpO2 97%, not currently breastfeeding.    GENERAL: no apparent distress  NEURO: A/A Ox3  RESP: non labored, CTA  CARDIO: Regular, no murmur  ABD: soft, NT, ND, BS+  EXTREMITIES: no edema, no calf tenderness, pulse per doppler, left groin with gauze and tegaderm, no active bleeding    DIAGNOSTIC DATA:   Labs:     Recent Labs   Lab 06/15/24  2127 06/16/24 0420 06/17/24  0349 06/18/24  0447 06/19/24  0532 06/20/24  0617   WBC 23.3* 21.4* 19.5* 14.8* 16.5* 17.2*   HGB 12.2 11.8* 11.6* 10.8* 11.4* 11.2*   MCV 87.1 87.7 86.4 84.4 85.5 88.4   PLT 99.0* 99.0* 132.0* 146.0* 194.0 254.0   INR 1.12  --   --   --   --   --        Recent Labs   Lab 06/16/24  0420 06/17/24 0349 06/17/24  1243 06/18/24  0447 06/18/24  1909 06/19/24  0532 06/20/24  0617    141  --  139  --  135* 138   K 4.3 3.2* 3.4* 3.1*  3.1* 3.9 3.9 3.8   * 115*  --  111  --  105 106   CO2 15.0* 18.0*  --  22.0  --  23.0 22.0   BUN 27* 26*  --  20  --  18 16   CREATSERUM 1.85* 1.44*  --  0.95  --  0.95 0.87   CA 7.8* 7.4*  --  7.7*  --  8.0* 8.3*   MG 1.6 1.9  --  1.8  --  2.0 2.0   * 99  --  84  --  117* 91       Recent Labs   Lab 06/16/24  0420 06/17/24  0349 06/18/24  0447  06/19/24  0532 06/20/24  0617   ALT 12* 10* 6* 11* 20   AST 42* 52* 32 37 37   ALB 1.5* 1.7* 1.6* 2.0* 2.1*       Recent Labs   Lab 06/19/24  0515 06/19/24  1234 06/19/24  1706 06/19/24 2043 06/20/24  0530   PGLU 116* 194* 249* 198* 96       No results for input(s): \"TROP\" in the last 168 hours.      MEDICATIONS      Current Facility-Administered Medications   Medication Dose Route Frequency    heparin (Porcine) 25,000 Units/250mL infusion premix  500 Units/hr Intravenous Continuous    predniSONE (Deltasone) tab 5 mg  5 mg Oral BID    amLODIPine (Norvasc) tab 10 mg  10 mg Oral Daily    losartan (Cozaar) tab 100 mg  100 mg Oral Daily    atenolol (Tenormin) tab 50 mg  50 mg Oral Daily Beta Blocker    ondansetron (Zofran) 4 MG/2ML injection 4 mg  4 mg Intravenous Q6H PRN    famotidine (Pepcid) tab 20 mg  20 mg Oral Daily    Or    famotidine (Pepcid) 20 mg/2mL injection 20 mg  20 mg Intravenous Daily    insulin aspart (NovoLOG) 100 Units/mL FlexPen 4-20 Units  4-20 Units Subcutaneous TID AC and HS    insulin degludec (Tresiba) 100 units/mL flextouch 20 Units  20 Units Subcutaneous Daily    aspirin chewable tab 81 mg  81 mg Oral Daily    rosuvastatin (Crestor) tab 10 mg  10 mg Oral Nightly    HYDROcodone-acetaminophen (Norco)  MG per tab 1 tablet  1 tablet Oral Q6H PRN    Or    HYDROcodone-acetaminophen (Norco)  MG per tab 2 tablet  2 tablet Oral Q6H PRN    morphINE PF 2 MG/ML injection 2 mg  2 mg Intravenous Q2H PRN    Or    morphINE PF 4 MG/ML injection 4 mg  4 mg Intravenous Q2H PRN    Or    morphINE PF 4 MG/ML injection 6 mg  6 mg Intravenous Q2H PRN    clopidogrel (Plavix) tab 75 mg  75 mg Oral Daily    glucose (Dex4) 15 GM/59ML oral liquid 15 g  15 g Oral Q15 Min PRN    Or    glucose (Glutose) 40% oral gel 15 g  15 g Oral Q15 Min PRN    Or    glucose-vitamin C (Dex-4) chewable tab 4 tablet  4 tablet Oral Q15 Min PRN    Or    dextrose 50% injection 50 mL  50 mL Intravenous Q15 Min PRN    Or    glucose  (Dex4) 15 GM/59ML oral liquid 30 g  30 g Oral Q15 Min PRN    Or    glucose (Glutose) 40% oral gel 30 g  30 g Oral Q15 Min PRN    Or    glucose-vitamin C (Dex-4) chewable tab 8 tablet  8 tablet Oral Q15 Min PRN    norepinephrine (Levophed) 4 mg/250mL infusion premix  0.5-30 mcg/min Intravenous Continuous                  IMAGING     No results found.      SEE ATTENDING NOTE BELOW:

## 2024-06-20 NOTE — PLAN OF CARE
Hematoma and ecchymosis noted on hip and groin area on the right side.   Dressing needed to be changed on site due to a slow leak in upper portion of sutures.   Cleaned with saline and dressed with 4x4 and ABD with tape  Patient was unable to get to bathroom fast enough and had two bouts of incontinence on the way to bathroom and in bed  Cleaned area and bed quickly to keep surgical areas clean  Patient was a 4/10 with pain when moving but decreased back down to a 2/10 with non pharm interventions  Pain medications was offered, but patient declined  Heparin gtt continued going at 5 ml/hr               Problem: PAIN - ADULT  Goal: Verbalizes/displays adequate comfort level or patient's stated pain goal  Description: INTERVENTIONS:  - Encourage pt to monitor pain and request assistance  - Assess pain using appropriate pain scale  - Administer analgesics based on type and severity of pain and evaluate response  - Implement non-pharmacological measures as appropriate and evaluate response  - Consider cultural and social influences on pain and pain management  - Manage/alleviate anxiety  - Utilize distraction and/or relaxation techniques  - Monitor for opioid side effects  - Notify MD/LIP if interventions unsuccessful or patient reports new pain  - Anticipate increased pain with activity and pre-medicate as appropriate  6/19/2024 2343 by Shana Lewis, RN  Outcome: Progressing  6/19/2024 2325 by Shana Lewis, RN  Outcome: Progressing     Problem: RISK FOR INFECTION - ADULT  Goal: Absence of fever/infection during anticipated neutropenic period  Description: INTERVENTIONS  - Monitor WBC  - Administer growth factors as ordered  - Implement neutropenic guidelines  Outcome: Progressing     Problem: SKIN/TISSUE INTEGRITY - ADULT  Goal: Skin integrity remains intact  Description: INTERVENTIONS  - Assess and document risk factors for pressure ulcer development  - Assess and document skin integrity  - Monitor for areas of redness  and/or skin breakdown  - Initiate interventions, skin care algorithm/standards of care as needed  6/19/2024 2343 by Shana Lewis RN  Outcome: Progressing  6/19/2024 2325 by Shana Lewis RN  Outcome: Progressing  6/19/2024 2325 by Shana Lewis RN  Outcome: Progressing  Goal: Incision(s), wounds(s) or drain site(s) healing without S/S of infection  Description: INTERVENTIONS:  - Assess and document skin integrity  - Assess and document dressing/incision, wound bed, drain sites and surrounding tissue  - Implement wound care per orders  6/19/2024 2343 by Shana Lewis RN  Outcome: Progressing  6/19/2024 2325 by Shana Lewis RN  Outcome: Progressing  6/19/2024 2325 by Shana Lewis RN  Outcome: Progressing

## 2024-06-20 NOTE — PHYSICAL THERAPY NOTE
PHYSICAL THERAPY EVALUATION - INPATIENT     Room Number: 7625/7625-A  Evaluation Date: 6/20/2024  Type of Evaluation: Initial  Physician Order: PT Eval and Treat    Presenting Problem: R common femoral artery occlusion s/p angiogram with embolization, L SFA embolization and L common femoral artery dissection 6/13. Post operative groin hematoma and R common femoral pseudoaneurysm, s/p R groin exploration, ligation of femoral artery branch, sartorius muscle myoplasty 6/15  Co-Morbidities : HTN, HLD, DM2, CKD, RA, PAD  Reason for Therapy: Mobility Dysfunction and Discharge Planning    PHYSICAL THERAPY ASSESSMENT   Patient is currently functioning near baseline with bed mobility, transfers, and gait.  Prior to admission, patient's baseline is independent with ADLs and mobility.  Patient is requiring stand-by assist and contact guard assist as a result of the following impairments: decreased functional strength, decreased endurance/aerobic capacity, pain, impaired standing balance, impaired motor planning, decreased muscular endurance, medical status, and limited R hip ROM.  Physical Therapy will continue to follow for duration of hospitalization.    Patient will benefit from continued skilled PT Services at discharge to promote functional independence and safety with additional support and return home with home health PT.    PLAN  PT Treatment Plan: Bed mobility;Body mechanics;Endurance;Energy conservation;Patient education;Family education;Gait training;Transfer training;Balance training;Strengthening  Rehab Potential : Fair  Frequency (Obs): 3x/week  Number of Visits to Meet Established Goals: 5      CURRENT GOALS    Goal #1 Patient is able to demonstrate supine - sit EOB @ level: independent     Goal #2 Patient is able to demonstrate transfers EOB to/from Chair/Wheelchair at assistance level: supervision     Goal #3 Patient is able to ambulate 150 feet with assist device: walker - rolling at assistance level:  supervision     Goal #4    Goal #5    Goal #6    Goal Comments: Goals established on 2024      PHYSICAL THERAPY MEDICAL/SOCIAL HISTORY  History related to current admission: Patient is a 69 year old female admitted on 2024 from home for R common femoral artery occlusion s/p angiogram with embolization, L SFA embolization and L common femoral artery dissection . Post operative groin hematoma and R common femoral pseudoaneurysm, s/p R groin exploration, ligation of femoral artery branch, sartorius muscle myoplasty 6/15.      HOME SITUATION  Type of Home: Apartment   Home Layout: One level;Elevator                Lives With: Alone (sisters or son can helpout)  Drives: No  Patient Owned Equipment: Cane;Rolling walker  Patient Regularly Uses: Glasses    Prior Level of [place]: Pt typically indep with ADLs and mobility. Son provides transportation. Sisters able to help as needed, son able to help on weekends.     SUBJECTIVE  \"I don't have any stairs'       OBJECTIVE  Precautions: Bed/chair alarm (R sartorius muscle flap)  Fall Risk: High fall risk    WEIGHT BEARING RESTRICTION  Weight Bearing Restriction: None                PAIN ASSESSMENT  Ratin  Location: R groin  Management Techniques: Repositioning    COGNITION  Overall Cognitive Status:  WFL - within functional limits    RANGE OF MOTION AND STRENGTH ASSESSMENT  See OT note for UE assessment - R hand limited due to RA    Lower extremity ROM is within functional limits except for the following:  R hip s/p vascular surgeries     Lower extremity strength is within functional limits except for the following:   R hip s/p vascular surgeries       BALANCE  Static Sitting: Poor +  Dynamic Sitting: Poor +  Static Standing: Poor +  Dynamic Standing: Poor +    ADDITIONAL TESTS                                    ACTIVITY TOLERANCE                         O2 WALK       NEUROLOGICAL FINDINGS                        AM-PAC '6-Clicks' INPATIENT SHORT FORM -  BASIC MOBILITY  How much difficulty does the patient currently have...  Patient Difficulty: Turning over in bed (including adjusting bedclothes, sheets and blankets)?: A Little   Patient Difficulty: Sitting down on and standing up from a chair with arms (e.g., wheelchair, bedside commode, etc.): A Little   Patient Difficulty: Moving from lying on back to sitting on the side of the bed?: A Lot   How much help from another person does the patient currently need...   Help from Another: Moving to and from a bed to a chair (including a wheelchair)?: A Little   Help from Another: Need to walk in hospital room?: A Little   Help from Another: Climbing 3-5 steps with a railing?: A Little       AM-PAC Score:  Raw Score: 17   Approx Degree of Impairment: 50.57%   Standardized Score (AM-PAC Scale): 42.13   CMS Modifier (G-Code): CK    FUNCTIONAL ABILITY STATUS  Gait Assessment   Functional Mobility/Gait Assessment  Gait Assistance: Contact guard assist;Supervision  Distance (ft): 100  Assistive Device: Rolling walker  Pattern:  (waddle type gait, dec hip and knee flexion RLE)    Skilled Therapy Provided     Bed Mobility:  Rolling: NT  Supine to sit: modA   Sit to supine: NT     Transfer Mobility:  Sit to stand: CGA   Stand to sit: CGA  Gait = CGA progressing to supervision    Therapist's Comments: RN cleared for session. Pt agreeable for therapy, received supine. Pt educated on log roll technique for supine>sit transfer to decrease hip flexor activation and pain mgmt. Pt encouraged step to pattern with RW for improved support of RLE during ambulation. Pt encouraged to sleep in recliner at home for ease of transfers. Pt encouraged to have additional support at home upon discharge.     Exercise/Education Provided:  Bed mobility  Body mechanics  Energy conservation  Gait training  Posture  Strengthening  Transfer training    Patient End of Session: Up in chair;Needs met;Call light within reach;RN aware of session/findings;All  patient questions and concerns addressed;Alarm set;Discussed recommendations with /      Patient Evaluation Complexity Level:  History High - 3 or more personal factors and/or co-morbidities   Examination of body systems Low - addressing 1-2 elements   Clinical Presentation Low - Stable   Clinical Decision Making Low - Stable       PT Session Time: 30 minutes  Gait Training: 10 minutes  Therapeutic Activity: 5 minutes

## 2024-06-20 NOTE — ANESTHESIA PREPROCEDURE EVALUATION
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 WOUND VAC PLACEMENT    Anesthesia Procedure: RIGHT GROIN IRRIGATION AND DEBRIDMENT WITH KERECIS. WOUND VAC PLACEMENT (Right)    Surgeons and Role:     * Yariel Du MD - Primary    Current medications reviewed.  Hospital Medications:   [COMPLETED] furosemide (Lasix) 10 mg/mL injection 20 mg  20 mg Intravenous Once    [COMPLETED] potassium chloride (Klor-Con M20) tab 40 mEq  40 mEq Oral Once    heparin (Porcine) 25,000 Units/250mL infusion premix  500 Units/hr Intravenous Continuous    [COMPLETED] furosemide (Lasix) 10 mg/mL injection 20 mg  20 mg Intravenous Once    [COMPLETED] magnesium sulfate in sterile water for injection 2 g/50mL IVPB premix 2 g  2 g Intravenous Once    [COMPLETED] potassium chloride 40 mEq/100mL IVPB premix (central line) 40 mEq  40 mEq Intravenous Once    And    [COMPLETED] potassium chloride 20 mEq/100mL IVPB premix 20 mEq  20 mEq Intravenous Once    [COMPLETED] hydrocortisone Na succinate PF (Solu-CORTEF) injection 50 mg  50 mg Intravenous Once    [COMPLETED] furosemide (Lasix) 10 mg/mL injection 20 mg  20 mg Intravenous Once    [] potassium chloride (Klor-Con M20) tab 40 mEq  40 mEq Oral Q4H    [COMPLETED] potassium chloride 40 mEq in 250mL sodium chloride 0.9% IVPB premix  40 mEq Intravenous Once    predniSONE (Deltasone) tab 5 mg  5 mg Oral BID    [COMPLETED] magnesium sulfate in sterile water for injection 2 g/50mL IVPB premix 2 g  2 g Intravenous Once    amLODIPine (Norvasc) tab 10 mg  10 mg Oral Daily    losartan (Cozaar) tab 100 mg  100 mg Oral Daily    atenolol (Tenormin) tab 50 mg  50 mg Oral Daily Beta Blocker    [COMPLETED] sodium chloride 0.9% infusion   Intravenous Once    [COMPLETED] iopamidol 76% (ISOVUE-370) injection for power injector  100 mL  Intravenous ONCE PRN    ondansetron (Zofran) 4 MG/2ML injection 4 mg  4 mg Intravenous Q6H PRN    [COMPLETED] sodium chloride 0.9% infusion   Intravenous Once    [COMPLETED] ceFAZolin (Ancef) 2g in 10mL IV syringe premix  2 g Intravenous Q12H    [COMPLETED] insulin aspart (NovoLOG) 100 Units/mL FlexPen 25 Units  25 Units Subcutaneous Once    [COMPLETED] sodium chloride 0.9% infusion   Intravenous Once    famotidine (Pepcid) tab 20 mg  20 mg Oral Daily    Or    famotidine (Pepcid) 20 mg/2mL injection 20 mg  20 mg Intravenous Daily    [COMPLETED] potassium chloride 40 mEq in 250mL sodium chloride 0.9% IVPB premix  40 mEq Intravenous Once    insulin aspart (NovoLOG) 100 Units/mL FlexPen 4-20 Units  4-20 Units Subcutaneous TID AC and HS    insulin degludec (Tresiba) 100 units/mL flextouch 20 Units  20 Units Subcutaneous Daily    [COMPLETED] insulin degludec (Tresiba) 100 units/mL flextouch 10 Units  10 Units Subcutaneous Once    [COMPLETED] lidocaine PF (Xylocaine-MPF) 1 % injection        [COMPLETED] heparin (Porcine) 5000 UNIT/ML injection        [COMPLETED] iodixanol (VISIPAQUE) 320 MG/ML injection 100 mL  100 mL Injection ONCE PRN    [COMPLETED] fentaNYL (Sublimaze) 50 mcg/mL injection        [COMPLETED] midazolam (Versed) 2 MG/2ML injection        [COMPLETED] heparin (Porcine) 5000 UNIT/ML injection        [COMPLETED] heparin (Porcine) 5000 UNIT/ML injection        [COMPLETED] clopidogrel (Plavix) 75 MG tab        [COMPLETED] fentaNYL (Sublimaze) 50 mcg/mL injection        [COMPLETED] lidocaine PF (Xylocaine-MPF) 1 % injection        [COMPLETED] heparin (Porcine) 5000 UNIT/ML injection        [COMPLETED] fentaNYL (Sublimaze) 50 mcg/mL injection        [COMPLETED] midazolam (Versed) 2 MG/2ML injection        [COMPLETED] heparin (Porcine) 5000 UNIT/ML injection        [COMPLETED] heparin (Porcine) 5000 UNIT/ML injection        [COMPLETED] sterile water for injection (PF) injection        [COMPLETED] sterile water for  injection (PF) injection        [COMPLETED] alteplase (Activase) 2 mg injection        [COMPLETED] sterile water for injection (PF) injection        [COMPLETED] alteplase (Activase) 2 mg injection        [COMPLETED] Nitroglycerin in D5W 200-5 MCG/ML-% injection        [COMPLETED] heparin (Porcine) 5000 UNIT/ML injection        [COMPLETED] fentaNYL (Sublimaze) 50 mcg/mL injection        [COMPLETED] midazolam (Versed) 2 MG/2ML injection        [COMPLETED] heparin (Porcine) 5000 UNIT/ML injection        [COMPLETED] heparin (Porcine) 5000 UNIT/ML injection        [COMPLETED] midazolam (Versed) 2 MG/2ML injection        [COMPLETED] alteplase (Activase) 2 mg injection        [COMPLETED] sterile water for injection (PF) injection        [COMPLETED] heparin (Porcine) 5000 UNIT/ML injection        [COMPLETED] heparin (Porcine) 25,000 Units/250mL infusion premix        aspirin chewable tab 81 mg  81 mg Oral Daily    rosuvastatin (Crestor) tab 10 mg  10 mg Oral Nightly    [COMPLETED] heparin (Porcine) 36819 units/250mL infusion (PE/DVT/THROMBUS) INITIAL DOSE  18 Units/kg/hr Intravenous Once    HYDROcodone-acetaminophen (Norco)  MG per tab 1 tablet  1 tablet Oral Q6H PRN    Or    HYDROcodone-acetaminophen (Norco)  MG per tab 2 tablet  2 tablet Oral Q6H PRN    morphINE PF 2 MG/ML injection 2 mg  2 mg Intravenous Q2H PRN    Or    morphINE PF 4 MG/ML injection 4 mg  4 mg Intravenous Q2H PRN    Or    morphINE PF 4 MG/ML injection 6 mg  6 mg Intravenous Q2H PRN    clopidogrel (Plavix) tab 75 mg  75 mg Oral Daily    glucose (Dex4) 15 GM/59ML oral liquid 15 g  15 g Oral Q15 Min PRN    Or    glucose (Glutose) 40% oral gel 15 g  15 g Oral Q15 Min PRN    Or    glucose-vitamin C (Dex-4) chewable tab 4 tablet  4 tablet Oral Q15 Min PRN    Or    dextrose 50% injection 50 mL  50 mL Intravenous Q15 Min PRN    Or    glucose (Dex4) 15 GM/59ML oral liquid 30 g  30 g Oral Q15 Min PRN    Or    glucose (Glutose) 40% oral gel 30 g  30 g  Oral Q15 Min PRN    Or    glucose-vitamin C (Dex-4) chewable tab 8 tablet  8 tablet Oral Q15 Min PRN    [COMPLETED] sodium chloride 0.9 % IV bolus 1,000 mL  1,000 mL Intravenous Once    norepinephrine (Levophed) 4 mg/250mL infusion premix  0.5-30 mcg/min Intravenous Continuous    [COMPLETED] sodium chloride 0.9 % IV bolus 1,000 mL  1,000 mL Intravenous Once       Outpatient Medications:  Omeprazole 40 MG Oral Capsule Delayed Release, Take 40 mg by mouth daily., Disp: , Rfl:   Acetaminophen (ACETAMINOPHEN EXTRA STRENGTH) 500 MG Oral Cap, Take 1,000 mg by mouth one time., Disp: , Rfl:   amLODIPine-Valsartan-HCTZ -25 MG Oral Tab, Take 1 tablet by mouth daily., Disp: , Rfl:   predniSONE 5 MG Oral Tab, Take 5 mg by mouth 2 (two) times daily., Disp: , Rfl:   atenolol 50 MG Oral Tab, Take 1 tablet (50 mg total) by mouth daily., Disp: 90 tablet, Rfl: 0  [] amLODIPine-Valsartan-HCTZ (EXFORGE HCT) -25 MG Oral Tab, Take 1 tablet by mouth daily., Disp: 60 tablet, Rfl: 0  [DISCONTINUED] predniSONE 5 MG Oral Tab, TK 1 T PO  BID., Disp: , Rfl: 1  Cholecalciferol (VITAMIN D3) 1000 units Oral Cap, Take 1 tablet by mouth daily., Disp: , Rfl:   MetFORMIN HCl 500 MG Oral Tab, Take 1 tablet (500 mg total) by mouth 2 (two) times daily., Disp: 180 tablet, Rfl: 3        Allergies: Celecoxib, Esomeprazole, Etanercept, Infliximab, Other, Oxycodone, Pregabalin, Enbrel, Fish-derived products, Orencia [abatacept], Rice, and Xeljanz [tofacitinib]      Anesthesia Evaluation    Patient summary reviewed.    Anesthetic Complications           GI/Hepatic/Renal             (+) chronic renal disease and CRI                   Cardiovascular      ECG reviewed.  Exercise tolerance: poor           (+) hypertension                                     Endo/Other      (+) diabetes and poorly controlled,        (+) anemia              (+) rheumatoid arthritis     Pulmonary        (+) COPD and mild                  Neuro/Psych                                       Past Surgical History:   Procedure Laterality Date    Benign biopsy left  1991    Cholecystectomy      Colonoscopy N/A 05/06/2019    Procedure: COLONOSCOPY;  Surgeon: Freeman Oshea MD;  Location:  ENDOSCOPY    Colonoscopy      Hip replacement surgery Right 01/10/2020    Knee replacement surgery      Needle biopsy left  2017    Removal gallbladder      Total hip replacement      Total knee replacement Bilateral     Upper gi endoscopy,exam       Social History     Tobacco Use    Smoking status: Every Day     Current packs/day: 0.50     Average packs/day: 0.5 packs/day for 51.4 years (25.7 ttl pk-yrs)     Types: Cigarettes     Start date: 1/29/1973    Smokeless tobacco: Never   Substance Use Topics    Alcohol use: No     History   Drug Use No     Available pre-op labs reviewed.  Lab Results   Component Value Date    WBC 17.2 (H) 06/20/2024    RBC 3.80 06/20/2024    HGB 11.2 (L) 06/20/2024    HCT 33.6 (L) 06/20/2024    MCV 88.4 06/20/2024    MCH 29.5 06/20/2024    MCHC 33.3 06/20/2024    RDW 15.5 06/20/2024    .0 06/20/2024     Lab Results   Component Value Date     06/20/2024    K 3.8 06/20/2024     06/20/2024    CO2 22.0 06/20/2024    BUN 16 06/20/2024    CREATSERUM 0.87 06/20/2024    GLU 91 06/20/2024    CA 8.3 (L) 06/20/2024     Lab Results   Component Value Date    INR 1.12 06/15/2024         Airway      Mallampati: II  Mouth opening: >3 FB  TM distance: 4 - 6 cm  Neck ROM: full Cardiovascular    Cardiovascular exam normal.  Rhythm: regular  Rate: normal  (-) murmur   Dental    Dentition appears grossly intact         Pulmonary    Pulmonary exam normal.  Breath sounds clear to auscultation bilaterally.               Other findings              ASA: 3   Plan: general  NPO status verified and patient meets guidelines.        Comment: Chart review per hospitalist and prior anesthesia notes  Plan/risks discussed with: patient  Use of blood product(s) discussed with:  patient    Consented to blood products.          Present on Admission:  **None**

## 2024-06-20 NOTE — OCCUPATIONAL THERAPY NOTE
OCCUPATIONAL THERAPY EVALUATION - INPATIENT     Room Number: 7625/7625-A  Evaluation Date: 6/20/2024  Type of Evaluation: Initial  Presenting Problem: Ruptured femoral pseudoaneursym    Physician Order: IP Consult to Occupational Therapy  Reason for Therapy: ADL/IADL Dysfunction and Discharge Planning  Procedure: 6/15 for R groin pseudoaneurysm s/p ligation of femoral artery branch with sartorius muscle myoplasty     OCCUPATIONAL THERAPY ASSESSMENT   Patient is currently functioning near baseline with toileting, upper body dressing, lower body dressing, grooming, bed mobility, transfers, static standing balance, and dynamic standing balance. Prior to admission, patient's baseline is Mod I.  Patient is requiring minimal assist as a result of the following impairments: decreased functional strength, decreased endurance, pain, impaired sitting and standing balance, decreased muscular endurance, and limited RUE ROM. Occupational Therapy will continue to follow for duration of hospitalization.    Patient will benefit from continued skilled OT Services at discharge to promote functional independence and safety with additional support and return home with home health OT      History Related to Current Admission: Patient is a 69 year old female admitted on 6/13/2024 with Presenting Problem: Ruptured femoral pseudoaneursym. Co-Morbidities : HTN, RA, HLD, DM2, PVD    WEIGHT BEARING RESTRICTION  Weight Bearing Restriction: None                Recommendations for nursing staff:   Transfers: Sup  Toileting location: toilet     EVALUATION SESSION:  Patient Start of Session: pt. Supine in bed;  staff present.   FUNCTIONAL TRANSFER ASSESSMENT  Sit to Stand: Edge of Bed  Edge of Bed: Supervision (Using RW)  Toilet Transfer: Supervision (Using RW and grab bar to lower self)    BED MOBILITY  Supine to Sit : Minimal Assist (Using handhold to lift trunk to EOB seated)  Scooting: Scooting to EOB independently    BALANCE  ASSESSMENT  Static Sitting: Supervision  Sitting Bilateral: Supervision  Static Standing: Contact Guard Assist  Standing Bilateral: Contact Guard Assist    FUNCTIONAL ADL ASSESSMENT  LB Dressing Seated: Minimal Assist (Pt. attempted LB dressing utilizing figure four method; unable to adjust RLE and challenges with LLE. Pt. reports using sock aid and reacher at home for LB dressing at baseline.)  Toileting Seated: Supervision (Pt. completed toileting task completing transfer, edenilson care, and clothing management.)      ACTIVITY TOLERANCE: Vitals stable. Pt. Reported dizziness when transitioning from supine to sitting; BP reported below.            BP: 136/67  BP Location: Right arm  BP Method: Automatic  Patient Position: Sitting    O2 SATURATIONS       COGNITION  Overall Cognitive Status:  WFL - within functional limits    Upper Extremity   ROM: within functional limits except for the following:RUE due to RA  Strength: within functional limits except for the following; RUE due to RA  Coordination  Gross motor: WFL  Fine motor: Impaired RUE due to RA  Sensation: Light touch:  intact    EDUCATION PROVIDED  Patient: Role of Occupational Therapy; Plan of Care; Discharge Recommendations; Functional Transfer Techniques  Patient's Response to Education: Verbalized Understanding; Returned Demonstration    Equipment used: RW  Demonstrates functional use, Would benefit from additional trial.      Therapist comments: Pt. Requiring Min A - CGA assistance to complete supine to sit and stabilize with bed mobility. Pt. Managing to hold herself stable seated with time. Pt. Did report dizziness when sitting up which could be the cause of poor trunk control and balance.      Patient End of Session: Up in chair;Needs met;Call light within reach;RN aware of session/findings;Alarm set;All patient questions and concerns addressed    OCCUPATIONAL PROFILE    HOME SITUATION  Type of Home: Apartment  Home Layout: One level;Elevator  Lives  With: Alone (sisters or son can helpout)    Toilet and Equipment: Standard height toilet  Shower/Tub and Equipment: Tub-shower combo  Other Equipment: Reacher;Sock aid (RW)          Drives: No  Patient Regularly Uses: Glasses    Prior Level of Function: Prior to admission pt. Reports living alone being Mod I with ADLs utilizing AE for LB dressing including a sock aid and reacher.  Pt. No longer drives due to RA in R hand making it difficult to  he wheel; her son is in the area driving her where she needs to go. Pt. Has not been using a mobility device since her knee replacement.      SUBJECTIVE   \"I was in the [employer]\"    PAIN ASSESSMENT  Ratin  Location: Site of procedure  Management Techniques: Repositioning;Nurse notified    OBJECTIVE     Fall Risk: Standard fall risk      ASSESSMENTS    AM-PAC ‘6-Clicks’ Inpatient Daily Activity Short Form  -   Putting on and taking off regular lower body clothing?: A Lot  -   Bathing (including washing, rinsing, drying)?: A Lot  -   Toileting, which includes using toilet, bedpan or urinal? : A Little  -   Putting on and taking off regular upper body clothing?: A Little  -   Taking care of personal grooming such as brushing teeth?: None  -   Eating meals?: None    AM-PAC Score:  Score: 18  Approx Degree of Impairment: 46.65%  Standardized Score (AM-PAC Scale): 38.66    ADDITIONAL TESTS     NEUROLOGICAL FINDINGS      COGNITION ASSESSMENTS       PLAN  OT Treatment Plan: ADL training;Functional transfer training;Endurance training;Patient/Family education;Patient/Family training;Compensatory technique education;Balance activities  Rehab Potential : Good  Frequency: 3-5x/week  Number of Visits to Meet Established Goals: 3    ADL Goals   Patient will perform grooming: independently  Patient will perform lower body dressing:  with modified independent, while at edge of bed, and with adaptive equipment PRN  Patient will perform toileting: independently    Functional Transfer  Goals  Patient will transfer from supine to sit:  with supervision  Patient will transfer from sit to stand:  with supervision  Patient will transfer to toilet:  with supervision      Additional Goals      Therapist Goals    Patient Evaluation Complexity Level:   Occupational Profile/Medical History LOW - Brief history including review of medical or therapy records    Specific performance deficits impacting engagement in ADL/IADL LOW  1 - 3 performance deficits    Client Assessment/Performance Deficits LOW - No comorbidities nor modifications of tasks    Clinical Decision Making LOW - Analysis of occupational profile, problem-focused assessments, limited treatment options    Overall Complexity LOW     OT Session Time: 15 minutes  Self-Care Home Management: 10 minutes  Therapeutic Activity: 0 minutes  Neuromuscular Re-education: 0 minutes  Therapeutic Exercise: 0 minutes  Cognitive Skills: 0 minutes  Sensory Integrative: 0 minutes  Orthotic Management and Trainin minutes  Can add/delete any of these

## 2024-06-20 NOTE — CM/SW NOTE
06/20/24 0900   CM/SW Referral Data   Referral Source Social Work (self-referral)     SW self referred, discussed with RN during rounds. Pt admitted on 6/14 for scheduled vascular procedure on 6/13. Pt has been cleared to ambulate, per reports has been getting up to use bathroom. Pain management. Per review, plans for washout on Friday with Dr. Du.     SW sent HH referrals in anticipation of need at [facility]. SW/CM to remain available.    SHAYY Perkins

## 2024-06-21 ENCOUNTER — ANESTHESIA (OUTPATIENT)
Dept: CARDIAC SURGERY | Facility: HOSPITAL | Age: 69
DRG: 252 | End: 2024-06-21
Payer: MEDICARE

## 2024-06-21 LAB
ALBUMIN SERPL-MCNC: 2.1 G/DL (ref 3.4–5)
ALBUMIN/GLOB SERPL: 0.5 {RATIO} (ref 1–2)
ALP LIVER SERPL-CCNC: 70 U/L
ALT SERPL-CCNC: 23 U/L
ANION GAP SERPL CALC-SCNC: 7 MMOL/L (ref 0–18)
AST SERPL-CCNC: 38 U/L (ref 15–37)
BILIRUB SERPL-MCNC: 1.2 MG/DL (ref 0.1–2)
BUN BLD-MCNC: 17 MG/DL (ref 9–23)
CALCIUM BLD-MCNC: 8.5 MG/DL (ref 8.5–10.1)
CHLORIDE SERPL-SCNC: 106 MMOL/L (ref 98–112)
CO2 SERPL-SCNC: 21 MMOL/L (ref 21–32)
CREAT BLD-MCNC: 1.01 MG/DL
EGFRCR SERPLBLD CKD-EPI 2021: 60 ML/MIN/1.73M2 (ref 60–?)
ERYTHROCYTE [DISTWIDTH] IN BLOOD BY AUTOMATED COUNT: 16.3 %
GLOBULIN PLAS-MCNC: 4.3 G/DL (ref 2.8–4.4)
GLUCOSE BLD-MCNC: 112 MG/DL (ref 70–99)
GLUCOSE BLD-MCNC: 128 MG/DL (ref 70–99)
GLUCOSE BLD-MCNC: 350 MG/DL (ref 70–99)
GLUCOSE BLD-MCNC: 93 MG/DL (ref 70–99)
GLUCOSE BLD-MCNC: 97 MG/DL (ref 70–99)
GLUCOSE BLD-MCNC: 98 MG/DL (ref 70–99)
HCT VFR BLD AUTO: 33.8 %
HGB BLD-MCNC: 11 G/DL
MAGNESIUM SERPL-MCNC: 1.8 MG/DL (ref 1.6–2.6)
MCH RBC QN AUTO: 29.5 PG (ref 26–34)
MCHC RBC AUTO-ENTMCNC: 32.5 G/DL (ref 31–37)
MCV RBC AUTO: 90.6 FL
OSMOLALITY SERPL CALC.SUM OF ELEC: 280 MOSM/KG (ref 275–295)
PLATELET # BLD AUTO: 296 10(3)UL (ref 150–450)
POTASSIUM SERPL-SCNC: 4.5 MMOL/L (ref 3.5–5.1)
POTASSIUM SERPL-SCNC: 4.5 MMOL/L (ref 3.5–5.1)
PROT SERPL-MCNC: 6.4 G/DL (ref 6.4–8.2)
RBC # BLD AUTO: 3.73 X10(6)UL
SODIUM SERPL-SCNC: 134 MMOL/L (ref 136–145)
WBC # BLD AUTO: 18.2 X10(3) UL (ref 4–11)

## 2024-06-21 PROCEDURE — 82962 GLUCOSE BLOOD TEST: CPT

## 2024-06-21 PROCEDURE — 84132 ASSAY OF SERUM POTASSIUM: CPT | Performed by: HOSPITALIST

## 2024-06-21 PROCEDURE — 0JBL0ZZ EXCISION OF RIGHT UPPER LEG SUBCUTANEOUS TISSUE AND FASCIA, OPEN APPROACH: ICD-10-PCS | Performed by: SURGERY

## 2024-06-21 PROCEDURE — 85027 COMPLETE CBC AUTOMATED: CPT | Performed by: SURGERY

## 2024-06-21 PROCEDURE — S0028 INJECTION, FAMOTIDINE, 20 MG: HCPCS | Performed by: SURGERY

## 2024-06-21 PROCEDURE — 83735 ASSAY OF MAGNESIUM: CPT | Performed by: SURGERY

## 2024-06-21 PROCEDURE — 80053 COMPREHEN METABOLIC PANEL: CPT | Performed by: SURGERY

## 2024-06-21 PROCEDURE — 04CK0ZZ EXTIRPATION OF MATTER FROM RIGHT FEMORAL ARTERY, OPEN APPROACH: ICD-10-PCS | Performed by: SURGERY

## 2024-06-21 RX ORDER — ONDANSETRON 2 MG/ML
4 INJECTION INTRAMUSCULAR; INTRAVENOUS EVERY 6 HOURS PRN
Status: DISCONTINUED | OUTPATIENT
Start: 2024-06-21 | End: 2024-06-29

## 2024-06-21 RX ORDER — ONDANSETRON 2 MG/ML
4 INJECTION INTRAMUSCULAR; INTRAVENOUS EVERY 6 HOURS PRN
Status: DISCONTINUED | OUTPATIENT
Start: 2024-06-21 | End: 2024-06-21 | Stop reason: HOSPADM

## 2024-06-21 RX ORDER — HYDROMORPHONE HYDROCHLORIDE 1 MG/ML
0.6 INJECTION, SOLUTION INTRAMUSCULAR; INTRAVENOUS; SUBCUTANEOUS EVERY 5 MIN PRN
Status: DISCONTINUED | OUTPATIENT
Start: 2024-06-21 | End: 2024-06-21 | Stop reason: HOSPADM

## 2024-06-21 RX ORDER — HYDROMORPHONE HYDROCHLORIDE 1 MG/ML
0.2 INJECTION, SOLUTION INTRAMUSCULAR; INTRAVENOUS; SUBCUTANEOUS EVERY 5 MIN PRN
Status: DISCONTINUED | OUTPATIENT
Start: 2024-06-21 | End: 2024-06-21 | Stop reason: HOSPADM

## 2024-06-21 RX ORDER — HYDROCODONE BITARTRATE AND ACETAMINOPHEN 5; 325 MG/1; MG/1
2 TABLET ORAL ONCE AS NEEDED
Status: DISCONTINUED | OUTPATIENT
Start: 2024-06-21 | End: 2024-06-21 | Stop reason: HOSPADM

## 2024-06-21 RX ORDER — EPHEDRINE SULFATE 50 MG/ML
INJECTION INTRAVENOUS AS NEEDED
Status: DISCONTINUED | OUTPATIENT
Start: 2024-06-21 | End: 2024-06-21 | Stop reason: SURG

## 2024-06-21 RX ORDER — DIPHENHYDRAMINE HYDROCHLORIDE 50 MG/ML
12.5 INJECTION INTRAMUSCULAR; INTRAVENOUS AS NEEDED
Status: DISCONTINUED | OUTPATIENT
Start: 2024-06-21 | End: 2024-06-21 | Stop reason: HOSPADM

## 2024-06-21 RX ORDER — METOCLOPRAMIDE HYDROCHLORIDE 5 MG/ML
5 INJECTION INTRAMUSCULAR; INTRAVENOUS EVERY 8 HOURS PRN
Status: DISCONTINUED | OUTPATIENT
Start: 2024-06-21 | End: 2024-06-29

## 2024-06-21 RX ORDER — MAGNESIUM OXIDE 400 MG/1
400 TABLET ORAL ONCE
Status: COMPLETED | OUTPATIENT
Start: 2024-06-21 | End: 2024-06-21

## 2024-06-21 RX ORDER — PHENYLEPHRINE HCL 10 MG/ML
VIAL (ML) INJECTION AS NEEDED
Status: DISCONTINUED | OUTPATIENT
Start: 2024-06-21 | End: 2024-06-21 | Stop reason: SURG

## 2024-06-21 RX ORDER — ONDANSETRON 2 MG/ML
INJECTION INTRAMUSCULAR; INTRAVENOUS AS NEEDED
Status: DISCONTINUED | OUTPATIENT
Start: 2024-06-21 | End: 2024-06-21 | Stop reason: SURG

## 2024-06-21 RX ORDER — ACETAMINOPHEN 500 MG
1000 TABLET ORAL ONCE AS NEEDED
Status: DISCONTINUED | OUTPATIENT
Start: 2024-06-21 | End: 2024-06-21 | Stop reason: HOSPADM

## 2024-06-21 RX ORDER — HYDROCODONE BITARTRATE AND ACETAMINOPHEN 5; 325 MG/1; MG/1
2 TABLET ORAL EVERY 4 HOURS PRN
Status: DISCONTINUED | OUTPATIENT
Start: 2024-06-21 | End: 2024-06-29

## 2024-06-21 RX ORDER — METOCLOPRAMIDE HYDROCHLORIDE 5 MG/ML
5 INJECTION INTRAMUSCULAR; INTRAVENOUS EVERY 8 HOURS PRN
Status: DISCONTINUED | OUTPATIENT
Start: 2024-06-21 | End: 2024-06-21 | Stop reason: HOSPADM

## 2024-06-21 RX ORDER — ACETAMINOPHEN 10 MG/ML
INJECTION, SOLUTION INTRAVENOUS AS NEEDED
Status: DISCONTINUED | OUTPATIENT
Start: 2024-06-21 | End: 2024-06-21 | Stop reason: SURG

## 2024-06-21 RX ORDER — LABETALOL HYDROCHLORIDE 5 MG/ML
5 INJECTION, SOLUTION INTRAVENOUS EVERY 5 MIN PRN
Status: DISCONTINUED | OUTPATIENT
Start: 2024-06-21 | End: 2024-06-21 | Stop reason: HOSPADM

## 2024-06-21 RX ORDER — LIDOCAINE HYDROCHLORIDE 10 MG/ML
INJECTION, SOLUTION EPIDURAL; INFILTRATION; INTRACAUDAL; PERINEURAL AS NEEDED
Status: DISCONTINUED | OUTPATIENT
Start: 2024-06-21 | End: 2024-06-21 | Stop reason: SURG

## 2024-06-21 RX ORDER — HYDROCODONE BITARTRATE AND ACETAMINOPHEN 5; 325 MG/1; MG/1
1 TABLET ORAL ONCE AS NEEDED
Status: DISCONTINUED | OUTPATIENT
Start: 2024-06-21 | End: 2024-06-21 | Stop reason: HOSPADM

## 2024-06-21 RX ORDER — SODIUM CHLORIDE 9 MG/ML
INJECTION, SOLUTION INTRAVENOUS CONTINUOUS PRN
Status: DISCONTINUED | OUTPATIENT
Start: 2024-06-21 | End: 2024-06-21 | Stop reason: SURG

## 2024-06-21 RX ORDER — SODIUM CHLORIDE, SODIUM LACTATE, POTASSIUM CHLORIDE, CALCIUM CHLORIDE 600; 310; 30; 20 MG/100ML; MG/100ML; MG/100ML; MG/100ML
INJECTION, SOLUTION INTRAVENOUS CONTINUOUS
Status: DISCONTINUED | OUTPATIENT
Start: 2024-06-21 | End: 2024-06-21 | Stop reason: HOSPADM

## 2024-06-21 RX ORDER — HYDROCODONE BITARTRATE AND ACETAMINOPHEN 5; 325 MG/1; MG/1
1 TABLET ORAL EVERY 4 HOURS PRN
Status: DISCONTINUED | OUTPATIENT
Start: 2024-06-21 | End: 2024-06-29

## 2024-06-21 RX ORDER — ACETAMINOPHEN 325 MG/1
650 TABLET ORAL EVERY 4 HOURS PRN
Status: DISCONTINUED | OUTPATIENT
Start: 2024-06-21 | End: 2024-06-29

## 2024-06-21 RX ORDER — HEPARIN SODIUM AND DEXTROSE 10000; 5 [USP'U]/100ML; G/100ML
500 INJECTION INTRAVENOUS CONTINUOUS
Status: DISCONTINUED | OUTPATIENT
Start: 2024-06-22 | End: 2024-06-26

## 2024-06-21 RX ORDER — DEXAMETHASONE SODIUM PHOSPHATE 4 MG/ML
VIAL (ML) INJECTION AS NEEDED
Status: DISCONTINUED | OUTPATIENT
Start: 2024-06-21 | End: 2024-06-21 | Stop reason: SURG

## 2024-06-21 RX ORDER — HEPARIN SODIUM AND DEXTROSE 10000; 5 [USP'U]/100ML; G/100ML
500 INJECTION INTRAVENOUS ONCE
Status: COMPLETED | OUTPATIENT
Start: 2024-06-21 | End: 2024-06-21

## 2024-06-21 RX ORDER — NALOXONE HYDROCHLORIDE 0.4 MG/ML
0.08 INJECTION, SOLUTION INTRAMUSCULAR; INTRAVENOUS; SUBCUTANEOUS AS NEEDED
Status: DISCONTINUED | OUTPATIENT
Start: 2024-06-21 | End: 2024-06-21 | Stop reason: HOSPADM

## 2024-06-21 RX ORDER — HYDROMORPHONE HYDROCHLORIDE 1 MG/ML
0.4 INJECTION, SOLUTION INTRAMUSCULAR; INTRAVENOUS; SUBCUTANEOUS EVERY 5 MIN PRN
Status: DISCONTINUED | OUTPATIENT
Start: 2024-06-21 | End: 2024-06-21 | Stop reason: HOSPADM

## 2024-06-21 RX ORDER — MEPERIDINE HYDROCHLORIDE 25 MG/ML
25 INJECTION INTRAMUSCULAR; INTRAVENOUS; SUBCUTANEOUS AS NEEDED
Status: DISCONTINUED | OUTPATIENT
Start: 2024-06-21 | End: 2024-06-21 | Stop reason: HOSPADM

## 2024-06-21 RX ORDER — METOPROLOL TARTRATE 1 MG/ML
2.5 INJECTION, SOLUTION INTRAVENOUS ONCE
Status: DISCONTINUED | OUTPATIENT
Start: 2024-06-21 | End: 2024-06-21 | Stop reason: HOSPADM

## 2024-06-21 RX ADMIN — SODIUM CHLORIDE: 9 INJECTION, SOLUTION INTRAVENOUS at 12:39:00

## 2024-06-21 RX ADMIN — DEXAMETHASONE SODIUM PHOSPHATE 8 MG: 4 MG/ML VIAL (ML) INJECTION at 12:53:00

## 2024-06-21 RX ADMIN — INSULIN DEGLUDEC 20 UNITS: 100 INJECTION, SOLUTION SUBCUTANEOUS at 11:30:00

## 2024-06-21 RX ADMIN — EPHEDRINE SULFATE 10 MG: 50 INJECTION INTRAVENOUS at 13:08:00

## 2024-06-21 RX ADMIN — PREDNISONE 5 MG: 5 TABLET ORAL at 21:04:00

## 2024-06-21 RX ADMIN — PHENYLEPHRINE HCL 50 MCG: 10 MG/ML VIAL (ML) INJECTION at 12:58:00

## 2024-06-21 RX ADMIN — LIDOCAINE HYDROCHLORIDE 5 ML: 10 INJECTION, SOLUTION EPIDURAL; INFILTRATION; INTRACAUDAL; PERINEURAL at 12:44:00

## 2024-06-21 RX ADMIN — ACETAMINOPHEN 1000 MG: 10 INJECTION, SOLUTION INTRAVENOUS at 13:11:00

## 2024-06-21 RX ADMIN — MAGNESIUM OXIDE 400 MG: 400 TABLET ORAL at 17:56:00

## 2024-06-21 RX ADMIN — MORPHINE SULFATE 4 MG: 4 INJECTION, SOLUTION INTRAMUSCULAR; INTRAVENOUS at 11:30:00

## 2024-06-21 RX ADMIN — ATENOLOL 50 MG: 50 TABLET ORAL at 05:11:00

## 2024-06-21 RX ADMIN — ONDANSETRON 4 MG: 2 INJECTION INTRAMUSCULAR; INTRAVENOUS at 12:53:00

## 2024-06-21 RX ADMIN — FAMOTIDINE 20 MG: 10 INJECTION, SOLUTION INTRAVENOUS at 11:30:00

## 2024-06-21 RX ADMIN — SODIUM CHLORIDE: 9 INJECTION, SOLUTION INTRAVENOUS at 13:34:00

## 2024-06-21 RX ADMIN — ROSUVASTATIN CALCIUM 10 MG: 10 TABLET, COATED ORAL at 20:55:00

## 2024-06-21 RX ADMIN — HEPARIN SODIUM AND DEXTROSE 500 UNITS: 10000; 5 INJECTION INTRAVENOUS at 19:06:00

## 2024-06-21 RX ADMIN — PHENYLEPHRINE HCL 100 MCG: 10 MG/ML VIAL (ML) INJECTION at 13:03:00

## 2024-06-21 RX ADMIN — SODIUM CHLORIDE, SODIUM LACTATE, POTASSIUM CHLORIDE, CALCIUM CHLORIDE: 600; 310; 30; 20 INJECTION, SOLUTION INTRAVENOUS at 14:00:00

## 2024-06-21 NOTE — BRIEF OP NOTE
Pre-Operative Diagnosis: postoperative hematoma     Post-Operative Diagnosis: same     Procedure Performed:   Evacuation of hematoma and pulse lavage  Debridement of skin and subcutaneous fat with knife 3x4 cm   Adaptic layer and wound vac placement      Florian    Assistant(s):        Surgical Findings:   Muscle flap secure   Necrotic skin on lateral edge debrided      Specimen: none      Estimated Blood Loss: 50 mL     Yariel Du MD  6/21/2024  1:43 PM

## 2024-06-21 NOTE — PLAN OF CARE
Assumed care at 0730  Patient alert, oriented x4  VSS, RA throughout shift, NSR on tele  Pedal pulses palpable  Pain controlled by PRN morphine and Norco  R groin dressing changed x2  R JILLIAN with minimal sanguineous output  Up SB assist and walker, ambulating in halls  Tolerating diet well  Voiding in bathroom   Call light within reach     Plan for washout tomorrow with Dr. Du   Patient and son Yoni updated on plan of care

## 2024-06-21 NOTE — PROGRESS NOTES
Assumed care @ 1930.    Pt a/o x4, VSS.  Tele SR.    No acute respiratory distress noted.    Denies any pain.    Pt ambulated to the bathroom and chair/bed with walker and 1 person assist.  (-) BM.  Voids per toilet.  Lt groin with dressing intact  Rt groin incision with oozing blood - dressing changed x2 with gauze, abd pad, and tape.  JILLIAN drain on Rt groin with bloody output.  Plan for Rt hematoma washout @ 1130 AM with Dr. Du.  Heparin gtt infusing @ 5 mL/hr w/o titration.  Pt NPO since MN, except sips with Med @ 0500.  No other complaints made.    Will continue to monitor.    Addendum  Heparin gtt stopped @ 0600.

## 2024-06-21 NOTE — ANESTHESIA POSTPROCEDURE EVALUATION
[facility]    Emilie John Patient Status:  Inpatient   Age/Gender 69 year old female MRN [id number]   Location [facility] POST ANESTHESIA CARE UNIT Attending Yariel Du MD   Hosp Day # 7 PCP Tay Alexandra MD       Anesthesia Post-op Note    RIGHT GROIN IRRIGATION AND DEBRIDMENT WITH KERECIS. WOUND VAC PLACEMENT    Procedure Summary       Date: 06/21/24 [facility]    Anesthesia Start: 1239 Anesthesia Stop: 1353    Procedure: RIGHT GROIN IRRIGATION AND DEBRIDMENT WITH KERECIS. WOUND VAC PLACEMENT (Right: Groin) Diagnosis: (same)    Surgeons: Yariel Du MD Anesthesiologist: Roberto Carlos Torres MD    Anesthesia Type: general ASA Status: 3            Anesthesia Type: general    Vitals Value Taken Time   /56 06/21/24 1354   Temp 96.5 06/21/24 1354   Pulse 73 06/21/24 1354   Resp 16 06/21/24 1354   SpO2 100% 06/21/24 1354       Patient Location: PACU    Anesthesia Type: general    Airway Patency: patent and extubated    Postop Pain Control: adequate    Mental Status: preanesthetic baseline    Nausea/Vomiting: none    Cardiopulmonary/Hydration status: stable euvolemic    Complications: no apparent anesthesia related complications    Postop vital signs: stable    Dental Exam: Unchanged from Preop    Patient to be discharged from PACU when criteria met.

## 2024-06-21 NOTE — ANESTHESIA PROCEDURE NOTES
Airway  Date/Time: 6/21/2024 12:46 PM  Urgency: elective    Airway not difficult    General Information and Staff    Patient location during procedure: OR  Anesthesiologist: Roberto Carlos Torres MD  Performed: anesthesiologist   Performed by: Roberto Carlos Torres MD  Authorized by: Roberto Carlos Torres MD      Indications and Patient Condition  Indications for airway management: anesthesia  Spontaneous Ventilation: absent  Sedation level: deep  Preoxygenated: yes  Patient position: sniffing  MILS maintained throughout  Mask difficulty assessment: 1 - vent by mask  No planned trial extubation    Final Airway Details  Final airway type: supraglottic airway      Successful airway: classic  Size 4       Number of attempts at approach: 1

## 2024-06-21 NOTE — PROGRESS NOTES
[facility] AND [facility]   part of [facility]     Progress Note  Emilie John Patient Status:  Inpatient    1955 MRN [id number]   Location [facility] 7NE-A Attending Yariel Du MD   Hosp Day # 7 PCP Tay Alexandra MD       SEE ATTENDING NOTE AT BOTTOM OF PAGE    Is this a shared or split note between Advanced Practice Provider and Physician? Yes    Assessment and Plan:    Patient is a 69 year old female with PMH sig for HTN, HLD, DM2, CKD, RA on prednisone, PAD here for schedueld vascular procedure     Impression     -left iliac chronic occlusion with claudication sp balloon angioplasty and stent of left common iliac artery and external iliac artery   -right common femoral artery occlusion sp thrombectomy, balloon angioplasty, stent; thrombectomy of left SFA and left anterior tibial artery, balloon angioplasty of left TP trunk and posterior tibial   -post op pain  -post op leukocytosis  -large R inguinal region pseudoaneurysm 6/15 noted     -post op shock, on pressors, possibly hemorrhagic  -left groin hematoma  -anemia     -DM2, uncontrolled - A1c 12s     -nicotine dependence, smokes 1ppd     -HTN  -HLD     -RA on chronic prednisone  -CKD 3, baseline Cr ~1.2     Plan     *vascular  -Went back to OR 6/15 for R groin pseudoaneurysm s/p ligation of femoral artery branch with sartorius muscle myoplasty.  -hep gtt  -able to ambulate per vascular  -to OR for washout 1130 am today    *post op hypotension / shock, resolved  -s/p iv hydrocortisone, restart home po prednisone  -supportive mgmt with prbc, received 6/15 and now trending hgb     *anemia  -preop hgb 13.9, trending postop anemia, has received several prbc     *thrombocytopenia- likely 2/2 blood loss, trend plts     *DM 2  -follows with endo as outpt, recently glipizide was increased  -ISS + tresiba 10 units     *HTN hx  -restart home meds (at home on atenolol, hydrochlorothiazide, norvasc, losartan)      *HLD  -statin     DVT proph  On heparin     GI ppx  -pepcid    PCP: Tay Alexandra MD    Concerns regarding plan of care were discussed with patient. Patient agrees with plan as detailed above. Discussed plan of care with Dr. Muller    Note: This chart was prepared using voice recognition software and may contain unintended word substitution errors.          Gurvinder WESTBROOK  [facility] Hospitalist Team  Contact via [employer] and Bubble (Check Availability)  6/21/2024             SUBJECTIVE:   In a chair, says her groin is in pain.  Denies cp/sob, n/v, f/c.   To OR today for washout.               OBJECTIVE:   Blood pressure 133/75, pulse 66, temperature 98.1 °F (36.7 °C), temperature source Oral, resp. rate 18, height 5' 1\" (1.549 m), weight 164 lb 10.9 oz (74.7 kg), SpO2 99%, not currently breastfeeding.    GENERAL: no apparent distress  NEURO: A/A Ox3  RESP: non labored, CTA  CARDIO: Regular, no murmur  ABD: soft, NT, ND, BS+  EXTREMITIES:  no edema, no calf tenderness, pulse per doppler, left groin with gauze and tegaderm    DIAGNOSTIC DATA:   Labs:     Recent Labs   Lab 06/15/24  2127 06/16/24 0420 06/17/24  0349 06/18/24  0447 06/19/24  0532 06/20/24  0617   WBC 23.3* 21.4* 19.5* 14.8* 16.5* 17.2*   HGB 12.2 11.8* 11.6* 10.8* 11.4* 11.2*   MCV 87.1 87.7 86.4 84.4 85.5 88.4   PLT 99.0* 99.0* 132.0* 146.0* 194.0 254.0   INR 1.12  --   --   --   --   --        Recent Labs   Lab 06/16/24  0420 06/17/24 0349 06/17/24  1243 06/18/24  0447 06/18/24  1909 06/19/24  0532 06/20/24  0617    141  --  139  --  135* 138   K 4.3 3.2* 3.4* 3.1*  3.1* 3.9 3.9 3.8   * 115*  --  111  --  105 106   CO2 15.0* 18.0*  --  22.0  --  23.0 22.0   BUN 27* 26*  --  20  --  18 16   CREATSERUM 1.85* 1.44*  --  0.95  --  0.95 0.87   CA 7.8* 7.4*  --  7.7*  --  8.0* 8.3*   MG 1.6 1.9  --  1.8  --  2.0 2.0   * 99  --  84  --  117* 91       Recent Labs   Lab 06/16/24  0420 06/17/24  0349  06/18/24  0447 06/19/24  0532 06/20/24  0617   ALT 12* 10* 6* 11* 20   AST 42* 52* 32 37 37   ALB 1.5* 1.7* 1.6* 2.0* 2.1*       Recent Labs   Lab 06/20/24  0530 06/20/24  1134 06/20/24  1642 06/20/24  2111 06/21/24  0510   PGLU 96 261* 206* 275* 93       No results for input(s): \"TROP\" in the last 168 hours.      MEDICATIONS      Current Facility-Administered Medications   Medication Dose Route Frequency    predniSONE (Deltasone) tab 5 mg  5 mg Oral BID    amLODIPine (Norvasc) tab 10 mg  10 mg Oral Daily    losartan (Cozaar) tab 100 mg  100 mg Oral Daily    atenolol (Tenormin) tab 50 mg  50 mg Oral Daily Beta Blocker    ondansetron (Zofran) 4 MG/2ML injection 4 mg  4 mg Intravenous Q6H PRN    famotidine (Pepcid) tab 20 mg  20 mg Oral Daily    Or    famotidine (Pepcid) 20 mg/2mL injection 20 mg  20 mg Intravenous Daily    insulin aspart (NovoLOG) 100 Units/mL FlexPen 4-20 Units  4-20 Units Subcutaneous TID AC and HS    insulin degludec (Tresiba) 100 units/mL flextouch 20 Units  20 Units Subcutaneous Daily    aspirin chewable tab 81 mg  81 mg Oral Daily    rosuvastatin (Crestor) tab 10 mg  10 mg Oral Nightly    HYDROcodone-acetaminophen (Norco)  MG per tab 1 tablet  1 tablet Oral Q6H PRN    Or    HYDROcodone-acetaminophen (Norco)  MG per tab 2 tablet  2 tablet Oral Q6H PRN    morphINE PF 2 MG/ML injection 2 mg  2 mg Intravenous Q2H PRN    Or    morphINE PF 4 MG/ML injection 4 mg  4 mg Intravenous Q2H PRN    Or    morphINE PF 4 MG/ML injection 6 mg  6 mg Intravenous Q2H PRN    clopidogrel (Plavix) tab 75 mg  75 mg Oral Daily    glucose (Dex4) 15 GM/59ML oral liquid 15 g  15 g Oral Q15 Min PRN    Or    glucose (Glutose) 40% oral gel 15 g  15 g Oral Q15 Min PRN    Or    glucose-vitamin C (Dex-4) chewable tab 4 tablet  4 tablet Oral Q15 Min PRN    Or    dextrose 50% injection 50 mL  50 mL Intravenous Q15 Min PRN    Or    glucose (Dex4) 15 GM/59ML oral liquid 30 g  30 g Oral Q15 Min PRN    Or    glucose  (Glutose) 40% oral gel 30 g  30 g Oral Q15 Min PRN    Or    glucose-vitamin C (Dex-4) chewable tab 8 tablet  8 tablet Oral Q15 Min PRN    norepinephrine (Levophed) 4 mg/250mL infusion premix  0.5-30 mcg/min Intravenous Continuous                  IMAGING     No results found.      SEE ATTENDING NOTE BELOW:

## 2024-06-22 LAB
ALBUMIN SERPL-MCNC: 2.2 G/DL (ref 3.4–5)
ALBUMIN/GLOB SERPL: 0.5 {RATIO} (ref 1–2)
ALP LIVER SERPL-CCNC: 73 U/L
ALT SERPL-CCNC: 20 U/L
ANION GAP SERPL CALC-SCNC: 7 MMOL/L (ref 0–18)
AST SERPL-CCNC: 20 U/L (ref 15–37)
BILIRUB SERPL-MCNC: 1 MG/DL (ref 0.1–2)
BUN BLD-MCNC: 18 MG/DL (ref 9–23)
CALCIUM BLD-MCNC: 8.7 MG/DL (ref 8.5–10.1)
CHLORIDE SERPL-SCNC: 104 MMOL/L (ref 98–112)
CO2 SERPL-SCNC: 22 MMOL/L (ref 21–32)
CREAT BLD-MCNC: 0.94 MG/DL
EGFRCR SERPLBLD CKD-EPI 2021: 66 ML/MIN/1.73M2 (ref 60–?)
ERYTHROCYTE [DISTWIDTH] IN BLOOD BY AUTOMATED COUNT: 16.1 %
GLOBULIN PLAS-MCNC: 4.4 G/DL (ref 2.8–4.4)
GLUCOSE BLD-MCNC: 150 MG/DL (ref 70–99)
GLUCOSE BLD-MCNC: 160 MG/DL (ref 70–99)
GLUCOSE BLD-MCNC: 190 MG/DL (ref 70–99)
GLUCOSE BLD-MCNC: 268 MG/DL (ref 70–99)
GLUCOSE BLD-MCNC: 316 MG/DL (ref 70–99)
HCT VFR BLD AUTO: 30.8 %
HGB BLD-MCNC: 10.3 G/DL
MAGNESIUM SERPL-MCNC: 2.1 MG/DL (ref 1.6–2.6)
MCH RBC QN AUTO: 29.6 PG (ref 26–34)
MCHC RBC AUTO-ENTMCNC: 33.4 G/DL (ref 31–37)
MCV RBC AUTO: 88.5 FL
OSMOLALITY SERPL CALC.SUM OF ELEC: 281 MOSM/KG (ref 275–295)
PLATELET # BLD AUTO: 381 10(3)UL (ref 150–450)
POTASSIUM SERPL-SCNC: 4.6 MMOL/L (ref 3.5–5.1)
PROT SERPL-MCNC: 6.6 G/DL (ref 6.4–8.2)
RBC # BLD AUTO: 3.48 X10(6)UL
SODIUM SERPL-SCNC: 133 MMOL/L (ref 136–145)
WBC # BLD AUTO: 17.5 X10(3) UL (ref 4–11)

## 2024-06-22 PROCEDURE — 82962 GLUCOSE BLOOD TEST: CPT

## 2024-06-22 PROCEDURE — 80053 COMPREHEN METABOLIC PANEL: CPT | Performed by: SURGERY

## 2024-06-22 PROCEDURE — 85027 COMPLETE CBC AUTOMATED: CPT | Performed by: SURGERY

## 2024-06-22 PROCEDURE — 83735 ASSAY OF MAGNESIUM: CPT | Performed by: SURGERY

## 2024-06-22 RX ADMIN — PREDNISONE 5 MG: 5 TABLET ORAL at 21:04:00

## 2024-06-22 RX ADMIN — ASPIRIN 81 MG: 81 TABLET, CHEWABLE ORAL at 09:58:00

## 2024-06-22 RX ADMIN — FAMOTIDINE 20 MG: 20 TABLET, FILM COATED ORAL at 09:58:00

## 2024-06-22 RX ADMIN — HEPARIN SODIUM AND DEXTROSE 500 UNITS/HR: 10000; 5 INJECTION INTRAVENOUS at 09:58:00

## 2024-06-22 RX ADMIN — HYDROCODONE BITARTRATE AND ACETAMINOPHEN 2 TABLET: 5; 325 TABLET ORAL at 22:32:00

## 2024-06-22 RX ADMIN — ROSUVASTATIN CALCIUM 10 MG: 10 TABLET, COATED ORAL at 21:04:00

## 2024-06-22 RX ADMIN — INSULIN DEGLUDEC 20 UNITS: 100 INJECTION, SOLUTION SUBCUTANEOUS at 12:23:00

## 2024-06-22 RX ADMIN — AMLODIPINE BESYLATE 10 MG: 10 TABLET ORAL at 09:58:00

## 2024-06-22 RX ADMIN — LOSARTAN POTASSIUM 100 MG: 100 TABLET ORAL at 09:58:00

## 2024-06-22 RX ADMIN — HYDROCODONE BITARTRATE AND ACETAMINOPHEN 1 TABLET: 5; 325 TABLET ORAL at 17:57:00

## 2024-06-22 RX ADMIN — HYDROCODONE BITARTRATE AND ACETAMINOPHEN 1 TABLET: 5; 325 TABLET ORAL at 06:00:00

## 2024-06-22 RX ADMIN — CLOPIDOGREL BISULFATE 75 MG: 75 TABLET ORAL at 09:58:00

## 2024-06-22 RX ADMIN — ATENOLOL 50 MG: 50 TABLET ORAL at 06:00:00

## 2024-06-22 RX ADMIN — PREDNISONE 5 MG: 5 TABLET ORAL at 09:58:00

## 2024-06-22 NOTE — PROGRESS NOTES
[facility]   part of [facility]     Progress Note  Emilie John Patient Status:  Inpatient    1955 MRN [id number]   Location [facility] 7NE-A Attending Yariel Du MD   Hosp Day # 8 PCP Tay Alexandra MD          Impression     -left iliac chronic occlusion with claudication sp balloon angioplasty and stent of left common iliac artery and external iliac artery   -right common femoral artery occlusion sp thrombectomy, balloon angioplasty, stent; thrombectomy of left SFA and left anterior tibial artery, balloon angioplasty of left TP trunk and posterior tibial   -post op pain  -post op leukocytosis  -large R inguinal region pseudoaneurysm 6/15 noted     -post op shock, on pressors, possibly hemorrhagic  -left groin hematoma  -anemia     -DM2, uncontrolled - A1c 12s     -nicotine dependence, smokes 1ppd     -HTN  -HLD     -RA on chronic prednisone  -CKD 3, baseline Cr ~1.2     Plan     *vascular  -Went back to OR 6/15 for R groin pseudoaneurysm s/p ligation of femoral artery branch with sartorius muscle myoplasty.  -hep gtt  -able to ambulate per vascular  -to OR for evacuation of hematoma and pulse lavage 24 w/ wound vac placement    *post op hypotension / shock, resolved  -s/p iv hydrocortisone, restart home po prednisone  -supportive mgmt with prbc, received 6/15 and now trending hgb     *anemia  -preop hgb 13.9 >> cont to trend  -hgb today 10.3 >> will monitor  Sp - received several prbc tx     *thrombocytopenia- likely 2/2 blood loss, trend plts     *DM 2  -follows with endo as outpt, recently glipizide was increased  -ISS + tresiba 10 units     *HTN hx  -restart home meds (at home on atenolol, hydrochlorothiazide, norvasc, losartan)     *HLD  -statin     DVT proph  On heparin     GI ppx  -pepcid    PCP: Tay Alexandra MD      DISPO:  Cont inpt  Will follow      Thank you for allowing me to participate in the care of this patient.  I will be  following the patient while she is in the hospital.        Lata Muller MD  [facility]  Pager [phone]  Answering Service number: [phone]                 SUBJECTIVE:   In a chair, says her groin is in pain.  Denies cp/sob, n/v, f/c.   To OR today for washout.               OBJECTIVE:   Blood pressure 124/54, pulse 62, temperature 97.8 °F (36.6 °C), temperature source Oral, resp. rate 18, height 5' 1\" (1.549 m), weight 164 lb 10.9 oz (74.7 kg), SpO2 100%, not currently breastfeeding.    GENERAL: no apparent distress  NEURO: A/A Ox3  RESP: non labored, CTA  CARDIO: Regular, no murmur  ABD: soft, NT, ND, BS+  EXTREMITIES:  no edema, no calf tenderness, pulse per doppler, left groin with gauze and tegaderm    DIAGNOSTIC DATA:   Labs:     Recent Labs   Lab 06/15/24  2127 06/16/24  0420 06/18/24 0447 06/19/24  0532 06/20/24  0617 06/21/24  1115 06/22/24  0550   WBC 23.3*   < > 14.8* 16.5* 17.2* 18.2* 17.5*   HGB 12.2   < > 10.8* 11.4* 11.2* 11.0* 10.3*   MCV 87.1   < > 84.4 85.5 88.4 90.6 88.5   PLT 99.0*   < > 146.0* 194.0 254.0 296.0 381.0   INR 1.12  --   --   --   --   --   --     < > = values in this interval not displayed.       Recent Labs   Lab 06/18/24 0447 06/18/24  1909 06/19/24  0532 06/20/24  0617 06/21/24  1115 06/22/24  0550     --  135* 138 134* 133*   K 3.1*  3.1* 3.9 3.9 3.8 4.5  4.5 4.6     --  105 106 106 104   CO2 22.0  --  23.0 22.0 21.0 22.0   BUN 20  --  18 16 17 18   CREATSERUM 0.95  --  0.95 0.87 1.01 0.94   CA 7.7*  --  8.0* 8.3* 8.5 8.7   MG 1.8  --  2.0 2.0 1.8 2.1   GLU 84  --  117* 91 98 150*       Recent Labs   Lab 06/18/24  0447 06/19/24  0532 06/20/24  0617 06/21/24  1115 06/22/24  0550   ALT 6* 11* 20 23 20   AST 32 37 37 38* 20   ALB 1.6* 2.0* 2.1* 2.1* 2.2*       Recent Labs   Lab 06/21/24  1148 06/21/24  1400 06/21/24  1644 06/21/24  2054 06/22/24  0540   PGLU 112* 97 128* 350* 160*       No results for input(s): \"TROP\" in the last 168  hours.      MEDICATIONS      Current Facility-Administered Medications   Medication Dose Route Frequency    acetaminophen (Tylenol) tab 650 mg  650 mg Oral Q4H PRN    Or    HYDROcodone-acetaminophen (Norco) 5-325 MG per tab 1 tablet  1 tablet Oral Q4H PRN    Or    HYDROcodone-acetaminophen (Norco) 5-325 MG per tab 2 tablet  2 tablet Oral Q4H PRN    ondansetron (Zofran) 4 MG/2ML injection 4 mg  4 mg Intravenous Q6H PRN    metoclopramide (Reglan) 5 mg/mL injection 5 mg  5 mg Intravenous Q8H PRN    heparin (Porcine) 25,000 Units/250mL infusion premix  500 Units/hr Intravenous Continuous    predniSONE (Deltasone) tab 5 mg  5 mg Oral BID    amLODIPine (Norvasc) tab 10 mg  10 mg Oral Daily    losartan (Cozaar) tab 100 mg  100 mg Oral Daily    atenolol (Tenormin) tab 50 mg  50 mg Oral Daily Beta Blocker    famotidine (Pepcid) tab 20 mg  20 mg Oral Daily    Or    famotidine (Pepcid) 20 mg/2mL injection 20 mg  20 mg Intravenous Daily    insulin aspart (NovoLOG) 100 Units/mL FlexPen 4-20 Units  4-20 Units Subcutaneous TID AC and HS    insulin degludec (Tresiba) 100 units/mL flextouch 20 Units  20 Units Subcutaneous Daily    aspirin chewable tab 81 mg  81 mg Oral Daily    rosuvastatin (Crestor) tab 10 mg  10 mg Oral Nightly    morphINE PF 2 MG/ML injection 2 mg  2 mg Intravenous Q2H PRN    Or    morphINE PF 4 MG/ML injection 4 mg  4 mg Intravenous Q2H PRN    Or    morphINE PF 4 MG/ML injection 6 mg  6 mg Intravenous Q2H PRN    clopidogrel (Plavix) tab 75 mg  75 mg Oral Daily    glucose (Dex4) 15 GM/59ML oral liquid 15 g  15 g Oral Q15 Min PRN    Or    glucose (Glutose) 40% oral gel 15 g  15 g Oral Q15 Min PRN    Or    glucose-vitamin C (Dex-4) chewable tab 4 tablet  4 tablet Oral Q15 Min PRN    Or    dextrose 50% injection 50 mL  50 mL Intravenous Q15 Min PRN    Or    glucose (Dex4) 15 GM/59ML oral liquid 30 g  30 g Oral Q15 Min PRN    Or    glucose (Glutose) 40% oral gel 30 g  30 g Oral Q15 Min PRN    Or    glucose-vitamin C  (Dex-4) chewable tab 8 tablet  8 tablet Oral Q15 Min PRN    norepinephrine (Levophed) 4 mg/250mL infusion premix  0.5-30 mcg/min Intravenous Continuous                  IMAGING     No results found.

## 2024-06-22 NOTE — PROGRESS NOTES
Vascular Surgery Progress Note    /60 (BP Location: Right arm)   Pulse 65   Temp 97.9 °F (36.6 °C) (Oral)   Resp 12   Ht 5' 1\" (1.549 m)   Wt 164 lb 10.9 oz (74.7 kg)   SpO2 90%   BMI 31.12 kg/m²     Recent Labs   Lab 06/20/24  0617 06/21/24  1115 06/22/24  0550   RBC 3.80 3.73* 3.48*   HGB 11.2* 11.0* 10.3*   HCT 33.6* 33.8* 30.8*   MCV 88.4 90.6 88.5   MCH 29.5 29.5 29.6   MCHC 33.3 32.5 33.4   RDW 15.5 16.3 16.1   WBC 17.2* 18.2* 17.5*   .0 296.0 381.0       Recent Labs   Lab 06/20/24  0617 06/21/24  1115 06/22/24  0550   GLU 91 98 150*   BUN 16 17 18   CREATSERUM 0.87 1.01 0.94   CA 8.3* 8.5 8.7    134* 133*   K 3.8 4.5  4.5 4.6    106 104   CO2 22.0 21.0 22.0       Patient seen and examined.  Resting comfortably.  Denies any pain.  Wound VAC holding suction with no significant bleeding.  Will remain on heparin drip.  Will reassess her on Monday. Ok to ambulate with PT. Encouraged increased oral intake.    Howard Taylor MD  [facility]  Vascular Surgery

## 2024-06-22 NOTE — PLAN OF CARE
Assumed care 0730  A+Ox4, RA, SR on tele  NPO in AM  Procedure @ noon  Back on floor in afternoon   -VSS  Heparin GTT started per order   -500 units per hour, no titration   -see nursing communication  Wound vac in place   -black foam, -125   -serosang drainage in canister   -marked  Call light in place, needs addressed

## 2024-06-22 NOTE — PROGRESS NOTES
Assumed care @ 1930.    Pt a/o x4, VSS.  Tele SR.    No acute respiratory distress noted.    Denies any pain.    Bilateral pedal pulses per doppler.  Pt on bedrest for next 4 days - post-op.  BM.  Mike in place.  Rt groin wound vac in place - small amount of blood < 50 mL in canister noted.  No wound vac complications noted.  Heparin gtt infusing @ 500 u/hr (5 mL/hr), without titration or PTT per vasc sx.  No other complaints made.    Will continue to monitor.

## 2024-06-22 NOTE — PHYSICAL THERAPY NOTE
PT attempt: per HANNAH Douglas MD ordered strict bedrest until 6/25/24. Will follow up when patient is allowed to get OOB and ambulate.

## 2024-06-23 LAB
ALBUMIN SERPL-MCNC: 2.1 G/DL (ref 3.4–5)
ALBUMIN/GLOB SERPL: 0.5 {RATIO} (ref 1–2)
ALP LIVER SERPL-CCNC: 70 U/L
ALT SERPL-CCNC: 19 U/L
ANION GAP SERPL CALC-SCNC: 7 MMOL/L (ref 0–18)
AST SERPL-CCNC: 20 U/L (ref 15–37)
BILIRUB SERPL-MCNC: 0.9 MG/DL (ref 0.1–2)
BUN BLD-MCNC: 23 MG/DL (ref 9–23)
CALCIUM BLD-MCNC: 8.8 MG/DL (ref 8.5–10.1)
CHLORIDE SERPL-SCNC: 108 MMOL/L (ref 98–112)
CO2 SERPL-SCNC: 20 MMOL/L (ref 21–32)
CREAT BLD-MCNC: 1.02 MG/DL
EGFRCR SERPLBLD CKD-EPI 2021: 60 ML/MIN/1.73M2 (ref 60–?)
ERYTHROCYTE [DISTWIDTH] IN BLOOD BY AUTOMATED COUNT: 16 %
GLOBULIN PLAS-MCNC: 4.3 G/DL (ref 2.8–4.4)
GLUCOSE BLD-MCNC: 135 MG/DL (ref 70–99)
GLUCOSE BLD-MCNC: 152 MG/DL (ref 70–99)
GLUCOSE BLD-MCNC: 157 MG/DL (ref 70–99)
GLUCOSE BLD-MCNC: 281 MG/DL (ref 70–99)
GLUCOSE BLD-MCNC: 71 MG/DL (ref 70–99)
HCT VFR BLD AUTO: 32.4 %
HGB BLD-MCNC: 10.5 G/DL
MAGNESIUM SERPL-MCNC: 1.9 MG/DL (ref 1.6–2.6)
MCH RBC QN AUTO: 29.5 PG (ref 26–34)
MCHC RBC AUTO-ENTMCNC: 32.4 G/DL (ref 31–37)
MCV RBC AUTO: 91 FL
OSMOLALITY SERPL CALC.SUM OF ELEC: 286 MOSM/KG (ref 275–295)
PLATELET # BLD AUTO: 415 10(3)UL (ref 150–450)
POTASSIUM SERPL-SCNC: 4.5 MMOL/L (ref 3.5–5.1)
PROT SERPL-MCNC: 6.4 G/DL (ref 6.4–8.2)
RBC # BLD AUTO: 3.56 X10(6)UL
SODIUM SERPL-SCNC: 135 MMOL/L (ref 136–145)
WBC # BLD AUTO: 15.1 X10(3) UL (ref 4–11)

## 2024-06-23 PROCEDURE — 83735 ASSAY OF MAGNESIUM: CPT | Performed by: SURGERY

## 2024-06-23 PROCEDURE — 97164 PT RE-EVAL EST PLAN CARE: CPT

## 2024-06-23 PROCEDURE — 97116 GAIT TRAINING THERAPY: CPT

## 2024-06-23 PROCEDURE — 97530 THERAPEUTIC ACTIVITIES: CPT

## 2024-06-23 PROCEDURE — 85027 COMPLETE CBC AUTOMATED: CPT | Performed by: SURGERY

## 2024-06-23 PROCEDURE — 80053 COMPREHEN METABOLIC PANEL: CPT | Performed by: SURGERY

## 2024-06-23 PROCEDURE — 82962 GLUCOSE BLOOD TEST: CPT

## 2024-06-23 RX ADMIN — ATENOLOL 50 MG: 50 TABLET ORAL at 05:39:00

## 2024-06-23 RX ADMIN — HEPARIN SODIUM AND DEXTROSE 500 UNITS/HR: 10000; 5 INJECTION INTRAVENOUS at 19:00:00

## 2024-06-23 RX ADMIN — PREDNISONE 5 MG: 5 TABLET ORAL at 09:18:00

## 2024-06-23 RX ADMIN — ROSUVASTATIN CALCIUM 10 MG: 10 TABLET, COATED ORAL at 21:17:00

## 2024-06-23 RX ADMIN — AMLODIPINE BESYLATE 10 MG: 10 TABLET ORAL at 09:18:00

## 2024-06-23 RX ADMIN — PREDNISONE 5 MG: 5 TABLET ORAL at 21:17:00

## 2024-06-23 RX ADMIN — LOSARTAN POTASSIUM 100 MG: 100 TABLET ORAL at 09:18:00

## 2024-06-23 RX ADMIN — CLOPIDOGREL BISULFATE 75 MG: 75 TABLET ORAL at 09:18:00

## 2024-06-23 RX ADMIN — INSULIN DEGLUDEC 20 UNITS: 100 INJECTION, SOLUTION SUBCUTANEOUS at 09:20:00

## 2024-06-23 RX ADMIN — ASPIRIN 81 MG: 81 TABLET, CHEWABLE ORAL at 09:18:00

## 2024-06-23 RX ADMIN — FAMOTIDINE 20 MG: 20 TABLET, FILM COATED ORAL at 09:18:00

## 2024-06-23 NOTE — CM/SW NOTE
SW received order for wound vac. Referral sent to [facility] in [place] for wound vac. CHARBEL will continue to follow.     SHAYY Kelly  Discharge Planner

## 2024-06-23 NOTE — PROGRESS NOTES
[facility] AND [facility]   part of [facility]     Progress Note  Emilie John Patient Status:  Inpatient    1955 MRN [id number]   Location [facility] 7NE-A Attending Yariel Cullen MD   Hosp Day # 9 PCP Tay Alexandra MD          Impression     -left iliac chronic occlusion with claudication sp balloon angioplasty and stent of left common iliac artery and external iliac artery   -right common femoral artery occlusion sp thrombectomy, balloon angioplasty, stent; thrombectomy of left SFA and left anterior tibial artery, balloon angioplasty of left TP trunk and posterior tibial   -post op pain  -post op leukocytosis  -large R inguinal region pseudoaneurysm 6/15 noted     -post op shock, on pressors, possibly hemorrhagic  -left groin hematoma  -anemia     -DM2, uncontrolled - A1c 12s     -nicotine dependence, smokes 1ppd     -HTN  -HLD     -RA on chronic prednisone  -CKD 3, baseline Cr ~1.2     Plan     *vascular  -Went back to OR 6/15 for R groin pseudoaneurysm s/p ligation of femoral artery branch with sartorius muscle myoplasty.  -hep gtt  -able to ambulate per vascular  -to OR for evacuation of hematoma and pulse lavage 24 w/ wound vac placement  -plan for reassessment Monday by vascular   -OK to ambulate with PT >> dw dr cullen over the phone    *post op hypotension / shock, resolved  -s/p iv hydrocortisone, restart home po prednisone  -supportive mgmt with prbc, received 6/15 and now trending hgb     *anemia  -preop hgb 13.9 >> cont to trend  -hgb today 10.5 >> will monitor  Sp - received several prbc tx     *thrombocytopenia- likely 2/2 blood loss, trend plts     *DM 2  -follows with endo as outpt, recently glipizide was increased  -ISS + tresiba 10 units     *HTN hx  -restart home meds (at home on atenolol, hydrochlorothiazide, norvasc, losartan)     *HLD  -statin     DVT proph  On heparin     GI ppx  -pepcid    PCP: Tay Alexandra MD      DISPO:  Cont  inpt  Will follow      Thank you for allowing me to participate in the care of this patient.  I will be following the patient while she is in the hospital.        Lata Muller MD  [facility]  Pager [phone]  Answering Service number: [phone]                 SUBJECTIVE:   In a chair, says her groin is in pain.  Denies cp/sob, n/v, f/c.   To OR today for washout.               OBJECTIVE:   Blood pressure 139/62, pulse 61, temperature 98.6 °F (37 °C), temperature source Oral, resp. rate 16, height 5' 1\" (1.549 m), weight 164 lb 10.9 oz (74.7 kg), SpO2 100%, not currently breastfeeding.    GENERAL: no apparent distress  NEURO: A/A Ox3  RESP: non labored, CTA  CARDIO: Regular, no murmur  ABD: soft, NT, ND, BS+  EXTREMITIES:  no edema, no calf tenderness, pulse per doppler, left groin with gauze and tegaderm    DIAGNOSTIC DATA:   Labs:     Recent Labs   Lab 06/19/24  0532 06/20/24  0617 06/21/24  1115 06/22/24  0550 06/23/24  0602   WBC 16.5* 17.2* 18.2* 17.5* 15.1*   HGB 11.4* 11.2* 11.0* 10.3* 10.5*   MCV 85.5 88.4 90.6 88.5 91.0   .0 254.0 296.0 381.0 415.0       Recent Labs   Lab 06/19/24  0532 06/20/24  0617 06/21/24  1115 06/22/24  0550 06/23/24  0602   * 138 134* 133* 135*   K 3.9 3.8 4.5  4.5 4.6 4.5    106 106 104 108   CO2 23.0 22.0 21.0 22.0 20.0*   BUN 18 16 17 18 23   CREATSERUM 0.95 0.87 1.01 0.94 1.02   CA 8.0* 8.3* 8.5 8.7 8.8   MG 2.0 2.0 1.8 2.1 1.9   * 91 98 150* 135*       Recent Labs   Lab 06/19/24  0532 06/20/24  0617 06/21/24  1115 06/22/24  0550 06/23/24  0602   ALT 11* 20 23 20 19   AST 37 37 38* 20 20   ALB 2.0* 2.1* 2.1* 2.2* 2.1*       Recent Labs   Lab 06/22/24  0540 06/22/24  1225 06/22/24  1543 06/22/24  2100 06/23/24  0534   PGLU 160* 268* 190* 316* 157*       No results for input(s): \"TROP\" in the last 168 hours.      MEDICATIONS      Current Facility-Administered Medications   Medication Dose Route Frequency    acetaminophen (Tylenol) tab 650 mg   650 mg Oral Q4H PRN    Or    HYDROcodone-acetaminophen (Norco) 5-325 MG per tab 1 tablet  1 tablet Oral Q4H PRN    Or    HYDROcodone-acetaminophen (Norco) 5-325 MG per tab 2 tablet  2 tablet Oral Q4H PRN    ondansetron (Zofran) 4 MG/2ML injection 4 mg  4 mg Intravenous Q6H PRN    metoclopramide (Reglan) 5 mg/mL injection 5 mg  5 mg Intravenous Q8H PRN    heparin (Porcine) 25,000 Units/250mL infusion premix  500 Units/hr Intravenous Continuous    predniSONE (Deltasone) tab 5 mg  5 mg Oral BID    amLODIPine (Norvasc) tab 10 mg  10 mg Oral Daily    losartan (Cozaar) tab 100 mg  100 mg Oral Daily    atenolol (Tenormin) tab 50 mg  50 mg Oral Daily Beta Blocker    famotidine (Pepcid) tab 20 mg  20 mg Oral Daily    Or    famotidine (Pepcid) 20 mg/2mL injection 20 mg  20 mg Intravenous Daily    insulin aspart (NovoLOG) 100 Units/mL FlexPen 4-20 Units  4-20 Units Subcutaneous TID AC and HS    insulin degludec (Tresiba) 100 units/mL flextouch 20 Units  20 Units Subcutaneous Daily    aspirin chewable tab 81 mg  81 mg Oral Daily    rosuvastatin (Crestor) tab 10 mg  10 mg Oral Nightly    morphINE PF 2 MG/ML injection 2 mg  2 mg Intravenous Q2H PRN    Or    morphINE PF 4 MG/ML injection 4 mg  4 mg Intravenous Q2H PRN    Or    morphINE PF 4 MG/ML injection 6 mg  6 mg Intravenous Q2H PRN    clopidogrel (Plavix) tab 75 mg  75 mg Oral Daily    glucose (Dex4) 15 GM/59ML oral liquid 15 g  15 g Oral Q15 Min PRN    Or    glucose (Glutose) 40% oral gel 15 g  15 g Oral Q15 Min PRN    Or    glucose-vitamin C (Dex-4) chewable tab 4 tablet  4 tablet Oral Q15 Min PRN    Or    dextrose 50% injection 50 mL  50 mL Intravenous Q15 Min PRN    Or    glucose (Dex4) 15 GM/59ML oral liquid 30 g  30 g Oral Q15 Min PRN    Or    glucose (Glutose) 40% oral gel 30 g  30 g Oral Q15 Min PRN    Or    glucose-vitamin C (Dex-4) chewable tab 8 tablet  8 tablet Oral Q15 Min PRN    norepinephrine (Levophed) 4 mg/250mL infusion premix  0.5-30 mcg/min Intravenous  Continuous                  IMAGING     No results found.

## 2024-06-23 NOTE — PROGRESS NOTES
Assumed care @ 1930.    Pt a/o x4, VSS.  Tele SR/SB.  HR 50-60's overnight.  No acute respiratory distress noted.    Pedal pulses per doppler.  Heparin gtt infusing @ 500 u/hr w/o titration or ptt.  C/O Rt groin pain, PRN PO Norco given with relief.    Pt on bedrest day 2/4.    Rt groin wound vac c/d/i, canister marked ~75 mL (~25 mL bloody output)  (-) BM.  Mike in place, no complications noted.  No other complaints made.  Will continue to monitor.

## 2024-06-23 NOTE — PLAN OF CARE
Assumed care 0730  A+Ox4, RA, SR on tele  VSS  Hep GTT  WoundVac in place   -continuous -125mm hg   -small output, marked on canister  Up to chair with PT   -Ok'd per Florian  Call light in reach, needs

## 2024-06-23 NOTE — PLAN OF CARE
Assumed care 0730  A+Ox4, RA, SR on tele  Occasional pain   -norco once on shift  Wound vac in place   -cont, -125   -small output  BR maintained  Tolerating diet  Mike in place  Call light in place, needs addressed

## 2024-06-23 NOTE — PHYSICAL THERAPY NOTE
PHYSICAL THERAPY EVALUATION - INPATIENT     Room Number: 7625/7625-A  Evaluation Date: 6/23/2024  Type of Evaluation: Re-evaluation  Physician Order: PT Eval and Treat    Presenting Problem: R common femoral artery occlusion s/p angiogram with embolization, L SFA embolization and L common femoral artery dissection 6/13. Post operative groin hematoma and R common femoral pseudoaneurysm, s/p R groin exploration, ligation of femoral artery branch, sartorius muscle myoplasty 6/15  Co-Morbidities : HTN, HLD, DM2, CKD, RA, PAD  Reason for Therapy: Mobility Dysfunction and Discharge Planning    PHYSICAL THERAPY ASSESSMENT   Patient is currently functioning below baseline with bed mobility, transfers, gait, stair negotiation, standing prolonged periods, and performing household tasks.  Prior to admission, patient's baseline is ind n all aspect of mobilities.  Patient is requiring contact guard assist and minimal assist as a result of the following impairments: pain, impaired standing balance, and limited R hip ROM due to pain.  Physical Therapy will continue to follow for duration of hospitalization.    Patient will benefit from continued skilled PT Services at discharge to promote functional independence and safety with additional support and return home with home health PT.    PLAN  PT Treatment Plan: Bed mobility;Body mechanics;Patient education;Gait training;Strengthening;Stair training;Transfer training;Balance training;Range of motion;Family education  Rehab Potential : Good  Frequency (Obs): 3-5x/week  Number of Visits to Meet Established Goals: 5      CURRENT GOALS    Goal #1 Patient is able to demonstrate supine - sit EOB @ level: modified independent     Goal #2 Patient is able to demonstrate transfers Sit to/from Stand at assistance level: modified independent     Goal #3 Patient is able to ambulate 300 feet with assist device: walker - rolling at assistance level: modified independent     Goal #4    Goal  #5    Goal #6    Goal Comments: Goals established on 6/23/2024      PHYSICAL THERAPY MEDICAL/SOCIAL HISTORY  History related to current admission: Patient is a 69 year old female admitted on 6/13/2024 from home for R common femoral artery occlusion s/p angiogram with embolization, L SFA embolization and L common femoral artery dissection 6/13. Post operative groin hematoma and R common femoral pseudoaneurysm, s/p R groin exploration, ligation of femoral artery branch, sartorius muscle myoplasty 6/15. Underwent a Evacuation of hematoma and pulse lavage, Debridement of skin and subcutaneous fat with knife 3x4 cm ,Adaptic layer and wound vac placement and was initially placed on BR until 6/25/24. However, via nursing, Dr. Du cleared for ambulation and mobilities starting today        HOME SITUATION  Type of Home: Apartment   Home Layout: One level;Elevator                Lives With: Alone (sisters or son can helpout)  Drives: No  Patient Owned Equipment: Rolling walker  Patient Regularly Uses: Glasses    Prior Level of [place]: ind in her mobilities with good support at home    SUBJECTIVE  Been in this bed since Friday and wanted to move      OBJECTIVE  Precautions: Bed/chair alarm (R sartorius muscle flap)  Fall Risk: High fall risk    WEIGHT BEARING RESTRICTION  Weight Bearing Restriction: None     PAIN ASSESSMENT  Rating: Unable to rate  Location: R groin  Management Techniques: Repositioning    COGNITION  Overall Cognitive Status:  WFL - within functional limits    RANGE OF MOTION AND STRENGTH ASSESSMENT  Upper extremity ROM and strength are within functional limits     Lower extremity ROM is within functional limits except for the following:  limited AROM on R hip as above only to functional range with c/o pain as well on the hip limiting mobiliteis    Lower extremity strength is within functional limits       BALANCE  Static Sitting: Good  Dynamic Sitting: Good  Static Standing: Fair  Dynamic Standing:  Fair -    ACTIVITY TOLERANCE; fair        AM-PAC '6-Clicks' INPATIENT SHORT FORM - BASIC MOBILITY  How much difficulty does the patient currently have...  Patient Difficulty: Turning over in bed (including adjusting bedclothes, sheets and blankets)?: A Little   Patient Difficulty: Sitting down on and standing up from a chair with arms (e.g., wheelchair, bedside commode, etc.): A Little   Patient Difficulty: Moving from lying on back to sitting on the side of the bed?: A Little   How much help from another person does the patient currently need...   Help from Another: Moving to and from a bed to a chair (including a wheelchair)?: A Little   Help from Another: Need to walk in hospital room?: A Little   Help from Another: Climbing 3-5 steps with a railing?: Total       AM-PAC Score:  Raw Score: 16   Approx Degree of Impairment: 54.16%   Standardized Score (AM-PAC Scale): 40.78   CMS Modifier (G-Code): CK    FUNCTIONAL ABILITY STATUS  Gait Assessment   Functional Mobility/Gait Assessment  Gait Assistance: Contact guard assist  Distance (ft): 100  Assistive Device: Rolling walker  Pattern:  (Antalgic)    Skilled Therapy Provided     Bed Mobility:  Rolling: min A  Supine to sit: min A   Sit to supine: NT     Transfer Mobility:  Sit to stand: CGA/min A   Stand to sit: CGA  Gait = limited ambulation with decrease swing phase clearance and heel to toe pattern on the R LE due to pain    Therapist's Comments:   Cleraed by Dr. Du via Dr. Muller to initiate gt training and lift BR order  Left on the recliner and made comfortable  Out of caution,  just limited ROM on the R hip to functional range considering complex R hip groin procedure with sartorius myoplasty and will progress until next communication with vascular  Addressed all issues and concerns. Nursing is aware of this visit.        Exercise/Education Provided:  Bed mobility  Body mechanics  Functional activity tolerated  Gait training  Transfer training    Patient  End of Session: Up in chair;Needs met;Call light within reach;RN aware of session/findings;All patient questions and concerns addressed      Patient Evaluation Complexity Level:  History Moderate - 1 or 2 personal factors and/or co-morbidities   Examination of body systems Moderate - addressing a total of 3 or more elements   Clinical Presentation Moderate - Evolving   Clinical Decision Making Low - Stable       PT Session Time: 25 minutes  Gait Training: 10 minutes  Therapeutic Activity: 15 minutes

## 2024-06-24 ENCOUNTER — ANESTHESIA EVENT (OUTPATIENT)
Dept: CARDIAC SURGERY | Facility: HOSPITAL | Age: 69
DRG: 252 | End: 2024-06-24
Payer: MEDICARE

## 2024-06-24 LAB
ALBUMIN SERPL-MCNC: 2.3 G/DL (ref 3.4–5)
ALBUMIN/GLOB SERPL: 0.5 {RATIO} (ref 1–2)
ALP LIVER SERPL-CCNC: 77 U/L
ALT SERPL-CCNC: 18 U/L
ANION GAP SERPL CALC-SCNC: 8 MMOL/L (ref 0–18)
AST SERPL-CCNC: 8 U/L (ref 15–37)
BILIRUB SERPL-MCNC: 1 MG/DL (ref 0.1–2)
BLOOD TYPE BARCODE: 5100
BUN BLD-MCNC: 24 MG/DL (ref 9–23)
CALCIUM BLD-MCNC: 9 MG/DL (ref 8.5–10.1)
CHLORIDE SERPL-SCNC: 107 MMOL/L (ref 98–112)
CO2 SERPL-SCNC: 21 MMOL/L (ref 21–32)
CREAT BLD-MCNC: 0.98 MG/DL
EGFRCR SERPLBLD CKD-EPI 2021: 62 ML/MIN/1.73M2 (ref 60–?)
ERYTHROCYTE [DISTWIDTH] IN BLOOD BY AUTOMATED COUNT: 16.1 %
GLOBULIN PLAS-MCNC: 4.6 G/DL (ref 2.8–4.4)
GLUCOSE BLD-MCNC: 151 MG/DL (ref 70–99)
GLUCOSE BLD-MCNC: 164 MG/DL (ref 70–99)
GLUCOSE BLD-MCNC: 197 MG/DL (ref 70–99)
GLUCOSE BLD-MCNC: 198 MG/DL (ref 70–99)
GLUCOSE BLD-MCNC: 85 MG/DL (ref 70–99)
HCT VFR BLD AUTO: 34.1 %
HGB BLD-MCNC: 10.8 G/DL
MAGNESIUM SERPL-MCNC: 2 MG/DL (ref 1.6–2.6)
MCH RBC QN AUTO: 29.3 PG (ref 26–34)
MCHC RBC AUTO-ENTMCNC: 31.7 G/DL (ref 31–37)
MCV RBC AUTO: 92.7 FL
OSMOLALITY SERPL CALC.SUM OF ELEC: 289 MOSM/KG (ref 275–295)
PLATELET # BLD AUTO: 464 10(3)UL (ref 150–450)
POTASSIUM SERPL-SCNC: 4.3 MMOL/L (ref 3.5–5.1)
PROT SERPL-MCNC: 6.9 G/DL (ref 6.4–8.2)
RBC # BLD AUTO: 3.68 X10(6)UL
SODIUM SERPL-SCNC: 136 MMOL/L (ref 136–145)
UNIT VOLUME: 289 ML
WBC # BLD AUTO: 16.9 X10(3) UL (ref 4–11)

## 2024-06-24 PROCEDURE — 86901 BLOOD TYPING SEROLOGIC RH(D): CPT | Performed by: SURGERY

## 2024-06-24 PROCEDURE — 86900 BLOOD TYPING SEROLOGIC ABO: CPT | Performed by: SURGERY

## 2024-06-24 PROCEDURE — 82962 GLUCOSE BLOOD TEST: CPT

## 2024-06-24 PROCEDURE — 86850 RBC ANTIBODY SCREEN: CPT | Performed by: SURGERY

## 2024-06-24 PROCEDURE — 80053 COMPREHEN METABOLIC PANEL: CPT | Performed by: SURGERY

## 2024-06-24 PROCEDURE — 83735 ASSAY OF MAGNESIUM: CPT | Performed by: SURGERY

## 2024-06-24 PROCEDURE — 85027 COMPLETE CBC AUTOMATED: CPT | Performed by: SURGERY

## 2024-06-24 RX ADMIN — FAMOTIDINE 20 MG: 20 TABLET, FILM COATED ORAL at 08:20:00

## 2024-06-24 RX ADMIN — PREDNISONE 5 MG: 5 TABLET ORAL at 08:20:00

## 2024-06-24 RX ADMIN — ASPIRIN 81 MG: 81 TABLET, CHEWABLE ORAL at 08:20:00

## 2024-06-24 RX ADMIN — CLOPIDOGREL BISULFATE 75 MG: 75 TABLET ORAL at 08:20:00

## 2024-06-24 RX ADMIN — PREDNISONE 5 MG: 5 TABLET ORAL at 21:35:00

## 2024-06-24 RX ADMIN — HYDROCODONE BITARTRATE AND ACETAMINOPHEN 2 TABLET: 5; 325 TABLET ORAL at 23:44:00

## 2024-06-24 RX ADMIN — ATENOLOL 50 MG: 50 TABLET ORAL at 05:33:00

## 2024-06-24 RX ADMIN — HYDROCODONE BITARTRATE AND ACETAMINOPHEN 1 TABLET: 5; 325 TABLET ORAL at 03:45:00

## 2024-06-24 RX ADMIN — AMLODIPINE BESYLATE 10 MG: 10 TABLET ORAL at 08:20:00

## 2024-06-24 RX ADMIN — ROSUVASTATIN CALCIUM 10 MG: 10 TABLET, COATED ORAL at 21:35:00

## 2024-06-24 RX ADMIN — LOSARTAN POTASSIUM 100 MG: 100 TABLET ORAL at 08:20:00

## 2024-06-24 RX ADMIN — INSULIN DEGLUDEC 20 UNITS: 100 INJECTION, SOLUTION SUBCUTANEOUS at 08:22:00

## 2024-06-24 NOTE — CONSULTS
[facility]  Inpatient Wound Care Contact Note    Emilie John Patient Status:  Inpatient    1955 MRN [id number]   Location [facility] 7NE-A Attending Yariel Du MD   Hosp Day # 10 PCP Tay Alexandra MD     Consult received, plan to go to the OR tomorrow with Dr. Du.  Will check status post OR with MD to see if we will need to see post-surgical.     Thank you,    Justine Flores, PT, MPT  [facility]  [address] [address]  [phone]

## 2024-06-24 NOTE — CM/SW NOTE
SW/CM following for DC planning needs/recs. Referral initiated for [employer] wound vac yesterday, noted plans for OR tomorrow.     SHAYY Perkins

## 2024-06-24 NOTE — OPERATIVE REPORT
[facility]    PATIENT'S NAME: JONNY KHAN   ATTENDING PHYSICIAN: Yariel Du M.D.   OPERATING PHYSICIAN: Yariel Du M.D.   PATIENT ACCOUNT#:   [id number]    LOCATION:  [address] [place] [address]  MEDICAL RECORD #:   [id number]       YOB: 1955  ADMISSION DATE:       06/13/2024      OPERATION DATE:  06/21/2024    OPERATIVE REPORT    PREOPERATIVE DIAGNOSIS:  Postoperative hematoma.  POSTOPERATIVE DIAGNOSIS:  Postoperative hematoma.  PROCEDURE:  1.   Evacuation of hematoma and pulse lavage.  2.   Debridement of the skin and subcutaneous fat with knife 3 x 4 cm.  3.   Adaptic layer and wound VAC placement.    ASSISTANT:  Operating room staff.    SPECIMEN:  None.    ESTIMATED BLOOD LOSS:  50 mL.    INDICATIONS:  This is a 69-year-old female who has had a complicated hospital course.  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  She then had bleeding from the right groin access site that was surgically managed over the weekend, and she has ongoing drainage from the incision.  We are proceeding with evacuation of hematoma as described below.    FINDINGS:  1.   Muscle flap, secured.  2.   Necrotic skin on lateral edge was debrided.    OPERATIVE TECHNIQUE:  The patient was taken to the operating room, placed under general anesthesia.  Patient was prepped and draped in usual sterile fashion.  Time-out performed.  I reopened  the proximal half of the incision where there was some breakdown of the skin already and evacuated all of the hematoma.  Hematoma was anterior to the myoplasty and involved the primary site for the myoplasty and it was tracking medially.  I then pulsed lavage to make sure that I removed all of the hematoma and irrigated with Irrisept.  The muscle flap was viable and secure.  Satisfied with this, I then  debrided a 3 x 4 cm area of skin in the lateral aspect of the incision and then closing.  I then placed a single layer of Adaptic on the muscle flap and then placed the wound VAC in the incision to try and close some of the dead space.  Wound VAC was activated and secured, and plan will be to take the patient back to the operating room early next week for additional washout and possibly Kerecis placement.    Dictated By Yariel Du M.D.  d: 06/23/2024 13:55:51  t: 06/23/2024 22:52:06  [facility] [id number]  MJG/

## 2024-06-24 NOTE — PROGRESS NOTES
[facility] AND [facility]   part of [facility]     Progress Note  Emilie John Patient Status:  Inpatient    1955 MRN [id number]   Location [facility] 7NE-A Attending Yariel Cullen MD   Hosp Day # 10 PCP Tay Alexandra MD          Impression     -left iliac chronic occlusion with claudication sp balloon angioplasty and stent of left common iliac artery and external iliac artery   -right common femoral artery occlusion sp thrombectomy, balloon angioplasty, stent; thrombectomy of left SFA and left anterior tibial artery, balloon angioplasty of left TP trunk and posterior tibial   -post op pain  -post op leukocytosis  -large R inguinal region pseudoaneurysm 6/15 noted     -post op shock, on pressors, possibly hemorrhagic  -left groin hematoma  -anemia     -DM2, uncontrolled - A1c 12s     -nicotine dependence, smokes 1ppd     -HTN  -HLD     -RA on chronic prednisone  -CKD 3, baseline Cr ~1.2     Plan     *vascular  -Went back to OR 6/15 for R groin pseudoaneurysm s/p ligation of femoral artery branch with sartorius muscle myoplasty.  -hep gtt  -able to ambulate per vascular  -to OR for evacuation of hematoma and pulse lavage 24 w/ wound vac placement  -plan for reassessment Monday by vascular   -OK to ambulate with PT >> dw dr cullen over the phone    *post op hypotension / shock, resolved  -s/p iv hydrocortisone, restart home po prednisone  -supportive mgmt with prbc, received 6/15 and now trending hgb     *anemia  -preop hgb 13.9 >> cont to trend  -hgb stable >> will monitor  Sp - received several prbc tx     *thrombocytopenia- likely 2/2 blood loss, trend plts     *DM 2  -follows with endo as outpt, recently glipizide was increased  -ISS + tresiba 20 units     *HTN hx  -restart home meds (at home on atenolol, hydrochlorothiazide, norvasc, losartan)     *HLD  -statin     DVT proph  On heparin     GI ppx  -pepcid    PCP: Tay Alexandra MD      DISPO:  Cont  inpt  Will follow      Thank you for allowing me to participate in the care of this patient.  I will be following the patient while she is in the hospital.        Michael Phillips Md  [facility]  Answering Service number: [phone]                 SUBJECTIVE:   Denies cp/sob, n/v, f/c. Asking if she is going home today                OBJECTIVE:   Blood pressure 112/63, pulse 62, temperature 98.2 °F (36.8 °C), temperature source Oral, resp. rate 20, height 5' 1\" (1.549 m), weight 164 lb 10.9 oz (74.7 kg), SpO2 99%, not currently breastfeeding.    GENERAL: no apparent distress  NEURO: A/A Ox3  RESP: non labored, CTA  CARDIO: Regular, no murmur  ABD: soft, NT, ND, BS+  EXTREMITIES:  no edema, no calf tenderness, right inguinal region with wound vac.     DIAGNOSTIC DATA:   Labs:     Recent Labs   Lab 06/20/24 0617 06/21/24 1115 06/22/24  0550 06/23/24  0602 06/24/24  0456   WBC 17.2* 18.2* 17.5* 15.1* 16.9*   HGB 11.2* 11.0* 10.3* 10.5* 10.8*   MCV 88.4 90.6 88.5 91.0 92.7   .0 296.0 381.0 415.0 464.0*       Recent Labs   Lab 06/20/24 0617 06/21/24 1115 06/22/24  0550 06/23/24  0602 06/24/24  0456    134* 133* 135* 136   K 3.8 4.5  4.5 4.6 4.5 4.3    106 104 108 107   CO2 22.0 21.0 22.0 20.0* 21.0   BUN 16 17 18 23 24*   CREATSERUM 0.87 1.01 0.94 1.02 0.98   CA 8.3* 8.5 8.7 8.8 9.0   MG 2.0 1.8 2.1 1.9 2.0   GLU 91 98 150* 135* 151*       Recent Labs   Lab 06/20/24 0617 06/21/24 1115 06/22/24  0550 06/23/24  0602 06/24/24  0456   ALT 20 23 20 19 18   AST 37 38* 20 20 8*   ALB 2.1* 2.1* 2.2* 2.1* 2.3*       Recent Labs   Lab 06/23/24  0534 06/23/24  1145 06/23/24  1535 06/23/24  2111 06/24/24  0516   PGLU 157* 152* 281* 71 164*       No results for input(s): \"TROP\" in the last 168 hours.      MEDICATIONS      Current Facility-Administered Medications   Medication Dose Route Frequency    acetaminophen (Tylenol) tab 650 mg  650 mg Oral Q4H PRN    Or    HYDROcodone-acetaminophen (Norco)  5-325 MG per tab 1 tablet  1 tablet Oral Q4H PRN    Or    HYDROcodone-acetaminophen (Norco) 5-325 MG per tab 2 tablet  2 tablet Oral Q4H PRN    ondansetron (Zofran) 4 MG/2ML injection 4 mg  4 mg Intravenous Q6H PRN    metoclopramide (Reglan) 5 mg/mL injection 5 mg  5 mg Intravenous Q8H PRN    heparin (Porcine) 25,000 Units/250mL infusion premix  500 Units/hr Intravenous Continuous    predniSONE (Deltasone) tab 5 mg  5 mg Oral BID    amLODIPine (Norvasc) tab 10 mg  10 mg Oral Daily    losartan (Cozaar) tab 100 mg  100 mg Oral Daily    atenolol (Tenormin) tab 50 mg  50 mg Oral Daily Beta Blocker    famotidine (Pepcid) tab 20 mg  20 mg Oral Daily    Or    famotidine (Pepcid) 20 mg/2mL injection 20 mg  20 mg Intravenous Daily    insulin aspart (NovoLOG) 100 Units/mL FlexPen 4-20 Units  4-20 Units Subcutaneous TID AC and HS    insulin degludec (Tresiba) 100 units/mL flextouch 20 Units  20 Units Subcutaneous Daily    aspirin chewable tab 81 mg  81 mg Oral Daily    rosuvastatin (Crestor) tab 10 mg  10 mg Oral Nightly    morphINE PF 2 MG/ML injection 2 mg  2 mg Intravenous Q2H PRN    Or    morphINE PF 4 MG/ML injection 4 mg  4 mg Intravenous Q2H PRN    Or    morphINE PF 4 MG/ML injection 6 mg  6 mg Intravenous Q2H PRN    clopidogrel (Plavix) tab 75 mg  75 mg Oral Daily    glucose (Dex4) 15 GM/59ML oral liquid 15 g  15 g Oral Q15 Min PRN    Or    glucose (Glutose) 40% oral gel 15 g  15 g Oral Q15 Min PRN    Or    glucose-vitamin C (Dex-4) chewable tab 4 tablet  4 tablet Oral Q15 Min PRN    Or    dextrose 50% injection 50 mL  50 mL Intravenous Q15 Min PRN    Or    glucose (Dex4) 15 GM/59ML oral liquid 30 g  30 g Oral Q15 Min PRN    Or    glucose (Glutose) 40% oral gel 30 g  30 g Oral Q15 Min PRN    Or    glucose-vitamin C (Dex-4) chewable tab 8 tablet  8 tablet Oral Q15 Min PRN    norepinephrine (Levophed) 4 mg/250mL infusion premix  0.5-30 mcg/min Intravenous Continuous                  IMAGING     No results found.

## 2024-06-24 NOTE — PLAN OF CARE
Assumed pt care at 1930  A&Ox4, able to make needs known  Hep infusing per order  Voiding per marcano  Wound vac intact  Dressing on R neck C/D/I  Pain managed with [facility]  Call light in reach  Bed in low position

## 2024-06-24 NOTE — ANESTHESIA PREPROCEDURE EVALUATION
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  Temp: 98 °F (36.7 °C)  Pulse: 54  Resp: 16  BP: 120/64  SpO2: 100 %  Body mass index is 31.12 kg/m².    Current medications reviewed.  Hospital Medications:   acetaminophen (Tylenol) tab 650 mg  650 mg Oral Q4H PRN    Or    HYDROcodone-acetaminophen (Norco) 5-325 MG per tab 1 tablet  1 tablet Oral Q4H PRN    Or    HYDROcodone-acetaminophen (Norco) 5-325 MG per tab 2 tablet  2 tablet Oral Q4H PRN    ondansetron (Zofran) 4 MG/2ML injection 4 mg  4 mg Intravenous Q6H PRN    metoclopramide (Reglan) 5 mg/mL injection 5 mg  5 mg Intravenous Q8H PRN    heparin (Porcine) 25,000 Units/250mL infusion premix  500 Units/hr Intravenous Continuous    [COMPLETED] magnesium oxide (Mag-Ox) tab 400 mg  400 mg Oral Once    [COMPLETED] heparin (Porcine) 45562 units/250 mL infusion (ACS/AFIB) INITIAL DOSE  500 Units Intravenous Once    [COMPLETED] furosemide (Lasix) 10 mg/mL injection 20 mg  20 mg Intravenous Once    [COMPLETED] potassium chloride (Klor-Con M20) tab 40 mEq  40 mEq Oral Once    [COMPLETED] furosemide (Lasix) 10 mg/mL injection 20 mg  20 mg Intravenous Once    [COMPLETED] magnesium sulfate in sterile water for injection 2 g/50mL IVPB premix 2 g  2 g Intravenous Once    [COMPLETED] potassium chloride 40 mEq/100mL IVPB premix (central line) 40 mEq  40 mEq Intravenous Once    And    [COMPLETED] potassium chloride 20 mEq/100mL IVPB premix 20 mEq  20 mEq Intravenous Once    [COMPLETED] hydrocortisone Na succinate PF (Solu-CORTEF) injection 50 mg  50 mg Intravenous Once    [COMPLETED] furosemide  (Lasix) 10 mg/mL injection 20 mg  20 mg Intravenous Once    [] potassium chloride (Klor-Con M20) tab 40 mEq  40 mEq Oral Q4H    [COMPLETED] potassium chloride 40 mEq in 250mL sodium chloride 0.9% IVPB premix  40 mEq Intravenous Once    predniSONE (Deltasone) tab 5 mg  5 mg Oral BID    [COMPLETED] magnesium sulfate in sterile water for injection 2 g/50mL IVPB premix 2 g  2 g Intravenous Once    amLODIPine (Norvasc) tab 10 mg  10 mg Oral Daily    losartan (Cozaar) tab 100 mg  100 mg Oral Daily    atenolol (Tenormin) tab 50 mg  50 mg Oral Daily Beta Blocker    [COMPLETED] sodium chloride 0.9% infusion   Intravenous Once    [COMPLETED] iopamidol 76% (ISOVUE-370) injection for power injector  100 mL Intravenous ONCE PRN    [COMPLETED] sodium chloride 0.9% infusion   Intravenous Once    [COMPLETED] ceFAZolin (Ancef) 2g in 10mL IV syringe premix  2 g Intravenous Q12H    [COMPLETED] insulin aspart (NovoLOG) 100 Units/mL FlexPen 25 Units  25 Units Subcutaneous Once    [COMPLETED] sodium chloride 0.9% infusion   Intravenous Once    famotidine (Pepcid) tab 20 mg  20 mg Oral Daily    Or    famotidine (Pepcid) 20 mg/2mL injection 20 mg  20 mg Intravenous Daily    [COMPLETED] potassium chloride 40 mEq in 250mL sodium chloride 0.9% IVPB premix  40 mEq Intravenous Once    insulin aspart (NovoLOG) 100 Units/mL FlexPen 4-20 Units  4-20 Units Subcutaneous TID AC and HS    insulin degludec (Tresiba) 100 units/mL flextouch 20 Units  20 Units Subcutaneous Daily    [COMPLETED] insulin degludec (Tresiba) 100 units/mL flextouch 10 Units  10 Units Subcutaneous Once    [COMPLETED] lidocaine PF (Xylocaine-MPF) 1 % injection        [COMPLETED] heparin (Porcine) 5000 UNIT/ML injection        [COMPLETED] iodixanol (VISIPAQUE) 320 MG/ML injection 100 mL  100 mL Injection ONCE PRN    [COMPLETED] fentaNYL (Sublimaze) 50 mcg/mL injection        [COMPLETED] midazolam (Versed) 2 MG/2ML injection        [COMPLETED] heparin (Porcine) 5000 UNIT/ML  injection        [COMPLETED] heparin (Porcine) 5000 UNIT/ML injection        [COMPLETED] clopidogrel (Plavix) 75 MG tab        [COMPLETED] fentaNYL (Sublimaze) 50 mcg/mL injection        [COMPLETED] lidocaine PF (Xylocaine-MPF) 1 % injection        [COMPLETED] heparin (Porcine) 5000 UNIT/ML injection        [COMPLETED] fentaNYL (Sublimaze) 50 mcg/mL injection        [COMPLETED] midazolam (Versed) 2 MG/2ML injection        [COMPLETED] heparin (Porcine) 5000 UNIT/ML injection        [COMPLETED] heparin (Porcine) 5000 UNIT/ML injection        [COMPLETED] sterile water for injection (PF) injection        [COMPLETED] sterile water for injection (PF) injection        [COMPLETED] alteplase (Activase) 2 mg injection        [COMPLETED] sterile water for injection (PF) injection        [COMPLETED] alteplase (Activase) 2 mg injection        [COMPLETED] Nitroglycerin in D5W 200-5 MCG/ML-% injection        [COMPLETED] heparin (Porcine) 5000 UNIT/ML injection        [COMPLETED] fentaNYL (Sublimaze) 50 mcg/mL injection        [COMPLETED] midazolam (Versed) 2 MG/2ML injection        [COMPLETED] heparin (Porcine) 5000 UNIT/ML injection        [COMPLETED] heparin (Porcine) 5000 UNIT/ML injection        [COMPLETED] midazolam (Versed) 2 MG/2ML injection        [COMPLETED] alteplase (Activase) 2 mg injection        [COMPLETED] sterile water for injection (PF) injection        [COMPLETED] heparin (Porcine) 5000 UNIT/ML injection        [COMPLETED] heparin (Porcine) 25,000 Units/250mL infusion premix        aspirin chewable tab 81 mg  81 mg Oral Daily    rosuvastatin (Crestor) tab 10 mg  10 mg Oral Nightly    [COMPLETED] heparin (Porcine) 10591 units/250mL infusion (PE/DVT/THROMBUS) INITIAL DOSE  18 Units/kg/hr Intravenous Once    morphINE PF 2 MG/ML injection 2 mg  2 mg Intravenous Q2H PRN    Or    morphINE PF 4 MG/ML injection 4 mg  4 mg Intravenous Q2H PRN    Or    morphINE PF 4 MG/ML injection 6 mg  6 mg Intravenous Q2H PRN     clopidogrel (Plavix) tab 75 mg  75 mg Oral Daily    glucose (Dex4) 15 GM/59ML oral liquid 15 g  15 g Oral Q15 Min PRN    Or    glucose (Glutose) 40% oral gel 15 g  15 g Oral Q15 Min PRN    Or    glucose-vitamin C (Dex-4) chewable tab 4 tablet  4 tablet Oral Q15 Min PRN    Or    dextrose 50% injection 50 mL  50 mL Intravenous Q15 Min PRN    Or    glucose (Dex4) 15 GM/59ML oral liquid 30 g  30 g Oral Q15 Min PRN    Or    glucose (Glutose) 40% oral gel 30 g  30 g Oral Q15 Min PRN    Or    glucose-vitamin C (Dex-4) chewable tab 8 tablet  8 tablet Oral Q15 Min PRN    [COMPLETED] sodium chloride 0.9 % IV bolus 1,000 mL  1,000 mL Intravenous Once    norepinephrine (Levophed) 4 mg/250mL infusion premix  0.5-30 mcg/min Intravenous Continuous    [COMPLETED] sodium chloride 0.9 % IV bolus 1,000 mL  1,000 mL Intravenous Once       Outpatient Medications:     Medications Prior to Admission   Medication Sig Dispense Refill Last Dose    glipiZIDE ER 5 MG Oral Tablet 24 Hr Take 2 tablets (10 mg total) by mouth 2 (two) times daily.   Past Week    aspirin 81 MG Oral Chew Tab Chew 1 tablet (81 mg total) by mouth daily.   6/13/2024 at 0700    azaTHIOprine 50 MG Oral Tab Take 1 tablet (50 mg total) by mouth daily.   Past Week    rosuvastatin 10 MG Oral Tab Take 1 tablet (10 mg total) by mouth nightly.   Past Week    amLODIPine 10 MG Oral Tab Take 1 tablet (10 mg total) by mouth daily. 90 tablet 1 6/12/2024    hydroCHLOROthiazide 25 MG Oral Tab Take 1 tablet (25 mg total) by mouth daily. 90 tablet 1 Past Week    valsartan 160 MG Oral Tab Take 1 tablet (160 mg total) by mouth daily. 90 tablet 1 6/12/2024    atenolol 50 MG Oral Tab Take 1 tablet (50 mg total) by mouth daily. 90 tablet 1 6/12/2024    metFORMIN 500 MG Oral Tab Take 2 tablets (1,000 mg total) by mouth 2 (two) times daily with meals. 2 tab twice a day 180 tablet 3 Past Week    predniSONE 5 MG Oral Tab Take 1 tablet (5 mg total) by mouth 2 (two) times daily. 60 tablet 3  6/12/2024    Glucose Blood (ACCU-CHEK LUPE PLUS) In Vitro Strip Use to check blood sugar before and after breakfast and dinner (4x per day) 400 strip 3     [DISCONTINUED] Accu-Chek Softclix Lancets Does not apply Misc Use to test blood sugar before and after each meal (6x daily) 600 each 0        Allergies: Celecoxib, Esomeprazole, Etanercept, Infliximab, Other, Oxycodone, Pregabalin, Enbrel, Fish-derived products, Orencia [abatacept], Rice, and Xeljanz [tofacitinib]      Anesthesia Evaluation    Patient summary reviewed.    Anesthetic Complications  (-) history of anesthetic complications         GI/Hepatic/Renal                                 Cardiovascular      ECG reviewed.            (+) hypertension   (+) hyperlipidemia                                  Endo/Other      (+) diabetes  type 2,                   (+) arthritis  (+) rheumatoid arthritis     Pulmonary        (+) COPD                   Neuro/Psych                                      Past Surgical History:   Procedure Laterality Date    Benign biopsy left  1991    Cholecystectomy      Colonoscopy N/A 05/06/2019    Procedure: COLONOSCOPY;  Surgeon: Freeman Oshea MD;  Location:  ENDOSCOPY    Colonoscopy      Hip replacement surgery Right 01/10/2020    Knee replacement surgery      Needle biopsy left  2017    Removal gallbladder      Total hip replacement      Total knee replacement Bilateral     Upper gi endoscopy,exam       Social History     Socioeconomic History    Marital status: Single   Tobacco Use    Smoking status: Every Day     Current packs/day: 0.50     Average packs/day: 0.5 packs/day for 51.4 years (25.7 ttl pk-yrs)     Types: Cigarettes     Start date: 1/29/1973    Smokeless tobacco: Never   Vaping Use    Vaping status: Never Used   Substance and Sexual Activity    Alcohol use: No    Drug use: No    Sexual activity: Not Currently     History   Drug Use No     Available pre-op labs reviewed.  Lab Results   Component Value Date    WBC  16.9 (H) 06/24/2024    RBC 3.68 (L) 06/24/2024    HGB 10.8 (L) 06/24/2024    HCT 34.1 (L) 06/24/2024    MCV 92.7 06/24/2024    MCH 29.3 06/24/2024    MCHC 31.7 06/24/2024    RDW 16.1 06/24/2024    .0 (H) 06/24/2024     Lab Results   Component Value Date     06/24/2024    K 4.3 06/24/2024     06/24/2024    CO2 21.0 06/24/2024    BUN 24 (H) 06/24/2024    CREATSERUM 0.98 06/24/2024     (H) 06/24/2024    CA 9.0 06/24/2024     Lab Results   Component Value Date    INR 1.12 06/15/2024         Airway      Mallampati: III  Mouth opening: >3 FB  TM distance: 4 - 6 cm  Neck ROM: full Cardiovascular      Rhythm: regular  Rate: normal     Dental             Pulmonary            (+) decreased breath sounds         Other findings              ASA: 3   Plan: general  NPO status verified and patient meets guidelines.          Plan/risks discussed with: patient (Risks discussed including nausea, vomiting, dental damage, stroke and heart attack.  Patient understands plan and wishes to proceed.)                Present on Admission:  **None**

## 2024-06-24 NOTE — PLAN OF CARE
Assumed care at 0700  Patient alert, oriented x4  Wound vac in place to R groin  Heparin drip infusing per protocol  Denies pain  Ambulating with walker    Plan for OR tomorrow am. Heparin to be stopped @ midnight. NPO @ midnight. Consent signed and in chart

## 2024-06-25 ENCOUNTER — ANESTHESIA (OUTPATIENT)
Dept: CARDIAC SURGERY | Facility: HOSPITAL | Age: 69
DRG: 252 | End: 2024-06-25
Payer: MEDICARE

## 2024-06-25 LAB
ANION GAP SERPL CALC-SCNC: 7 MMOL/L (ref 0–18)
ANTIBODY SCREEN: NEGATIVE
BASOPHILS # BLD AUTO: 0.11 X10(3) UL (ref 0–0.2)
BASOPHILS NFR BLD AUTO: 0.6 %
BUN BLD-MCNC: 27 MG/DL (ref 9–23)
CALCIUM BLD-MCNC: 9.3 MG/DL (ref 8.5–10.1)
CHLORIDE SERPL-SCNC: 109 MMOL/L (ref 98–112)
CO2 SERPL-SCNC: 22 MMOL/L (ref 21–32)
CREAT BLD-MCNC: 1.06 MG/DL
EGFRCR SERPLBLD CKD-EPI 2021: 57 ML/MIN/1.73M2 (ref 60–?)
EOSINOPHIL # BLD AUTO: 0.16 X10(3) UL (ref 0–0.7)
EOSINOPHIL NFR BLD AUTO: 0.9 %
ERYTHROCYTE [DISTWIDTH] IN BLOOD BY AUTOMATED COUNT: 16.6 %
GLUCOSE BLD-MCNC: 121 MG/DL (ref 70–99)
GLUCOSE BLD-MCNC: 147 MG/DL (ref 70–99)
GLUCOSE BLD-MCNC: 202 MG/DL (ref 70–99)
GLUCOSE BLD-MCNC: 216 MG/DL (ref 70–99)
GLUCOSE BLD-MCNC: 221 MG/DL (ref 70–99)
GLUCOSE BLD-MCNC: 245 MG/DL (ref 70–99)
GLUCOSE BLD-MCNC: 89 MG/DL (ref 70–99)
HCT VFR BLD AUTO: 30.8 %
HGB BLD-MCNC: 10.1 G/DL
IMM GRANULOCYTES # BLD AUTO: 0.45 X10(3) UL (ref 0–1)
IMM GRANULOCYTES NFR BLD: 2.5 %
LYMPHOCYTES # BLD AUTO: 2.12 X10(3) UL (ref 1–4)
LYMPHOCYTES NFR BLD AUTO: 11.8 %
MAGNESIUM SERPL-MCNC: 2 MG/DL (ref 1.6–2.6)
MCH RBC QN AUTO: 29.6 PG (ref 26–34)
MCHC RBC AUTO-ENTMCNC: 32.8 G/DL (ref 31–37)
MCV RBC AUTO: 90.3 FL
MONOCYTES # BLD AUTO: 1.03 X10(3) UL (ref 0.1–1)
MONOCYTES NFR BLD AUTO: 5.8 %
NEUTROPHILS # BLD AUTO: 14.03 X10 (3) UL (ref 1.5–7.7)
NEUTROPHILS # BLD AUTO: 14.03 X10(3) UL (ref 1.5–7.7)
NEUTROPHILS NFR BLD AUTO: 78.4 %
OSMOLALITY SERPL CALC.SUM OF ELEC: 294 MOSM/KG (ref 275–295)
PLATELET # BLD AUTO: 488 10(3)UL (ref 150–450)
POTASSIUM SERPL-SCNC: 4.2 MMOL/L (ref 3.5–5.1)
RBC # BLD AUTO: 3.41 X10(6)UL
RH BLOOD TYPE: POSITIVE
SODIUM SERPL-SCNC: 138 MMOL/L (ref 136–145)
WBC # BLD AUTO: 17.9 X10(3) UL (ref 4–11)

## 2024-06-25 PROCEDURE — 0JBL0ZZ EXCISION OF RIGHT UPPER LEG SUBCUTANEOUS TISSUE AND FASCIA, OPEN APPROACH: ICD-10-PCS | Performed by: SURGERY

## 2024-06-25 PROCEDURE — 80048 BASIC METABOLIC PNL TOTAL CA: CPT | Performed by: HOSPITALIST

## 2024-06-25 PROCEDURE — 86901 BLOOD TYPING SEROLOGIC RH(D): CPT | Performed by: SURGERY

## 2024-06-25 PROCEDURE — 86900 BLOOD TYPING SEROLOGIC ABO: CPT | Performed by: SURGERY

## 2024-06-25 PROCEDURE — 85025 COMPLETE CBC W/AUTO DIFF WBC: CPT | Performed by: HOSPITALIST

## 2024-06-25 PROCEDURE — 86850 RBC ANTIBODY SCREEN: CPT | Performed by: SURGERY

## 2024-06-25 PROCEDURE — 83735 ASSAY OF MAGNESIUM: CPT | Performed by: HOSPITALIST

## 2024-06-25 PROCEDURE — 0HRHXK4 REPLACEMENT OF RIGHT UPPER LEG SKIN WITH NONAUTOLOGOUS TISSUE SUBSTITUTE, PARTIAL THICKNESS, EXTERNAL APPROACH: ICD-10-PCS | Performed by: SURGERY

## 2024-06-25 PROCEDURE — 82962 GLUCOSE BLOOD TEST: CPT

## 2024-06-25 DEVICE — FISH-SKIN GRAFT FOR SURGICAL USE. KERECIS® SURGICLOSE® IS INDICATED FOR THE MANAGEMENT OF WOUNDS INCLUDING: PARTIAL AND FULL THICKNESS WOUNDS, PRESSURE ULCERS, CHRONIC VASCULAR ULCERS, DIABETIC ULCERS, TRAUMA WOUNDS (ABRASIONS, LACERATIONS, SECOND-DEGREE BURNS, SKIN TEARS), SURGICAL WOUNDS (DONOR SITE/GRAFTS, POST-MOHS SURGERY, POST-LASER SURGERY, PODIATRIC, WOUND DEHISCENCE) AND DRAINING WOUNDS.IFU: HTTPS://WWW.KERECIS.COM/IFUS/IFU-KERECIS-SURGICLOSE/
Type: IMPLANTABLE DEVICE | Site: GROIN | Status: FUNCTIONAL
Brand: KERECIS® SURGICLOSE®

## 2024-06-25 RX ORDER — HYDROMORPHONE HYDROCHLORIDE 1 MG/ML
0.6 INJECTION, SOLUTION INTRAMUSCULAR; INTRAVENOUS; SUBCUTANEOUS EVERY 5 MIN PRN
Status: DISCONTINUED | OUTPATIENT
Start: 2024-06-25 | End: 2024-06-25 | Stop reason: HOSPADM

## 2024-06-25 RX ORDER — HYDROMORPHONE HYDROCHLORIDE 1 MG/ML
0.2 INJECTION, SOLUTION INTRAMUSCULAR; INTRAVENOUS; SUBCUTANEOUS EVERY 5 MIN PRN
Status: DISCONTINUED | OUTPATIENT
Start: 2024-06-25 | End: 2024-06-25 | Stop reason: HOSPADM

## 2024-06-25 RX ORDER — NALOXONE HYDROCHLORIDE 0.4 MG/ML
0.08 INJECTION, SOLUTION INTRAMUSCULAR; INTRAVENOUS; SUBCUTANEOUS AS NEEDED
Status: DISCONTINUED | OUTPATIENT
Start: 2024-06-25 | End: 2024-06-25 | Stop reason: HOSPADM

## 2024-06-25 RX ORDER — LIDOCAINE HYDROCHLORIDE 10 MG/ML
INJECTION, SOLUTION EPIDURAL; INFILTRATION; INTRACAUDAL; PERINEURAL AS NEEDED
Status: DISCONTINUED | OUTPATIENT
Start: 2024-06-25 | End: 2024-06-25 | Stop reason: SURG

## 2024-06-25 RX ORDER — MEPERIDINE HYDROCHLORIDE 25 MG/ML
12.5 INJECTION INTRAMUSCULAR; INTRAVENOUS; SUBCUTANEOUS AS NEEDED
Status: DISCONTINUED | OUTPATIENT
Start: 2024-06-25 | End: 2024-06-25 | Stop reason: HOSPADM

## 2024-06-25 RX ORDER — METOCLOPRAMIDE HYDROCHLORIDE 5 MG/ML
5 INJECTION INTRAMUSCULAR; INTRAVENOUS EVERY 8 HOURS PRN
Status: DISCONTINUED | OUTPATIENT
Start: 2024-06-25 | End: 2024-06-25 | Stop reason: HOSPADM

## 2024-06-25 RX ORDER — SODIUM CHLORIDE, SODIUM LACTATE, POTASSIUM CHLORIDE, CALCIUM CHLORIDE 600; 310; 30; 20 MG/100ML; MG/100ML; MG/100ML; MG/100ML
INJECTION, SOLUTION INTRAVENOUS CONTINUOUS
Status: DISCONTINUED | OUTPATIENT
Start: 2024-06-25 | End: 2024-06-25 | Stop reason: HOSPADM

## 2024-06-25 RX ORDER — DIPHENHYDRAMINE HYDROCHLORIDE 50 MG/ML
12.5 INJECTION INTRAMUSCULAR; INTRAVENOUS AS NEEDED
Status: DISCONTINUED | OUTPATIENT
Start: 2024-06-25 | End: 2024-06-25 | Stop reason: HOSPADM

## 2024-06-25 RX ORDER — MIDAZOLAM HYDROCHLORIDE 1 MG/ML
1 INJECTION INTRAMUSCULAR; INTRAVENOUS EVERY 5 MIN PRN
Status: DISCONTINUED | OUTPATIENT
Start: 2024-06-25 | End: 2024-06-25 | Stop reason: HOSPADM

## 2024-06-25 RX ORDER — HYDROMORPHONE HYDROCHLORIDE 1 MG/ML
0.4 INJECTION, SOLUTION INTRAMUSCULAR; INTRAVENOUS; SUBCUTANEOUS EVERY 5 MIN PRN
Status: DISCONTINUED | OUTPATIENT
Start: 2024-06-25 | End: 2024-06-25 | Stop reason: HOSPADM

## 2024-06-25 RX ORDER — HYDROMORPHONE HYDROCHLORIDE 1 MG/ML
INJECTION, SOLUTION INTRAMUSCULAR; INTRAVENOUS; SUBCUTANEOUS
Status: COMPLETED
Start: 2024-06-25 | End: 2024-06-25

## 2024-06-25 RX ORDER — ONDANSETRON 2 MG/ML
4 INJECTION INTRAMUSCULAR; INTRAVENOUS EVERY 6 HOURS PRN
Status: DISCONTINUED | OUTPATIENT
Start: 2024-06-25 | End: 2024-06-25 | Stop reason: HOSPADM

## 2024-06-25 RX ADMIN — FAMOTIDINE 20 MG: 20 TABLET, FILM COATED ORAL at 11:28:00

## 2024-06-25 RX ADMIN — INSULIN DEGLUDEC 20 UNITS: 100 INJECTION, SOLUTION SUBCUTANEOUS at 13:58:00

## 2024-06-25 RX ADMIN — ASPIRIN 81 MG: 81 TABLET, CHEWABLE ORAL at 11:29:00

## 2024-06-25 RX ADMIN — SODIUM CHLORIDE, SODIUM LACTATE, POTASSIUM CHLORIDE, CALCIUM CHLORIDE: 600; 310; 30; 20 INJECTION, SOLUTION INTRAVENOUS at 09:45:00

## 2024-06-25 RX ADMIN — ROSUVASTATIN CALCIUM 10 MG: 10 TABLET, COATED ORAL at 21:39:00

## 2024-06-25 RX ADMIN — LOSARTAN POTASSIUM 100 MG: 100 TABLET ORAL at 11:29:00

## 2024-06-25 RX ADMIN — LIDOCAINE HYDROCHLORIDE 100 MG: 10 INJECTION, SOLUTION EPIDURAL; INFILTRATION; INTRACAUDAL; PERINEURAL at 08:26:00

## 2024-06-25 RX ADMIN — PREDNISONE 5 MG: 5 TABLET ORAL at 21:39:00

## 2024-06-25 RX ADMIN — HYDROCODONE BITARTRATE AND ACETAMINOPHEN 1 TABLET: 5; 325 TABLET ORAL at 13:21:00

## 2024-06-25 RX ADMIN — HYDROMORPHONE HYDROCHLORIDE 0.6 MG: 1 INJECTION, SOLUTION INTRAMUSCULAR; INTRAVENOUS; SUBCUTANEOUS at 09:43:00

## 2024-06-25 RX ADMIN — AMLODIPINE BESYLATE 10 MG: 10 TABLET ORAL at 11:29:00

## 2024-06-25 RX ADMIN — PREDNISONE 5 MG: 5 TABLET ORAL at 11:30:00

## 2024-06-25 RX ADMIN — HYDROCODONE BITARTRATE AND ACETAMINOPHEN 2 TABLET: 5; 325 TABLET ORAL at 21:40:00

## 2024-06-25 NOTE — BRIEF OP NOTE
Pre-Operative Diagnosis: Postoperative hematoma, surgical wound dehiscence     Post-Operative Diagnosis: same     Procedure Performed:   1. Debridement of skin and subcutaneous fat total area 12x4x3 cm   2. Pulse lavage   3. Placement of kerecis skin substitute 95 cm, 38 cm, 38  surgiclose micro and 6x8 kerecis mesh     Florian    Assistant(s):        Surgical Findings:   Debrided to healthy tissues   Myoplasty intact and viable      Specimen: none      Estimated Blood Loss: Blood Output: 20 mL (6/25/2024  9:19 AM)    Yariel Du MD  6/25/2024  9:30 AM

## 2024-06-25 NOTE — ANESTHESIA PROCEDURE NOTES
Airway  Date/Time: 6/25/2024 8:27 AM  Urgency: elective      General Information and Staff    Patient location during procedure: OR  Anesthesiologist: Lux Harley MD  Performed: anesthesiologist   Performed by: Lux Harley MD  Authorized by: Lux Harley MD      Indications and Patient Condition  Indications for airway management: anesthesia  Sedation level: deep  Preoxygenated: yes  Patient position: sniffing  Mask difficulty assessment: 1 - vent by mask    Final Airway Details  Final airway type: supraglottic airway      Successful airway: classic  Size 3       Number of attempts at approach: 1

## 2024-06-25 NOTE — ANESTHESIA POSTPROCEDURE EVALUATION
[facility]    Emilie John Patient Status:  Inpatient   Age/Gender 69 year old female MRN [id number]   Location [facility] CARDIOVASCULAR SURGERY Attending Yariel Du MD   Hosp Day # 11 PCP Tay Alexandra MD       Anesthesia Post-op Note    RIGHT GROIN IRRIGATION AND DEBRIDMENT, WOUND VAC PLACEMENT, KERECIS PLACEMENT    Procedure Summary       Date: 06/25/24 Room / Location: [facility] 01 /  [facility]    Anesthesia Start: 0821 Anesthesia Stop: 0954    Procedure: RIGHT GROIN IRRIGATION AND DEBRIDMENT, WOUND VAC PLACEMENT, KERECIS PLACEMENT (Right: Groin) Diagnosis: (postop hematoma)    Surgeons: Yariel Du MD Responsible Provider: Lux Harley MD    Anesthesia Type: general ASA Status: 3            Anesthesia Type: general    Vitals Value Taken Time   /77 06/25/24 0954   Temp 97.4 06/25/24 0954   Pulse 72 06/25/24 0954   Resp 16 06/25/24 0954   SpO2 98 06/25/24 0954       Patient Location: PACU    Anesthesia Type: general    Airway Patency: patent    Postop Pain Control: adequate    Mental Status: mildly sedated but able to meaningfully participate in the post-anesthesia evaluation    Nausea/Vomiting: none    Cardiopulmonary/Hydration status: stable euvolemic    Complications: no apparent anesthesia related complications    Postop vital signs: stable    Dental Exam: Unchanged from Preop

## 2024-06-25 NOTE — PROCEDURES
[facility]    PATIENT'S NAME: JONNY KHAN   ATTENDING PHYSICIAN: Yariel Du M.D.   OPERATING PHYSICIAN: Yariel Du M.D.   PATIENT ACCOUNT#:   [id number]    LOCATION:  [address] [place] [address]  MEDICAL RECORD #:   [id number]       YOB: 1955  ADMISSION DATE:       06/13/2024      OPERATION DATE:  06/13/2024    CARDIAC PROCEDURE TRANSCRIPTION    PERIPHERAL ANGIOGRAPHY/PERCUTANEOUS PERIPHERAL INTERVENTION    PREOPERATIVE DIAGNOSIS:  Right common femoral artery occlusion post angiogram.   POSTOPERATIVE DIAGNOSIS:  Right common femoral artery occlusion post angiogram with embolization, left superficial femoral artery embolization, left common femoral artery dissection.  PROCEDURE PERFORMED:    1.   Ultrasound-guided percutaneous access, left common femoral artery.   2.   Selection of the right common femoral artery and angiogram.    3.   Penumbra percutaneous thrombectomy of the right common femoral artery and profunda and superficial femoral artery.  4.   Balloon angioplasty and stenting of the mid right SFA with a 7 x 120 Innova.  5.   Balloon angioplasty and stenting of the proximal SFA with a 7 x 60 Innova.  6.   Ultrasound-guided percutaneous access of the right common femoral artery  7.   Penumbra percutaneous thrombectomy of the left SFA and anterior tibial artery.  8.   Balloon angioplasty of the left TP trunk and posterior tibial artery with a 3 x 80 balloon.  9.   Balloon angioplasty and stenting of the left common femoral artery with an 8 x 60 Innova stent.   10.   Intravascular ultrasound the right common femoral artery, superficial femoral artery, popliteal artery and peroneal.  11.   Intravascular ultrasound of the left external iliac, common femoral artery, SFA, and popliteal artery.    ANESTHESIA:  Moderate conscious sedation with 5 mg of Versed and 200 mcg of fentanyl.  Start time was 1436, finish was 1837.    SURGICAL FINDINGS:    1.   Occlusion of the right common  femoral artery with embolism to the superficial femoral artery and profunda treated with Penumbra percutaneous thrombectomy.    2.   Right SFA with high-grade stenosis treated with angioplasty and stenting the proximal and mid SFA.  3.   Left SFA thrombus/embolus treated with a Penumbra.  4.   Left common femoral artery flow-limiting dissection treated with self-expanding stent.    SPECIMEN:  None.    ESTIMATED BLOOD LOSS:  200 mL.    BRIEF HISTORY:  This is a 69-year-old female who 2 hours previously underwent a peripheral intervention for a left external iliac .  One hour after the procedure, I was notified that she was having extreme pain in her right foot.  We promptly evaluated her and proceeded with a repeat angiogram, concern for a potential closure device complication.    DETAILS OF PROCEDURE:  The patient was taken to the catheterization lab, prepped and draped in the usual sterile fashion.  Moderate conscious sedation was initiated and monitored by a qualified nurse under my supervision.  Using ultrasound, I percutaneously accessed the left common femoral artery with a micropuncture kit.  I then advanced a Glidewire Advantage across the previous stent under fluoro and then exchanged for a Crossover catheter.  I did an angiogram of the right common iliac and external iliac, both of which were widely patent.  I then advanced the Glidewire Advantage down to the level of the proximal common femoral artery and then the Crossover catheter and then did an angiogram at the level of the common femoral artery.  This demonstrated there was complete occlusion of the distal common femoral artery with clot in the profunda and the superficial femoral artery.  The patient was systemically heparinized already, and we made sure the patient had ACTs that were appropriate, greater than 260 throughout the procedure.  I then advanced the Glidewire Advantage into the superficial femoral artery and then exchanged the 5-Hungarian  short sheath for a 7-Nepalese sheath.  The sheath went up and over the aortic bifurcation under direct fluoroscopy, making sure we did not disrupt the recently placed stents, and then a 7-Nepalese Penumbra device was opened and prepped.  I then started with percutaneous thrombectomy of the right common femoral artery and the superficial femoral artery and had an excellent technical result.  There was still clot in the profunda.  I was able to redirect the wire into the profunda and then did a suction thrombectomy of the profunda all the way to the third order branches.  Repeat angiogram showed an excellent technical result and complete resolution of the thrombus of the common femoral artery and profunda and SFA.  There were ongoing stenoses in the distal arteries that were causing some flow limitation.  I then performed intravascular ultrasound of the right common femoral artery, superficial femoral artery, popliteal, and peroneal after exchanging the 0.035 wire for a 0.018 wire and confirmed under IVUS that these were major stenoses and not residual thrombus.  Therefore, to make sure that she had adequate flow, I proceeded with stenting.  I then deployed a 7 x 120 Innova stent in the mid SFA and then in the proximal SFA.  There was small flow limitation, and I placed an additional 7 x 60 stent.  I then profiled both stents with a 6 mm balloon.  Repeat angiogram showed an excellent technical result with complete resolution of the stenoses, and there was flow through the posterior tibial artery and the peroneal artery into the foot.  Satisfied with this, I then under fluoroscopy withdrew the sheath to the level of the left common femoral artery.  Due to the unusual nature of her having occluded the right side, I then did a retrograde angiogram on the left, and she had a superficial femoral artery thrombus that was concerning for acute finding.  I then percutaneously accessed the right side with a micropuncture sheath and  advanced the Glidewire Advantage into the aorta, exchanged the micropuncture sheath for a 5-Armenian sheath, and then used a Crossover catheter, and came over to the other side with the Crossover catheter.  Due to the presence of the 7-Armenian sheath, I had difficulty advancing the catheters and wires past this and, therefore, had to remove the 7-Armenian sheath and then placed a Perclose device and then temporarily ballooned this area to make sure I had adequate hemostasis before I could proceed with additional percutaneous thrombectomy.  We then exchanged over the Glidewire Advantage for a 7-Armenian sheath.  The sheath went up and over the aortic bifurcation under direct visualization, making sure that it did not disrupt the recently placed left iliac stent, and then a second Penumbra percutaneous thrombectomy device was opened.  We then did percutaneous thrombectomy of the left superficial femoral artery.  There was significant thrombus that was concerning in the anterior tibial artery and a 0.014 CAT RX was opened, and I performed percutaneous thrombectomy of the left anterior tibial artery.  Repeat angiogram showed there were stenoses within the TP trunk and the posterior tibial artery, and I performed angioplasty of the posterior tibial artery and the TP trunk with a 3 x 80 balloon.  Flow was still sluggish, and I noticed that my sheath was slightly farther into the common femoral artery.  It was just in the distal common femoral artery.  I then pulled the sheath back and identified a high-grade stenosis within the left common femoral artery.  At this time, due to the length of the procedure, I wanted to make sure the patient had adequate flow and, therefore, placed a stent in the left common femoral artery.  I placed an 8 x 60 Innova self-expanding stent in front of this with a 7 mm balloon.  Repeat angiogram showed an excellent technical result.  At this time, I broke scrub briefly and inspected both feet, and  there were excellent signals in the posterior tibial artery on the right and the anterior tibial artery on the left.  Having completed the procedures, salvage of both lower extremities, all devices were removed, and a Perclose device was placed on the right common femoral artery.  I then reinspected the feet after removing all devices, and there were excellent Doppler signals in both feet.  The patient was then taken to Recovery in the ICU in stable condition.    Dictated By Yariel Du M.D.  d: 06/25/2024 05:02:32  t: 06/25/2024 11:05:13  Psychiatric [id number]  MJG/

## 2024-06-25 NOTE — PROCEDURES
[facility]    PATIENT'S NAME: JONNY KHAN   ATTENDING PHYSICIAN: Yariel Du M.D.   OPERATING PHYSICIAN: Yariel Du M.D.   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  PROCEDURE PERFORMED:    1.   Ultrasound-guided percutaneous access, right and left common femoral arteries.  2.   Angiogram of left iliac artery.  3.   Balloon angioplasty and stent of the left common iliac artery and external iliac artery with an 8 x 37 balloon expandable stent proximal and an 8 x 100 self-expanding stent distal.    ANESTHESIA:  Moderate conscious sedation with 4 mg of Versed and 75 mcg of fentanyl.  Start time is 1228, finish was 1300.    ASSISTANT:  Catheterization lab staff.    SURGICAL FINDINGS:  Long segment external iliac artery chronic total occlusion.    SPECIMEN:  None.    ESTIMATED BLOOD LOSS:  50 mL.    BRIEF HISTORY:  This is a 69-year-old female with significant history of left leg pain.  We are proceeding with peripheral intervention as described below.    DETAILS OF PROCEDURE:  Patient was taken to the catheterization lab, prepped and draped in the usual sterile fashion.  Moderate conscious sedation was initiated and monitored by a qualified nurse under my supervision.  Using ultrasound, I percutaneously accessed the right and left common femoral arteries with micropuncture kits.  Under fluoroscopy, I then advanced a Glidewire Advantage into the right common iliac artery into the aorta and exchanged the micropuncture sheath for a 5-Andorran sheath.  On  the patient's right, I then advanced a J-tip Cardoso wire and stopped short of the chronic total occlusion and then placed a 5-Omani sheath.  Patient was then systemically heparinized.  On the patient's right, I then placed an Omni Flush catheter and did a flush aortogram which demonstrated the aorta was patent.  The common iliacs were patent.  On the patient's left, the chronic CT of the iliac was demonstrated.  I then used a NaviCross and advanced this up the Cardoso wire and then exchanged for a Glidewire Advantage.  I engaged the  with a NaviCross and, with the NaviCross and the Glidewire Advantage, I was able to successfully cross the  into the intraluminal.  I advanced the NaviCross into the aorta.  Did an aortogram through the NaviCross confirming that I was intraluminal, I then positioned the Glidewire Advantage with the tip of the wire in the infrarenal aorta.  I then exchanged for a 6-Omani sheath.  I then balloon angioplastied the chronic total occlusion first with a 6 mm balloon the entire length, and based on the CT scan, then selected an 8 x 37 balloon expandable stent to be placed extending from the distal common iliac extending into the external iliac and then selected an 8 x 100 self-expanding stent.  The 8 x 100 self-expanding stent was then profiled with a 7 mm balloon.  Repeat angiogram from the right side showed an excellent technical result with wide patency on the patient's right.  On the patient's left, I then removed all the devices and then placed a Perclose device.  On the patient's left, over wire, I removed the Omni Flush catheter and removed the 5-Omani sheath and then placed an additional Perclose device.  The patient was awakened and taken to Recovery in stable condition.    Dictated By Yariel Du M.D.  d: 06/25/2024 04:52:42  t: 06/25/2024 11:01:50  [facility] [id number]  [facility] [place]

## 2024-06-25 NOTE — PLAN OF CARE
Received pt at 1930  Pt AOx4, NSR, RA, VSS  Wound vac to R groin w/ ss/s output  PRN [place] for pain  NPO @ [place]  Heparin gtt stopped @ [place]  D/c Planning: To OR w/ Dr Du @0800 6/25  Call light within reach.  Needs currently met      Problem: Patient/Family Goals  Goal: Patient/Family Long Term Goal  Description: Patient's Long Term Goal: Stay out of hospital    Interventions:  - Med compliance, follow up appointments, PT/OT, pain management  - See additional Care Plan goals for specific interventions  Outcome: Progressing  Goal: Patient/Family Short Term Goal  Description: Patient's Short Term Goal: SBP maintain goal >90    Interventions:   - titrate levo to achieve goal, encourage PO fluids when more awake  - See additional Care Plan goals for specific interventions  Outcome: Progressing     Problem: PAIN - ADULT  Goal: Verbalizes/displays adequate comfort level or patient's stated pain goal  Description: INTERVENTIONS:  - Encourage pt to monitor pain and request assistance  - Assess pain using appropriate pain scale  - Administer analgesics based on type and severity of pain and evaluate response  - Implement non-pharmacological measures as appropriate and evaluate response  - Consider cultural and social influences on pain and pain management  - Manage/alleviate anxiety  - Utilize distraction and/or relaxation techniques  - Monitor for opioid side effects  - Notify MD/LIP if interventions unsuccessful or patient reports new pain  - Anticipate increased pain with activity and pre-medicate as appropriate  Outcome: Progressing     Problem: DISCHARGE PLANNING  Goal: Discharge to home or other facility with appropriate resources  Description: INTERVENTIONS:  - Identify barriers to discharge w/pt and caregiver  - Include patient/family/discharge partner in discharge planning  - Arrange for needed discharge resources and transportation as appropriate  - Identify discharge learning needs (meds, wound care, etc)  -  Arrange for interpreters to assist at discharge as needed  - Consider post-discharge preferences of patient/family/discharge partner  - Complete POLST form as appropriate  - Assess patient's ability to be responsible for managing their own health  - Refer to Case Management Department for coordinating discharge planning if the patient needs post-hospital services based on physician/LIP order or complex needs related to functional status, cognitive ability or social support system  Outcome: Progressing     Problem: SKIN/TISSUE INTEGRITY - ADULT  Goal: Skin integrity remains intact  Description: INTERVENTIONS  - Assess and document risk factors for pressure ulcer development  - Assess and document skin integrity  - Monitor for areas of redness and/or skin breakdown  - Initiate interventions, skin care algorithm/standards of care as needed  Outcome: Progressing  Goal: Incision(s), wounds(s) or drain site(s) healing without S/S of infection  Description: INTERVENTIONS:  - Assess and document risk factors for pressure ulcer development  - Assess and document skin integrity  - Assess and document dressing/incision, wound bed, drain sites and surrounding tissue  - Implement wound care per orders  - Initiate isolation precautions as appropriate  - Initiate Pressure Ulcer prevention bundle as indicated  Outcome: Progressing     Problem: Impaired Functional Mobility  Goal: Achieve highest/safest level of mobility/gait  Description: Interventions:  - Assess patient's functional ability and stability  - Promote increasing activity/tolerance for mobility and gait  - Educate and engage patient/family in tolerated activity level and precautions  - Recommend use of  RW for transfers and ambulation  Outcome: Progressing     Problem: Impaired Activities of Daily Living  Goal: Achieve highest/safest level of independence in self care  Description: Interventions:  - Assess ability and encourage patient to participate in ADLs to  maximize function  - Promote sitting position while performing ADLs such as feeding, grooming, and bathing  - Educate and encourage patient/family in tolerated functional activity level and precautions during self-care  - Encourage patient to incorporate impaired side during daily activities to promote function  Outcome: Progressing     Problem: CARDIOVASCULAR - ADULT  Goal: Maintains optimal cardiac output and hemodynamic stability  Description: INTERVENTIONS:  - Monitor vital signs, rhythm, and trends  - Monitor for bleeding, hypotension and signs of decreased cardiac output  - Evaluate effectiveness of vasoactive medications to optimize hemodynamic stability  - Monitor arterial and/or venous puncture sites for bleeding and/or hematoma  - Assess quality of pulses, skin color and temperature  - Assess for signs of decreased coronary artery perfusion - ex. Angina  - Evaluate fluid balance, assess for edema, trend weights  Outcome: Progressing  Goal: Absence of cardiac arrhythmias or at baseline  Description: INTERVENTIONS:  - Continuous cardiac monitoring, monitor vital signs, obtain 12 lead EKG if indicated  - Evaluate effectiveness of antiarrhythmic and heart rate control medications as ordered  - Initiate emergency measures for life threatening arrhythmias  - Monitor electrolytes and administer replacement therapy as ordered  Outcome: Progressing     Problem: RISK FOR INFECTION - ADULT  Goal: Absence of fever/infection during anticipated neutropenic period  Description: INTERVENTIONS  - Monitor WBC  - Administer growth factors as ordered  - Implement neutropenic guidelines  Outcome: Progressing

## 2024-06-25 NOTE — PROGRESS NOTES
[facility]   part of [facility]     Progress Note  Emilie John Patient Status:  Inpatient    1955 MRN [id number]   Location [facility] 7NE-A Attending Yariel Du MD   Hosp Day # 11 PCP Tay Alexandra MD          Impression     -left iliac chronic occlusion with claudication sp balloon angioplasty and stent of left common iliac artery and external iliac artery   -right common femoral artery occlusion sp thrombectomy, balloon angioplasty, stent; thrombectomy of left SFA and left anterior tibial artery, balloon angioplasty of left TP trunk and posterior tibial   -post op pain  -post op leukocytosis  -large R inguinal region pseudoaneurysm 6/15 noted     -post op shock, on pressors, possibly hemorrhagic  -left groin hematoma  -anemia     -DM2, uncontrolled - A1c 12s     -nicotine dependence, smokes 1ppd     -HTN  -HLD     -RA on chronic prednisone  -CKD 3, baseline Cr ~1.2     Plan     *vascular  -Went back to OR 6/15 for R groin pseudoaneurysm s/p ligation of femoral artery branch with sartorius muscle myoplasty.  -hep gtt  -able to ambulate per vascular  -to OR for evacuation of hematoma and pulse lavage 24 w/ wound vac placement  -to OR  for debridement of skin and subcutaneous fat, pulse lavage, placement of kerecis skin substitute and mesh.     *post op hypotension / shock, resolved  -s/p iv hydrocortisone, restart home po prednisone  -supportive mgmt with prbc, received 6/15 and now trending hgb     *anemia  -preop hgb 13.9 >> cont to trend  -hgb stable >> will monitor  Sp - received several prbc tx     *thrombocytopenia- likely 2/2 blood loss, trend plts     *DM 2  -follows with endo as outpt, recently glipizide was increased  -ISS + tresiba 20 units     *HTN hx  -restart home meds (at home on atenolol, hydrochlorothiazide, norvasc, losartan)     *HLD  -statin     DVT proph  On heparin     GI ppx  -pepcid    PCP: Tay Alexandra  MD      DISPO:  Cont inpt  Will follow      Thank you for allowing me to participate in the care of this patient.  I will be following the patient while she is in the hospital.        Michael Phillips Md  [facility]  Answering Service number: [phone]                 SUBJECTIVE:   Denies cp/sob, n/v, f/c.                OBJECTIVE:   Blood pressure 115/56, pulse 69, temperature 98.3 °F (36.8 °C), temperature source Temporal, resp. rate 13, height 5' 1\" (1.549 m), weight 164 lb 10.9 oz (74.7 kg), SpO2 97%, not currently breastfeeding.    GENERAL: no apparent distress  NEURO: A/A Ox3  RESP: non labored, CTA  CARDIO: Regular, no murmur  ABD: soft, NT, ND, BS+  EXTREMITIES:  no edema, no calf tenderness, right inguinal region with wound vac.     DIAGNOSTIC DATA:   Labs:     Recent Labs   Lab 06/21/24  1115 06/22/24  0550 06/23/24  0602 06/24/24  0456 06/25/24  0717   WBC 18.2* 17.5* 15.1* 16.9* 17.9*   HGB 11.0* 10.3* 10.5* 10.8* 10.1*   MCV 90.6 88.5 91.0 92.7 90.3   .0 381.0 415.0 464.0* 488.0*       Recent Labs   Lab 06/21/24  1115 06/22/24  0550 06/23/24  0602 06/24/24  0456 06/25/24  0717   * 133* 135* 136 138   K 4.5  4.5 4.6 4.5 4.3 4.2    104 108 107 109   CO2 21.0 22.0 20.0* 21.0 22.0   BUN 17 18 23 24* 27*   CREATSERUM 1.01 0.94 1.02 0.98 1.06*   CA 8.5 8.7 8.8 9.0 9.3   MG 1.8 2.1 1.9 2.0 2.0   GLU 98 150* 135* 151* 147*       Recent Labs   Lab 06/20/24  0617 06/21/24  1115 06/22/24  0550 06/23/24  0602 06/24/24  0456   ALT 20 23 20 19 18   AST 37 38* 20 20 8*   ALB 2.1* 2.1* 2.2* 2.1* 2.3*       Recent Labs   Lab 06/24/24  0516 06/24/24  1204 06/24/24  1547 06/24/24  2136 06/25/24  0526   PGLU 164* 198* 85 197* 216*       No results for input(s): \"TROP\" in the last 168 hours.      MEDICATIONS      Current Facility-Administered Medications   Medication Dose Route Frequency    acetaminophen (Tylenol) tab 650 mg  650 mg Oral Q4H PRN    Or    HYDROcodone-acetaminophen (Norco) 5-325 MG  per tab 1 tablet  1 tablet Oral Q4H PRN    Or    HYDROcodone-acetaminophen (Norco) 5-325 MG per tab 2 tablet  2 tablet Oral Q4H PRN    ondansetron (Zofran) 4 MG/2ML injection 4 mg  4 mg Intravenous Q6H PRN    metoclopramide (Reglan) 5 mg/mL injection 5 mg  5 mg Intravenous Q8H PRN    heparin (Porcine) 25,000 Units/250mL infusion premix  500 Units/hr Intravenous Continuous    predniSONE (Deltasone) tab 5 mg  5 mg Oral BID    amLODIPine (Norvasc) tab 10 mg  10 mg Oral Daily    losartan (Cozaar) tab 100 mg  100 mg Oral Daily    atenolol (Tenormin) tab 50 mg  50 mg Oral Daily Beta Blocker    famotidine (Pepcid) tab 20 mg  20 mg Oral Daily    Or    famotidine (Pepcid) 20 mg/2mL injection 20 mg  20 mg Intravenous Daily    insulin aspart (NovoLOG) 100 Units/mL FlexPen 4-20 Units  4-20 Units Subcutaneous TID AC and HS    insulin degludec (Tresiba) 100 units/mL flextouch 20 Units  20 Units Subcutaneous Daily    aspirin chewable tab 81 mg  81 mg Oral Daily    rosuvastatin (Crestor) tab 10 mg  10 mg Oral Nightly    morphINE PF 2 MG/ML injection 2 mg  2 mg Intravenous Q2H PRN    Or    morphINE PF 4 MG/ML injection 4 mg  4 mg Intravenous Q2H PRN    Or    morphINE PF 4 MG/ML injection 6 mg  6 mg Intravenous Q2H PRN    clopidogrel (Plavix) tab 75 mg  75 mg Oral Daily    glucose (Dex4) 15 GM/59ML oral liquid 15 g  15 g Oral Q15 Min PRN    Or    glucose (Glutose) 40% oral gel 15 g  15 g Oral Q15 Min PRN    Or    glucose-vitamin C (Dex-4) chewable tab 4 tablet  4 tablet Oral Q15 Min PRN    Or    dextrose 50% injection 50 mL  50 mL Intravenous Q15 Min PRN    Or    glucose (Dex4) 15 GM/59ML oral liquid 30 g  30 g Oral Q15 Min PRN    Or    glucose (Glutose) 40% oral gel 30 g  30 g Oral Q15 Min PRN    Or    glucose-vitamin C (Dex-4) chewable tab 8 tablet  8 tablet Oral Q15 Min PRN    norepinephrine (Levophed) 4 mg/250mL infusion premix  0.5-30 mcg/min Intravenous Continuous                  IMAGING     No results found.

## 2024-06-25 NOTE — OCCUPATIONAL THERAPY NOTE
IP OT chart reviewed. Pt off the floor for RIGHT GROIN IRRIGATION AND DEBRIDMENT. OT continue to follow.

## 2024-06-25 NOTE — DIETARY NOTE
[facility]   part of [facility]   CLINICAL NUTRITION    Emilie John     Admitting diagnosis:  Iliac artery occlusion, bilateral (HCC) [I74.5]    Ht: 154.9 cm (5' 1\")  Wt: 74.7 kg (164 lb 10.9 oz).   Body mass index is 31.12 kg/m².  IBW: 47.7 kg    Wt Readings from Last 6 Encounters:   06/18/24 74.7 kg (164 lb 10.9 oz)   01/26/24 69.4 kg (153 lb)   03/09/22 69.9 kg (154 lb)   02/22/22 69.3 kg (152 lb 12.8 oz)   12/01/21 71.2 kg (157 lb)   10/22/21 70.8 kg (156 lb)        Labs/Meds reviewed    Diet:       Procedures    Carbohydrate controlled diet 1800 kcal/60 grams; Is Patient on Accuchecks? Yes; Misc Restriction: Cardiac     Percent Meals Eaten (last 3 days)       Date/Time Percent Meals Eaten (%)    06/22/24 0850 100 %    06/22/24 1220 90 %    06/23/24 0830 90 %    06/23/24 1230 100 %    06/23/24 1810 15 %    06/24/24 1700 80 %    06/25/24 1300 100 %          6/25 - Pt to OR this Am for debridement of wound.  Previously tolerating % of most meals. No n/v/d reported.  Last BM 6/24. No new wt available.    Pt chart reviewed d/t Elevated A1C.  Patient reports good appetite at this time.  Nursing notes reports Percent Meals Eaten (%): 100 % intake for last meal.  Tolerating po diet without diarrhea, emesis, or constipation.   No significant weight changes noted.     PMH includes HTN, DM, PVD.  Pt seen for A1C 12.3%.  PT lying in bed, reports good appetite. Denies n/v/d. Denies recent wt changes. Pt appears well nourished.  Reviewed current A1C with pt.  She notes she is working with Endo APRN in OP setting and just had her meds adjusted.  However, she notes intolerance to her medications (diarrhea, abdominal pain). She also is on chronic steroids. Encouraged pt to discuss medications with MD further.  PT does not follow any particular diet PTA, and does all her own shopping and cooking.  She is limited by RA in her hands. Reviewed foods that contain carbs and advised portion control.  Pt noted to  be drinking sugar sweetened beverages (iced coffee) which she knows she shouldn't drink.  Encouraged pt to focus on limiting her beverages to unsweetened beverages.  Practiced identifying and counting carbs using her every day meals.  PT would benefit from ongoing education and support.       Patient is at low nutrition risk at this time.    Please consult if patient status changes or nutrition issues arise.    Madelaine King RD, LDN, [facility]  Clinical Dietitian  Phone [phone]

## 2024-06-26 LAB
ANION GAP SERPL CALC-SCNC: 10 MMOL/L (ref 0–18)
BASOPHILS # BLD AUTO: 0.05 X10(3) UL (ref 0–0.2)
BASOPHILS NFR BLD AUTO: 0.3 %
BUN BLD-MCNC: 29 MG/DL (ref 9–23)
CALCIUM BLD-MCNC: 9.2 MG/DL (ref 8.5–10.1)
CHLORIDE SERPL-SCNC: 106 MMOL/L (ref 98–112)
CO2 SERPL-SCNC: 19 MMOL/L (ref 21–32)
CREAT BLD-MCNC: 1.11 MG/DL
EGFRCR SERPLBLD CKD-EPI 2021: 54 ML/MIN/1.73M2 (ref 60–?)
EOSINOPHIL # BLD AUTO: 0.03 X10(3) UL (ref 0–0.7)
EOSINOPHIL NFR BLD AUTO: 0.2 %
ERYTHROCYTE [DISTWIDTH] IN BLOOD BY AUTOMATED COUNT: 16.7 %
GLUCOSE BLD-MCNC: 168 MG/DL (ref 70–99)
GLUCOSE BLD-MCNC: 178 MG/DL (ref 70–99)
GLUCOSE BLD-MCNC: 203 MG/DL (ref 70–99)
GLUCOSE BLD-MCNC: 213 MG/DL (ref 70–99)
GLUCOSE BLD-MCNC: 217 MG/DL (ref 70–99)
HCT VFR BLD AUTO: 30.5 %
HGB BLD-MCNC: 9.9 G/DL
IMM GRANULOCYTES # BLD AUTO: 0.35 X10(3) UL (ref 0–1)
IMM GRANULOCYTES NFR BLD: 1.9 %
LYMPHOCYTES # BLD AUTO: 1.63 X10(3) UL (ref 1–4)
LYMPHOCYTES NFR BLD AUTO: 9 %
MAGNESIUM SERPL-MCNC: 2 MG/DL (ref 1.6–2.6)
MCH RBC QN AUTO: 29.4 PG (ref 26–34)
MCHC RBC AUTO-ENTMCNC: 32.5 G/DL (ref 31–37)
MCV RBC AUTO: 90.5 FL
MONOCYTES # BLD AUTO: 1.07 X10(3) UL (ref 0.1–1)
MONOCYTES NFR BLD AUTO: 5.9 %
NEUTROPHILS # BLD AUTO: 14.99 X10 (3) UL (ref 1.5–7.7)
NEUTROPHILS # BLD AUTO: 14.99 X10(3) UL (ref 1.5–7.7)
NEUTROPHILS NFR BLD AUTO: 82.7 %
OSMOLALITY SERPL CALC.SUM OF ELEC: 290 MOSM/KG (ref 275–295)
PLATELET # BLD AUTO: 522 10(3)UL (ref 150–450)
POTASSIUM SERPL-SCNC: 4.6 MMOL/L (ref 3.5–5.1)
RBC # BLD AUTO: 3.37 X10(6)UL
SODIUM SERPL-SCNC: 135 MMOL/L (ref 136–145)
WBC # BLD AUTO: 18.1 X10(3) UL (ref 4–11)

## 2024-06-26 PROCEDURE — 80048 BASIC METABOLIC PNL TOTAL CA: CPT | Performed by: HOSPITALIST

## 2024-06-26 PROCEDURE — 97116 GAIT TRAINING THERAPY: CPT

## 2024-06-26 PROCEDURE — 83735 ASSAY OF MAGNESIUM: CPT | Performed by: HOSPITALIST

## 2024-06-26 PROCEDURE — 99214 OFFICE O/P EST MOD 30 MIN: CPT

## 2024-06-26 PROCEDURE — 82962 GLUCOSE BLOOD TEST: CPT

## 2024-06-26 PROCEDURE — 85025 COMPLETE CBC W/AUTO DIFF WBC: CPT | Performed by: HOSPITALIST

## 2024-06-26 PROCEDURE — 97535 SELF CARE MNGMENT TRAINING: CPT

## 2024-06-26 RX ADMIN — INSULIN DEGLUDEC 20 UNITS: 100 INJECTION, SOLUTION SUBCUTANEOUS at 08:43:00

## 2024-06-26 RX ADMIN — LOSARTAN POTASSIUM 100 MG: 100 TABLET ORAL at 08:44:00

## 2024-06-26 RX ADMIN — HYDROCODONE BITARTRATE AND ACETAMINOPHEN 1 TABLET: 5; 325 TABLET ORAL at 06:00:00

## 2024-06-26 RX ADMIN — AMLODIPINE BESYLATE 10 MG: 10 TABLET ORAL at 08:44:00

## 2024-06-26 RX ADMIN — PREDNISONE 5 MG: 5 TABLET ORAL at 08:44:00

## 2024-06-26 RX ADMIN — HYDROCODONE BITARTRATE AND ACETAMINOPHEN 1 TABLET: 5; 325 TABLET ORAL at 20:49:00

## 2024-06-26 RX ADMIN — ASPIRIN 81 MG: 81 TABLET, CHEWABLE ORAL at 08:44:00

## 2024-06-26 RX ADMIN — CLOPIDOGREL BISULFATE 75 MG: 75 TABLET ORAL at 08:44:00

## 2024-06-26 RX ADMIN — FAMOTIDINE 20 MG: 20 TABLET, FILM COATED ORAL at 08:44:00

## 2024-06-26 RX ADMIN — ROSUVASTATIN CALCIUM 10 MG: 10 TABLET, COATED ORAL at 20:49:00

## 2024-06-26 RX ADMIN — PREDNISONE 5 MG: 5 TABLET ORAL at 20:49:00

## 2024-06-26 RX ADMIN — ATENOLOL 50 MG: 50 TABLET ORAL at 06:03:00

## 2024-06-26 RX ADMIN — HEPARIN SODIUM AND DEXTROSE 500 UNITS/HR: 10000; 5 INJECTION INTRAVENOUS at 08:44:00

## 2024-06-26 NOTE — PLAN OF CARE
Assumed care at 0730  Taken down for surgery this morning  Returned to floor with wound vac in place. Minimal sanguinous output  Groin pain controlled with prn norco  Per Dr. Du, plan to resume heparin gtt and plavix tomorrow.  Pt updated on POC

## 2024-06-26 NOTE — OPERATIVE REPORT
[facility]    PATIENT'S NAME: JONNY KHAN   ATTENDING PHYSICIAN: Yariel Du M.D.   OPERATING PHYSICIAN: Yariel Du M.D.   PATIENT ACCOUNT#:   [id number]    LOCATION:  [address] [place] [address]  MEDICAL RECORD #:   [id number]       YOB: 1955  ADMISSION DATE:       06/13/2024      OPERATION DATE:  06/25/2024    OPERATIVE REPORT    PREOPERATIVE DIAGNOSIS:  Postoperative hematoma, surgical wound dehiscence.  POSTOPERATIVE DIAGNOSIS:  Postoperative hematoma, surgical wound dehiscence.  PROCEDURE:  Debridement of skin and subcutaneous fat in the right groin 12 x 4 x 3 ccm, pulse lavage of the wound, placement of Kerecis skin substitute 95 cm, 38 cm, 38 cm sq Surgiclose Micro, and a 6 x 8 Kerecis mesh.    SPECIMEN:  None.    ESTIMATED BLOOD LOSS:  20 mL.    INDICATIONS:  This is a 69-year-old female who has had a complicated course after an angiogram.  She is returning to the operating room for further debridement of her right groin wound.    FINDINGS:  Debrided to healthy tissues.  Myoplasty intact and viable.    OPERATIVE TECHNIQUE:  Patient was taken to the operating room and placed under general anesthesia.  The wound VAC was removed and the piece of Adaptic was removed.  Patient was then prepped and draped in the usual sterile fashion.  Time-out performed.  There were some full-thickness skin injuries on the medial aspect of the incision that, using a knife, I sharply removed with the knife.  I then sharply debrided all the edges of the wound with the knife.  I then used a curette and debrided the base of the wound.  We then pulsed lavaged to make sure any debris tissue was removed.  After carefully inspecting the wound, we then placed a series of Kerecis Micro 98 cm sq, 38 cm sq, and 38 cm sq over the intact myoplasty and the myoplasty harvest site.  I then sewed a 6 x 8 Kerecis mesh in place and then placed a single layer of Adaptic and a wound VAC.  Wound VAC had an adequate seal,  and the patient was awakened from anesthesia and taken to Recovery in stable condition.    Dictated By Yariel Du M.D.  d: 06/25/2024 09:41:26  t: 06/25/2024 16:02:50  [facility] [id number]  MJG/

## 2024-06-26 NOTE — CONSULTS
[facility]  Inpatient Wound Care Contact Note    Emilie John Patient Status:  Inpatient    1955 MRN [id number]   Location [facility] 7NE-A Attending Yariel Du MD   Hosp Day # 12 PCP Tay Alexandra MD     Surgical wound: right groin.NPWT placed yesterday in the OR with skin graft     Spoke with  due to blood clot/bleeding on the incision site as reported by RN.  Upon assessment of the surgical sIte and the NPWT. The drape was loose at the groin area causing leaks.Coagulated blood noted and cleansed with saline.Reinforced the drape and reapplied the adapt  barrier ring to edenilson wound/along the wound margin. Issues resolved. Maintained the seal at 125 mmHg continuous negative pressure.  Patient tolerated the procedure well.    We will continue to follow this patient while in-house and assist with wound care discharge planning.    Please call me at [phone] if you have any questions about this consultation and plan of care.      Time spent : 45 minutes      Thank you,  Gissell Ruiz RN BSN [facility]  [address] [address]

## 2024-06-26 NOTE — PHYSICAL THERAPY NOTE
PHYSICAL THERAPY TREATMENT NOTE - INPATIENT    Room Number: 7625/7625-A     Session: 1     Number of Visits to Meet Established Goals: 5    Presenting Problem: R common femoral artery occlusion s/p angiogram with embolization, L SFA embolization and L common femoral artery dissection 6/13. Post operative groin hematoma and R common femoral pseudoaneurysm, s/p R groin exploration, ligation of femoral artery branch, sartorius muscle myoplasty 6/15  Co-Morbidities : HTN, HLD, DM2, CKD, RA, PAD    ASSESSMENT     Patient is currently functioning near baseline with bed mobility, transfers, and gait.    Patient currently does not meet criteria for skilled inpatient physical therapy services, however patient will continue to benefit from QID ambulation with nursing staff.  Pt is discharged from IP PT services.  RN aware to re-order if there is a decline in mobility status.      RW issued for home 6/26/2024     Recommend short distance ambulation d/t sartorius muscle myoplasty - shorter frequent walks versus 5 miles.  Pt in agreement and motivated to maintain current level of mobility.       PLAN  PT Treatment Plan: Bed mobility;Body mechanics;Patient education;Gait training;Strengthening;Stair training;Transfer training;Balance training;Range of motion;Family education  Rehab Potential : Good  Frequency (Obs): 3-5x/week    CURRENT GOALS     Goal #1 Patient is able to demonstrate supine - sit EOB @ level: modified independent      Goal #2 Patient is able to demonstrate transfers Sit to/from Stand at assistance level: modified independent      Goal #3 Patient is able to ambulate 300 feet with assist device: walker - rolling at assistance level: modified independent      Goal #4     Goal #5     Goal #6     Goal Comments: Goals established on 6/23/2024 6/26/2024 all goals achieved     SUBJECTIVE  \"I am doing ok\"    OBJECTIVE  Precautions: Bed/chair alarm (R sartorius muscle flap)    WEIGHT BEARING RESTRICTION  Weight  Bearing Restriction: None                PAIN ASSESSMENT   Ratin  Location: R groin  Management Techniques: Repositioning    BALANCE                                                                                                                       Static Sitting: Good  Dynamic Sitting: Good           Static Standing: Fair  Dynamic Standing: Fair -    ACTIVITY TOLERANCE                         O2 WALK         AM-PAC '6-Clicks' INPATIENT SHORT FORM - BASIC MOBILITY  How much difficulty does the patient currently have...  Patient Difficulty: Turning over in bed (including adjusting bedclothes, sheets and blankets)?: A Little   Patient Difficulty: Sitting down on and standing up from a chair with arms (e.g., wheelchair, bedside commode, etc.): A Little   Patient Difficulty: Moving from lying on back to sitting on the side of the bed?: A Little   How much help from another person does the patient currently need...   Help from Another: Moving to and from a bed to a chair (including a wheelchair)?: A Little   Help from Another: Need to walk in hospital room?: A Little   Help from Another: Climbing 3-5 steps with a railing?: Total       AM-PAC Score:  Raw Score: 16   Approx Degree of Impairment: 54.16%   Standardized Score (AM-PAC Scale): 40.78   CMS Modifier (G-Code): CK    FUNCTIONAL ABILITY STATUS  Gait Assessment   Functional Mobility/Gait Assessment  Gait Assistance: Supervision  Distance (ft): 100  Assistive Device: Rolling walker  Pattern: Shuffle (bilateral hip ER - shortened stride d/t muscle flap)  Stairs:  (has elevator in building)    Skilled Therapy Provided    Bed Mobility:  Rolling: NT   Supine<>Sit: NT   Sit<>Supine: NT     Transfer Mobility:  Sit<>Stand: SBA   Stand<>Sit: SBA   Gait: SBA with RW    Therapist's Comments: NA      THERAPEUTIC EXERCISES  Lower Extremity Encouraged gentle ROM     Upper Extremity none     Position Sitting     Repetitions      Sets        Patient End of Session: Up in  chair;Needs met;Call light within reach;RN aware of session/findings;All patient questions and concerns addressed    PT Session Time: 10 minutes  Gait Training: 10 minutes  Therapeutic Activity: 0 minutes  Therapeutic Exercise: 0 minutes   Neuromuscular Re-education: 0 minutes

## 2024-06-26 NOTE — OCCUPATIONAL THERAPY NOTE
OCCUPATIONAL THERAPY TREATMENT NOTE - INPATIENT     Room Number: 7625/7625-A  Session: 1   Number of Visits to Meet Established Goals: 3    Presenting Problem: Ruptured femoral pseudoaneursym      ASSESSMENT   Patient demonstrates excellent progress this session, goals  met .       OT Device Recommendations: TBD    History:  Patient is a 69 year old female admitted on 6/13/2024 with Presenting Problem: Ruptured femoral pseudoaneursym. Co-Morbidities : HTN, RA, HLD, DM2, PVD     6/25 surgery  PREOPERATIVE DIAGNOSIS:  Postoperative hematoma, surgical wound dehiscence.  POSTOPERATIVE DIAGNOSIS:  Postoperative hematoma, surgical wound dehiscence.  PROCEDURE:  Debridement of skin and subcutaneous fat in the right groin 12 x 4 x 3 ccm, pulse lavage of the wound, placement of Kerecis skin substitute 95 cm, 38 cm, 38 cm sq Surgiclose Micro, and a 6 x 8 Kerecis mesh.    WEIGHT BEARING RESTRICTION  Weight Bearing Restriction: None                Recommendations for nursing staff:   Transfers: Modified independent, supervision for safety    Toileting location: toilet    TREATMENT SESSION:  Patient Start of Session: seated.   FUNCTIONAL TRANSFER ASSESSMENT  Sit to Stand: Chair  Edge of Bed: Supervision (Using RW)  Chair: Modified Independent  Toilet Transfer: Not Tested    BED MOBILITY  Supine to Sit : Not tested  Sit to Supine (OT): Not Tested  Scooting: Scooting to EOB independently    EDUCATION PROVIDED  Patient: Role of Occupational Therapy; Plan of Care; Posture/Positioning; Surgical Precautions; Compensatory ADL Techniques; Proper Body Mechanics  Patient's Response to Education: Verbalized Understanding      Equipment used: rw    Therapist comments: Modified independent to stand with RW. Educated the pt about body mechanics and LB dressing sequencing that could help minimize LE discomfort.   Wound vac intact. Verbalized understanding. Modified independent I ambulation in hallway.  No concerns about returning  home.  Patient End of Session: Up in chair;Needs met;Call light within reach;RN aware of session/findings;All patient questions and concerns addressed;Alarm set      PAIN ASSESSMENT  Rating: Unable to rate  Location: \"little\" surgical site  Management Techniques: Repositioning     OBJECTIVE  Precautions: Bed/chair alarm (R sartorius muscle flap)    AM-PAC ‘6-Clicks’ Inpatient Daily Activity Short Form  -   Putting on and taking off regular lower body clothing?: A Little  -   Bathing (including washing, rinsing, drying)?: A Little  -   Toileting, which includes using toilet, bedpan or urinal? : None  -   Putting on and taking off regular upper body clothing?: None  -   Taking care of personal grooming such as brushing teeth?: None  -   Eating meals?: None    AM-PAC Score:  Score: 22  Approx Degree of Impairment: 25.8%  Standardized Score (AM-PAC Scale): 47.1    PLAN  OT Treatment Plan: ADL training;Functional transfer training;Endurance training;Patient/Family education;Patient/Family training;Compensatory technique education;Balance activities  Rehab Potential : Good  Frequency: 3-5x/week    OT Goals:   ADL Goals   Patient will perform grooming: independently  Patient will perform lower body dressing:  with modified independent, while at edge of bed, and with adaptive equipment PRN  Patient will perform toileting: independently     Functional Transfer Goals  Patient will transfer from supine to sit:  with supervision  Patient will transfer from sit to stand:  with supervision  Patient will transfer to toilet:  with supervision    OT Session Time: 16 minutes  Self-Care Home Management: 12 minutes  Therapeutic Activity: 4 minutes

## 2024-06-26 NOTE — PROGRESS NOTES
Vascular surgery progress note    Patient seen and examined.  No complaints.  Denies any pain.  Wound VAC holding suction with serosanguineous output.  No significant output.  Will continue to monitor closely.

## 2024-06-26 NOTE — WOUND PROGRESS NOTE
[facility]  Inpatient Wound Care Contact Note    Emilie John Patient Status:  Inpatient    1955 MRN [id number]   Location [facility] 7NE-A Attending Yariel Du MD   Hosp Day # 12 PCP Tay Alexandra MD     Spoke with  regarding the  treatment and plan of care.Per MD, will need to leave the wound vac in place until patient is seen at the wound clinic on Tuesday.Will need to switch the hospital wound vac to home wound vac prior to discharge.    We will continue to follow this patient while in-house and assist with wound care discharge planning.    Please call me at [phone]  if you have any questions about this consultation and plan of care..      Thank you,  Gissell Ruiz RN BSN [facility]  [address] [phone]

## 2024-06-26 NOTE — CM/SW NOTE
Confirmed plans for wound vac at DC. Yes[facility]. [facility] has accepted for post DC management.     Updates to follow.    SHAYY Perkins

## 2024-06-26 NOTE — PLAN OF CARE
Assumed care 1930, s/p I&D Rt groin.  Rt groin with wound vac with 50ml sanguinous drainage in cannister overnight and small amount oozing around wound vac site.  Medicated with Norco for pain, with relief, slept well last night.

## 2024-06-26 NOTE — HOME CARE LIAISON
[facility] Spoke w/ patient who is agreeable with [facility]. Contact information placed on AVS.

## 2024-06-26 NOTE — PROGRESS NOTES
[facility] AND [facility]   part of [facility]     Progress Note  Emilie John Patient Status:  Inpatient    1955 MRN [id number]   Location [facility] 7NE-A Attending Yariel Du MD   Hosp Day # 12 PCP Tay Alexandra MD          Impression     -left iliac chronic occlusion with claudication sp balloon angioplasty and stent of left common iliac artery and external iliac artery   -right common femoral artery occlusion sp thrombectomy, balloon angioplasty, stent; thrombectomy of left SFA and left anterior tibial artery, balloon angioplasty of left TP trunk and posterior tibial   -post op pain  -post op leukocytosis  -large R inguinal region pseudoaneurysm 6/15 noted     -post op shock, on pressors, possibly hemorrhagic  -left groin hematoma  -anemia     -DM2, uncontrolled - A1c 12s     -nicotine dependence, smokes 1ppd     -HTN  -HLD     -RA on chronic prednisone  -CKD 3, baseline Cr ~1.2     Plan     *vascular  -Went back to OR 6/15 for R groin pseudoaneurysm s/p ligation of femoral artery branch with sartorius muscle myoplasty.  -hep gtt restarted today, some bleeding at surgical site, pending vascular decision on plan of care  -able to ambulate per vascular  -to OR for evacuation of hematoma and pulse lavage 24 w/ wound vac placement  -to OR  for debridement of skin and subcutaneous fat, pulse lavage, placement of kerecis skin substitute and mesh.     *post op hypotension / shock, resolved  -s/p iv hydrocortisone, restart home po prednisone  -supportive mgmt with prbc, received 6/15 and now trending hgb     *anemia  -preop hgb 13.9 >> cont to trend  -hgb stable >> will monitor  Sp - received several prbc tx     *thrombocytopenia- likely 2/2 blood loss, trend plts     *DM 2  -follows with endo as outpt, recently glipizide was increased  -ISS + tresiba 20 units     *HTN hx  -restart home meds (at home on atenolol, hydrochlorothiazide, norvasc, losartan)      *HLD  -statin     DVT proph  On heparin     GI ppx  -pepcid    PCP: Tay Alexandra MD      DISPO:  Cont inpt  Will follow      Thank you for allowing me to participate in the care of this patient.  I will be following the patient while she is in the hospital.        Michael Phillips Md  [facility]  Answering Service number: [phone]                 SUBJECTIVE:   Denies cp/sob, n/v, f/c.                OBJECTIVE:   Blood pressure 118/59, pulse 63, temperature 98.1 °F (36.7 °C), temperature source Oral, resp. rate 13, height 5' 1\" (1.549 m), weight 164 lb 10.9 oz (74.7 kg), SpO2 100%, not currently breastfeeding.    GENERAL: no apparent distress  NEURO: A/A Ox3  RESP: non labored, CTA, no crackles, no wheezing  CARDIO: Regular, no murmur  ABD: soft, NT, ND, BS+  EXTREMITIES:  no edema, no calf tenderness, right inguinal region with wound vac.     DIAGNOSTIC DATA:   Labs:     Recent Labs   Lab 06/21/24  1115 06/22/24  0550 06/23/24  0602 06/24/24  0456 06/25/24  0717   WBC 18.2* 17.5* 15.1* 16.9* 17.9*   HGB 11.0* 10.3* 10.5* 10.8* 10.1*   MCV 90.6 88.5 91.0 92.7 90.3   .0 381.0 415.0 464.0* 488.0*       Recent Labs   Lab 06/21/24  1115 06/22/24  0550 06/23/24  0602 06/24/24  0456 06/25/24  0717   * 133* 135* 136 138   K 4.5  4.5 4.6 4.5 4.3 4.2    104 108 107 109   CO2 21.0 22.0 20.0* 21.0 22.0   BUN 17 18 23 24* 27*   CREATSERUM 1.01 0.94 1.02 0.98 1.06*   CA 8.5 8.7 8.8 9.0 9.3   MG 1.8 2.1 1.9 2.0 2.0   GLU 98 150* 135* 151* 147*       Recent Labs   Lab 06/20/24  0617 06/21/24  1115 06/22/24  0550 06/23/24  0602 06/24/24  0456   ALT 20 23 20 19 18   AST 37 38* 20 20 8*   ALB 2.1* 2.1* 2.2* 2.1* 2.3*       Recent Labs   Lab 06/25/24  1153 06/25/24  1604 06/25/24  1714 06/25/24  2219 06/26/24  0551   PGLU 121* 245* 221* 202* 178*       No results for input(s): \"TROP\" in the last 168 hours.      MEDICATIONS      Current Facility-Administered Medications   Medication Dose Route Frequency     acetaminophen (Tylenol) tab 650 mg  650 mg Oral Q4H PRN    Or    HYDROcodone-acetaminophen (Norco) 5-325 MG per tab 1 tablet  1 tablet Oral Q4H PRN    Or    HYDROcodone-acetaminophen (Norco) 5-325 MG per tab 2 tablet  2 tablet Oral Q4H PRN    ondansetron (Zofran) 4 MG/2ML injection 4 mg  4 mg Intravenous Q6H PRN    metoclopramide (Reglan) 5 mg/mL injection 5 mg  5 mg Intravenous Q8H PRN    heparin (Porcine) 25,000 Units/250mL infusion premix  500 Units/hr Intravenous Continuous    predniSONE (Deltasone) tab 5 mg  5 mg Oral BID    amLODIPine (Norvasc) tab 10 mg  10 mg Oral Daily    losartan (Cozaar) tab 100 mg  100 mg Oral Daily    atenolol (Tenormin) tab 50 mg  50 mg Oral Daily Beta Blocker    famotidine (Pepcid) tab 20 mg  20 mg Oral Daily    Or    famotidine (Pepcid) 20 mg/2mL injection 20 mg  20 mg Intravenous Daily    insulin aspart (NovoLOG) 100 Units/mL FlexPen 4-20 Units  4-20 Units Subcutaneous TID AC and HS    insulin degludec (Tresiba) 100 units/mL flextouch 20 Units  20 Units Subcutaneous Daily    aspirin chewable tab 81 mg  81 mg Oral Daily    rosuvastatin (Crestor) tab 10 mg  10 mg Oral Nightly    morphINE PF 2 MG/ML injection 2 mg  2 mg Intravenous Q2H PRN    Or    morphINE PF 4 MG/ML injection 4 mg  4 mg Intravenous Q2H PRN    Or    morphINE PF 4 MG/ML injection 6 mg  6 mg Intravenous Q2H PRN    clopidogrel (Plavix) tab 75 mg  75 mg Oral Daily    glucose (Dex4) 15 GM/59ML oral liquid 15 g  15 g Oral Q15 Min PRN    Or    glucose (Glutose) 40% oral gel 15 g  15 g Oral Q15 Min PRN    Or    glucose-vitamin C (Dex-4) chewable tab 4 tablet  4 tablet Oral Q15 Min PRN    Or    dextrose 50% injection 50 mL  50 mL Intravenous Q15 Min PRN    Or    glucose (Dex4) 15 GM/59ML oral liquid 30 g  30 g Oral Q15 Min PRN    Or    glucose (Glutose) 40% oral gel 30 g  30 g Oral Q15 Min PRN    Or    glucose-vitamin C (Dex-4) chewable tab 8 tablet  8 tablet Oral Q15 Min PRN    norepinephrine (Levophed) 4 mg/250mL infusion  premix  0.5-30 mcg/min Intravenous Continuous                  IMAGING     No results found.

## 2024-06-27 LAB
ANION GAP SERPL CALC-SCNC: 5 MMOL/L (ref 0–18)
BASOPHILS # BLD AUTO: 0.07 X10(3) UL (ref 0–0.2)
BASOPHILS NFR BLD AUTO: 0.4 %
BUN BLD-MCNC: 28 MG/DL (ref 9–23)
CALCIUM BLD-MCNC: 9.2 MG/DL (ref 8.5–10.1)
CHLORIDE SERPL-SCNC: 107 MMOL/L (ref 98–112)
CO2 SERPL-SCNC: 22 MMOL/L (ref 21–32)
CREAT BLD-MCNC: 0.93 MG/DL
EGFRCR SERPLBLD CKD-EPI 2021: 67 ML/MIN/1.73M2 (ref 60–?)
EOSINOPHIL # BLD AUTO: 0.15 X10(3) UL (ref 0–0.7)
EOSINOPHIL NFR BLD AUTO: 0.9 %
ERYTHROCYTE [DISTWIDTH] IN BLOOD BY AUTOMATED COUNT: 16.7 %
GLUCOSE BLD-MCNC: 111 MG/DL (ref 70–99)
GLUCOSE BLD-MCNC: 155 MG/DL (ref 70–99)
GLUCOSE BLD-MCNC: 172 MG/DL (ref 70–99)
GLUCOSE BLD-MCNC: 223 MG/DL (ref 70–99)
GLUCOSE BLD-MCNC: 248 MG/DL (ref 70–99)
GLUCOSE BLD-MCNC: 64 MG/DL (ref 70–99)
HCT VFR BLD AUTO: 30.3 %
HGB BLD-MCNC: 9.5 G/DL
IMM GRANULOCYTES # BLD AUTO: 0.2 X10(3) UL (ref 0–1)
IMM GRANULOCYTES NFR BLD: 1.1 %
LYMPHOCYTES # BLD AUTO: 1.93 X10(3) UL (ref 1–4)
LYMPHOCYTES NFR BLD AUTO: 11.1 %
MAGNESIUM SERPL-MCNC: 1.7 MG/DL (ref 1.6–2.6)
MCH RBC QN AUTO: 29.2 PG (ref 26–34)
MCHC RBC AUTO-ENTMCNC: 31.4 G/DL (ref 31–37)
MCV RBC AUTO: 93.2 FL
MONOCYTES # BLD AUTO: 1.32 X10(3) UL (ref 0.1–1)
MONOCYTES NFR BLD AUTO: 7.6 %
NEUTROPHILS # BLD AUTO: 13.74 X10 (3) UL (ref 1.5–7.7)
NEUTROPHILS # BLD AUTO: 13.74 X10(3) UL (ref 1.5–7.7)
NEUTROPHILS NFR BLD AUTO: 78.9 %
OSMOLALITY SERPL CALC.SUM OF ELEC: 287 MOSM/KG (ref 275–295)
PLATELET # BLD AUTO: 514 10(3)UL (ref 150–450)
POTASSIUM SERPL-SCNC: 4.4 MMOL/L (ref 3.5–5.1)
RBC # BLD AUTO: 3.25 X10(6)UL
SODIUM SERPL-SCNC: 134 MMOL/L (ref 136–145)
WBC # BLD AUTO: 17.4 X10(3) UL (ref 4–11)

## 2024-06-27 PROCEDURE — 83735 ASSAY OF MAGNESIUM: CPT | Performed by: HOSPITALIST

## 2024-06-27 PROCEDURE — 82962 GLUCOSE BLOOD TEST: CPT

## 2024-06-27 PROCEDURE — 80048 BASIC METABOLIC PNL TOTAL CA: CPT | Performed by: HOSPITALIST

## 2024-06-27 PROCEDURE — 85025 COMPLETE CBC W/AUTO DIFF WBC: CPT | Performed by: HOSPITALIST

## 2024-06-27 RX ORDER — MAGNESIUM OXIDE 400 MG/1
400 TABLET ORAL ONCE
Status: COMPLETED | OUTPATIENT
Start: 2024-06-27 | End: 2024-06-27

## 2024-06-27 RX ADMIN — INSULIN DEGLUDEC 20 UNITS: 100 INJECTION, SOLUTION SUBCUTANEOUS at 08:52:00

## 2024-06-27 RX ADMIN — HYDROCODONE BITARTRATE AND ACETAMINOPHEN 1 TABLET: 5; 325 TABLET ORAL at 06:25:00

## 2024-06-27 RX ADMIN — FAMOTIDINE 20 MG: 20 TABLET, FILM COATED ORAL at 08:52:00

## 2024-06-27 RX ADMIN — PREDNISONE 5 MG: 5 TABLET ORAL at 08:52:00

## 2024-06-27 RX ADMIN — HYDROCODONE BITARTRATE AND ACETAMINOPHEN 1 TABLET: 5; 325 TABLET ORAL at 17:33:00

## 2024-06-27 RX ADMIN — ASPIRIN 81 MG: 81 TABLET, CHEWABLE ORAL at 08:52:00

## 2024-06-27 RX ADMIN — CLOPIDOGREL BISULFATE 75 MG: 75 TABLET ORAL at 08:52:00

## 2024-06-27 RX ADMIN — MAGNESIUM OXIDE 400 MG: 400 TABLET ORAL at 08:52:00

## 2024-06-27 RX ADMIN — LOSARTAN POTASSIUM 100 MG: 100 TABLET ORAL at 08:52:00

## 2024-06-27 RX ADMIN — PREDNISONE 5 MG: 5 TABLET ORAL at 22:00:00

## 2024-06-27 RX ADMIN — AMLODIPINE BESYLATE 10 MG: 10 TABLET ORAL at 08:52:00

## 2024-06-27 RX ADMIN — ROSUVASTATIN CALCIUM 10 MG: 10 TABLET, COATED ORAL at 22:00:00

## 2024-06-27 RX ADMIN — ATENOLOL 50 MG: 50 TABLET ORAL at 06:26:00

## 2024-06-27 NOTE — PROGRESS NOTES
Vascular Surgery Progress Note    /63 (BP Location: Right arm)   Pulse 65   Temp 97.1 °F (36.2 °C) (Oral)   Resp 16   Ht 5' 1\" (1.549 m)   Wt 164 lb 10.9 oz (74.7 kg)   SpO2 95%   BMI 31.12 kg/m²     Recent Labs   Lab 06/25/24  0717 06/26/24  0552 06/27/24  0515   RBC 3.41* 3.37* 3.25*   HGB 10.1* 9.9* 9.5*   HCT 30.8* 30.5* 30.3*   MCV 90.3 90.5 93.2   MCH 29.6 29.4 29.2   MCHC 32.8 32.5 31.4   RDW 16.6 16.7 16.7   NEPRELIM 14.03* 14.99* 13.74*   WBC 17.9* 18.1* 17.4*   .0* 522.0* 514.0*       Recent Labs   Lab 06/25/24  0717 06/26/24  0552 06/27/24  0515   * 168* 155*   BUN 27* 29* 28*   CREATSERUM 1.06* 1.11* 0.93   CA 9.3 9.2 9.2    135* 134*   K 4.2 4.6 4.4    106 107   CO2 22.0 19.0* 22.0       Patient seen and examined.  Denies any pain.  Had some strikethrough with heparin drip.  Heparin drip has been held.  Will plan to start Eliquis tomorrow.  Will have physical therapy evaluate the patient and ambulate to ensure no concerns.  Pending no issues likely discharge home tomorrow with her son.     Howard Taylor MD  [facility]  Vascular Surgery

## 2024-06-27 NOTE — PLAN OF CARE
Received patient at 1930.  Alert and oriented X 4  C/o mild pain to the groin area  On RA at this time.  Left groin with wound Vac, Bloody drainage noted.  Dressing reinforced X 2, Mike in place  Family at bedside.  Call light within reach, all needs met.

## 2024-06-27 NOTE — PLAN OF CARE
Assumed care at 0700  Pt AxOx4, RA  C/o mild pain at incision, declined pain meds  Heparin gtt and plavix resumed as ordered by Dr. Du.  Bloody drng and clots leaking from wound vac noted. Dr. Du notified. Heparin gtt stopped per order.  Wound care reinforced wound vac  Sanguinous drng in [place] canister noted  Pt updated on POC

## 2024-06-28 LAB
ANION GAP SERPL CALC-SCNC: 7 MMOL/L (ref 0–18)
BASOPHILS # BLD AUTO: 0.09 X10(3) UL (ref 0–0.2)
BASOPHILS NFR BLD AUTO: 0.4 %
BUN BLD-MCNC: 27 MG/DL (ref 9–23)
CALCIUM BLD-MCNC: 9.2 MG/DL (ref 8.5–10.1)
CHLORIDE SERPL-SCNC: 106 MMOL/L (ref 98–112)
CO2 SERPL-SCNC: 21 MMOL/L (ref 21–32)
CREAT BLD-MCNC: 1.14 MG/DL
EGFRCR SERPLBLD CKD-EPI 2021: 52 ML/MIN/1.73M2 (ref 60–?)
EOSINOPHIL # BLD AUTO: 0.16 X10(3) UL (ref 0–0.7)
EOSINOPHIL NFR BLD AUTO: 0.7 %
ERYTHROCYTE [DISTWIDTH] IN BLOOD BY AUTOMATED COUNT: 16.9 %
GLUCOSE BLD-MCNC: 172 MG/DL (ref 70–99)
GLUCOSE BLD-MCNC: 173 MG/DL (ref 70–99)
GLUCOSE BLD-MCNC: 190 MG/DL (ref 70–99)
GLUCOSE BLD-MCNC: 191 MG/DL (ref 70–99)
GLUCOSE BLD-MCNC: 192 MG/DL (ref 70–99)
GLUCOSE BLD-MCNC: 192 MG/DL (ref 70–99)
GLUCOSE BLD-MCNC: 209 MG/DL (ref 70–99)
GLUCOSE BLD-MCNC: 223 MG/DL (ref 70–99)
HCT VFR BLD AUTO: 32 %
HGB BLD-MCNC: 10.3 G/DL
IMM GRANULOCYTES # BLD AUTO: 0.32 X10(3) UL (ref 0–1)
IMM GRANULOCYTES NFR BLD: 1.5 %
LYMPHOCYTES # BLD AUTO: 2.2 X10(3) UL (ref 1–4)
LYMPHOCYTES NFR BLD AUTO: 10.1 %
MAGNESIUM SERPL-MCNC: 1.7 MG/DL (ref 1.6–2.6)
MCH RBC QN AUTO: 30.1 PG (ref 26–34)
MCHC RBC AUTO-ENTMCNC: 32.2 G/DL (ref 31–37)
MCV RBC AUTO: 93.6 FL
MONOCYTES # BLD AUTO: 1.39 X10(3) UL (ref 0.1–1)
MONOCYTES NFR BLD AUTO: 6.4 %
NEUTROPHILS # BLD AUTO: 17.71 X10 (3) UL (ref 1.5–7.7)
NEUTROPHILS # BLD AUTO: 17.71 X10(3) UL (ref 1.5–7.7)
NEUTROPHILS NFR BLD AUTO: 80.9 %
OSMOLALITY SERPL CALC.SUM OF ELEC: 288 MOSM/KG (ref 275–295)
PLATELET # BLD AUTO: 530 10(3)UL (ref 150–450)
POTASSIUM SERPL-SCNC: 4.6 MMOL/L (ref 3.5–5.1)
RBC # BLD AUTO: 3.42 X10(6)UL
SODIUM SERPL-SCNC: 134 MMOL/L (ref 136–145)
WBC # BLD AUTO: 21.9 X10(3) UL (ref 4–11)

## 2024-06-28 PROCEDURE — 85025 COMPLETE CBC W/AUTO DIFF WBC: CPT | Performed by: STUDENT IN AN ORGANIZED HEALTH CARE EDUCATION/TRAINING PROGRAM

## 2024-06-28 PROCEDURE — 82962 GLUCOSE BLOOD TEST: CPT

## 2024-06-28 PROCEDURE — 80048 BASIC METABOLIC PNL TOTAL CA: CPT | Performed by: STUDENT IN AN ORGANIZED HEALTH CARE EDUCATION/TRAINING PROGRAM

## 2024-06-28 PROCEDURE — 83735 ASSAY OF MAGNESIUM: CPT | Performed by: SURGERY

## 2024-06-28 RX ORDER — MAGNESIUM OXIDE 400 MG/1
400 TABLET ORAL ONCE
Status: COMPLETED | OUTPATIENT
Start: 2024-06-28 | End: 2024-06-28

## 2024-06-28 RX ORDER — DOCUSATE SODIUM 100 MG/1
100 CAPSULE, LIQUID FILLED ORAL 2 TIMES DAILY
Status: DISCONTINUED | OUTPATIENT
Start: 2024-06-28 | End: 2024-06-29

## 2024-06-28 RX ADMIN — DOCUSATE SODIUM 100 MG: 100 CAPSULE, LIQUID FILLED ORAL at 09:47:00

## 2024-06-28 RX ADMIN — MAGNESIUM OXIDE 400 MG: 400 TABLET ORAL at 08:49:00

## 2024-06-28 RX ADMIN — LOSARTAN POTASSIUM 100 MG: 100 TABLET ORAL at 08:49:00

## 2024-06-28 RX ADMIN — AMLODIPINE BESYLATE 10 MG: 10 TABLET ORAL at 08:49:00

## 2024-06-28 RX ADMIN — ROSUVASTATIN CALCIUM 10 MG: 10 TABLET, COATED ORAL at 21:07:00

## 2024-06-28 RX ADMIN — CLOPIDOGREL BISULFATE 75 MG: 75 TABLET ORAL at 08:49:00

## 2024-06-28 RX ADMIN — HYDROCODONE BITARTRATE AND ACETAMINOPHEN 1 TABLET: 5; 325 TABLET ORAL at 23:36:00

## 2024-06-28 RX ADMIN — PREDNISONE 5 MG: 5 TABLET ORAL at 08:49:00

## 2024-06-28 RX ADMIN — HYDROCODONE BITARTRATE AND ACETAMINOPHEN 1 TABLET: 5; 325 TABLET ORAL at 19:05:00

## 2024-06-28 RX ADMIN — ATENOLOL 50 MG: 50 TABLET ORAL at 05:22:00

## 2024-06-28 RX ADMIN — FAMOTIDINE 20 MG: 20 TABLET, FILM COATED ORAL at 08:49:00

## 2024-06-28 RX ADMIN — PREDNISONE 5 MG: 5 TABLET ORAL at 21:07:00

## 2024-06-28 RX ADMIN — INSULIN DEGLUDEC 20 UNITS: 100 INJECTION, SOLUTION SUBCUTANEOUS at 08:50:00

## 2024-06-28 NOTE — PROGRESS NOTES
[facility]  Inpatient Wound Care Contact Note    Emilie John Patient Status:  Inpatient    1955 MRN [id number]   Location [facility] 7NE-A Attending Yariel Du MD   Hosp Day # 14 PCP Tay Alexandra MD     Saw patient to change the NPWT to home unit. Confirmed home health upon discharge with RN and educated patient on different alarms and what number to call if unit malfunctions once she is home. Clarified discharge plan with inpatient RN - wound vac to stay on until Tuesday when patient is seen at clinic. In case of issues that cannot be resolved when at home - patient is to call HH and they can contact Dr Du's practice for orders.     Please call [phone] if any questions.      Thank you,    MARK ANTHONY Snyder   [facility]  [address] [phone]

## 2024-06-28 NOTE — PLAN OF CARE
Assumed care at 1930.  A&Ox4.  Tele SR/SB on room air.  Wound vac intact to right groin.  Denies pain.  C/o slight dizziness when sitting at edge of bed. BP wnl, resolved.  HS blood sugar 64, patient asymptomatic. Patient stated she did not eat dinner. Hypoglycemic protocol initiated, hospitalist notified. Recheck blood sugar wnl.  Plan for eliquis and PT re-eval in AM. Possible discharge pending PT rec.  Patient updated on POC.

## 2024-06-28 NOTE — CDS QUERY
Dear Dr Du,     Please clarify the following requests regarding the debridement of right groin on 6/21    Clarification #1: TYPE of Procedure  (  x )  Excisional Debridement (surgical removal or cutting away of devitalized tissue ,necrosis, or slough to the level of viable tissue)     (   )  Non Excisional Debridement  (non-operative scraping, brushing, irrigating, scrubbing, or washing of devitalized tissue, necrosis, or slough; minor removal of loose fragments)           Documentation from the Medical Record:     6/21/2024 Brief Op Note:   Procedure Performed:   Evacuation of hematoma and pulse lavage  Debridement of skin and subcutaneous fat with knife 3x4 cm   Adaptic layer and wound vac placement    Operative note:    FINDINGS:  1.       Muscle flap, secured.  2.       Necrotic skin on lateral edge was debrided.     OPERATIVE TECHNIQUE:  The patient was taken to the operating room, placed under general anesthesia.  Patient was prepped and draped in usual sterile fashion.  Time-out performed.  I reopened the proximal half of the incision where there was some breakdown of the skin already and evacuated all of the hematoma.  Hematoma was anterior to the myoplasty and involved the primary site for the myoplasty and it was tracking medially.  I then pulsed lavage to make sure that I removed all of the hematoma and irrigated with Irrisept.  The muscle flap was viable and secure.  Satisfied with this, I then debrided a 3 x 4 cm area of skin in the lateral aspect of the incision and then closing.  I then placed a single layer of Adaptic on the muscle flap and then placed the wound VAC in the incision to try and close some of the dead space.  Wound VAC was activated and secured, and plan will be to take the patient back to the operating room early next week for additional washout and possibly Kerecis placement.           For questions, please contact clinical  Viri Toussaint RN, BSN  [phone].                                                       THIS FORM IS A PERMANENT PART OF THE MEDICAL RECORD

## 2024-06-28 NOTE — PHYSICAL THERAPY NOTE
Duplicate PT orders received. Pt seen by PT post vascular procedure. [facility] recommended and pt DC from IP PT services due to meeting all IP PT goals. Orders cleared.

## 2024-06-28 NOTE — PLAN OF CARE
Assumed care 0730  A+Ox4, RA, SR on tele  Some pain after ambulation   -1 tab norco on shift  Tolerating diet   -completes supplements  Wound Vac C/D/I   -125mm Hg, continuous  PT reeval ordered per Brandon   -will see in morning  Daily cares   -bedding/bath/gown/oral care  DC tomorrow pending Vascular Surgery  Call light in reach, needs addressed

## 2024-06-28 NOTE — PROGRESS NOTES
[facility]   part of [facility]     Progress Note  Emilie John Patient Status:  Inpatient    1955 MRN [id number]   Location [facility] 7NE-A Attending Yariel Du MD   Hosp Day # 13 PCP Tay Alexandra MD          Impression     -left iliac chronic occlusion with claudication sp balloon angioplasty and stent of left common iliac artery and external iliac artery   -right common femoral artery occlusion sp thrombectomy, balloon angioplasty, stent; thrombectomy of left SFA and left anterior tibial artery, balloon angioplasty of left TP trunk and posterior tibial   -post op pain  -post op leukocytosis  -large R inguinal region pseudoaneurysm 6/15 noted     -post op shock, on pressors, possibly hemorrhagic  -left groin hematoma  -anemia     -DM2, uncontrolled - A1c 12s     -nicotine dependence, smokes 1ppd     -HTN  -HLD     -RA on chronic prednisone  -CKD 3, baseline Cr ~1.2     Plan     *vascular  -Went back to OR 6/15 for R groin pseudoaneurysm s/p ligation of femoral artery branch with sartorius muscle myoplasty.  -able to ambulate per vascular  -to OR for evacuation of hematoma and pulse lavage 24 w/ wound vac placement  -to OR  for debridement of skin and subcutaneous fat, pulse lavage, placement of kerecis skin substitute and mesh.     *post op hypotension / shock, resolved  -s/p iv hydrocortisone, restart home po prednisone  -supportive mgmt with prbc, received 6/15 and now trending hgb     *anemia  -preop hgb 13.9 >> cont to trend  -hgb stable >> will monitor  Sp - received several prbc tx     *thrombocytopenia- likely 2/2 blood loss, trend plts     *DM 2  -follows with endo as outpt, recently glipizide was increased  -ISS + tresiba 20 units     *HTN hx  -restart home meds (at home on atenolol, hydrochlorothiazide, norvasc, losartan)     *HLD  -statin     DVT proph  On heparin     GI ppx  -pepcid    PCP: Tay Alexandra  MD      DISPO:  Cont inpt  Will follow      Thank you for allowing me to participate in the care of this patient.  I will be following the patient while she is in the hospital.        Melissa Cavazos DO  Hospitalist  [facility]                   SUBJECTIVE:   Denies cp/sob, n/v, f/c. Ambulating. Tolerating diet.               OBJECTIVE:   Blood pressure 109/53, pulse 64, temperature 98.3 °F (36.8 °C), temperature source Oral, resp. rate 14, height 5' 1\" (1.549 m), weight 164 lb 10.9 oz (74.7 kg), SpO2 95%, not currently breastfeeding.    GENERAL: no apparent distress  NEURO: A/A Ox3  RESP: non labored, CTA, no crackles, no wheezing  CARDIO: Regular, no murmur  ABD: soft, NT, ND, BS+  EXTREMITIES:  no edema, no calf tenderness, right inguinal region with wound vac. Surgical incision c/d/I.    DIAGNOSTIC DATA:   Labs:     Recent Labs   Lab 06/23/24  0602 06/24/24  0456 06/25/24  0717 06/26/24  0552 06/27/24  0515   WBC 15.1* 16.9* 17.9* 18.1* 17.4*   HGB 10.5* 10.8* 10.1* 9.9* 9.5*   MCV 91.0 92.7 90.3 90.5 93.2   .0 464.0* 488.0* 522.0* 514.0*       Recent Labs   Lab 06/23/24  0602 06/24/24  0456 06/25/24  0717 06/26/24  0552 06/27/24  0515   * 136 138 135* 134*   K 4.5 4.3 4.2 4.6 4.4    107 109 106 107   CO2 20.0* 21.0 22.0 19.0* 22.0   BUN 23 24* 27* 29* 28*   CREATSERUM 1.02 0.98 1.06* 1.11* 0.93   CA 8.8 9.0 9.3 9.2 9.2   MG 1.9 2.0 2.0 2.0 1.7   * 151* 147* 168* 155*       Recent Labs   Lab 06/21/24  1115 06/22/24  0550 06/23/24  0602 06/24/24  0456   ALT 23 20 19 18   AST 38* 20 20 8*   ALB 2.1* 2.2* 2.1* 2.3*       Recent Labs   Lab 06/27/24  0604 06/27/24  1147 06/27/24  1634 06/27/24  2134 06/27/24  2155   PGLU 172* 223* 248* 64* 111*       No results for input(s): \"TROP\" in the last 168 hours.      MEDICATIONS      Current Facility-Administered Medications   Medication Dose Route Frequency    [START ON 6/28/2024] apixaban (Eliquis) tab 2.5 mg  2.5 mg Oral BID     acetaminophen (Tylenol) tab 650 mg  650 mg Oral Q4H PRN    Or    HYDROcodone-acetaminophen (Norco) 5-325 MG per tab 1 tablet  1 tablet Oral Q4H PRN    Or    HYDROcodone-acetaminophen (Norco) 5-325 MG per tab 2 tablet  2 tablet Oral Q4H PRN    ondansetron (Zofran) 4 MG/2ML injection 4 mg  4 mg Intravenous Q6H PRN    metoclopramide (Reglan) 5 mg/mL injection 5 mg  5 mg Intravenous Q8H PRN    predniSONE (Deltasone) tab 5 mg  5 mg Oral BID    amLODIPine (Norvasc) tab 10 mg  10 mg Oral Daily    losartan (Cozaar) tab 100 mg  100 mg Oral Daily    atenolol (Tenormin) tab 50 mg  50 mg Oral Daily Beta Blocker    famotidine (Pepcid) tab 20 mg  20 mg Oral Daily    Or    famotidine (Pepcid) 20 mg/2mL injection 20 mg  20 mg Intravenous Daily    insulin aspart (NovoLOG) 100 Units/mL FlexPen 4-20 Units  4-20 Units Subcutaneous TID AC and HS    insulin degludec (Tresiba) 100 units/mL flextouch 20 Units  20 Units Subcutaneous Daily    rosuvastatin (Crestor) tab 10 mg  10 mg Oral Nightly    morphINE PF 2 MG/ML injection 2 mg  2 mg Intravenous Q2H PRN    Or    morphINE PF 4 MG/ML injection 4 mg  4 mg Intravenous Q2H PRN    Or    morphINE PF 4 MG/ML injection 6 mg  6 mg Intravenous Q2H PRN    clopidogrel (Plavix) tab 75 mg  75 mg Oral Daily    glucose (Dex4) 15 GM/59ML oral liquid 15 g  15 g Oral Q15 Min PRN    Or    glucose (Glutose) 40% oral gel 15 g  15 g Oral Q15 Min PRN    Or    glucose-vitamin C (Dex-4) chewable tab 4 tablet  4 tablet Oral Q15 Min PRN    Or    dextrose 50% injection 50 mL  50 mL Intravenous Q15 Min PRN    Or    glucose (Dex4) 15 GM/59ML oral liquid 30 g  30 g Oral Q15 Min PRN    Or    glucose (Glutose) 40% oral gel 30 g  30 g Oral Q15 Min PRN    Or    glucose-vitamin C (Dex-4) chewable tab 8 tablet  8 tablet Oral Q15 Min PRN    norepinephrine (Levophed) 4 mg/250mL infusion premix  0.5-30 mcg/min Intravenous Continuous                  IMAGING     No results found.

## 2024-06-28 NOTE — CM/SW NOTE
SW made aware of potential DC plans for tomorrow. RN reports home wound vac is currently on pt. [facility] following for DC planning.     SHAYY Perkins

## 2024-06-28 NOTE — PROGRESS NOTES
[facility]   part of [facility]     Progress Note  Emilie John Patient Status:  Inpatient    1955 MRN [id number]   Location [facility] 7NE-A Attending Yariel Du MD   Hosp Day # 14 PCP Tay Alexandra MD          Impression     -left iliac chronic occlusion with claudication sp balloon angioplasty and stent of left common iliac artery and external iliac artery   -right common femoral artery occlusion sp thrombectomy, balloon angioplasty, stent; thrombectomy of left SFA and left anterior tibial artery, balloon angioplasty of left TP trunk and posterior tibial   -post op pain  -post op leukocytosis  -large R inguinal region pseudoaneurysm 6/15 noted     -post op shock, on pressors, possibly hemorrhagic  -left groin hematoma  -anemia     -DM2, uncontrolled - A1c 12s     -nicotine dependence, smokes 1ppd     -HTN  -HLD     -RA on chronic prednisone  -CKD 3, baseline Cr ~1.2     Plan     *vascular  -Went back to OR 6/15 for R groin pseudoaneurysm s/p ligation of femoral artery branch with sartorius muscle myoplasty.  -able to ambulate per vascular  -to OR for evacuation of hematoma and pulse lavage 24 w/ wound vac placement  -to OR  for debridement of skin and subcutaneous fat, pulse lavage, placement of kerecis skin substitute and mesh.     *post op hypotension / shock, resolved  -s/p iv hydrocortisone, restart home po prednisone  -supportive mgmt with prbc, received 6/15 and now trending hgb     *anemia  -preop hgb 13.9 >> cont to trend  -hgb stable >> will monitor  Sp - received several prbc tx     *thrombocytopenia- likely 2/2 blood loss, trend plts     *DM 2  -follows with endo as outpt, recently glipizide was increased  -ISS + tresiba 20 units     *HTN hx  -restart home meds (at home on atenolol, hydrochlorothiazide, norvasc, losartan)     *HLD  -statin     DVT proph  On heparin     GI ppx  -pepcid    PCP: Tay Alexandra  MD      DISPO:  Cont inpt, anticipate dc tomorrow  Will follow      Thank you for allowing me to participate in the care of this patient.  I will be following the patient while she is in the hospital.        Melissa Cavazos DO  Hospitalist  [facility]                   SUBJECTIVE:   Denies cp/sob, n/v, f/c. Ambulating. Tolerating diet. Mike to be discontinued               OBJECTIVE:   Blood pressure 106/77, pulse 69, temperature 98.3 °F (36.8 °C), temperature source Oral, resp. rate (!) 29, height 5' 1\" (1.549 m), weight 164 lb 10.9 oz (74.7 kg), SpO2 100%, not currently breastfeeding.    GENERAL: no apparent distress  NEURO: A/A Ox3  RESP: non labored, CTA, no crackles, no wheezing  CARDIO: Regular, no murmur  ABD: soft, NT, ND, BS+  EXTREMITIES:  no edema, no calf tenderness, right inguinal region with wound vac. Surgical incision c/d/I.    DIAGNOSTIC DATA:   Labs:     Recent Labs   Lab 06/24/24  0456 06/25/24  0717 06/26/24  0552 06/27/24  0515 06/28/24  0415   WBC 16.9* 17.9* 18.1* 17.4* 21.9*   HGB 10.8* 10.1* 9.9* 9.5* 10.3*   MCV 92.7 90.3 90.5 93.2 93.6   .0* 488.0* 522.0* 514.0* 530.0*       Recent Labs   Lab 06/24/24  0456 06/25/24  0717 06/26/24  0552 06/27/24  0515 06/28/24  0415    138 135* 134* 134*   K 4.3 4.2 4.6 4.4 4.6    109 106 107 106   CO2 21.0 22.0 19.0* 22.0 21.0   BUN 24* 27* 29* 28* 27*   CREATSERUM 0.98 1.06* 1.11* 0.93 1.14*   CA 9.0 9.3 9.2 9.2 9.2   MG 2.0 2.0 2.0 1.7 1.7   * 147* 168* 155* 192*       Recent Labs   Lab 06/22/24  0550 06/23/24  0602 06/24/24  0456   ALT 20 19 18   AST 20 20 8*   ALB 2.2* 2.1* 2.3*       Recent Labs   Lab 06/28/24  0318 06/28/24  0521 06/28/24  0723 06/28/24  1243 06/28/24  1620   PGLU 190* 191* 173* 172* 209*       No results for input(s): \"TROP\" in the last 168 hours.      MEDICATIONS      Current Facility-Administered Medications   Medication Dose Route Frequency    docusate sodium (Colace) cap 100 mg  100 mg Oral  BID    rivaroxaban (Xarelto) tab 2.5 mg  2.5 mg Oral BID with meals    acetaminophen (Tylenol) tab 650 mg  650 mg Oral Q4H PRN    Or    HYDROcodone-acetaminophen (Norco) 5-325 MG per tab 1 tablet  1 tablet Oral Q4H PRN    Or    HYDROcodone-acetaminophen (Norco) 5-325 MG per tab 2 tablet  2 tablet Oral Q4H PRN    ondansetron (Zofran) 4 MG/2ML injection 4 mg  4 mg Intravenous Q6H PRN    metoclopramide (Reglan) 5 mg/mL injection 5 mg  5 mg Intravenous Q8H PRN    predniSONE (Deltasone) tab 5 mg  5 mg Oral BID    amLODIPine (Norvasc) tab 10 mg  10 mg Oral Daily    losartan (Cozaar) tab 100 mg  100 mg Oral Daily    atenolol (Tenormin) tab 50 mg  50 mg Oral Daily Beta Blocker    famotidine (Pepcid) tab 20 mg  20 mg Oral Daily    Or    famotidine (Pepcid) 20 mg/2mL injection 20 mg  20 mg Intravenous Daily    insulin aspart (NovoLOG) 100 Units/mL FlexPen 4-20 Units  4-20 Units Subcutaneous TID AC and HS    insulin degludec (Tresiba) 100 units/mL flextouch 20 Units  20 Units Subcutaneous Daily    rosuvastatin (Crestor) tab 10 mg  10 mg Oral Nightly    morphINE PF 2 MG/ML injection 2 mg  2 mg Intravenous Q2H PRN    Or    morphINE PF 4 MG/ML injection 4 mg  4 mg Intravenous Q2H PRN    Or    morphINE PF 4 MG/ML injection 6 mg  6 mg Intravenous Q2H PRN    clopidogrel (Plavix) tab 75 mg  75 mg Oral Daily    glucose (Dex4) 15 GM/59ML oral liquid 15 g  15 g Oral Q15 Min PRN    Or    glucose (Glutose) 40% oral gel 15 g  15 g Oral Q15 Min PRN    Or    glucose-vitamin C (Dex-4) chewable tab 4 tablet  4 tablet Oral Q15 Min PRN    Or    dextrose 50% injection 50 mL  50 mL Intravenous Q15 Min PRN    Or    glucose (Dex4) 15 GM/59ML oral liquid 30 g  30 g Oral Q15 Min PRN    Or    glucose (Glutose) 40% oral gel 30 g  30 g Oral Q15 Min PRN    Or    glucose-vitamin C (Dex-4) chewable tab 8 tablet  8 tablet Oral Q15 Min PRN    norepinephrine (Levophed) 4 mg/250mL infusion premix  0.5-30 mcg/min Intravenous Continuous                  IMAGING      No results found.

## 2024-06-28 NOTE — CDS QUERY
Dear Dr Du,     Please clarify the following requests regarding the debridement of right groin on 6/25    Clarification #1: TYPE of Procedure  ( x  )  Excisional Debridement (surgical removal or cutting away of devitalized tissue, necrosis, or slough to the level of viable tissue)     (   )  Non Excisional Debridement  (non-operative scraping, brushing, irrigating, scrubbing, or washing of devitalized tissue, necrosis, or slough; minor removal of loose fragments)                                 Documentation from the Medical Record:     6/25/2024 Brief Op Note:   Procedure Performed:   . Debridement of skin and subcutaneous fat total area 12x4x3 cm   2. Pulse lavage   3. Placement of kerecis skin substitute 95 cm, 38 cm, 38  surgiclose micro and 6x8 kerecis mesh     Operative note:   PROCEDURE: Debridement of skin and subcutaneous fat in the right groin 12 x 4 x 3 ccm, pulse lavage of the wound, placement of Kerecis skin substitute 95 cm, 38 cm, 38 cm sq Surgiclose Micro, and a 6 x 8 Kerecis mesh.     OPERATIVE TECHNIQUE:  Patient was taken to the operating room and placed under general anesthesia.  The wound VAC was removed and the piece of Adaptic was removed.  Patient was then prepped and draped in the usual sterile fashion.  Time-out performed.  There were some full-thickness skin injuries on the medial aspect of the incision that, using a knife, I sharply removed with the knife.  I then sharply debrided all the edges of the wound with the knife.  I then used a curette and debrided the base of the wound.  We then pulsed lavaged to make sure any debris tissue was removed.  After carefully inspecting the wound, we then placed a series of Kerecis Micro 98 cm sq, 38 cm sq, and 38 cm sq over the intact myoplasty and the myoplasty harvest site.  I then sewed a 6 x 8 Kerecis mesh in place and then placed a single layer of Adaptic and a wound VAC.  Wound VAC had an adequate seal, and the patient was awakened from  anesthesia and taken to Recovery in stable condition.         For questions, please contact clinical  Viri Toussaint RN, [phone].                                                       THIS FORM IS A PERMANENT PART OF THE MEDICAL RECORD

## 2024-06-28 NOTE — PROGRESS NOTES
Doing well   Vac reinforced  Dc marcano   Home tomorrow with vac     Has wound clinic appt tomorrow

## 2024-06-29 VITALS
DIASTOLIC BLOOD PRESSURE: 61 MMHG | TEMPERATURE: 98 F | OXYGEN SATURATION: 99 % | BODY MASS INDEX: 31.09 KG/M2 | WEIGHT: 164.69 LBS | SYSTOLIC BLOOD PRESSURE: 121 MMHG | RESPIRATION RATE: 16 BRPM | HEIGHT: 61 IN | HEART RATE: 64 BPM

## 2024-06-29 LAB
ANION GAP SERPL CALC-SCNC: 6 MMOL/L (ref 0–18)
BASOPHILS # BLD AUTO: 0.1 X10(3) UL (ref 0–0.2)
BASOPHILS NFR BLD AUTO: 0.6 %
BUN BLD-MCNC: 28 MG/DL (ref 9–23)
CALCIUM BLD-MCNC: 9.1 MG/DL (ref 8.5–10.1)
CHLORIDE SERPL-SCNC: 107 MMOL/L (ref 98–112)
CO2 SERPL-SCNC: 21 MMOL/L (ref 21–32)
CREAT BLD-MCNC: 1.06 MG/DL
EGFRCR SERPLBLD CKD-EPI 2021: 57 ML/MIN/1.73M2 (ref 60–?)
EOSINOPHIL # BLD AUTO: 0.08 X10(3) UL (ref 0–0.7)
EOSINOPHIL NFR BLD AUTO: 0.4 %
ERYTHROCYTE [DISTWIDTH] IN BLOOD BY AUTOMATED COUNT: 16.7 %
GLUCOSE BLD-MCNC: 138 MG/DL (ref 70–99)
GLUCOSE BLD-MCNC: 145 MG/DL (ref 70–99)
HCT VFR BLD AUTO: 29.9 %
HGB BLD-MCNC: 9.8 G/DL
IMM GRANULOCYTES # BLD AUTO: 0.21 X10(3) UL (ref 0–1)
IMM GRANULOCYTES NFR BLD: 1.2 %
LYMPHOCYTES # BLD AUTO: 1.87 X10(3) UL (ref 1–4)
LYMPHOCYTES NFR BLD AUTO: 10.5 %
MAGNESIUM SERPL-MCNC: 1.8 MG/DL (ref 1.6–2.6)
MCH RBC QN AUTO: 29.8 PG (ref 26–34)
MCHC RBC AUTO-ENTMCNC: 32.8 G/DL (ref 31–37)
MCV RBC AUTO: 90.9 FL
MONOCYTES # BLD AUTO: 1.17 X10(3) UL (ref 0.1–1)
MONOCYTES NFR BLD AUTO: 6.6 %
NEUTROPHILS # BLD AUTO: 14.39 X10 (3) UL (ref 1.5–7.7)
NEUTROPHILS # BLD AUTO: 14.39 X10(3) UL (ref 1.5–7.7)
NEUTROPHILS NFR BLD AUTO: 80.7 %
OSMOLALITY SERPL CALC.SUM OF ELEC: 286 MOSM/KG (ref 275–295)
PLATELET # BLD AUTO: 483 10(3)UL (ref 150–450)
POTASSIUM SERPL-SCNC: 4.5 MMOL/L (ref 3.5–5.1)
RBC # BLD AUTO: 3.29 X10(6)UL
SODIUM SERPL-SCNC: 134 MMOL/L (ref 136–145)
WBC # BLD AUTO: 17.8 X10(3) UL (ref 4–11)

## 2024-06-29 PROCEDURE — 80048 BASIC METABOLIC PNL TOTAL CA: CPT | Performed by: STUDENT IN AN ORGANIZED HEALTH CARE EDUCATION/TRAINING PROGRAM

## 2024-06-29 PROCEDURE — 82962 GLUCOSE BLOOD TEST: CPT

## 2024-06-29 PROCEDURE — 83735 ASSAY OF MAGNESIUM: CPT | Performed by: SURGERY

## 2024-06-29 PROCEDURE — 85025 COMPLETE CBC W/AUTO DIFF WBC: CPT | Performed by: STUDENT IN AN ORGANIZED HEALTH CARE EDUCATION/TRAINING PROGRAM

## 2024-06-29 RX ORDER — MAGNESIUM OXIDE 400 MG/1
400 TABLET ORAL ONCE
Status: COMPLETED | OUTPATIENT
Start: 2024-06-29 | End: 2024-06-29

## 2024-06-29 RX ORDER — PSEUDOEPHEDRINE HCL 30 MG
100 TABLET ORAL 2 TIMES DAILY
Status: SHIPPED | COMMUNITY
Start: 2024-06-29 | End: 2024-07-08

## 2024-06-29 RX ORDER — HYDROCODONE BITARTRATE AND ACETAMINOPHEN 5; 325 MG/1; MG/1
1 TABLET ORAL EVERY 4 HOURS PRN
Qty: 30 TABLET | Refills: 0 | Status: SHIPPED | OUTPATIENT
Start: 2024-06-29

## 2024-06-29 RX ADMIN — INSULIN DEGLUDEC 20 UNITS: 100 INJECTION, SOLUTION SUBCUTANEOUS at 09:10:00

## 2024-06-29 RX ADMIN — ATENOLOL 50 MG: 50 TABLET ORAL at 05:22:00

## 2024-06-29 RX ADMIN — LOSARTAN POTASSIUM 100 MG: 100 TABLET ORAL at 09:01:00

## 2024-06-29 RX ADMIN — MAGNESIUM OXIDE 400 MG: 400 TABLET ORAL at 09:01:00

## 2024-06-29 RX ADMIN — PREDNISONE 5 MG: 5 TABLET ORAL at 09:01:00

## 2024-06-29 RX ADMIN — HYDROCODONE BITARTRATE AND ACETAMINOPHEN 1 TABLET: 5; 325 TABLET ORAL at 09:10:00

## 2024-06-29 RX ADMIN — CLOPIDOGREL BISULFATE 75 MG: 75 TABLET ORAL at 09:01:00

## 2024-06-29 RX ADMIN — DOCUSATE SODIUM 100 MG: 100 CAPSULE, LIQUID FILLED ORAL at 09:01:00

## 2024-06-29 RX ADMIN — FAMOTIDINE 20 MG: 20 TABLET, FILM COATED ORAL at 09:01:00

## 2024-06-29 RX ADMIN — AMLODIPINE BESYLATE 10 MG: 10 TABLET ORAL at 09:01:00

## 2024-06-29 NOTE — PLAN OF CARE
Assumed care 0730  A+Ox4, RA, SR on tele  DC plan in place   -wound vac exchanged to home version    -per wound care team  Mike out 1500   -200mL on bladder scan at 1900   -will repeat Q4 if no void  1 tab norco PRN  Colace added   -small BM in afternoon  Up to chair, bathroom as needed  VSS  Discussed discharge plan with pt/son  Call light in reach, needs addressed

## 2024-06-29 NOTE — CM/SW NOTE
CM informed [facility] lia[facility] patient will discharge home today with wound vac and confirmed agency contact information included in discharge summary.     [facility]  [facility] [place] [address]  Phone: [phone]

## 2024-06-29 NOTE — PLAN OF CARE
Assumed care at approximately 1930.   A & O x 4.   RA.   NSR on tele.   Wound vac in place.   Denies pain,   Call light within reach.   Patient updated on plan of care.

## 2024-06-29 NOTE — PLAN OF CARE
Assumed care 0730  A+OX4, RA, SR on tele  VSS  R groin C/D/I   -some trace oozing medial interior right thigh    -per WC team \"gentle cleanse only\"    -\"avoid soaps. Mild warm water\"  WoundVac in place   -home version placed yesterday   -to call [facility] for troubleshooting per WC team  Consults medically clear for discharge  AVS reviewed, all questions answered   -f/u with existing WC clinic July 2   -[facility] to see tomorrow   -maintain woundvac 24/7    -if issues, call [facility]    -supplies sent with patient   -medications per AVS  Son Yoni included in discharge instructions        NURSING DISCHARGE NOTE    Discharged Home via Wheelchair.  Accompanied by Family member and Support staff  Belongings Taken by patient/family.

## 2024-06-29 NOTE — PROGRESS NOTES
Vascular Surgery Progress Note    No acute events overnight. Patient states pain well controlled on current regimen. Eager to go home. Home VAC connected.   Ok for discharge, already set up for wound care visit on Tuesday.     Safia Mcneil MD  06/29/24  10:42 AM     Wound care to left le.  Cleanse wound with normal saline.  2.  Apply Normogel to wound bed.  3.  Apply Foam as cover dressing. Once scab is gone switch to gauze.   4. Secure with tape.  5.  Change daily.

## 2024-06-29 NOTE — PROGRESS NOTES
[facility]   part of [facility]     Progress Note  Emilie John Patient Status:  Inpatient    1955 MRN [id number]   Location [facility] 7NE-A Attending Yariel Du MD   Hosp Day # 15 PCP Tay Alexandra MD          Impression     -left iliac chronic occlusion with claudication sp balloon angioplasty and stent of left common iliac artery and external iliac artery   -right common femoral artery occlusion sp thrombectomy, balloon angioplasty, stent; thrombectomy of left SFA and left anterior tibial artery, balloon angioplasty of left TP trunk and posterior tibial   -post op pain  -post op leukocytosis  -large R inguinal region pseudoaneurysm 6/15 noted     -post op shock, on pressors, possibly hemorrhagic  -left groin hematoma  -anemia     -DM2, uncontrolled - A1c 12s     -nicotine dependence, smokes 1ppd     -HTN  -HLD     -RA on chronic prednisone  -CKD 3, baseline Cr ~1.2     Plan     *vascular  -Went back to OR 6/15 for R groin pseudoaneurysm s/p ligation of femoral artery branch with sartorius muscle myoplasty.  -able to ambulate per vascular  -to OR for evacuation of hematoma and pulse lavage 24 w/ wound vac placement  -to OR  for debridement of skin and subcutaneous fat, pulse lavage, placement of kerecis skin substitute and mesh.     *post op hypotension / shock, resolved  -s/p iv hydrocortisone, restart home po prednisone  -supportive mgmt with prbc, received 6/15 and now trending hgb     *anemia  -preop hgb 13.9 >> cont to trend  -hgb stable >> will monitor  Sp - received several prbc tx     *thrombocytopenia- likely 2/2 blood loss, trend plts     *DM 2  -follows with endo as outpt, recently glipizide was increased  -ISS + tresiba 20 units     *HTN hx  -restart home meds (at home on atenolol, hydrochlorothiazide, norvasc, losartan)     *HLD  -statin     DVT proph  On heparin     GI ppx  -pepcid    PCP: Tay Alexandra  MD      DISPO:  Cont inpt, anticipate dc tomorrow  Will follow      Thank you for allowing me to participate in the care of this patient.  I will be following the patient while she is in the hospital.        Melissa Cavazos DO  Hospitalist  [facility]                   SUBJECTIVE:   Denies cp/sob, n/v, f/c. Ambulating. Tolerating diet. Ready for dc                OBJECTIVE:   Blood pressure 129/59, pulse 59, temperature 98.3 °F (36.8 °C), temperature source Oral, resp. rate (!) 9, height 5' 1\" (1.549 m), weight 164 lb 10.9 oz (74.7 kg), SpO2 96%, not currently breastfeeding.    GENERAL: no apparent distress  NEURO: A/A Ox3  RESP: non labored, CTA, no crackles, no wheezing  CARDIO: Regular, no murmur  ABD: soft, NT, ND, BS+  EXTREMITIES:  no edema, no calf tenderness, right inguinal region with wound vac. Surgical incision c/d/I.    DIAGNOSTIC DATA:   Labs:     Recent Labs   Lab 06/25/24  0717 06/26/24  0552 06/27/24  0515 06/28/24  0415 06/29/24  0535   WBC 17.9* 18.1* 17.4* 21.9* 17.8*   HGB 10.1* 9.9* 9.5* 10.3* 9.8*   MCV 90.3 90.5 93.2 93.6 90.9   .0* 522.0* 514.0* 530.0* 483.0*       Recent Labs   Lab 06/25/24  0717 06/26/24  0552 06/27/24  0515 06/28/24  0415 06/29/24  0535    135* 134* 134* 134*   K 4.2 4.6 4.4 4.6 4.5    106 107 106 107   CO2 22.0 19.0* 22.0 21.0 21.0   BUN 27* 29* 28* 27* 28*   CREATSERUM 1.06* 1.11* 0.93 1.14* 1.06*   CA 9.3 9.2 9.2 9.2 9.1   MG 2.0 2.0 1.7 1.7 1.8   * 168* 155* 192* 138*       Recent Labs   Lab 06/23/24  0602 06/24/24  0456   ALT 19 18   AST 20 8*   ALB 2.1* 2.3*       Recent Labs   Lab 06/28/24  0723 06/28/24  1243 06/28/24  1620 06/28/24  2134 06/29/24  0521   PGLU 173* 172* 209* 192* 145*       No results for input(s): \"TROP\" in the last 168 hours.      MEDICATIONS      Current Facility-Administered Medications   Medication Dose Route Frequency    docusate sodium (Colace) cap 100 mg  100 mg Oral BID    rivaroxaban (Xarelto) tab 2.5  mg  2.5 mg Oral BID with meals    acetaminophen (Tylenol) tab 650 mg  650 mg Oral Q4H PRN    Or    HYDROcodone-acetaminophen (Norco) 5-325 MG per tab 1 tablet  1 tablet Oral Q4H PRN    Or    HYDROcodone-acetaminophen (Norco) 5-325 MG per tab 2 tablet  2 tablet Oral Q4H PRN    ondansetron (Zofran) 4 MG/2ML injection 4 mg  4 mg Intravenous Q6H PRN    metoclopramide (Reglan) 5 mg/mL injection 5 mg  5 mg Intravenous Q8H PRN    predniSONE (Deltasone) tab 5 mg  5 mg Oral BID    amLODIPine (Norvasc) tab 10 mg  10 mg Oral Daily    losartan (Cozaar) tab 100 mg  100 mg Oral Daily    atenolol (Tenormin) tab 50 mg  50 mg Oral Daily Beta Blocker    famotidine (Pepcid) tab 20 mg  20 mg Oral Daily    Or    famotidine (Pepcid) 20 mg/2mL injection 20 mg  20 mg Intravenous Daily    insulin aspart (NovoLOG) 100 Units/mL FlexPen 4-20 Units  4-20 Units Subcutaneous TID AC and HS    insulin degludec (Tresiba) 100 units/mL flextouch 20 Units  20 Units Subcutaneous Daily    rosuvastatin (Crestor) tab 10 mg  10 mg Oral Nightly    morphINE PF 2 MG/ML injection 2 mg  2 mg Intravenous Q2H PRN    Or    morphINE PF 4 MG/ML injection 4 mg  4 mg Intravenous Q2H PRN    Or    morphINE PF 4 MG/ML injection 6 mg  6 mg Intravenous Q2H PRN    clopidogrel (Plavix) tab 75 mg  75 mg Oral Daily    glucose (Dex4) 15 GM/59ML oral liquid 15 g  15 g Oral Q15 Min PRN    Or    glucose (Glutose) 40% oral gel 15 g  15 g Oral Q15 Min PRN    Or    glucose-vitamin C (Dex-4) chewable tab 4 tablet  4 tablet Oral Q15 Min PRN    Or    dextrose 50% injection 50 mL  50 mL Intravenous Q15 Min PRN    Or    glucose (Dex4) 15 GM/59ML oral liquid 30 g  30 g Oral Q15 Min PRN    Or    glucose (Glutose) 40% oral gel 30 g  30 g Oral Q15 Min PRN    Or    glucose-vitamin C (Dex-4) chewable tab 8 tablet  8 tablet Oral Q15 Min PRN    norepinephrine (Levophed) 4 mg/250mL infusion premix  0.5-30 mcg/min Intravenous Continuous                  IMAGING     No results found.

## 2024-07-01 ENCOUNTER — HOSPITAL ENCOUNTER (INPATIENT)
Facility: HOSPITAL | Age: 69
LOS: 7 days | Discharge: NURSING, REHAB OR LONG-TERM CARE | End: 2024-07-08
Attending: EMERGENCY MEDICINE | Admitting: HOSPITALIST
Payer: MEDICARE

## 2024-07-01 ENCOUNTER — PATIENT OUTREACH (OUTPATIENT)
Dept: CASE MANAGEMENT | Age: 69
End: 2024-07-01

## 2024-07-01 ENCOUNTER — APPOINTMENT (OUTPATIENT)
Dept: GENERAL RADIOLOGY | Facility: HOSPITAL | Age: 69
End: 2024-07-01
Attending: EMERGENCY MEDICINE
Payer: MEDICARE

## 2024-07-01 DIAGNOSIS — N17.9 AKI (ACUTE KIDNEY INJURY) (HCC): ICD-10-CM

## 2024-07-01 DIAGNOSIS — I95.9 HYPOTENSION, UNSPECIFIED HYPOTENSION TYPE: Primary | ICD-10-CM

## 2024-07-01 DIAGNOSIS — A41.9 SEPSIS, DUE TO UNSPECIFIED ORGANISM, UNSPECIFIED WHETHER ACUTE ORGAN DYSFUNCTION PRESENT (HCC): ICD-10-CM

## 2024-07-01 LAB
ADENOVIRUS F 40/41 PCR: NEGATIVE
ALBUMIN SERPL-MCNC: 2.7 G/DL (ref 3.4–5)
ALBUMIN/GLOB SERPL: 0.5 {RATIO} (ref 1–2)
ALP LIVER SERPL-CCNC: 94 U/L
ALT SERPL-CCNC: 18 U/L
ANION GAP SERPL CALC-SCNC: 10 MMOL/L (ref 0–18)
ANTIBODY SCREEN: NEGATIVE
APTT PPP: 25.9 SECONDS (ref 23–36)
AST SERPL-CCNC: 20 U/L (ref 15–37)
ASTROVIRUS PCR: NEGATIVE
BASOPHILS # BLD AUTO: 0.07 X10(3) UL (ref 0–0.2)
BASOPHILS NFR BLD AUTO: 0.4 %
BILIRUB SERPL-MCNC: 1.1 MG/DL (ref 0.1–2)
BILIRUB UR QL STRIP.AUTO: NEGATIVE
BUN BLD-MCNC: 33 MG/DL (ref 9–23)
C CAYETANENSIS DNA SPEC QL NAA+PROBE: NEGATIVE
C DIFF TOX B STL QL: NEGATIVE
CALCIUM BLD-MCNC: 9.2 MG/DL (ref 8.5–10.1)
CAMPY SP DNA.DIARRHEA STL QL NAA+PROBE: NEGATIVE
CHLORIDE SERPL-SCNC: 106 MMOL/L (ref 98–112)
CLARITY UR REFRACT.AUTO: CLEAR
CO2 SERPL-SCNC: 18 MMOL/L (ref 21–32)
COLOR UR AUTO: YELLOW
CREAT BLD-MCNC: 1.66 MG/DL
CRYPTOSP DNA SPEC QL NAA+PROBE: NEGATIVE
EAEC PAA PLAS AGGR+AATA ST NAA+NON-PRB: NEGATIVE
EC STX1+STX2 + H7 FLIC SPEC NAA+PROBE: NEGATIVE
EGFRCR SERPLBLD CKD-EPI 2021: 33 ML/MIN/1.73M2 (ref 60–?)
ENTAMOEBA HISTOLYTICA PCR: NEGATIVE
EOSINOPHIL # BLD AUTO: 0.21 X10(3) UL (ref 0–0.7)
EOSINOPHIL NFR BLD AUTO: 1.2 %
EPEC EAE GENE STL QL NAA+NON-PROBE: NEGATIVE
ERYTHROCYTE [DISTWIDTH] IN BLOOD BY AUTOMATED COUNT: 16.6 %
ETEC LTA+ST1A+ST1B TOX ST NAA+NON-PROBE: NEGATIVE
GIARDIA LAMBLIA PCR: NEGATIVE
GLOBULIN PLAS-MCNC: 5 G/DL (ref 2.8–4.4)
GLUCOSE BLD-MCNC: 150 MG/DL (ref 70–99)
GLUCOSE BLD-MCNC: 162 MG/DL (ref 70–99)
GLUCOSE UR STRIP.AUTO-MCNC: NORMAL MG/DL
HCT VFR BLD AUTO: 30.4 %
HGB BLD-MCNC: 10 G/DL
HYALINE CASTS #/AREA URNS AUTO: PRESENT /LPF
IMM GRANULOCYTES # BLD AUTO: 0.19 X10(3) UL (ref 0–1)
IMM GRANULOCYTES NFR BLD: 1 %
INR BLD: 1.09 (ref 0.8–1.2)
LACTATE SERPL-SCNC: 1.4 MMOL/L (ref 0.4–2)
LEUKOCYTE ESTERASE UR QL STRIP.AUTO: 250
LYMPHOCYTES # BLD AUTO: 2.76 X10(3) UL (ref 1–4)
LYMPHOCYTES NFR BLD AUTO: 15.1 %
MCH RBC QN AUTO: 30 PG (ref 26–34)
MCHC RBC AUTO-ENTMCNC: 32.9 G/DL (ref 31–37)
MCV RBC AUTO: 91.3 FL
MONOCYTES # BLD AUTO: 1.57 X10(3) UL (ref 0.1–1)
MONOCYTES NFR BLD AUTO: 8.6 %
NEUTROPHILS # BLD AUTO: 13.46 X10 (3) UL (ref 1.5–7.7)
NEUTROPHILS # BLD AUTO: 13.46 X10(3) UL (ref 1.5–7.7)
NEUTROPHILS NFR BLD AUTO: 73.7 %
NOROVIRUS GI/GII PCR: NEGATIVE
OSMOLALITY SERPL CALC.SUM OF ELEC: 289 MOSM/KG (ref 275–295)
P SHIGELLOIDES DNA STL QL NAA+PROBE: NEGATIVE
PH UR STRIP.AUTO: 5.5 [PH] (ref 5–8)
PLATELET # BLD AUTO: 467 10(3)UL (ref 150–450)
POTASSIUM SERPL-SCNC: 4.4 MMOL/L (ref 3.5–5.1)
PROT SERPL-MCNC: 7.7 G/DL (ref 6.4–8.2)
PROTHROMBIN TIME: 14.1 SECONDS (ref 11.6–14.8)
RBC # BLD AUTO: 3.33 X10(6)UL
RH BLOOD TYPE: POSITIVE
ROTAVIRUS A PCR: NEGATIVE
SALMONELLA DNA SPEC QL NAA+PROBE: NEGATIVE
SAPOVIRUS PCR: NEGATIVE
SHIGELLA SP+EIEC IPAH ST NAA+NON-PROBE: NEGATIVE
SODIUM SERPL-SCNC: 134 MMOL/L (ref 136–145)
SP GR UR STRIP.AUTO: 1.01 (ref 1–1.03)
TROPONIN I SERPL HS-MCNC: 12 NG/L
UROBILINOGEN UR STRIP.AUTO-MCNC: NORMAL MG/DL
V CHOLERAE DNA SPEC QL NAA+PROBE: NEGATIVE
VIBRIO DNA SPEC NAA+PROBE: NEGATIVE
WBC # BLD AUTO: 18.3 X10(3) UL (ref 4–11)
YERSINIA DNA SPEC NAA+PROBE: NEGATIVE

## 2024-07-01 PROCEDURE — 81001 URINALYSIS AUTO W/SCOPE: CPT | Performed by: EMERGENCY MEDICINE

## 2024-07-01 PROCEDURE — 87075 CULTR BACTERIA EXCEPT BLOOD: CPT | Performed by: SURGERY

## 2024-07-01 PROCEDURE — 87205 SMEAR GRAM STAIN: CPT | Performed by: SURGERY

## 2024-07-01 PROCEDURE — 85730 THROMBOPLASTIN TIME PARTIAL: CPT | Performed by: EMERGENCY MEDICINE

## 2024-07-01 PROCEDURE — 87077 CULTURE AEROBIC IDENTIFY: CPT | Performed by: SURGERY

## 2024-07-01 PROCEDURE — 87186 SC STD MICRODIL/AGAR DIL: CPT | Performed by: EMERGENCY MEDICINE

## 2024-07-01 PROCEDURE — 87081 CULTURE SCREEN ONLY: CPT | Performed by: HOSPITALIST

## 2024-07-01 PROCEDURE — 87184 SC STD DISK METHOD PER PLATE: CPT | Performed by: EMERGENCY MEDICINE

## 2024-07-01 PROCEDURE — 83605 ASSAY OF LACTIC ACID: CPT | Performed by: EMERGENCY MEDICINE

## 2024-07-01 PROCEDURE — 93010 ELECTROCARDIOGRAM REPORT: CPT

## 2024-07-01 PROCEDURE — 87070 CULTURE OTHR SPECIMN AEROBIC: CPT | Performed by: SURGERY

## 2024-07-01 PROCEDURE — 86901 BLOOD TYPING SEROLOGIC RH(D): CPT | Performed by: EMERGENCY MEDICINE

## 2024-07-01 PROCEDURE — 99285 EMERGENCY DEPT VISIT HI MDM: CPT

## 2024-07-01 PROCEDURE — 80053 COMPREHEN METABOLIC PANEL: CPT | Performed by: EMERGENCY MEDICINE

## 2024-07-01 PROCEDURE — 87040 BLOOD CULTURE FOR BACTERIA: CPT | Performed by: EMERGENCY MEDICINE

## 2024-07-01 PROCEDURE — 93005 ELECTROCARDIOGRAM TRACING: CPT

## 2024-07-01 PROCEDURE — 87186 SC STD MICRODIL/AGAR DIL: CPT | Performed by: SURGERY

## 2024-07-01 PROCEDURE — 71045 X-RAY EXAM CHEST 1 VIEW: CPT | Performed by: EMERGENCY MEDICINE

## 2024-07-01 PROCEDURE — 87086 URINE CULTURE/COLONY COUNT: CPT | Performed by: EMERGENCY MEDICINE

## 2024-07-01 PROCEDURE — 85610 PROTHROMBIN TIME: CPT | Performed by: EMERGENCY MEDICINE

## 2024-07-01 PROCEDURE — 87076 CULTURE ANAEROBE IDENT EACH: CPT | Performed by: SURGERY

## 2024-07-01 PROCEDURE — 36415 COLL VENOUS BLD VENIPUNCTURE: CPT

## 2024-07-01 PROCEDURE — 87077 CULTURE AEROBIC IDENTIFY: CPT | Performed by: EMERGENCY MEDICINE

## 2024-07-01 PROCEDURE — 87507 IADNA-DNA/RNA PROBE TQ 12-25: CPT | Performed by: EMERGENCY MEDICINE

## 2024-07-01 PROCEDURE — 96375 TX/PRO/DX INJ NEW DRUG ADDON: CPT

## 2024-07-01 PROCEDURE — 86850 RBC ANTIBODY SCREEN: CPT | Performed by: EMERGENCY MEDICINE

## 2024-07-01 PROCEDURE — 85025 COMPLETE CBC W/AUTO DIFF WBC: CPT | Performed by: EMERGENCY MEDICINE

## 2024-07-01 PROCEDURE — 86900 BLOOD TYPING SEROLOGIC ABO: CPT | Performed by: EMERGENCY MEDICINE

## 2024-07-01 PROCEDURE — 82962 GLUCOSE BLOOD TEST: CPT

## 2024-07-01 PROCEDURE — 99291 CRITICAL CARE FIRST HOUR: CPT

## 2024-07-01 PROCEDURE — 87493 C DIFF AMPLIFIED PROBE: CPT | Performed by: EMERGENCY MEDICINE

## 2024-07-01 PROCEDURE — 84484 ASSAY OF TROPONIN QUANT: CPT | Performed by: EMERGENCY MEDICINE

## 2024-07-01 PROCEDURE — 96361 HYDRATE IV INFUSION ADD-ON: CPT

## 2024-07-01 PROCEDURE — 96365 THER/PROPH/DIAG IV INF INIT: CPT

## 2024-07-01 RX ORDER — MORPHINE SULFATE 2 MG/ML
2 INJECTION, SOLUTION INTRAMUSCULAR; INTRAVENOUS ONCE
Status: COMPLETED | OUTPATIENT
Start: 2024-07-01 | End: 2024-07-01

## 2024-07-01 RX ORDER — ACETAMINOPHEN 500 MG
500 TABLET ORAL EVERY 4 HOURS PRN
Status: DISCONTINUED | OUTPATIENT
Start: 2024-07-01 | End: 2024-07-08

## 2024-07-01 RX ORDER — NICOTINE POLACRILEX 4 MG
30 LOZENGE BUCCAL
Status: DISCONTINUED | OUTPATIENT
Start: 2024-07-01 | End: 2024-07-08

## 2024-07-01 RX ORDER — SODIUM HYPOCHLORITE 2.5 MG/ML
SOLUTION TOPICAL AS NEEDED
Status: DISCONTINUED | OUTPATIENT
Start: 2024-07-01 | End: 2024-07-01

## 2024-07-01 RX ORDER — CLOPIDOGREL BISULFATE 75 MG/1
75 TABLET ORAL DAILY
Status: DISCONTINUED | OUTPATIENT
Start: 2024-07-02 | End: 2024-07-08

## 2024-07-01 RX ORDER — NICOTINE POLACRILEX 4 MG
15 LOZENGE BUCCAL
Status: DISCONTINUED | OUTPATIENT
Start: 2024-07-01 | End: 2024-07-08

## 2024-07-01 RX ORDER — ROSUVASTATIN CALCIUM 10 MG/1
10 TABLET, COATED ORAL NIGHTLY
Status: DISCONTINUED | OUTPATIENT
Start: 2024-07-01 | End: 2024-07-08

## 2024-07-01 RX ORDER — ASPIRIN 81 MG/1
81 TABLET, CHEWABLE ORAL DAILY
Status: DISCONTINUED | OUTPATIENT
Start: 2024-07-01 | End: 2024-07-01

## 2024-07-01 RX ORDER — SODIUM CHLORIDE 9 MG/ML
INJECTION, SOLUTION INTRAVENOUS CONTINUOUS
Status: DISCONTINUED | OUTPATIENT
Start: 2024-07-01 | End: 2024-07-02

## 2024-07-01 RX ORDER — HYDROCODONE BITARTRATE AND ACETAMINOPHEN 5; 325 MG/1; MG/1
1 TABLET ORAL EVERY 4 HOURS PRN
Status: DISCONTINUED | OUTPATIENT
Start: 2024-07-01 | End: 2024-07-08

## 2024-07-01 RX ORDER — HEPARIN SODIUM 5000 [USP'U]/ML
5000 INJECTION, SOLUTION INTRAVENOUS; SUBCUTANEOUS EVERY 8 HOURS SCHEDULED
Status: DISCONTINUED | OUTPATIENT
Start: 2024-07-01 | End: 2024-07-01

## 2024-07-01 RX ORDER — MORPHINE SULFATE 4 MG/ML
INJECTION, SOLUTION INTRAMUSCULAR; INTRAVENOUS
Status: COMPLETED
Start: 2024-07-01 | End: 2024-07-01

## 2024-07-01 RX ORDER — DEXTROSE MONOHYDRATE 25 G/50ML
50 INJECTION, SOLUTION INTRAVENOUS
Status: DISCONTINUED | OUTPATIENT
Start: 2024-07-01 | End: 2024-07-08

## 2024-07-01 RX ORDER — MORPHINE SULFATE 4 MG/ML
4 INJECTION, SOLUTION INTRAMUSCULAR; INTRAVENOUS ONCE
Status: COMPLETED | OUTPATIENT
Start: 2024-07-01 | End: 2024-07-01

## 2024-07-01 RX ORDER — SODIUM HYPOCHLORITE 2.5 MG/ML
SOLUTION TOPICAL 2 TIMES DAILY
Status: DISCONTINUED | OUTPATIENT
Start: 2024-07-01 | End: 2024-07-08

## 2024-07-01 RX ORDER — INSULIN DEGLUDEC 100 U/ML
10 INJECTION, SOLUTION SUBCUTANEOUS NIGHTLY
Status: DISCONTINUED | OUTPATIENT
Start: 2024-07-01 | End: 2024-07-08

## 2024-07-01 NOTE — ED PROVIDER NOTES
Patient Seen in: [facility] Emergency Department      History     Chief Complaint   Patient presents with    Wound Care     Stated Complaint: discharged with wound vac, wound vac disconnected    Subjective:   HPI    69-year-old female with a past medical history as below including hypertension, diabetes, DVT on Xarelto, PAD status post iliac artery stenting complicated by pseudoaneurysm s/p surgical repair discharged from  2 days ago presents with generalized weakness since yesterday and concerns about wound VAC being disconnected from her right thigh.  Son states that he went to check on patient because her wound VAC just got disconnected and he did not know how to reattach it.  He states she appeared weak and lethargic difficulty walking.  Patient states she started feeling this way yesterday but worsened today.  She denies fever or chills.  Denies abdominal pain, nausea or vomiting.  She reports having few episodes of diarrhea since being home that is been nonbloody.  Denies urinary symptoms.  She does have some pain in her right thigh which she states is not significantly changed since the surgery.  She reports some odor which she also states has been present since prior to discharge.    Objective:   Past Medical History:    Diabetes (HCC)    DVT (deep venous thrombosis) (HCC)    Essential hypertension    High blood pressure    Nicotine use disorder    Osteoarthritis    hip, wrist    Peripheral vascular disease (HCC)    Rheumatoid arthritis (HCC)    Visual impairment    glasses              Past Surgical History:   Procedure Laterality Date    Benign biopsy left  1991    Cholecystectomy      Colonoscopy N/A 05/06/2019    Procedure: COLONOSCOPY;  Surgeon: Freeman Oshea MD;  Location:  ENDOSCOPY    Colonoscopy      Hip replacement surgery Right 01/10/2020    Knee replacement surgery      Needle biopsy left  2017    Removal gallbladder      Total hip replacement      Total knee replacement Bilateral      Upper gi endoscopy,exam                  Social History     Socioeconomic History    Marital status: Single   Tobacco Use    Smoking status: Every Day     Current packs/day: 0.50     Average packs/day: 0.5 packs/day for 51.4 years (25.7 ttl pk-yrs)     Types: Cigarettes     Start date: 1/29/1973    Smokeless tobacco: Never   Vaping Use    Vaping status: Never Used   Substance and Sexual Activity    Alcohol use: No    Drug use: No    Sexual activity: Not Currently     Social Determinants of Health     Financial Resource Strain: Medium Risk (3/9/2022)    [facility]    Overall Financial Resource Strain (CARDIA)     Difficulty of Paying Living Expenses: Somewhat hard   Food Insecurity: No Food Insecurity (6/14/2024)    Food Insecurity     Food Insecurity: Never true   Transportation Needs: No Transportation Needs (6/14/2024)    Transportation Needs     Lack of Transportation: No   Physical Activity: Insufficiently Active (3/9/2022)    Exercise Vital Sign     Days of Exercise per Week: 2 days     Minutes of Exercise per Session: 30 min   Stress: No Stress Concern Present (3/9/2022)    Received from [facility], [facility] Occupational [facility] - Occupational Stress Questionnaire     Feeling of Stress : Not at all    Social Connections   Housing Stability: Low Risk  (6/14/2024)    Housing Stability     Housing Instability: No              Review of Systems    Positive for stated Chief Complaint: Wound Care    Other systems are as noted in HPI.  Constitutional and vital signs reviewed.      All other systems reviewed and negative except as noted above.    Physical Exam     ED Triage Vitals [07/01/24 1255]   BP (!) 73/50   Pulse 102   Resp 18   Temp 97.5 °F (36.4 °C)   Temp src Temporal   SpO2 99 %   O2 Device None (Room air)       Current Vitals:   Vital Signs  BP: 98/53  Pulse: 77  Resp: 22  Temp: 97.5 °F (36.4 °C)  Temp src:  Temporal  MAP (mmHg): 68    Oxygen Therapy  SpO2: 100 %  O2 Device: None (Room air)            Physical Exam  Vitals and nursing note reviewed.   Constitutional:       Appearance: She is well-developed.   HENT:      Head: Normocephalic and atraumatic.      Mouth/Throat:      Mouth: Mucous membranes are moist.   Eyes:      General: No scleral icterus.  Cardiovascular:      Rate and Rhythm: Regular rhythm. Tachycardia present.   Pulmonary:      Effort: Pulmonary effort is normal.      Breath sounds: Normal breath sounds.   Abdominal:      Palpations: Abdomen is soft.      Tenderness: There is no abdominal tenderness.   Musculoskeletal:      Comments: Right medial thigh with dressing in place  Mildly tender  No surrounding erythema  + foul older   Skin:     General: Skin is warm and dry.   Neurological:      General: No focal deficit present.      Mental Status: She is alert and oriented to person, place, and time.      Cranial Nerves: No cranial nerve deficit.      Motor: No weakness.   Psychiatric:         Mood and Affect: Mood normal.         Behavior: Behavior normal.             ED Course     Labs Reviewed   COMP METABOLIC PANEL (14) - Abnormal; Notable for the following components:       Result Value    Glucose 162 (*)     Sodium 134 (*)     CO2 18.0 (*)     BUN 33 (*)     Creatinine 1.66 (*)     eGFR-Cr 33 (*)     Albumin 2.7 (*)     Globulin  5.0 (*)     A/G Ratio 0.5 (*)     All other components within normal limits   URINALYSIS WITH CULTURE REFLEX - Abnormal; Notable for the following components:    Ketones Urine Trace (*)     Blood Urine Trace (*)     Protein Urine Trace (*)     Nitrite Urine 2+ (*)     Leukocyte Esterase Urine 250 (*)     WBC Urine 21-50 (*)     Bacteria Urine 1+ (*)     Squamous Epi. Cells Few (*)     Hyaline Casts Present (*)     All other components within normal limits   CBC W/ DIFFERENTIAL - Abnormal; Notable for the following components:    WBC 18.3 (*)     RBC 3.33 (*)     HGB 10.0  (*)     HCT 30.4 (*)     .0 (*)     Neutrophil Absolute Prelim 13.46 (*)     Neutrophil Absolute 13.46 (*)     Monocyte Absolute 1.57 (*)     All other components within normal limits   PROTHROMBIN TIME (PT) - Normal   PTT, ACTIVATED - Normal   LACTIC ACID, PLASMA - Normal   TROPONIN I HIGH SENSITIVITY - Normal   CBC WITH DIFFERENTIAL WITH PLATELET    Narrative:     The following orders were created for panel order CBC With Differential With Platelet.  Procedure                               Abnormality         Status                     ---------                               -----------         ------                     CBC W/ DIFFERENTIAL[[id number]]          Abnormal            Final result                 Please view results for these tests on the individual orders.   TYPE AND SCREEN    Narrative:     The following orders were created for panel order Type and screen.  Procedure                               Abnormality         Status                     ---------                               -----------         ------                     ABORH (Blood Type)[[id number]]                               Final result               Antibody Screen[[id number]]                                  Final result                 Please view results for these tests on the individual orders.   ABORH (BLOOD TYPE)   ANTIBODY SCREEN   RAINBOW DRAW LAVENDER   RAINBOW DRAW LIGHT GREEN   RAINBOW DRAW BLUE   RAINBOW DRAW GOLD   BLOOD CULTURE   BLOOD CULTURE   GI STOOL PANEL BY PCR   C. DIFFICILE(TOXIGENIC)PCR   URINE CULTURE, ROUTINE     EKG    Rate, intervals and axes as noted on EKG Report.  Rate: 96  Rhythm: Sinus Rhythm  Readin                          A total of 56 minutes of critical care time (exclusive of billable procedures) was administered to manage the patient's unstable vital signs and metabolic instability due to her hypotension and sepsis.  This involved direct patient intervention, complex decision making,  and/or extensive discussions with the patient, family, and clinical staff.    MDM   69-year-old female with a past medical history as below including hypertension, diabetes, DVT on Xarelto, PAD status post iliac artery stenting complicated by pseudoaneurysm s/p surgical repair discharged from  2 days ago presents with generalized weakness since yesterday and concerns about wound VAC being disconnected from her right thigh.    Differential includes but is not limited to hypotension due to sepsis, anemia, dehydration    Labs show elevated WBC 18.3 which is not simply change from previous of 17.8 on 6/29/2024.  Labs do show an COLLIN with creatinine 1.66, increased from 1.06.  Sodium is mildly low at 134.  UA does show some evidence of UTI with 2+ leukocyte Estrace and 21-50 white cells.    Patient treated with 30 mL/kg fluid bolus with improvement in blood pressure.  Will treat presumptively for sepsis with Zosyn.  Source may be her right thigh wound versus secondary to urinary tract infection. Discussed with vascular surgery Dr. Mcneil he states patient does not need any imaging of her right groin wound.  Discussed with hospitalist Dr. Ram.          [facility]   part of [facility]      Sepsis Reassessment Note    BP 98/53   Pulse 77   Temp 97.5 °F (36.4 °C) (Temporal)   Resp 22   Wt 74 kg   SpO2 100%   BMI 30.83 kg/m²      I completed the sepsis reassessment at 132    Cardiac:  Regularity: Regular  Rate: Normal  Heart Sounds: S1,S2    Lungs:   Right: Clear  Left: Clear    Peripheral Pulses:  Radial: Right 1+ or Left 1+      Capillary Refill:  <3 Secs    Skin:  Temp/Moisture: Warm,Dry  Color: Normal      Melva Garg MD  7/1/2024  2:42 PM            Medical Decision Making      Disposition and Plan     Clinical Impression:  1. Hypotension, unspecified hypotension type    2. COLLIN (acute kidney injury) (HCC)    3. Sepsis, due to unspecified organism, unspecified whether acute organ dysfunction  present (HCC)         Disposition:  Admit  7/1/2024  3:50 pm    Follow-up:  No follow-up provider specified.        Medications Prescribed:  Current Discharge Medication List                            Hospital Problems       Present on Admission  Date Reviewed: 3/20/2022            ICD-10-CM Noted POA    * (Principal) Hypotension, unspecified hypotension type I95.9 7/1/2024 Unknown

## 2024-07-01 NOTE — ED QUICK NOTES
Orders for admission, patient is aware of plan and ready to go upstairs. Any questions, please call ED RN Meli at extension [phone].     Patient Covid vaccination status: Fully vaccinated     COVID Test Ordered in ED: None    COVID Suspicion at Admission: N/A    Running Infusions:    sodium chloride 2,220 mL (07/01/24 1415)        Mental Status/LOC at time of transport: AOx4     Other pertinent information:   CIWA score: N/A   NIH score:  N/A

## 2024-07-01 NOTE — H&P
[facility] H&P/Consult note       CC:   Chief Complaint   Patient presents with    Wound Care        PCP: Tay Alexandra MD    History of Present Illness: Patient is a 69 year old female with PMH sig for HTN, HLD, DM2, CKD, RA on prednisone, PAD with recent LE vascular procedure complicated by hematoma, ruptured pseudoaneurysm, here for weakness and hypotension    She was admitted 06/13-06/29, underwent angioplasty/ stent of iliac artery 06/13, complicated by hemorrhagic shock. Taken back to OR 06/15 for evacuation of hematoma and R groin pseudoaneurysm rupture and ligation of femoral artery branch. She had another procedure for hematoma evacuation on 06/21 and wound vac placement. On 06/25 she underwent debridement of skin and subcutaneous fat, placement of kerecs skin substitue and mesh. Was discharged with wound vac     She reports that her wound vac at home got disconnected.wound has  been bothering her and she has been scratching it.   When son saw pt today, she was very weak, fatigued. BP was in 70s systolic in ER. Pt states she has not had good po intake, has had diarrhea at home, but no fevers or abd pain. No resp symptoms. No urinary symptoms.   Her BP responded to fluids.       PMH  Past Medical History:    Diabetes (HCC)    DVT (deep venous thrombosis) (HCC)    Essential hypertension    High blood pressure    Nicotine use disorder    Osteoarthritis    hip, wrist    Peripheral vascular disease (HCC)    Rheumatoid arthritis (HCC)    Visual impairment    glasses        PSH  Past Surgical History:   Procedure Laterality Date    Benign biopsy left  1991    Cholecystectomy      Colonoscopy N/A 05/06/2019    Procedure: COLONOSCOPY;  Surgeon: Freeman Oshea MD;  Location:  ENDOSCOPY    Colonoscopy      Hip replacement surgery Right 01/10/2020    Knee replacement surgery      Needle biopsy left  2017    Removal gallbladder      Total hip replacement      Total knee replacement Bilateral     Upper gi  endoscopy,exam          ALL:  Allergies   Allergen Reactions    Celecoxib DIARRHEA and RASH     AND DIARRHEA      Esomeprazole DIARRHEA and RASH     DIARRHEA      Etanercept HIVES and RASH    Fish-Derived Products SWELLING    Infliximab HIVES and ANAPHYLAXIS    Other ANAPHYLAXIS, SWELLING and HIVES     Throat closes up  HAD IN CONJUNCTION WITH ASA- SO AVOIDS BOTH OF THESE- FACE SWELLED UP    Oxycodone PAIN and NAUSEA ONLY    Pregabalin DIARRHEA and RASH     AND DIARRHEA      Enbrel RASH    Orencia [Abatacept] ITCHING    Rice OTHER (SEE COMMENTS)     \"Feels warm\"    Xeljanz [Tofacitinib] DIARRHEA        Home Medications:  No outpatient medications have been marked as taking for the 7/1/24 encounter (Hospital Encounter).         Soc Hx  Social History     Tobacco Use    Smoking status: Every Day     Current packs/day: 0.50     Average packs/day: 0.5 packs/day for 51.4 years (25.7 ttl pk-yrs)     Types: Cigarettes     Start date: 1/29/1973    Smokeless tobacco: Never   Substance Use Topics    Alcohol use: No        Fam Hx  Family History   Problem Relation Age of Onset    Other (CVA) Father     Cancer Mother 60        STOMACH CANCER    Cancer Brother         LUNG CANCER       Review of Systems  Comprehensive ROS reviewed and negative except for what's stated above.         OBJECTIVE:  BP 98/53   Pulse 77   Temp 97.5 °F (36.4 °C) (Temporal)   Resp 22   Wt 163 lb 2.3 oz (74 kg)   SpO2 100%   BMI 30.83 kg/m²   General:  Alert, no distress, appears stated age.   Head:  Normocephalic    Eyes:  Sclera anicteric, No conjunctival pallor, EOMs intact.    Throat: MMM   Neck: Supple    Lungs:   Clear to auscultation bilaterally    Chest wall:  No tenderness or deformity.   Heart:  Regular rate and rhythm    Abdomen:   Soft, NT/ND,    Extremities: R leg with groin wound, foul odor    Skin: No visible rashes or lesions.    Neurologic: Normal strength, no focal deficit appreciated     Diagnostic Data:    CBC/Chem  Recent Labs    Lab 06/26/24  0552 06/27/24  0515 06/28/24  0415 06/29/24  0535 07/01/24  1325   WBC 18.1* 17.4* 21.9* 17.8* 18.3*   HGB 9.9* 9.5* 10.3* 9.8* 10.0*   MCV 90.5 93.2 93.6 90.9 91.3   .0* 514.0* 530.0* 483.0* 467.0*   INR  --   --   --   --  1.09       Recent Labs   Lab 06/25/24  0717 06/26/24  0552 06/27/24  0515 06/28/24  0415 06/29/24  0535 07/01/24  1325    135* 134* 134* 134* 134*   K 4.2 4.6 4.4 4.6 4.5 4.4    106 107 106 107 106   CO2 22.0 19.0* 22.0 21.0 21.0 18.0*   BUN 27* 29* 28* 27* 28* 33*   CREATSERUM 1.06* 1.11* 0.93 1.14* 1.06* 1.66*   * 168* 155* 192* 138* 162*   CA 9.3 9.2 9.2 9.2 9.1 9.2   MG 2.0 2.0 1.7 1.7 1.8  --        Recent Labs   Lab 07/01/24  1325   ALT 18   AST 20   ALB 2.7*       No results for input(s): \"TROP\" in the last 168 hours.         Radiology: XR CHEST AP PORTABLE  (CPT=71045)    Result Date: 7/1/2024  PROCEDURE:  XR CHEST AP PORTABLE  (CPT=71045)  TECHNIQUE:  AP chest radiograph was obtained.  COMPARISON:  [facility] , XR, XR CHEST AP PORTABLE  (CPT=71045), 6/16/2024, 8:59 AM.  INDICATIONS:  discharged with wound vac, wound vac disconnected  PATIENT STATED HISTORY: (As transcribed by Technologist)  Patient stated feeling weak today.    FINDINGS:  Lungs and pleural spaces are clear.  Cardiac size is within normal limits.  Aorta is atherosclerotic.  Chest wall structures are otherwise unremarkable.            CONCLUSION:  There is no evidence of active cardiopulmonary disease on this single portable chest radiograph.   LOCATION:  [facility]      Dictated by (CST): Alverto Herrera MD on 7/01/2024 at 1:51 PM     Finalized by (CST): Alverto Herrera MD on 7/01/2024 at 1:51 PM       XR CHEST AP PORTABLE  (CPT=71045)    Result Date: 6/16/2024  PROCEDURE:  XR CHEST AP PORTABLE  (CPT=71045)  TECHNIQUE:  AP chest radiograph was obtained.  COMPARISON:  [facility] , XR, XR CHEST AP PORTABLE  (CPT=71045), 9/20/2019, 2:38 PM.  INDICATIONS:  intubated  PATIENT STATED HISTORY: (As  transcribed by Technologist)  Patient offered no additional history at this time.    FINDINGS:  Endotracheal tube is 4 cm above the tanya.  The right-sided central line with tip in the SVC.  The heart is normal in size.  The lungs are clear.            CONCLUSION:  Endotracheal tube 4 cm above the tanya.   LOCATION:  [facility]      Dictated by (CST): Gadiel De La Garza MD on 6/16/2024 at 9:43 AM     Finalized by (CST): Gadiel De La Garza MD on 6/16/2024 at 9:43 AM       CTA ABD/PEL (CPT=74174)    Result Date: 6/15/2024  PROCEDURE:  CTA ABD/PEL (CPT=74174)  COMPARISON:  [facility] , US, US GROIN BILATERAL (CPT=76882-50), 6/15/2024, 11:06 AM.  [facility] , CT, CT ABDOMEN+PELVIS KIDNEYSTONE 2D RNDR(NO IV,NO ORAL)(CPT=74176), 1/26/2024, 2:07 AM.  INDICATIONS:  Hematoma  TECHNIQUE:  CT images of the abdomen and pelvis were obtained pre- and post- injection of non-ionic intravenous contrast material. Multi-planar reformatted/3-D images were created to optimize visualization of vascular anatomy.  Dose reduction techniques were used. Dose information is transmitted to the ACR (American College of Radiology) NRDR (National Radiology Data Registry) which includes the Dose Index Registry.  PATIENT STATED HISTORY:(As transcribed by Technologist)  hematoma right groin from procedure   CONTRAST USED:  100cc of Isovue 370  FINDINGS:  AORTA/VASCULAR:  Mild to moderate stenosis at the origin of the celiac axis due to calcified plaque.  The right left hepatic arteries branch off of the proper hepatic and celiac axis.  Moderate stenosis in the proximal superior mesenteric artery due to soft tissue density plaque is noted.  Moderate to severe stenosis in the distal superior mesenteric artery (image 220) is noted.  The major branches off the superior mesenteric artery appear to be patent.  Calcified plaques in bilateral common iliac along the internal iliac and external iliac arteries are noted.  Stent in the left external iliac artery appears to be  patent.  Left external iliac, common femoral, profunda and superficial femoral arteries are patent proximally.  Dense calcified plaque distally is noted.   LIVER:  No enlargement, atrophy, abnormal density, or significant focal lesion.  BILIARY:  Sequelae of cholecystectomy. PANCREAS:  No lesion, fluid collection, ductal dilatation, or atrophy.  SPLEEN:  No enlargement or focal lesion.  KIDNEYS:  No mass, obstruction, or calcification.  ADRENALS:  No mass or enlargement.  RETROPERITONEUM:  Normal.  No mass or adenopathy.  BOWEL/MESENTERY:  The appendix is normal.  No evidence of an acute inflammatory process. ABDOMINAL WALL:  Large collection in the right inguinal region is identified.  This measures 13.5 x 7.1 cm in the axial plane.  This is approximately 8.0 cm cranio caudally.  Noncontrast examination demonstrates fluid high density level that is suggestive of differing ages of hematoma.  There is contrast extending from the region of the right common femoral artery into this collection with puddling of contrast on arterial phase imaging.  Delayed phase imaging demonstrates significant new high density throughout this collection.  There is a stent in the right superficial femoral artery that is patent.  Markedly hypoplastic right superficial femoral artery distal to this demonstrates varying degrees of moderate to marked stenosis. URINARY BLADDER:  No visible focal wall thickening, lesion, or calculus.  PELVIC NODES:  No adenopathy.  PELVIC ORGANS:  No visible mass.  Pelvic organs appropriate for patient age.  BONES:  No bony lesion or fracture.  LUNG BASES:  Numerous cysts throughout the lung bases are noted.  Atelectasis the lung bases is noted.  Mild bronchiectasis is also identified. OTHER:  Negative.             CONCLUSION:   1. Numerous cysts the lung bases along with mild bronchiectasis at the lung bases is noted.  This appears more prominent than on prior examination.  2. Large collection in the right  inguinal region measuring 13.5 x 7.1 x 8.0 cm is highly suspicious for a large pseudoaneurysm.  Noncontrast images demonstrate fluid high density level that is likely due to varying ages of blood products.  There is hyperenhancement on arterial phase imaging with a branch extending from the right common femoral artery into this collection.  Delayed images demonstrate accumulation of high density contrast within this collection.  This critical result was discussed with Nurse Estefany at 1249 hours on 6/15/2024. Read back was performed.   LOCATION:  [facility]   Dictated by (CST): Jonathan Isaacs MD on 6/15/2024 at 12:43 PM     Finalized by (CST): Jonathan Isaacs MD on 6/15/2024 at 12:49 PM       US GROIN BILATERAL (CPT=76882-50)    Result Date: 6/15/2024  PROCEDURE:  US GROIN BILATERAL LIMITED (CPT=76882)  COMPARISON:  [facility] , CT, CT ABDOMEN+PELVIS KIDNEYSTONE 2D RNDR(NO IV,NO ORAL)(CPT=74176), 1/26/2024, 2:07 AM.  INDICATIONS:  hematoma bilateral  TECHNIQUE:  Sonography was performed of the clinically requested area of interest.  PATIENT STATED HISTORY: (As transcribed by Technologist)     FINDINGS:  MASSES:  Right common femoral artery and vein are widely patent.  Large heterogeneous fluid debris collection in the right inguinal region measuring 9.4 x 9.2 x 7.4 cm is noted.  No flow within this collection.  Left common femoral, profunda and superficial femoral artery appear widely patent.  No fluid collection in the left inguinal region. FLUID COLLECTIONS:  As above OTHER:  Negative.            CONCLUSION:  Large fluid collection in the right inguinal region is suspicious for a large hematoma.  Infection cannot be entirely excluded.  The vessels adjacent to the hematoma are widely patent.  No significant vascularity within the likely hematoma.   LOCATION:  [facility]   Dictated by (CST): Jonathan Isaacs MD on 6/15/2024 at 11:47 AM     Finalized by (CST): Jonathan Isaacs MD on 6/15/2024 at 11:48 AM       CATH  PV    Result Date: 6/14/2024  This exam has been completed. Please refer to Notes for the results to this procedure.    CATH PV    Result Date: 6/13/2024  This exam has been completed. Please refer to Notes for the results to this procedure.       ASSESSMENT / PLAN:     Patient is a 69 year old female with PMH sig for HTN, HLD, DM2, CKD, RA on prednisone, PAD with recent LE vascular procedure complicated by hematoma, ruptured pseudoaneurysm, here for weakness and hypotension      Impression    -hypotension with possible sepsis  -diarrhea  -COLLIN on CKD 3, baseline Cr ~1.2    -left iliac chronic occlusion with claudication sp balloon angioplasty and stent of left common iliac artery and external iliac artery 06/13  -right common femoral artery occlusion sp thrombectomy, balloon angioplasty, stent; thrombectomy of left SFA and left anterior tibial artery, balloon angioplasty of left TP trunk and posterior tibial 06/13  -sp R groin pseudoaneurysm s/p ligation of femoral artery branch with sartorius muscle myoplasty on 06/15    -evacuation of hematoma and pulse lavage 6/21/24 w/ wound vac placement   -sp debridement of skin and subcutaneous fat, pulse lavage, placement of kerecis skin substitute and mesh 06/25     -anemia, recent abla      -DM2, uncontrolled - A1c 12s     -nicotine dependence, smokes 1ppd     -HTN hx  -HLD     -RA on chronic prednisone       Plan     *hypotension / lethargy   -possible from infection and dehydration  -has had diarrhea at home, BP responded rather rapidly with just fluids but concern for possible infectious source as well  -UA with pyuria however she has no urinary symptoms. Await urine cx  -possible surgical site wound, has foul odor  -cxr wo infiltrates   -check mrsa nares  -on zosyn, add vancomycin pending work up   -check stool cx   -no fevers but wbc high (chronically elevated but appears worse)     *COLLIN  -possible from dehydration/ infection  -IVFs     *RA   -on chronic prednisone,  will give stress dose steroids      *anemia  -due to recent abla. Hgb overall stable      *DM 2  -uncontrolled  -ISS  Add tresiba given steroids     *HTN hx  -hold home meds (at home on atenolol, hydrochlorothiazide, norvasc, losartan)     *HLD  -statin    Scd  Xarelto       Further recommendations pending patient's clinical course. [facility] hospitalist to continue to follow patient while in house    Patient and/or patient's family given opportunity to ask questions and note understanding and agreeing with therapeutic plan as outlined    Avel Veras Hospitalist  [phone]  Answering Service: [phone]

## 2024-07-01 NOTE — PROGRESS NOTES
DM apt request (discharged 06/29)    PIETRO Thayer  Nurse Practitioner  [place] [id number]  [address]  [facility] [address] [phone]  [facility] to call [phone] to assist w/scheduling apt

## 2024-07-01 NOTE — ED INITIAL ASSESSMENT (HPI)
Pt had surgery 2 weeks ago, had a wound vac placed, states her wound vac became disconnected.   called the wound clinic and they were on lunch so they decided to come to ER.  [facility] has an appointment tomorrow in the wound clinic for a dressing change.

## 2024-07-01 NOTE — PROGRESS NOTES
Vascular Surgery Progress Note    Consult noted. Patient will be seen tomorrow AM. Well known to service, recently discharged on Saturday. Wound VAC issues at home. When evaluated patient lethargic and weak, hypotensive in ED. Will plan for admission, antibiotics and wound evaluation in the AM.     Safia Mcneil MD  07/01/24  3:34 PM     repositioning

## 2024-07-02 ENCOUNTER — ANESTHESIA EVENT (OUTPATIENT)
Dept: CARDIAC SURGERY | Facility: HOSPITAL | Age: 69
End: 2024-07-02
Payer: MEDICARE

## 2024-07-02 ENCOUNTER — APPOINTMENT (OUTPATIENT)
Dept: WOUND CARE | Facility: HOSPITAL | Age: 69
End: 2024-07-02
Attending: NURSE PRACTITIONER
Payer: MEDICARE

## 2024-07-02 LAB
ALBUMIN SERPL-MCNC: 2.2 G/DL (ref 3.4–5)
ALBUMIN/GLOB SERPL: 0.6 {RATIO} (ref 1–2)
ALP LIVER SERPL-CCNC: 91 U/L
ALT SERPL-CCNC: 17 U/L
ANION GAP SERPL CALC-SCNC: 7 MMOL/L (ref 0–18)
AST SERPL-CCNC: 14 U/L (ref 15–37)
ATRIAL RATE: 96 BPM
BILIRUB SERPL-MCNC: 1.2 MG/DL (ref 0.1–2)
BUN BLD-MCNC: 18 MG/DL (ref 9–23)
CALCIUM BLD-MCNC: 8.1 MG/DL (ref 8.5–10.1)
CHLORIDE SERPL-SCNC: 113 MMOL/L (ref 98–112)
CO2 SERPL-SCNC: 18 MMOL/L (ref 21–32)
CREAT BLD-MCNC: 1.1 MG/DL
CREAT BLD-MCNC: 1.1 MG/DL
EGFRCR SERPLBLD CKD-EPI 2021: 54 ML/MIN/1.73M2 (ref 60–?)
EGFRCR SERPLBLD CKD-EPI 2021: 54 ML/MIN/1.73M2 (ref 60–?)
ERYTHROCYTE [DISTWIDTH] IN BLOOD BY AUTOMATED COUNT: 16.4 %
GLOBULIN PLAS-MCNC: 4 G/DL (ref 2.8–4.4)
GLUCOSE BLD-MCNC: 125 MG/DL (ref 70–99)
GLUCOSE BLD-MCNC: 147 MG/DL (ref 70–99)
GLUCOSE BLD-MCNC: 166 MG/DL (ref 70–99)
GLUCOSE BLD-MCNC: 174 MG/DL (ref 70–99)
GLUCOSE BLD-MCNC: 243 MG/DL (ref 70–99)
GLUCOSE BLD-MCNC: 261 MG/DL (ref 70–99)
HCT VFR BLD AUTO: 25.9 %
HGB BLD-MCNC: 8.2 G/DL
MAGNESIUM SERPL-MCNC: 1.8 MG/DL (ref 1.6–2.6)
MCH RBC QN AUTO: 29.4 PG (ref 26–34)
MCHC RBC AUTO-ENTMCNC: 31.7 G/DL (ref 31–37)
MCV RBC AUTO: 92.8 FL
OSMOLALITY SERPL CALC.SUM OF ELEC: 292 MOSM/KG (ref 275–295)
P AXIS: 48 DEGREES
P-R INTERVAL: 126 MS
PLATELET # BLD AUTO: 371 10(3)UL (ref 150–450)
POTASSIUM SERPL-SCNC: 4.2 MMOL/L (ref 3.5–5.1)
PROT SERPL-MCNC: 6.2 G/DL (ref 6.4–8.2)
Q-T INTERVAL: 388 MS
QRS DURATION: 136 MS
QTC CALCULATION (BEZET): 490 MS
R AXIS: -11 DEGREES
RBC # BLD AUTO: 2.79 X10(6)UL
SODIUM SERPL-SCNC: 138 MMOL/L (ref 136–145)
T AXIS: 13 DEGREES
VENTRICULAR RATE: 96 BPM
WBC # BLD AUTO: 13.2 X10(3) UL (ref 4–11)

## 2024-07-02 PROCEDURE — 83735 ASSAY OF MAGNESIUM: CPT | Performed by: HOSPITALIST

## 2024-07-02 PROCEDURE — 80053 COMPREHEN METABOLIC PANEL: CPT | Performed by: HOSPITALIST

## 2024-07-02 PROCEDURE — 85027 COMPLETE CBC AUTOMATED: CPT | Performed by: HOSPITALIST

## 2024-07-02 PROCEDURE — 82565 ASSAY OF CREATININE: CPT | Performed by: HOSPITALIST

## 2024-07-02 PROCEDURE — 82962 GLUCOSE BLOOD TEST: CPT

## 2024-07-02 RX ORDER — MAGNESIUM OXIDE 400 MG/1
400 TABLET ORAL ONCE
Status: COMPLETED | OUTPATIENT
Start: 2024-07-02 | End: 2024-07-02

## 2024-07-02 RX ORDER — PREDNISONE 5 MG/1
5 TABLET ORAL 2 TIMES DAILY WITH MEALS
Status: DISCONTINUED | OUTPATIENT
Start: 2024-07-02 | End: 2024-07-08

## 2024-07-02 NOTE — HOME CARE LIAISON
Patient is current with [facility].  Please enter [facility] order upon discharge.  RN PT OT.

## 2024-07-02 NOTE — PLAN OF CARE
Received pt at 1930  Pt AOx4, NSR, RA, VSS  Dr Mcneil at bedside ~2030 to remove wound vac dressing. Wet to dry applied. Redressed @2200 w/ Dakins. PRN Morphine for initial dressing change  IVF  IV abx  D/c Planning: Washout and debridement 7/3 w/ Vasc sgx  Call light within reach.  Needs currently met

## 2024-07-02 NOTE — CONSULTS
Infectious Disease Initial Consultation      Date of admission: 7/1/2024  1:09 PM     Date of service: 07/02/24 8:48 AM    Consult requested by: Avel Ram DO     Reason for consult: Right groin surgical wound infection    Chief complaint: Hypotension    History of present illness: Emilie John is a 69 year old female with history of peripheral vascular disease, recently underwent a left iliac balloon angioplasty and stent of the left common iliac artery  Iliac artery along with balloon angioplasty, stent thrombectomy of the left SFA and left anterior tibial artery on 6/13.  She developed a large right inguinal region pseudoaneurysm on 6/15.  Went back to the OR, underwent ligation of the femoral artery branch.  Went back to the OR for evacuation of the hematoma pulse lavage on 6/21/2024 with wound VAC placement.  Was taken again to the OR on 6/25 for debridement of skin and subcutaneous fat, pulse lavage and placement of Kerecis skin substitute and mesh.  She was discharged on 6/29 with a wound VAC.  Patient reports she excellently disconnected her wound VAC on the morning of 7/1 was unable to be connected.  She was brought to the emergency room as such.  In the ED, she was found to be lethargic and hypotensive; however, afebrile.  Labs revealed leukocytosis white count of 18.3 with a left shift.  BMP showed creatinine 1.66.  LFTs unremarkable.  Lactic acid is 1.4.  UA showed 21-50 white blood cells with 250 leukocyte esterase.  Her wound was noted to be infected, aerobic cultures were obtained from the wound.  Blood cultures and urine cultures were also pending.  MRSA screen is pending.  C. difficile was done and it was negative.  Stool GI panel given her diarrhea was also done and that was negative.  The patient was started on IV Zosyn and IV vancomycin admitted to the medical floor.  She is planned for operative washout on Wednesday with vascular surgery.    Review of systems:  All other components  of the review of systems are negative, except those described in the history of present illness.     Past Medical History:    Diabetes (HCC)    DVT (deep venous thrombosis) (HCC)    Essential hypertension    High blood pressure    Nicotine use disorder    Osteoarthritis    hip, wrist    Peripheral vascular disease (HCC)    Rheumatoid arthritis (HCC)    Visual impairment    glasses     Past Surgical History:   Procedure Laterality Date    Benign biopsy left  1991    Cholecystectomy      Colonoscopy N/A 05/06/2019    Procedure: COLONOSCOPY;  Surgeon: Freeman Oshea MD;  Location:  ENDOSCOPY    Colonoscopy      Hip replacement surgery Right 01/10/2020    Knee replacement surgery      Needle biopsy left  2017    Removal gallbladder      Total hip replacement      Total knee replacement Bilateral     Upper gi endoscopy,exam       Social History     Socioeconomic History    Marital status: Single   Tobacco Use    Smoking status: Every Day     Current packs/day: 0.50     Average packs/day: 0.5 packs/day for 51.4 years (25.7 ttl pk-yrs)     Types: Cigarettes     Start date: 1/29/1973    Smokeless tobacco: Never   Vaping Use    Vaping status: Never Used   Substance and Sexual Activity    Alcohol use: No    Drug use: No    Sexual activity: Not Currently     Social Determinants of Health     Financial Resource Strain: Medium Risk (3/9/2022)    [facility]    Overall Financial Resource Strain (CARDIA)     Difficulty of Paying Living Expenses: Somewhat hard   Food Insecurity: No Food Insecurity (7/1/2024)    Food Insecurity     Food Insecurity: Never true   Transportation Needs: No Transportation Needs (7/1/2024)    Transportation Needs     Lack of Transportation: No   Physical Activity: Insufficiently Active (3/9/2022)    Exercise Vital Sign     Days of Exercise per Week: 2 days     Minutes of Exercise per Session: 30 min   Stress: No Stress Concern Present (3/9/2022)    Received  from [facility], [facility] - Occupational Stress Questionnaire     Feeling of Stress : Not at all    Social Connections   Housing Stability: Low Risk  (7/1/2024)    Housing Stability     Housing Instability: No     Family History   Problem Relation Age of Onset    Other (CVA) Father     Cancer Mother 60        STOMACH CANCER    Cancer Brother         LUNG CANCER     Reviewed, see above    Medications:    magnesium oxide    HYDROcodone-acetaminophen    rosuvastatin    sodium chloride    acetaminophen    piperacillin-tazobactam    hydrocortisone sodium succinate    rivaroxaban    insulin degludec    glucose **OR** glucose **OR** glucose-vitamin C **OR** dextrose **OR** glucose **OR** glucose **OR** glucose-vitamin C    insulin aspart    vancomycin    clopidogrel    sodium hypochlorite     Allergies:  Allergies   Allergen Reactions    Celecoxib DIARRHEA and RASH     AND DIARRHEA      Esomeprazole DIARRHEA and RASH     DIARRHEA      Etanercept HIVES and RASH    Fish-Derived Products SWELLING    Infliximab HIVES and ANAPHYLAXIS    Other ANAPHYLAXIS, SWELLING and HIVES     Throat closes up  HAD IN CONJUNCTION WITH ASA- SO AVOIDS BOTH OF THESE- FACE SWELLED UP    Oxycodone PAIN and NAUSEA ONLY    Pregabalin DIARRHEA and RASH     AND DIARRHEA      Enbrel RASH    Orencia [Abatacept] ITCHING    Rice OTHER (SEE COMMENTS)     \"Feels warm\"    Xeljanz [Tofacitinib] DIARRHEA       Physical Exam:  Vitals:    07/02/24 0800   BP: 132/65   Pulse: 85   Resp: 15   Temp: 97.6 °F (36.4 °C)     Vitals signs and nursing note reviewed.   Constitutional:       Appearance: Normal appearance.   HENT:      Head: Normocephalic and atraumatic.      Mouth: Mucous membranes are moist.   Neck:      Musculoskeletal: Neck supple.   Cardiovascular:      Rate and Rhythm: Normal rate.   Pulmonary:      Effort: Pulmonary effort is normal. No respiratory distress.   Musculoskeletal:      Right  lower leg: No edema.  Right groin wound wound dehiscence noted with active drainage.     Left lower leg: No edema.   Skin:     General: Skin is warm and dry.   Neurological:      General: No focal deficit present.      Mental Status: Alert and oriented to person, place, and time.     Laboratory data:  I have independently reviewed all lab results; including old microbiological results.  Lab Results   Component Value Date    WBC 13.2 07/02/2024    HGB 8.2 07/02/2024    HCT 25.9 07/02/2024    .0 07/02/2024    CREATSERUM 1.10 07/02/2024    CREATSERUM 1.10 07/02/2024    BUN 18 07/02/2024     07/02/2024    K 4.2 07/02/2024     07/02/2024    CO2 18.0 07/02/2024     07/02/2024    CA 8.1 07/02/2024    ALB 2.2 07/02/2024    ALKPHO 91 07/02/2024    BILT 1.2 07/02/2024    TP 6.2 07/02/2024    AST 14 07/02/2024    ALT 17 07/02/2024    PTT 25.9 07/01/2024    INR 1.09 07/01/2024    MG 1.8 07/02/2024    TROPHS 12 07/01/2024        Recent Labs   Lab 07/01/24  1325 07/02/24  0602   RBC 3.33* 2.79*   HGB 10.0* 8.2*   HCT 30.4* 25.9*   MCV 91.3 92.8   MCH 30.0 29.4   MCHC 32.9 31.7   RDW 16.6 16.4   NEPRELIM 13.46*  --    WBC 18.3* 13.2*   .0* 371.0       Microbiology data:  No results found for this visit on 07/01/24.      Radiology:  I have reviewed all imaging data available independently.   Chest x-ray:  No acute disease    Impression:  Emilie John is a 69 year old female with     Right groin surgical wound infection  This is in the setting of vascular surgery on 6/13 followed by multiple surgeries for hematoma evacuation and wound VAC placement during her last admission  Now presenting here with a surgical wound infection  Drainage cultures are pending  Plan for washout of her wound 7/3 with vascular surgery  Currently on IV Zosyn IV vancomycin  Rheumatoid arthritis  On prednisone and azathioprine  Immunosuppressed  Leukocytosis  Reactive  Will continue to trend  Pyuria with  bacteriuria  Unlikely source of infection    Recommendations:     Continue to follow-up on microbiological data  Continue IV Zosyn IV vancomycin  Agree with surgical intervention as planned by vascular surgery, duration of therapy and choice of antibiotics will depend on microbiological data and depth of infection  Please send for aerobic and anaerobic cultures when taken to the OR  Continue to monitor daily labs for antibiotic toxicity  Further recommendations will depend on the above work-up and clinical progress     The plan of care was discussed with the primary hospital team, Avel Ram DO     Recommendations were also discussed with the patient; all questions were answered.     Thank you for this consultation. Please don't hesitate to call the ID team for questions or any acute changes in patient's clinical condition.    Please note that this report has been produced using speech recognition software and may contain errors related to that system including, but not limited to, errors in grammar, punctuation, and spelling, as well as words and phrases that possibly may have been recognized inappropriately.  If there are any questions or concerns, contact the dictating provider for clarification.    The  Cent[employer] Cures Act makes medical notes like these available to patients in the interest of transparency. Please be advised this is a medical document. Medical documents are intended to carry relevant information, facts as evident, and the clinical opinion of the practitioner. The medical note is intended as peer to peer communication and may appear blunt or direct. It is written in medical language and may contain abbreviations or verbiage that are unfamiliar.     Zeeshan Lama MD  [facility] Infectious Disease. Tel: [phone]. Fax: [phone].     Emilie John : 1955 MRN: [id number] [facility]: [phone]

## 2024-07-02 NOTE — CM/SW NOTE
24 1400   CM/SW Referral Data   Referral Source    Reason for Referral Discharge planning;Readmission   Informant Patient   Readmission Assessment   Factors that patient feels contributed to this readmission Acute/Chronic Clinical Presentation   Pt's living situation prior to admission? Home alone   Pt's level of independence at discharge? No assist/independent (minimal)   Pt. received education on diagnoses at time of discharge? Yes   Was the recommended discharge plan achieved? Yes   Was pt. discharged w/out services? No   Patient Info   Patient's Current Mental Status at Time of Assessment Alert;Oriented   Patient's Home Environment Condo/Apt with elevator   Patient lives with Alone   Patient Status Prior to Admission   Independent with ADLs and Mobility No   Pt. requires assistance with Driving   Services in place prior to admission Home Health Care;DME/Supplies at home   Home Health Provider Info [facility]   DME Provider/Supplier [employer]   Type of DME/Supplies Wound Vac/NPWT   Choice of Post-Acute Provider   Informed patient of right to choose their preferred provider Yes     CM self referred to case for discharge planning and readmission.    Pt is a 69 year old female admitted from home after her wound vac became disconnected.  Also c/o weakness.   She was recently admitted for vascular procedure complicated by hematoma, ruptured pseudoaneurysm  She was discharged home with a wound vac in place and with [facility] on 24    Met with patient at the bedside and she reports that the  RN saw her 24 and the wound vac was working fine.  She noticed yesterday morning that it seemed to have shut off.  She said she thought the battery had  so she plugged it in and it began alarming.  She attempted to contact [facility] and left message.  Her son came to see her and brought her to the ER.    Pt now awaiting surgery for operative washout w/Dr Taylor    / to remain available  for support and/or discharge planning.     Emerita MEJIAA MSN, RN CTL/  [phone]

## 2024-07-02 NOTE — PHYSICAL THERAPY NOTE
Order received, chart reviewed. Pt with plans for operative washout tomorrow. Will hold and follow post surgery.     Ramandeep Devlin, PT, DPT  07/02/24

## 2024-07-02 NOTE — PLAN OF CARE
Assumed care around 1800  Patient slightly lethargic but oriented x4  VSS, RA, NSR on tele  Denies pain   R groin wound noted, son brought disconnected wound vac   Up x1 assist and walker  Call light within reach     Admission navigator completed with patient and son   Patient reported that she has not started taking some of the new medications prescribed after her recent discharge, she has not been able to fill her Xarelto     Patient and family updated on plan of care  Plan for vascular to see    NURSING ADMISSION NOTE      Patient admitted via Cart  Oriented to room.  Safety precautions initiated.  Bed in low position.  Call light in reach.

## 2024-07-02 NOTE — PROGRESS NOTES
[facility] Dosing Service      Initial Pharmacokinetic Consult for Vancomycin Dosing     Emilie John is a 69 year old female who is being initiated on vancomycin therapy for sepsis.  Pharmacy has been asked to dose vancomycin by Dr. Ram.  The initial treatment and monitoring approach will be non-AUC strategy.        Weight and Temperature:    Wt Readings from Last 1 Encounters:   24 74 kg (163 lb 2.3 oz)        Temp Readings from Last 1 Encounters:   24 99.5 °F (37.5 °C) (Oral)      Labs:   Recent Labs   Lab 24  0415 24  0535 24  1325   CREATSERUM 1.14* 1.06* 1.66*      Estimated Creatinine Clearance: 24.1 mL/min (A) (based on SCr of 1.66 mg/dL (H)).     Recent Labs   Lab 245 24  0535 24  1325   WBC 21.9* 17.8* 18.3*          The Pharmacokinetic Target is:    Trough/random 10-15 mg/L    Renal Dosing Considerations:    COLLIN/ARF     Assessment/Plan:   Initial/Loading dose: Will receive 1000 mg IV (15 mg/kg, capped at 2250 mg) x 1 initial dose.      Maintenance dose: Pharmacy will dose vancomycin at 1000 mg IV every 36 hours    Monitorin) Plan for vancomycin trough to be obtained before the 3rd dose    2) Pharmacy will order SCr as clinically indicated to assess renal function.    3) Pharmacy will monitor for toxicity and efficacy, adjust vancomycin dose and/or frequency, and order vancomycin levels as appropriate per the Pharmacy and Therapeutics Committee approved protocol until discontinuation of the medication.       We appreciate the opportunity to assist in the care of this patient.     Rhoda ZARAGOZA CPhT  2024  7:33 PM  [facility]: [phone]

## 2024-07-02 NOTE — ANESTHESIA PREPROCEDURE EVALUATION
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  Temp: 98 °F (36.7 °C)  Pulse: 93  Resp: 13  BP: 114/59  SpO2: 99 %  Body mass index is 30.83 kg/m².    Current medications reviewed.  Hospital Medications:   [COMPLETED] magnesium oxide (Mag-Ox) tab 400 mg  400 mg Oral Once    predniSONE (Deltasone) tab 5 mg  5 mg Oral BID with meals    [] sodium chloride 0.9 % IV bolus 2,220 mL  30 mL/kg Intravenous Continuous    [COMPLETED] piperacillin-tazobactam (Zosyn) 4.5 g in dextrose 5% 100 mL IVPB-ADDV  4.5 g Intravenous Once    [COMPLETED] morphINE PF 4 MG/ML injection 4 mg  4 mg Intravenous Once    HYDROcodone-acetaminophen (Norco) 5-325 MG per tab 1 tablet  1 tablet Oral Q4H PRN    rosuvastatin (Crestor) tab 10 mg  10 mg Oral Nightly    acetaminophen (Tylenol Extra Strength) tab 500 mg  500 mg Oral Q4H PRN    piperacillin-tazobactam (Zosyn) 3.375 g in dextrose 5% 100 mL IVPB-ADDV  3.375 g Intravenous Q8H    rivaroxaban (Xarelto) tab 2.5 mg  2.5 mg Oral BID with meals    insulin degludec (Tresiba) 100 units/mL flextouch 10 Units  10 Units Subcutaneous Nightly    glucose (Dex4) 15 GM/59ML oral liquid 15 g  15 g Oral Q15 Min PRN    Or    glucose (Glutose) 40% oral gel 15 g  15 g Oral Q15 Min PRN    Or    glucose-vitamin C (Dex-4) chewable tab 4 tablet  4 tablet Oral Q15 Min PRN    Or    dextrose 50% injection 50 mL  50 mL Intravenous Q15 Min PRN    Or    glucose (Dex4) 15 GM/59ML oral liquid 30 g  30 g Oral Q15 Min PRN    Or    glucose (Glutose) 40% oral gel 30 g  30 g Oral Q15 Min PRN    Or    glucose-vitamin C (Dex-4) chewable tab 8 tablet  8 tablet Oral Q15 Min PRN    insulin aspart (NovoLOG) 100 Units/mL FlexPen  1-5 Units  1-5 Units Subcutaneous TID AC and HS    vancomycin (Vancocin) 1,000 mg in sodium chloride 0.9% 250 mL IVPB-ADDV  15 mg/kg Intravenous Q36H    [COMPLETED] morphINE PF 2 MG/ML injection 2 mg  2 mg Intravenous Once    clopidogrel (Plavix) tab 75 mg  75 mg Oral Daily    sodium hypochlorite (Dakin's) 0.25 % external solution   Topical BID       Outpatient Medications:     Medications Prior to Admission   Medication Sig Dispense Refill Last Dose    HYDROcodone-acetaminophen 5-325 MG Oral Tab Take 1 tablet by mouth every 4 (four) hours as needed. 30 tablet 0 7/1/2024 at 1200    docusate sodium 100 MG Oral Cap Take 100 mg by mouth 2 (two) times daily.   Past Week at 0900    clopidogrel 75 MG Oral Tab Take 1 tablet (75 mg total) by mouth daily. 90 tablet 0 Past Week    glipiZIDE ER 5 MG Oral Tablet 24 Hr Take 2 tablets (10 mg total) by mouth 2 (two) times daily.   Past Week    aspirin 81 MG Oral Chew Tab Chew 1 tablet (81 mg total) by mouth daily.   6/30/2024 at 0900    rosuvastatin 10 MG Oral Tab Take 1 tablet (10 mg total) by mouth nightly.   Past Week    amLODIPine 10 MG Oral Tab Take 1 tablet (10 mg total) by mouth daily. 90 tablet 1 6/30/2024 at 0900    valsartan 160 MG Oral Tab Take 1 tablet (160 mg total) by mouth daily. 90 tablet 1 6/30/2024 at 0900    atenolol 50 MG Oral Tab Take 1 tablet (50 mg total) by mouth daily. 90 tablet 1 6/30/2024 at 0900    metFORMIN 500 MG Oral Tab Take 2 tablets (1,000 mg total) by mouth 2 (two) times daily with meals. 2 tab twice a day 180 tablet 3 Past Week    predniSONE 5 MG Oral Tab Take 1 tablet (5 mg total) by mouth 2 (two) times daily. 60 tablet 3 6/30/2024 at 0900    rivaroxaban 2.5 MG Oral Tab Take 1 tablet (2.5 mg total) by mouth 2 (two) times daily with meals. 60 tablet 0     azaTHIOprine 50 MG Oral Tab Take 1 tablet (50 mg total) by mouth daily.       hydroCHLOROthiazide 25 MG Oral Tab Take 1 tablet (25 mg total) by mouth daily. 90 tablet 1     Glucose Blood  (ACCU-CHEK LUPE PLUS) In Vitro Strip Use to check blood sugar before and after breakfast and dinner (4x per day) 400 strip 3        Allergies: Celecoxib, Esomeprazole, Etanercept, Fish-derived products, Infliximab, Other, Oxycodone, Pregabalin, Enbrel, Orencia [abatacept], Rice, and Xeljanz [tofacitinib]      Anesthesia Evaluation    Patient summary reviewed.    Anesthetic Complications  (-) history of anesthetic complications         GI/Hepatic/Renal                                 Cardiovascular      ECG reviewed.      MET: >4      (+) hypertension and well controlled  (+) hyperlipidemia                                  Endo/Other  Comment: PVD    (+) diabetes and poorly controlled, type 2, not using insulin                  (+) arthritis  (+) rheumatoid arthritis     Pulmonary  Comment: 25 pyh      (+) COPD and moderate  COPD not requiring home oxygen.                Neuro/Psych                 (+) neuromuscular disease             This admit underwent a left iliac balloon angioplasty and stent of the left common iliac artery  Iliac artery along with balloon angioplasty, stent thrombectomy of the left SFA and left anterior tibial artery on 6/13.  She developed a large right inguinal region pseudoaneurysm on 6/15.  Went back to the OR, underwent ligation of the femoral artery branch.  Went back to the OR for evacuation of the hematoma pulse lavage on 6/21/2024 with wound VAC placement.  Was taken again to the OR on 6/25 for debridement of skin and subcutaneous fat, pulse lavage and placement of Kerecis skin substitute and mesh.  She was discharged on 6/29 with a wound VAC only to come back with a wound infx          Past Surgical History:   Procedure Laterality Date    Benign biopsy left  1991    Cholecystectomy      Colonoscopy N/A 05/06/2019    Procedure: COLONOSCOPY;  Surgeon: Freeman Oshea MD;  Location:  ENDOSCOPY    Colonoscopy      Hip replacement surgery Right 01/10/2020    Knee replacement surgery       Needle biopsy left  2017    Removal gallbladder      Total hip replacement      Total knee replacement Bilateral     Upper gi endoscopy,exam       Social History     Socioeconomic History    Marital status: Single   Tobacco Use    Smoking status: Every Day     Current packs/day: 0.50     Average packs/day: 0.5 packs/day for 51.4 years (25.7 ttl pk-yrs)     Types: Cigarettes     Start date: 1/29/1973    Smokeless tobacco: Never   Vaping Use    Vaping status: Never Used   Substance and Sexual Activity    Alcohol use: No    Drug use: No    Sexual activity: Not Currently     History   Drug Use No     Available pre-op labs reviewed.  Lab Results   Component Value Date    WBC 13.2 (H) 07/02/2024    RBC 2.79 (L) 07/02/2024    HGB 8.2 (L) 07/02/2024    HCT 25.9 (L) 07/02/2024    MCV 92.8 07/02/2024    MCH 29.4 07/02/2024    MCHC 31.7 07/02/2024    RDW 16.4 07/02/2024    .0 07/02/2024     Lab Results   Component Value Date     07/02/2024    K 4.2 07/02/2024     (H) 07/02/2024    CO2 18.0 (L) 07/02/2024    BUN 18 07/02/2024    CREATSERUM 1.10 (H) 07/02/2024    CREATSERUM 1.10 (H) 07/02/2024     (H) 07/02/2024    CA 8.1 (L) 07/02/2024     Lab Results   Component Value Date    INR 1.09 07/01/2024         Airway      Mallampati: III  Mouth opening: >3 FB  TM distance: 4 - 6 cm  Neck ROM: full Cardiovascular      Rhythm: regular  Rate: normal     Dental             Pulmonary            (+) decreased breath sounds         Other findings              ASA: 3   Plan: general  NPO status verified and patient meets guidelines.        Comment: Discussed, consent signed electronically with her permission  Plan/risks discussed with: patient                Present on Admission:  **None**

## 2024-07-02 NOTE — DIETARY NOTE
[facility]   part of [facility]   CLINICAL NUTRITION    Emilie John     Admitting diagnosis:  COLLIN (acute kidney injury) ([facility]) [N17.9]  Hypotension, unspecified hypotension type [I95.9]  Sepsis, due to unspecified organism, unspecified whether acute organ dysfunction present ([facility]) [A41.9]    Ht:  5'1\"  Wt: 74 kg (163 lb 2.3 oz).   Body mass index is 30.83 kg/m².  IBW: 47.7 kg    Wt Readings from Last 6 Encounters:   07/01/24 74 kg (163 lb 2.3 oz)   06/18/24 74.7 kg (164 lb 10.9 oz)   01/26/24 69.4 kg (153 lb)   03/09/22 69.9 kg (154 lb)   02/22/22 69.3 kg (152 lb 12.8 oz)   12/01/21 71.2 kg (157 lb)        Labs/Meds reviewed    Diet:       Procedures    Cardiac diet Cardiac; Calorie Restriction/Carb Controlled: 1800 kcal/60 grams; Is Patient on Accuchecks? Yes     Percent Meals Eaten (last 3 days)       None          Pt chart reviewed d/t Elevated A1C.  Patient reports good appetite at this time.  No intake yet documented this admission - was tolerating PO well during last admission  No reported diarrhea, emesis, or constipation.   No significant weight changes noted.    PMH includes HTN, DM ,PVD.  Pt screened for A1C 12.3%. Pt has received DM education within the past 3 mo.  Please consult RD if pt would like additional education.  RD will follow per protocol    Patient is at low nutrition risk at this time.    Please consult if patient status changes or nutrition issues arise.    Madelaine King RD, LDN, [facility]  Clinical Dietitian  Phone [phone]

## 2024-07-02 NOTE — PROGRESS NOTES
DM apt request (discharged 06/29)     PIETRO Thayer  Nurse Practitioner  [place] [id number]  [address]  [facility] [id number] [phone]  Pt re-admitted to [facility] 07/01  Closing encounter

## 2024-07-02 NOTE — OCCUPATIONAL THERAPY NOTE
Order received, chart reviewed. Pt with plans for operative washout tomorrow. Will hold and follow post surgery.

## 2024-07-02 NOTE — PROGRESS NOTES
[facility]  Hospitalist Progress Note                                                                     [facility]   part of [facility]        Emilie Ceeerson  4/7/1955    SUBJECTIVE:  Patient seen and examined.  Feeling, ok, pain stable  Denies CP/SOB.  NAD.       OBJECTIVE:  Temp:  [97.5 °F (36.4 °C)-99.7 °F (37.6 °C)] 97.6 °F (36.4 °C)  Pulse:  [] 89  Resp:  [13-25] 13  BP: ()/(46-69) 132/65  SpO2:  [97 %-100 %] 100 %  Exam  Gen: No acute distress, alert and oriented x3, no focal neurologic deficits  Pulm: Lungs clear bilaterally, normal respiratory effort  CV: Heart with regular rate and rhythm, no murmur.  Normal PMI.    Abd: Abdomen soft, nontender, nondistended, no organomegaly, bowel sounds present  MSK: Full range of motion in extremities, no clubbing, no cyanosis  Skin: no rashes or lesions    Labs:   Recent Labs   Lab 06/27/24  0515 06/28/24 0415 06/29/24  0535 07/01/24  1325 07/02/24  0602   WBC 17.4* 21.9* 17.8* 18.3* 13.2*   HGB 9.5* 10.3* 9.8* 10.0* 8.2*   MCV 93.2 93.6 90.9 91.3 92.8   .0* 530.0* 483.0* 467.0* 371.0   INR  --   --   --  1.09  --        Recent Labs   Lab 06/26/24  0552 06/27/24  0515 06/28/24  0415 06/29/24  0535 07/01/24  1325 07/02/24  0602   * 134* 134* 134* 134* 138   K 4.6 4.4 4.6 4.5 4.4 4.2    107 106 107 106 113*   CO2 19.0* 22.0 21.0 21.0 18.0* 18.0*   BUN 29* 28* 27* 28* 33* 18   CREATSERUM 1.11* 0.93 1.14* 1.06* 1.66* 1.10*  1.10*   CA 9.2 9.2 9.2 9.1 9.2 8.1*   MG 2.0 1.7 1.7 1.8  --  1.8   * 155* 192* 138* 162* 166*       Recent Labs   Lab 07/01/24  1325 07/02/24  0602   ALT 18 17   AST 20 14*   ALB 2.7* 2.2*       Recent Labs   Lab 06/28/24  2134 06/29/24  0521 07/01/24  2122 07/02/24  0552 07/02/24  1130   PGLU 192* 145* 150* 174* 243*       Meds:   Scheduled:    rosuvastatin  10 mg Oral Nightly    piperacillin-tazobactam  3.375 g Intravenous Q8H     hydrocortisone sodium succinate  50 mg Intravenous Q8H LIZZ    rivaroxaban  2.5 mg Oral BID with meals    insulin degludec  10 Units Subcutaneous Nightly    insulin aspart  1-5 Units Subcutaneous TID AC and HS    vancomycin  15 mg/kg Intravenous Q36H    clopidogrel  75 mg Oral Daily    sodium hypochlorite   Topical BID     Continuous Infusions:    sodium chloride 100 mL/hr at 07/01/24 2205     PRN:   HYDROcodone-acetaminophen    acetaminophen    glucose **OR** glucose **OR** glucose-vitamin C **OR** dextrose **OR** glucose **OR** glucose **OR** glucose-vitamin C    Assessment/Plan:  Principal Problem:    Hypotension, unspecified hypotension type  Active Problems:    COLLIN (acute kidney injury) (HCC)    Sepsis, due to unspecified organism, unspecified whether acute organ dysfunction present (HCC)    Patient is a 69 year old female with PMH sig for HTN, HLD, DM2, CKD, RA on prednisone, PAD with recent LE vascular procedure complicated by hematoma, ruptured pseudoaneurysm, here for weakness and hypotension        Impression     -hypotension with possible sepsis - resolving   -diarrhea  -COLLIN on CKD 3, baseline Cr ~1.2     -left iliac chronic occlusion with claudication sp balloon angioplasty and stent of left common iliac artery and external iliac artery 06/13  -right common femoral artery occlusion sp thrombectomy, balloon angioplasty, stent; thrombectomy of left SFA and left anterior tibial artery, balloon angioplasty of left TP trunk and posterior tibial 06/13  -sp R groin pseudoaneurysm s/p ligation of femoral artery branch with sartorius muscle myoplasty on 06/15    -evacuation of hematoma and pulse lavage 6/21/24 w/ wound vac placement   -sp debridement of skin and subcutaneous fat, pulse lavage, placement of kerecis skin substitute and mesh 06/25     -anemia, recent abla      -DM2, uncontrolled - A1c 12s     -nicotine dependence, smokes 1ppd     -HTN hx  -HLD     -RA on chronic prednisone        Plan      *hypotension / lethargy   -possible from infection and dehydration  -has had diarrhea at home, BP responded rather rapidly with just fluids but concern for possible infectious source as well  -UA with pyuria however she has no urinary symptoms. Await urine cx  -possible surgical site wound, has foul odor - plan for vascular washout on Weds   -cxr wo infiltrates   -check mrsa nares  -on zosyn, add vancomycin pending work up - ID following, await cx   -check stool cx   -no fevers but wbc high (chronically elevated but appears worse)     *COLLIN - resolving  -possible from dehydration/ infection  -will dc IVF      *RA   -on chronic prednisone  - complete stress dose steroids today and resume home dose this evening      *anemia  -due to recent abla. Hgb overall stable      *DM 2  -uncontrolled  -ISS  Add tresiba given steroids     *HTN hx  -hold home meds (at home on atenolol, hydrochlorothiazide, norvasc, losartan)  - will consider resuming tomorrow      *HLD  -statin     Scd  Xarelto          Bipin Maradiaga MD  [facility]  Hospitalist  Message over [employer]  Pager: [phone]

## 2024-07-02 NOTE — CONSULTS
Safia Mcneil MD.  [facility]   Vascular and Endovascular Surgery     VASCULAR SURGERY   CONSULT NOTE      Name: Emilie John   :   1955  [id number]     2024       REFERRING PHYSICIAN: Avel Ram DO  PRIMARY CARE PHYSICIAN:  Tay Alexandra MD    HISTORY OF PRESENT ILLNESS: Emilie is a 69 year old female whom I have been asked to see regarding right groin wound. Patient well known to service. She was discharged home on Saturday with a home wound VAC in place. She accidentally disconnected her VAC this morning and was unable to reconnect it. She contact her son who brought her to the ED because she seemed lethargic and not like herself. She was found to be hypotensive in the ED down to 70/30. She was resuscitated and admitted for further management. Given that the VAC was completely unattached to the suction pad, I was contacted by the RN and presented to change it. Patient states that she is not having any more significant pain; everything has been at her postoperative baseline. No fevers at home. She was having some diarrhea at home as well. She was taking aspirin at home but was unable to get plavix or xarelto due to it being backordered at her pharmacy.     PAST MEDICAL HISTORY:    Past Medical History:    Diabetes (HCC)    DVT (deep venous thrombosis) (HCC)    Essential hypertension    High blood pressure    Nicotine use disorder    Osteoarthritis    hip, wrist    Peripheral vascular disease (HCC)    Rheumatoid arthritis (HCC)    Visual impairment    glasses     PAST SURGICAL HISTORY:   Past Surgical History:   Procedure Laterality Date    Benign biopsy left      Cholecystectomy      Colonoscopy N/A 2019    Procedure: COLONOSCOPY;  Surgeon: Freeman Oshea MD;  Location:  ENDOSCOPY    Colonoscopy      Hip replacement surgery Right 01/10/2020    Knee replacement surgery      Needle biopsy left  2017    Removal gallbladder      Total hip replacement      Total  knee replacement Bilateral     Upper gi endoscopy,exam       MEDICATIONS:     Current Facility-Administered Medications:     HYDROcodone-acetaminophen (Norco) 5-325 MG per tab 1 tablet, 1 tablet, Oral, Q4H PRN    rosuvastatin (Crestor) tab 10 mg, 10 mg, Oral, Nightly    sodium chloride 0.9% infusion, , Intravenous, Continuous    acetaminophen (Tylenol Extra Strength) tab 500 mg, 500 mg, Oral, Q4H PRN    [START ON 7/2/2024] piperacillin-tazobactam (Zosyn) 3.375 g in dextrose 5% 100 mL IVPB-ADDV, 3.375 g, Intravenous, Q8H    hydrocortisone Na succinate PF (Solu-CORTEF) injection 50 mg, 50 mg, Intravenous, Q8H LIZZ    rivaroxaban (Xarelto) tab 2.5 mg, 2.5 mg, Oral, BID with meals    insulin degludec (Tresiba) 100 units/mL flextouch 10 Units, 10 Units, Subcutaneous, Nightly    glucose (Dex4) 15 GM/59ML oral liquid 15 g, 15 g, Oral, Q15 Min PRN **OR** glucose (Glutose) 40% oral gel 15 g, 15 g, Oral, Q15 Min PRN **OR** glucose-vitamin C (Dex-4) chewable tab 4 tablet, 4 tablet, Oral, Q15 Min PRN **OR** dextrose 50% injection 50 mL, 50 mL, Intravenous, Q15 Min PRN **OR** glucose (Dex4) 15 GM/59ML oral liquid 30 g, 30 g, Oral, Q15 Min PRN **OR** glucose (Glutose) 40% oral gel 30 g, 30 g, Oral, Q15 Min PRN **OR** glucose-vitamin C (Dex-4) chewable tab 8 tablet, 8 tablet, Oral, Q15 Min PRN    insulin aspart (NovoLOG) 100 Units/mL FlexPen 1-5 Units, 1-5 Units, Subcutaneous, TID AC and HS    vancomycin (Vancocin) 1,000 mg in sodium chloride 0.9% 250 mL IVPB-ADDV, 15 mg/kg, Intravenous, Q36H    morphINE PF 2 MG/ML injection 2 mg, 2 mg, Intravenous, Once    [START ON 7/2/2024] clopidogrel (Plavix) tab 75 mg, 75 mg, Oral, Daily    ALLERGIES:    She is allergic to celecoxib, esomeprazole, etanercept, fish-derived products, infliximab, other, oxycodone, pregabalin, enbrel, orencia [abatacept], rice, and xeljanz [tofacitinib].    SOCIAL HISTORY:    Patient  reports that she has been smoking cigarettes. She started smoking about 51  years ago. She has a 25.7 pack-year smoking history. She has never used smokeless tobacco. She reports that she does not drink alcohol and does not use drugs.    FAMILY HISTORY:    Patient's family history includes CVA in her father; Cancer in her brother; Cancer (age of onset: 60) in her mother.    ROS:    Comprehensive ROS reviewed and negative except for what's stated above.  Including negative for chest pain, shortness of breath, syncope.     EXAM:  /60 (BP Location: Left arm)   Pulse 94   Temp 99.5 °F (37.5 °C) (Oral)   Resp 17   Wt 163 lb 2.3 oz (74 kg)   SpO2 100%   BMI 30.83 kg/m²   GENERAL: alert and oriented x3, in no apparent distress  PSYCH: Normal mood and affect  NEURO: Cranial nerves grossly intact. No sensory or motor deficits  HEENT: Ears and throat are clear  NECK: Supple  RESPIRATORY: Normal work of breathing  CARDIO: RRR   ABDOMEN: Soft, non-tender, non-distended  BACK: Normal, no tenderness  SKIN: No rashes, warm and dry  MUSCULOSKELETAL: Strength 5/5 throughout, sensation intact  EXTREMITIES: No edema. Right groin wound malodorous with purulent drainage after VAC removed. No further breakdown of the skin. Muscle flap intact and healthy. No significant granulation tissue noted.   VASCULAR: Both feet warm.        Diagnostic Data:      LABS:  Recent Labs   Lab 06/26/24  0552 06/27/24  0515 06/28/24 0415 06/29/24  0535 07/01/24  1325   WBC 18.1* 17.4* 21.9* 17.8* 18.3*   HGB 9.9* 9.5* 10.3* 9.8* 10.0*   MCV 90.5 93.2 93.6 90.9 91.3   .0* 514.0* 530.0* 483.0* 467.0*   INR  --   --   --   --  1.09       Recent Labs   Lab 06/25/24  0717 06/26/24  0552 06/27/24  0515 06/28/24  0415 06/29/24  0535 07/01/24  1325    135* 134* 134* 134* 134*   K 4.2 4.6 4.4 4.6 4.5 4.4    106 107 106 107 106   CO2 22.0 19.0* 22.0 21.0 21.0 18.0*   BUN 27* 29* 28* 27* 28* 33*   CREATSERUM 1.06* 1.11* 0.93 1.14* 1.06* 1.66*   * 168* 155* 192* 138* 162*   CA 9.3 9.2 9.2 9.2 9.1 9.2   MG  2.0 2.0 1.7 1.7 1.8  --      Recent Labs   Lab 07/01/24  1325   PTP 14.1   INR 1.09   PTT 25.9     Recent Labs   Lab 07/01/24  1325   ALT 18   AST 20   ALB 2.7*     No results for input(s): \"TROP\" in the last 168 hours.  No results found for: \"ANAS\", \"RASHAUN\", \"ANASCRN\"  No results for input(s): \"PCACT\", \"PSACT\", \"AT3ACT\", \"HIPAB\", \"PATHI\", \"STALA\", \"DRVVTRATIO\", \"DRVVT\", \"STACLOT\", \"CARIGG\", \"O2OM8NIRSW\", \"K7PG5DAHLM\", \"RA\", \"HAVIGM\", \"HBCIGM\", \"HCVAB\", \"HBSAG\", \"HBCAB\", \"HBVDNAINTERP\", \"ANAS\", \"C3\", \"C4\" in the last 168 hours.    Radiology: XR CHEST AP PORTABLE  (CPT=71045)    Result Date: 7/1/2024  PROCEDURE:  XR CHEST AP PORTABLE  (CPT=71045)  TECHNIQUE:  AP chest radiograph was obtained.  COMPARISON:  NAYELY , REX, XR CHEST AP PORTABLE  (CPT=71045), 6/16/2024, 8:59 AM.  INDICATIONS:  discharged with wound vac, wound vac disconnected  PATIENT STATED HISTORY: (As transcribed by Technologist)  Patient stated feeling weak today.    FINDINGS:  Lungs and pleural spaces are clear.  Cardiac size is within normal limits.  Aorta is atherosclerotic.  Chest wall structures are otherwise unremarkable.            CONCLUSION:  There is no evidence of active cardiopulmonary disease on this single portable chest radiograph.   LOCATION:  [facility]      Dictated by (CST): Alverto Herrera MD on 7/01/2024 at 1:51 PM     Finalized by (CST): Alverto Herrera MD on 7/01/2024 at 1:51 PM         ASSESSMENT AND PLAN:     The patient is a 69 year old female who presents with right groin wound infection and VAC disconnection at home. Cultures sent from the groin. Plan Dakins wet to dry twice a day. Will need operative washout on Wednesday with Dr. Taylor. Recommend broad spectrum antibiotics and ID consult. Discussed that at discharge will likely need subacute rehab.     The patient indicated an understanding of these issues and agreed to the plan and all questions were answered.     Thank you for allowing me to participate in the patient's care.      Sincerely,  Safia Mcneil MD  07/01/24   8:53 PM

## 2024-07-03 ENCOUNTER — ANESTHESIA (OUTPATIENT)
Dept: CARDIAC SURGERY | Facility: HOSPITAL | Age: 69
End: 2024-07-03
Payer: MEDICARE

## 2024-07-03 LAB
ANION GAP SERPL CALC-SCNC: 7 MMOL/L (ref 0–18)
BASOPHILS # BLD AUTO: 0.04 X10(3) UL (ref 0–0.2)
BASOPHILS NFR BLD AUTO: 0.4 %
BUN BLD-MCNC: 9 MG/DL (ref 9–23)
CALCIUM BLD-MCNC: 8.4 MG/DL (ref 8.5–10.1)
CHLORIDE SERPL-SCNC: 113 MMOL/L (ref 98–112)
CO2 SERPL-SCNC: 19 MMOL/L (ref 21–32)
CREAT BLD-MCNC: 0.91 MG/DL
CREAT BLD-MCNC: 0.91 MG/DL
EGFRCR SERPLBLD CKD-EPI 2021: 68 ML/MIN/1.73M2 (ref 60–?)
EGFRCR SERPLBLD CKD-EPI 2021: 68 ML/MIN/1.73M2 (ref 60–?)
EOSINOPHIL # BLD AUTO: 0.22 X10(3) UL (ref 0–0.7)
EOSINOPHIL NFR BLD AUTO: 2.1 %
ERYTHROCYTE [DISTWIDTH] IN BLOOD BY AUTOMATED COUNT: 16 %
GLUCOSE BLD-MCNC: 102 MG/DL (ref 70–99)
GLUCOSE BLD-MCNC: 113 MG/DL (ref 70–99)
GLUCOSE BLD-MCNC: 154 MG/DL (ref 70–99)
GLUCOSE BLD-MCNC: 157 MG/DL (ref 70–99)
GLUCOSE BLD-MCNC: 332 MG/DL (ref 70–99)
HCT VFR BLD AUTO: 26.5 %
HGB BLD-MCNC: 8.6 G/DL
IMM GRANULOCYTES # BLD AUTO: 0.09 X10(3) UL (ref 0–1)
IMM GRANULOCYTES NFR BLD: 0.9 %
LYMPHOCYTES # BLD AUTO: 1.99 X10(3) UL (ref 1–4)
LYMPHOCYTES NFR BLD AUTO: 19 %
MAGNESIUM SERPL-MCNC: 1.8 MG/DL (ref 1.6–2.6)
MCH RBC QN AUTO: 30.2 PG (ref 26–34)
MCHC RBC AUTO-ENTMCNC: 32.5 G/DL (ref 31–37)
MCV RBC AUTO: 93 FL
MONOCYTES # BLD AUTO: 0.95 X10(3) UL (ref 0.1–1)
MONOCYTES NFR BLD AUTO: 9.1 %
NEUTROPHILS # BLD AUTO: 7.19 X10 (3) UL (ref 1.5–7.7)
NEUTROPHILS # BLD AUTO: 7.19 X10(3) UL (ref 1.5–7.7)
NEUTROPHILS NFR BLD AUTO: 68.5 %
OSMOLALITY SERPL CALC.SUM OF ELEC: 290 MOSM/KG (ref 275–295)
PLATELET # BLD AUTO: 374 10(3)UL (ref 150–450)
POTASSIUM SERPL-SCNC: 3.5 MMOL/L (ref 3.5–5.1)
RBC # BLD AUTO: 2.85 X10(6)UL
SODIUM SERPL-SCNC: 139 MMOL/L (ref 136–145)
WBC # BLD AUTO: 10.5 X10(3) UL (ref 4–11)

## 2024-07-03 PROCEDURE — 0KBQ0ZZ EXCISION OF RIGHT UPPER LEG MUSCLE, OPEN APPROACH: ICD-10-PCS | Performed by: SURGERY

## 2024-07-03 PROCEDURE — 80048 BASIC METABOLIC PNL TOTAL CA: CPT | Performed by: HOSPITALIST

## 2024-07-03 PROCEDURE — 82962 GLUCOSE BLOOD TEST: CPT

## 2024-07-03 PROCEDURE — 85025 COMPLETE CBC W/AUTO DIFF WBC: CPT | Performed by: HOSPITALIST

## 2024-07-03 PROCEDURE — 82565 ASSAY OF CREATININE: CPT | Performed by: HOSPITALIST

## 2024-07-03 PROCEDURE — 83735 ASSAY OF MAGNESIUM: CPT | Performed by: HOSPITALIST

## 2024-07-03 RX ORDER — HYDROMORPHONE HYDROCHLORIDE 1 MG/ML
0.6 INJECTION, SOLUTION INTRAMUSCULAR; INTRAVENOUS; SUBCUTANEOUS EVERY 5 MIN PRN
Status: DISCONTINUED | OUTPATIENT
Start: 2024-07-03 | End: 2024-07-03 | Stop reason: HOSPADM

## 2024-07-03 RX ORDER — MAGNESIUM SULFATE HEPTAHYDRATE 40 MG/ML
2 INJECTION, SOLUTION INTRAVENOUS ONCE
Status: COMPLETED | OUTPATIENT
Start: 2024-07-03 | End: 2024-07-03

## 2024-07-03 RX ORDER — ONDANSETRON 2 MG/ML
INJECTION INTRAMUSCULAR; INTRAVENOUS AS NEEDED
Status: DISCONTINUED | OUTPATIENT
Start: 2024-07-03 | End: 2024-07-03 | Stop reason: SURG

## 2024-07-03 RX ORDER — LIDOCAINE HYDROCHLORIDE 10 MG/ML
INJECTION, SOLUTION EPIDURAL; INFILTRATION; INTRACAUDAL; PERINEURAL AS NEEDED
Status: DISCONTINUED | OUTPATIENT
Start: 2024-07-03 | End: 2024-07-03 | Stop reason: SURG

## 2024-07-03 RX ORDER — CEFAZOLIN SODIUM 1 G/3ML
INJECTION, POWDER, FOR SOLUTION INTRAMUSCULAR; INTRAVENOUS AS NEEDED
Status: DISCONTINUED | OUTPATIENT
Start: 2024-07-03 | End: 2024-07-03 | Stop reason: SURG

## 2024-07-03 RX ORDER — SODIUM CHLORIDE, SODIUM LACTATE, POTASSIUM CHLORIDE, CALCIUM CHLORIDE 600; 310; 30; 20 MG/100ML; MG/100ML; MG/100ML; MG/100ML
INJECTION, SOLUTION INTRAVENOUS CONTINUOUS
Status: DISCONTINUED | OUTPATIENT
Start: 2024-07-03 | End: 2024-07-04

## 2024-07-03 RX ORDER — ATENOLOL 50 MG/1
50 TABLET ORAL
Status: DISCONTINUED | OUTPATIENT
Start: 2024-07-03 | End: 2024-07-08

## 2024-07-03 RX ORDER — HYDROMORPHONE HYDROCHLORIDE 1 MG/ML
0.2 INJECTION, SOLUTION INTRAMUSCULAR; INTRAVENOUS; SUBCUTANEOUS EVERY 5 MIN PRN
Status: DISCONTINUED | OUTPATIENT
Start: 2024-07-03 | End: 2024-07-03 | Stop reason: HOSPADM

## 2024-07-03 RX ORDER — PHENYLEPHRINE HCL 10 MG/ML
VIAL (ML) INJECTION AS NEEDED
Status: DISCONTINUED | OUTPATIENT
Start: 2024-07-03 | End: 2024-07-03 | Stop reason: SURG

## 2024-07-03 RX ORDER — NALOXONE HYDROCHLORIDE 0.4 MG/ML
0.08 INJECTION, SOLUTION INTRAMUSCULAR; INTRAVENOUS; SUBCUTANEOUS AS NEEDED
Status: DISCONTINUED | OUTPATIENT
Start: 2024-07-03 | End: 2024-07-03 | Stop reason: HOSPADM

## 2024-07-03 RX ORDER — NYSTATIN 100000 U/G
CREAM TOPICAL 2 TIMES DAILY
Status: DISCONTINUED | OUTPATIENT
Start: 2024-07-03 | End: 2024-07-03

## 2024-07-03 RX ORDER — METOCLOPRAMIDE HYDROCHLORIDE 5 MG/ML
10 INJECTION INTRAMUSCULAR; INTRAVENOUS EVERY 8 HOURS PRN
Status: DISCONTINUED | OUTPATIENT
Start: 2024-07-03 | End: 2024-07-03 | Stop reason: HOSPADM

## 2024-07-03 RX ORDER — HYDROMORPHONE HYDROCHLORIDE 1 MG/ML
0.4 INJECTION, SOLUTION INTRAMUSCULAR; INTRAVENOUS; SUBCUTANEOUS EVERY 5 MIN PRN
Status: DISCONTINUED | OUTPATIENT
Start: 2024-07-03 | End: 2024-07-03 | Stop reason: HOSPADM

## 2024-07-03 RX ORDER — ONDANSETRON 2 MG/ML
4 INJECTION INTRAMUSCULAR; INTRAVENOUS EVERY 6 HOURS PRN
Status: DISCONTINUED | OUTPATIENT
Start: 2024-07-03 | End: 2024-07-03 | Stop reason: HOSPADM

## 2024-07-03 RX ORDER — SODIUM CHLORIDE, SODIUM LACTATE, POTASSIUM CHLORIDE, CALCIUM CHLORIDE 600; 310; 30; 20 MG/100ML; MG/100ML; MG/100ML; MG/100ML
INJECTION, SOLUTION INTRAVENOUS CONTINUOUS
Status: DISCONTINUED | OUTPATIENT
Start: 2024-07-03 | End: 2024-07-03 | Stop reason: HOSPADM

## 2024-07-03 RX ADMIN — LIDOCAINE HYDROCHLORIDE 50 MG: 10 INJECTION, SOLUTION EPIDURAL; INFILTRATION; INTRACAUDAL; PERINEURAL at 17:37:00

## 2024-07-03 RX ADMIN — SODIUM CHLORIDE, SODIUM LACTATE, POTASSIUM CHLORIDE, CALCIUM CHLORIDE: 600; 310; 30; 20 INJECTION, SOLUTION INTRAVENOUS at 18:16:00

## 2024-07-03 RX ADMIN — SODIUM CHLORIDE, SODIUM LACTATE, POTASSIUM CHLORIDE, CALCIUM CHLORIDE: 600; 310; 30; 20 INJECTION, SOLUTION INTRAVENOUS at 17:34:00

## 2024-07-03 RX ADMIN — PHENYLEPHRINE HCL 100 MCG: 10 MG/ML VIAL (ML) INJECTION at 17:41:00

## 2024-07-03 RX ADMIN — ONDANSETRON 4 MG: 2 INJECTION INTRAMUSCULAR; INTRAVENOUS at 17:53:00

## 2024-07-03 RX ADMIN — CEFAZOLIN SODIUM 2 G: 1 INJECTION, POWDER, FOR SOLUTION INTRAMUSCULAR; INTRAVENOUS at 17:52:00

## 2024-07-03 NOTE — PROGRESS NOTES
Infectious Disease Progress Note      Date of admission: 7/1/2024  1:09 PM     Reason for consult: Right groin surgical wound infection    Subjective: Continues to complain of pain involving her right groin incision.  No nausea or vomiting.  No diarrhea.  No shortness of breath.  No cough or sputum production.    The rest of the systems were reviewed and found to be negative except was mentioned above    Interval events: This is a 69-year-old female patient, with history of peripheral vascular disease, recently had bilateral vascular interventions to both her lower extremities, presenting here with right groin surgical wound infections.  Cultures with Klebsiella aerogenes from both her wound and her urine indicating cross-contamination.  Now plan for surgery later today for debridement.    Medications:    potassium chloride    cefepime    lactated ringers    predniSONE    HYDROcodone-acetaminophen    rosuvastatin    acetaminophen    rivaroxaban    insulin degludec    glucose **OR** glucose **OR** glucose-vitamin C **OR** dextrose **OR** glucose **OR** glucose **OR** glucose-vitamin C    insulin aspart    vancomycin    clopidogrel    sodium hypochlorite     Allergies:  Allergies   Allergen Reactions    Celecoxib DIARRHEA and RASH     AND DIARRHEA      Esomeprazole DIARRHEA and RASH     DIARRHEA      Etanercept HIVES and RASH    Fish-Derived Products SWELLING    Infliximab HIVES and ANAPHYLAXIS    Other ANAPHYLAXIS, SWELLING and HIVES     Throat closes up  HAD IN CONJUNCTION WITH ASA- SO AVOIDS BOTH OF THESE- FACE SWELLED UP    Oxycodone PAIN and NAUSEA ONLY    Pregabalin DIARRHEA and RASH     AND DIARRHEA      Enbrel RASH    Orencia [Abatacept] ITCHING    Rice OTHER (SEE COMMENTS)     \"Feels warm\"    Xeljanz [Tofacitinib] DIARRHEA       Physical Exam:  Vitals:    07/03/24 0940   BP:    Pulse: 85   Resp: 15   Temp:      Vitals signs and nursing note reviewed.   Constitutional:       Appearance: Normal appearance.    HENT:      Head: Normocephalic and atraumatic.      Mouth: Mucous membranes are moist.   Neck:      Musculoskeletal: Neck supple.   Cardiovascular:      Rate and Rhythm: Normal rate.   Pulmonary:      Effort: Pulmonary effort is normal. No respiratory distress.   Musculoskeletal:      Right lower leg: No edema.  Right groin surgical incision noted with wound dehiscence and drainage     Left lower leg: No edema.   Skin:     General: Skin is warm and dry.   Neurological:      General: No focal deficit present.      Mental Status: Alert and oriented to person, place, and time.       Laboratory data:  I have reviewed all the lab results independently.  Lab Results   Component Value Date    WBC 10.5 07/03/2024    HGB 8.6 07/03/2024    HCT 26.5 07/03/2024    .0 07/03/2024    CREATSERUM 0.91 07/03/2024    CREATSERUM 0.91 07/03/2024    BUN 9 07/03/2024     07/03/2024    K 3.5 07/03/2024     07/03/2024    CO2 19.0 07/03/2024     07/03/2024    CA 8.4 07/03/2024    MG 1.8 07/03/2024      Recent Labs   Lab 07/03/24  0617   RBC 2.85*   HGB 8.6*   HCT 26.5*   MCV 93.0   MCH 30.2   MCHC 32.5   RDW 16.0   NEPRELIM 7.19   WBC 10.5   .0      Microbiology data:  Hospital Encounter on 07/01/24   1. Aerobic Bacterial Culture     Status: Abnormal (Preliminary result)    Collection Time: 07/01/24  9:04 PM    Specimen: Groin; Other   Result Value Ref Range    Aerobic Culture Result 4+ growth Klebsiella (Enterobacter) aerogenes (A) N/A    Aerobic Smear 2+ WBCs seen N/A    Aerobic Smear 1+ Gram Positive Cocci N/A    Aerobic Smear 1+ Gram Positive Rods N/A    Aerobic Smear 1+ Gram Negative Rods N/A   2. Urine Culture, Routine     Status: Abnormal (Preliminary result)    Collection Time: 07/01/24  3:48 PM    Specimen: Urine, clean catch   Result Value Ref Range    Urine Culture (A) N/A     >100,000 CFU/ML Klebsiella (Enterobacter) aerogenes   3. Blood Culture     Status: None (Preliminary result)     Collection Time: 07/01/24  3:14 PM    Specimen: Blood,peripheral   Result Value Ref Range    Blood Culture Result No Growth 1 Day N/A     Impression:  Emilie John is a 69 year old female with    Right groin surgical wound infection  Cultures with Klebsiella aeruginosa  This is in the setting of vascular surgery on 6/13/2024 followed by multiple evacuations and wound VAC placement  Currently on IV Zosyn and IV vancomycin  Plan for washout on 7/3/2024  Rheumatoid arthritis  On prednisone and azithromycin.  Immunosuppressed  Leukocytosis  Reactive  Resolved    Recommendations:    Discontinue IV Zosyn  Escalate to IV cefepime 1 g every 8  Continue IV vancomycin pending finalization of her cultures  Agree with washout, please send for surgical cultures  Final dose of antibiotics and duration will depend on depth of infection  Continue to monitor daily labs for antibiotic toxicities  Further recommendations will depend on the above work-up and clinical progress     The plan of care was discussed with the primary hospital team, Avel Ram DO     Recommendations were also discussed with the patient; all questions were answered.     Thank you for this consultation. Please don't hesitate to call the ID team for questions or any acute changes in patient's clinical condition.    Please note that this report has been produced using speech recognition software and may contain errors related to that system including, but not limited to, errors in grammar, punctuation, and spelling, as well as words and phrases that possibly may have been recognized inappropriately.  If there are any questions or concerns, contact the dictating provider for clarification.    The 21st Century Cures Act makes medical notes like these available to patients in the interest of transparency. Please be advised this is a medical document. Medical documents are intended to carry relevant information, facts as evident, and the clinical opinion  of the practitioner. The medical note is intended as peer to peer communication and may appear blunt or direct. It is written in medical language and may contain abbreviations or verbiage that are unfamiliar.     Zeeshan Lama MD  [facility]. Tel: [phone]. Fax: [phone].     Emilie Ceeerson : 1955 MRN: [id number] CSN: [id number]

## 2024-07-03 NOTE — ANESTHESIA POSTPROCEDURE EVALUATION
[facility]    Emilie John Patient Status:  Inpatient   Age/Gender 69 year old female MRN [id number]   Location [facility] CARDIOVASCULAR SURGERY Attending Avel Ram DO   Hosp Day # 2 PCP Tay Alexandra MD       Anesthesia Post-op Note    RIGHT GROIN IRRIGATION AND DEBRIDMENT    Procedure Summary       Date: 07/03/24 Room / Location: [facility] 01 / [facility]    Anesthesia Start: 1735 Anesthesia Stop: 1816    Procedure: RIGHT GROIN IRRIGATION AND DEBRIDMENT (Right) Diagnosis: (right groin seroma)    Surgeons: Howard Taylor MD Anesthesiologist: Junior Romeo MD    Anesthesia Type: general ASA Status: 3            Anesthesia Type: general    Vitals Value Taken Time   /80 07/03/24 1817   Temp 98.4 °F (36.9 °C) 07/03/24 1817   Pulse 99 07/03/24 1817   Resp 16 07/03/24 1817   SpO2 100 % 07/03/24 1817       Patient Location: PACU    Anesthesia Type: general    Airway Patency: patent    Postop Pain Control: adequate    Mental Status: mildly sedated but able to meaningfully participate in the post-anesthesia evaluation    Nausea/Vomiting: none    Cardiopulmonary/Hydration status: stable euvolemic    Complications: no apparent anesthesia related complications    Postop vital signs: stable    Dental Exam: Unchanged from Preop    Patient to be discharged from PACU when criteria met.

## 2024-07-03 NOTE — ANESTHESIA PROCEDURE NOTES
Airway  Date/Time: 7/3/2024 5:40 PM  Urgency: elective    Airway not difficult    General Information and Staff    Patient location during procedure: OR  Anesthesiologist: Junior Romeo MD  Performed: anesthesiologist   Performed by: Junior Romeo MD  Authorized by: Junior Romeo MD      Indications and Patient Condition  Indications for airway management: anesthesia  Sedation level: deep  Preoxygenated: yes  Patient position: sniffing  Mask difficulty assessment: 1 - vent by mask  Planned trial extubation    Final Airway Details  Final airway type: supraglottic airway      Successful airway: classic  Size 3       Number of attempts at approach: 1

## 2024-07-03 NOTE — PLAN OF CARE
Assumed care 0730.  Alert and orientedx4.   Accuchecks,   NPO  PO steriods  Cardiac diet  RA  Tele- NSR  BM today  Purewick.   Painful groin site.   Wound Right groin. Wound care BID.  Up with 1 and walker.   Procedure today.  UTI +.   IV ABX for wound and UTI.   Continue to monitor pt.       Problem: PAIN - ADULT  Goal: Verbalizes/displays adequate comfort level or patient's stated pain goal  Description: INTERVENTIONS:  - Encourage pt to monitor pain and request assistance  - Assess pain using appropriate pain scale  - Administer analgesics based on type and severity of pain and evaluate response  - Implement non-pharmacological measures as appropriate and evaluate response  - Consider cultural and social influences on pain and pain management  - Manage/alleviate anxiety  - Utilize distraction and/or relaxation techniques  - Monitor for opioid side effects  - Notify MD/LIP if interventions unsuccessful or patient reports new pain  - Anticipate increased pain with activity and pre-medicate as appropriate  Outcome: Progressing     Problem: SKIN/TISSUE INTEGRITY - ADULT  Goal: Skin integrity remains intact  Description: INTERVENTIONS  - Assess and document risk factors for pressure ulcer development  - Assess and document skin integrity  - Monitor for areas of redness and/or skin breakdown  - Initiate interventions, skin care algorithm/standards of care as needed  Outcome: Progressing  Goal: Incision(s), wounds(s) or drain site(s) healing without S/S of infection  Description: INTERVENTIONS:  - Assess and document risk factors for pressure ulcer development  - Assess and document skin integrity  - Assess and document dressing/incision, wound bed, drain sites and surrounding tissue  - Implement wound care per orders  - Initiate isolation precautions as appropriate  - Initiate Pressure Ulcer prevention bundle as indicated  Outcome: Progressing  Goal: Oral mucous membranes remain intact  Description: INTERVENTIONS  -  Assess oral mucosa and hygiene practices  - Implement preventative oral hygiene regimen  - Implement oral medicated treatments as ordered  Outcome: Progressing     Problem: MUSCULOSKELETAL - ADULT  Goal: Return mobility to safest level of function  Description: INTERVENTIONS:  - Assess patient stability and activity tolerance for standing, transferring and ambulating w/ or w/o assistive devices  - Assist with transfers and ambulation using safe patient handling equipment as needed  - Ensure adequate protection for wounds/incisions during mobilization  - Obtain PT/OT consults as needed  - Advance activity as appropriate  - Communicate ordered activity level and limitations with patient/family  Outcome: Progressing  Goal: Maintain proper alignment of affected body part  Description: INTERVENTIONS:  - Support and protect limb and body alignment per provider's orders  - Instruct and reinforce with patient and family use of appropriate assistive device and precautions (e.g. spinal or hip dislocation precautions)  Outcome: Progressing     Problem: RISK FOR INFECTION - ADULT  Goal: Absence of fever/infection during anticipated neutropenic period  Description: INTERVENTIONS  - Monitor WBC  - Administer growth factors as ordered  - Implement neutropenic guidelines  Outcome: Progressing     Problem: SAFETY ADULT - FALL  Goal: Free from fall injury  Description: INTERVENTIONS:  - Assess pt frequently for physical needs  - Identify cognitive and physical deficits and behaviors that affect risk of falls.  - Opal fall precautions as indicated by assessment.  - Educate pt/family on patient safety including physical limitations  - Instruct pt to call for assistance with activity based on assessment  - Modify environment to reduce risk of injury  - Provide assistive devices as appropriate  - Consider OT/PT consult to assist with strengthening/mobility  - Encourage toileting schedule  Outcome: Progressing

## 2024-07-03 NOTE — PROGRESS NOTES
[facility]  Hospitalist Progress Note                                                                     [facility]   part of formerly [facility]        Emilie Ceeerson  4/7/1955    SUBJECTIVE:  Patient seen and examined.  Feeling, ok, pain stable  Denies CP/SOB.  NAD.       OBJECTIVE:  Temp:  [97.2 °F (36.2 °C)-98.6 °F (37 °C)] 98.6 °F (37 °C)  Pulse:  [] 97  Resp:  [15-22] 19  BP: (139-151)/(64-79) 143/72  SpO2:  [96 %-100 %] 100 %  Exam  Gen: No acute distress, alert and oriented x3, no focal neurologic deficits  Pulm: Lungs clear bilaterally, normal respiratory effort  CV: Heart with regular rate and rhythm, no murmur.  Normal PMI.    Abd: Abdomen soft, nontender, nondistended, no organomegaly, bowel sounds present  MSK: Full range of motion in extremities, no clubbing, no cyanosis  Skin: no rashes or lesions    Labs:   Recent Labs   Lab 06/28/24 0415 06/29/24  0535 07/01/24  1325 07/02/24  0602 07/03/24  0617   WBC 21.9* 17.8* 18.3* 13.2* 10.5   HGB 10.3* 9.8* 10.0* 8.2* 8.6*   MCV 93.6 90.9 91.3 92.8 93.0   .0* 483.0* 467.0* 371.0 374.0   INR  --   --  1.09  --   --        Recent Labs   Lab 06/27/24  0515 06/28/24  0415 06/29/24  0535 07/01/24  1325 07/02/24  0602 07/03/24  0617   * 134* 134* 134* 138 139   K 4.4 4.6 4.5 4.4 4.2 3.5    106 107 106 113* 113*   CO2 22.0 21.0 21.0 18.0* 18.0* 19.0*   BUN 28* 27* 28* 33* 18 9   CREATSERUM 0.93 1.14* 1.06* 1.66* 1.10*  1.10* 0.91  0.91   CA 9.2 9.2 9.1 9.2 8.1* 8.4*   MG 1.7 1.7 1.8  --  1.8 1.8   * 192* 138* 162* 166* 157*       Recent Labs   Lab 07/01/24  1325 07/02/24  0602   ALT 18 17   AST 20 14*   ALB 2.7* 2.2*       Recent Labs   Lab 07/02/24  1627 07/02/24  2035 07/02/24  2054 07/03/24  0554 07/03/24  1228   PGLU 125* 261* 147* 332* 113*       Meds:   Scheduled:    cefepime  1 g Intravenous q12h    [START ON 7/4/2024] vancomycin  15 mg/kg Intravenous Q24H     predniSONE  5 mg Oral BID with meals    rosuvastatin  10 mg Oral Nightly    rivaroxaban  2.5 mg Oral BID with meals    insulin degludec  10 Units Subcutaneous Nightly    insulin aspart  1-5 Units Subcutaneous TID AC and HS    clopidogrel  75 mg Oral Daily    sodium hypochlorite   Topical BID     Continuous Infusions:    lactated ringers 75 mL/hr at 07/03/24 1005     PRN:   HYDROcodone-acetaminophen    acetaminophen    glucose **OR** glucose **OR** glucose-vitamin C **OR** dextrose **OR** glucose **OR** glucose **OR** glucose-vitamin C    Assessment/Plan:  Principal Problem:    Hypotension, unspecified hypotension type  Active Problems:    COLLIN (acute kidney injury) (HCC)    Sepsis, due to unspecified organism, unspecified whether acute organ dysfunction present (HCC)    Patient is a 69 year old female with PMH sig for HTN, HLD, DM2, CKD, RA on prednisone, PAD with recent LE vascular procedure complicated by hematoma, ruptured pseudoaneurysm, here for weakness and hypotension        Impression     -hypotension with possible sepsis - resolving   -diarrhea  -COLLIN on CKD 3, baseline Cr ~1.2     -left iliac chronic occlusion with claudication sp balloon angioplasty and stent of left common iliac artery and external iliac artery 06/13  -right common femoral artery occlusion sp thrombectomy, balloon angioplasty, stent; thrombectomy of left SFA and left anterior tibial artery, balloon angioplasty of left TP trunk and posterior tibial 06/13  -sp R groin pseudoaneurysm s/p ligation of femoral artery branch with sartorius muscle myoplasty on 06/15    -evacuation of hematoma and pulse lavage 6/21/24 w/ wound vac placement   -sp debridement of skin and subcutaneous fat, pulse lavage, placement of kerecis skin substitute and mesh 06/25     -anemia, recent abla      -DM2, uncontrolled - A1c 12s     -nicotine dependence, smokes 1ppd     -HTN hx  -HLD     -RA on chronic prednisone        Plan     *hypotension / lethargy   -possible  from infection and dehydration  -has had diarrhea at home, BP responded rather rapidly with just fluids but concern for possible infectious source as well  -UA with pyuria however she has no urinary symptoms. Await urine cx  -possible surgical site wound, has foul odor - plan for vascular washout on Weds   -cxr wo infiltrates   - wound cx with Klebsiella, abx to cefepime  -add vancomycin pending work up - ID following, await cx   -no fevers but wbc high (chronically elevated but appears worse)     *COLLIN - resolving  -possible from dehydration/ infection  -will dc IVF      *RA   -on chronic prednisone  - completed stress dose steroids 7/2 and back to home dose      *anemia  -due to recent abla. Hgb overall stable      *DM 2  -uncontrolled  -ISS      *HTN hx  -hold home meds (at home on atenolol, hydrochlorothiazide, norvasc, losartan)  - will consider resuming tomorrow postop - start atenolol tonight      *HLD  -statin     Scd  Stevo Maradiaga MD  [facility]  Hospitalist  Message over [email or web address]  Pager: [phone]

## 2024-07-03 NOTE — PLAN OF CARE
Assumed care at approx 1930  A/ox4, RA, VSS on tele.  Reports 1-2/10 pain at rest. Moderate to severe pain reported during wound care. Pain managed w/ PRN meds.  Wound care completed per orders.  Up x1/walker to bathroom; Steady ambulation.  NPO at midnight for debridement procedure today.  Comfort measures maintained. Pt updated on POC.

## 2024-07-03 NOTE — OPERATIVE REPORT
Vascular Surgery Op Note  Patients Name:   Emilie John   Operating Physician: Howard Taylor MD  CSN:    [id number]                                                        Location:  OR  MRN:     [id number]                                             YOB: 1955      Operation Date:     07/03/2024           Pre-Operative Diagnosis:   1.  Right groin wound dehiscence     Post-Operative Diagnosis:   1. Right  groin wound dehiscence        Procedure Performed:   Excisional  debridement of right groin 10cm x 4 cm x 4 cm.        Surgeon:  Howard Taylor MD        Anesthesia:   LMA        EBL: 10cc     Complications: None     Drains: None     Findings: Excisional debridement performed to healthy tissue.  No further soft tissue necrosis.  Will continue wound vac to heal by secondary intention.     Indications for procedure: 69-year-old female well-known to the service who has undergone numerous interventions.  She  had dehiscence through the right groin and was admitted and recommended for debridement.  The risk benefits of the procedure were discussed.  Informed consent was directly obtained.  On the afternoon of  07/03 the patient was brought to the operating room and placed in supine position.  General anesthesia was induced without any issues.  The patient was subsequently prepped and draped in the usual sterile fashion.  Timeout was performed.  Antibiotics were administered. Sharp debridement was performed with scissors to remove all devitalized soft tissue which included muscle and necrotic fat.  This was performed without issue.  The wound was copiously irrigated with  pulse irrigation.  Upon completion there was evidence of healthy tissue with no further devitalized tissue.  At this point I was satisfied and opted to terminate the procedure.  Wound vac was applied into the incision. The patient awoke from anesthesia and was taken to recovery in stable condition.  All counts were correct at the end  of the case.        Howard Taylor MD

## 2024-07-04 LAB
ANION GAP SERPL CALC-SCNC: 7 MMOL/L (ref 0–18)
BASOPHILS # BLD AUTO: 0.04 X10(3) UL (ref 0–0.2)
BASOPHILS NFR BLD AUTO: 0.4 %
BUN BLD-MCNC: 5 MG/DL (ref 9–23)
CALCIUM BLD-MCNC: 8.3 MG/DL (ref 8.5–10.1)
CHLORIDE SERPL-SCNC: 113 MMOL/L (ref 98–112)
CO2 SERPL-SCNC: 19 MMOL/L (ref 21–32)
CREAT BLD-MCNC: 0.78 MG/DL
CREAT BLD-MCNC: 0.78 MG/DL
EGFRCR SERPLBLD CKD-EPI 2021: 82 ML/MIN/1.73M2 (ref 60–?)
EGFRCR SERPLBLD CKD-EPI 2021: 82 ML/MIN/1.73M2 (ref 60–?)
EOSINOPHIL # BLD AUTO: 0.28 X10(3) UL (ref 0–0.7)
EOSINOPHIL NFR BLD AUTO: 2.7 %
ERYTHROCYTE [DISTWIDTH] IN BLOOD BY AUTOMATED COUNT: 16.2 %
GLUCOSE BLD-MCNC: 108 MG/DL (ref 70–99)
GLUCOSE BLD-MCNC: 171 MG/DL (ref 70–99)
GLUCOSE BLD-MCNC: 171 MG/DL (ref 70–99)
GLUCOSE BLD-MCNC: 252 MG/DL (ref 70–99)
GLUCOSE BLD-MCNC: 97 MG/DL (ref 70–99)
HCT VFR BLD AUTO: 26.3 %
HGB BLD-MCNC: 8.6 G/DL
IMM GRANULOCYTES # BLD AUTO: 0.09 X10(3) UL (ref 0–1)
IMM GRANULOCYTES NFR BLD: 0.9 %
LYMPHOCYTES # BLD AUTO: 1.68 X10(3) UL (ref 1–4)
LYMPHOCYTES NFR BLD AUTO: 16.2 %
MAGNESIUM SERPL-MCNC: 1.8 MG/DL (ref 1.6–2.6)
MCH RBC QN AUTO: 29.8 PG (ref 26–34)
MCHC RBC AUTO-ENTMCNC: 32.7 G/DL (ref 31–37)
MCV RBC AUTO: 91 FL
MONOCYTES # BLD AUTO: 0.95 X10(3) UL (ref 0.1–1)
MONOCYTES NFR BLD AUTO: 9.2 %
NEUTROPHILS # BLD AUTO: 7.33 X10 (3) UL (ref 1.5–7.7)
NEUTROPHILS # BLD AUTO: 7.33 X10(3) UL (ref 1.5–7.7)
NEUTROPHILS NFR BLD AUTO: 70.6 %
OSMOLALITY SERPL CALC.SUM OF ELEC: 285 MOSM/KG (ref 275–295)
PLATELET # BLD AUTO: 353 10(3)UL (ref 150–450)
POTASSIUM SERPL-SCNC: 3.9 MMOL/L (ref 3.5–5.1)
POTASSIUM SERPL-SCNC: 3.9 MMOL/L (ref 3.5–5.1)
RBC # BLD AUTO: 2.89 X10(6)UL
SODIUM SERPL-SCNC: 139 MMOL/L (ref 136–145)
WBC # BLD AUTO: 10.4 X10(3) UL (ref 4–11)

## 2024-07-04 PROCEDURE — 80048 BASIC METABOLIC PNL TOTAL CA: CPT | Performed by: SURGERY

## 2024-07-04 PROCEDURE — 97530 THERAPEUTIC ACTIVITIES: CPT

## 2024-07-04 PROCEDURE — 97162 PT EVAL MOD COMPLEX 30 MIN: CPT

## 2024-07-04 PROCEDURE — 82962 GLUCOSE BLOOD TEST: CPT

## 2024-07-04 PROCEDURE — 83735 ASSAY OF MAGNESIUM: CPT | Performed by: SURGERY

## 2024-07-04 PROCEDURE — 85025 COMPLETE CBC W/AUTO DIFF WBC: CPT | Performed by: SURGERY

## 2024-07-04 PROCEDURE — 82565 ASSAY OF CREATININE: CPT | Performed by: SURGERY

## 2024-07-04 PROCEDURE — 84132 ASSAY OF SERUM POTASSIUM: CPT | Performed by: SURGERY

## 2024-07-04 PROCEDURE — 97116 GAIT TRAINING THERAPY: CPT

## 2024-07-04 RX ORDER — MAGNESIUM OXIDE 400 MG/1
400 TABLET ORAL ONCE
Status: COMPLETED | OUTPATIENT
Start: 2024-07-04 | End: 2024-07-04

## 2024-07-04 NOTE — PHYSICAL THERAPY NOTE
PHYSICAL THERAPY EVALUATION - INPATIENT     Room Number: 7626/7626-A  Evaluation Date: 7/4/2024  Type of Evaluation: Initial  Physician Order: PT Eval and Treat    Presenting Problem: Weakness, hypotension, R groin wound infection and VAC disconnection at home s/p excisional debridement R groin 7/3  Co-Morbidities : HTN, HLD, DM2, CKD, RA, PAD  Reason for Therapy: Mobility Dysfunction and Discharge Planning    PHYSICAL THERAPY ASSESSMENT   Patient is currently functioning near baseline with bed mobility, transfers, gait, maintaining seated position, and standing prolonged periods.  Prior to admission, patient's baseline is mod I with RW.  Patient is requiring supervision as a result of the following impairments: decreased functional strength, decreased endurance/aerobic capacity, pain, impaired standing balance, impaired motor planning, decreased muscular endurance, and medical status.  Physical Therapy will continue to follow for duration of hospitalization.    Patient will benefit from continued skilled PT Services at discharge to promote functional independence and safety with additional support and return home with home health PT.    PLAN  PT Treatment Plan: Transfer training;Bed mobility;Body mechanics;Endurance;Energy conservation;Family education;Patient education;Gait training;Strengthening;Balance training  Rehab Potential : Good  Frequency (Obs): 3x/week  Number of Visits to Meet Established Goals: 4      CURRENT GOALS    Goal #1 Patient is able to demonstrate supine - sit EOB @ level: modified independent     Goal #2 Patient is able to demonstrate transfers EOB to/from Chair/Wheelchair at assistance level: modified independent     Goal #3 Patient is able to ambulate 150 feet with assist device: walker - rolling at assistance level: supervision     Goal #4    Goal #5    Goal #6    Goal Comments: Goals established on 7/4/2024      PHYSICAL THERAPY MEDICAL/SOCIAL HISTORY  History related to current  admission: Patient is a 69 year old female admitted on 2024 from home for weakness, hypotension, R groin wound infection.  Pt s/p excisional debridement R groin with wound vac placement 7/3.    Previous Admission:   -2024: R common femoral artery occlusion s/p angiogram with embolization, L SFA embolization and L common femoral artery dissection , post operative groin hematoma, R common femoral pseudo aneurysm s/p R groin exploration , ligation of femoral artery branch, sartorius muscle myoplasty 6/15; DC [facility]    HOME SITUATION  Type of Home: Apartment   Home Layout: One level;Elevator                Lives With: Alone  Drives: No (lyft, uber, son)  Patient Owned Equipment: Rolling walker;Cane  Patient Regularly Uses: Glasses    Prior Level of [place]: Pt typically ambulates with RW, son and sister able to assist as needed.     SUBJECTIVE  \"It's more like my hip joint from my hip replacement, not the groin pain\"      OBJECTIVE  Precautions: Bed/chair alarm (R groin wound vac)  Fall Risk: High fall risk    WEIGHT BEARING RESTRICTION  Weight Bearing Restriction: None                PAIN ASSESSMENT  Ratin  Location: R hip       COGNITION  Overall Cognitive Status:  WFL - within functional limits    RANGE OF MOTION AND STRENGTH ASSESSMENT  Upper extremity ROM and strength are within functional limits - B hand deformities likely due to RA    Lower extremity ROM is within functional limits      Lower extremity strength is within functional limits except for the following:   R hip/groin s/p debridement      BALANCE  Static Sitting: Fair +  Dynamic Sitting: Fair  Static Standing: Fair -  Dynamic Standing: Poor +    ADDITIONAL TESTS                                    ACTIVITY TOLERANCE  Pulse: 101  Heart Rate Source: Monitor                   O2 WALK  Oxygen Therapy  SPO2% on Room Air at Rest: 99    NEUROLOGICAL FINDINGS                        AM-PAC '6-Clicks' INPATIENT SHORT FORM - BASIC  MOBILITY  How much difficulty does the patient currently have...  Patient Difficulty: Turning over in bed (including adjusting bedclothes, sheets and blankets)?: A Little   Patient Difficulty: Sitting down on and standing up from a chair with arms (e.g., wheelchair, bedside commode, etc.): A Little   Patient Difficulty: Moving from lying on back to sitting on the side of the bed?: A Little   How much help from another person does the patient currently need...   Help from Another: Moving to and from a bed to a chair (including a wheelchair)?: A Little   Help from Another: Need to walk in hospital room?: A Little   Help from Another: Climbing 3-5 steps with a railing?: A Little       AM-PAC Score:  Raw Score: 18   Approx Degree of Impairment: 46.58%   Standardized Score (AM-PAC Scale): 43.63   CMS Modifier (G-Code): CK    FUNCTIONAL ABILITY STATUS  Gait Assessment   Functional Mobility/Gait Assessment  Gait Assistance: Supervision  Distance (ft): 75  Assistive Device: Rolling walker  Pattern:  (waddle type gait, poor  on RW due to RA)    Skilled Therapy Provided     Bed Mobility:  Rolling: NT  Supine to sit: Jackie   Sit to supine: Nt     Transfer Mobility:  Sit to stand: supervision   Stand to sit: supervision  Gait = supervision    Therapist's Comments: RN cleared for session. Pt agreeable for therapy, received supine. Pt cued on log roll technique for supine>sit transfer for improved comfort of R groin. Pt cued on proper UE/LE placement for sit>stand transfer. Pt able to ambulate to bathroom, cued on usage of grab bar. Pt able to perform pericare independently. Pt amb with waddle type gait with supervision. Instructed to call for nursing staff for any needs and OOB mobility.     Exercise/Education Provided:  Bed mobility  Body mechanics  Energy conservation  Functional activity tolerated  Gait training  Posture  Strengthening  Transfer training    Patient End of Session: Up in chair;Needs met;Call light within  reach;RN aware of session/findings;All patient questions and concerns addressed;Alarm set      Patient Evaluation Complexity Level:  History High - 3 or more personal factors and/or co-morbidities   Examination of body systems Moderate - addressing a total of 3 or more elements   Clinical Presentation Moderate - Evolving   Clinical Decision Making Moderate - Evolving       PT Session Time: 30 minutes  Gait Training: 10 minutes  Therapeutic Activity: 15 minutes

## 2024-07-04 NOTE — PROGRESS NOTES
[facility]  Hospitalist Progress Note                                                                     [facility]   part of [facility]        Emilie Ceeerson  4/7/1955    SUBJECTIVE:  Patient seen and examined.  Feeling well no complaints.   Denies CP/SOB.  NAD.       OBJECTIVE:  Temp:  [97.5 °F (36.4 °C)-99.3 °F (37.4 °C)] 99.3 °F (37.4 °C)  Pulse:  [] 96  Resp:  [15-21] 18  BP: (102-157)/(61-80) 140/64  SpO2:  [98 %-100 %] 100 %  Exam  Gen: No acute distress, alert and oriented x3, no focal neurologic deficits  Pulm: Lungs clear bilaterally, normal respiratory effort  CV: Heart with regular rate and rhythm, no murmur.  Normal PMI.    Abd: Abdomen soft, nontender, nondistended, no organomegaly, bowel sounds present  MSK: Full range of motion in extremities, no clubbing, no cyanosis  Skin: no rashes or lesions    Labs:   Recent Labs   Lab 06/29/24  0535 07/01/24  1325 07/02/24  0602 07/03/24  0617 07/04/24  0605   WBC 17.8* 18.3* 13.2* 10.5 10.4   HGB 9.8* 10.0* 8.2* 8.6* 8.6*   MCV 90.9 91.3 92.8 93.0 91.0   .0* 467.0* 371.0 374.0 353.0   INR  --  1.09  --   --   --        Recent Labs   Lab 06/28/24  0415 06/29/24  0535 07/01/24  1325 07/02/24  0602 07/03/24  0617 07/04/24  0605   * 134* 134* 138 139 139   K 4.6 4.5 4.4 4.2 3.5 3.9  3.9    107 106 113* 113* 113*   CO2 21.0 21.0 18.0* 18.0* 19.0* 19.0*   BUN 27* 28* 33* 18 9 5*   CREATSERUM 1.14* 1.06* 1.66* 1.10*  1.10* 0.91  0.91 0.78  0.78   CA 9.2 9.1 9.2 8.1* 8.4* 8.3*   MG 1.7 1.8  --  1.8 1.8 1.8   * 138* 162* 166* 157* 97       Recent Labs   Lab 07/01/24  1325 07/02/24  0602   ALT 18 17   AST 20 14*   ALB 2.7* 2.2*       Recent Labs   Lab 07/03/24  1228 07/03/24  1636 07/03/24  2142 07/04/24  0525 07/04/24  1203   PGLU 113* 102* 154* 108* 171*       Meds:   Scheduled:    cefepime  1 g Intravenous q12h    vancomycin  15 mg/kg Intravenous Q24H    atenolol   50 mg Oral Daily Beta Blocker    predniSONE  5 mg Oral BID with meals    rosuvastatin  10 mg Oral Nightly    rivaroxaban  2.5 mg Oral BID with meals    insulin degludec  10 Units Subcutaneous Nightly    insulin aspart  1-5 Units Subcutaneous TID AC and HS    clopidogrel  75 mg Oral Daily    sodium hypochlorite   Topical BID     Continuous Infusions:    lactated ringers 75 mL/hr at 07/03/24 1735     PRN:   HYDROcodone-acetaminophen    acetaminophen    glucose **OR** glucose **OR** glucose-vitamin C **OR** dextrose **OR** glucose **OR** glucose **OR** glucose-vitamin C    Assessment/Plan:  Principal Problem:    Hypotension, unspecified hypotension type  Active Problems:    COLLIN (acute kidney injury) (HCC)    Sepsis, due to unspecified organism, unspecified whether acute organ dysfunction present (HCC)    Patient is a 69 year old female with PMH sig for HTN, HLD, DM2, CKD, RA on prednisone, PAD with recent LE vascular procedure complicated by hematoma, ruptured pseudoaneurysm, here for weakness and hypotension        Impression     -hypotension with possible sepsis - resolving   -diarrhea  -COLLIN on CKD 3, baseline Cr ~1.2     -left iliac chronic occlusion with claudication sp balloon angioplasty and stent of left common iliac artery and external iliac artery 06/13  -right common femoral artery occlusion sp thrombectomy, balloon angioplasty, stent; thrombectomy of left SFA and left anterior tibial artery, balloon angioplasty of left TP trunk and posterior tibial 06/13  -sp R groin pseudoaneurysm s/p ligation of femoral artery branch with sartorius muscle myoplasty on 06/15    -evacuation of hematoma and pulse lavage 6/21/24 w/ wound vac placement   -sp debridement of skin and subcutaneous fat, pulse lavage, placement of kerecis skin substitute and mesh 06/25     -anemia, recent abla      -DM2, uncontrolled - A1c 12s     -nicotine dependence, smokes 1ppd     -HTN hx  -HLD     -RA on chronic prednisone        Plan      *hypotension / lethargy   -possible from infection and dehydration  -has had diarrhea at home, BP responded rather rapidly with just fluids but concern for possible infectious source as well  -UA with pyuria however she has no urinary symptoms. Await urine cx  -possible surgical site wound, has foul odor - s/p excisional debridement of R groin 7/3, [place], wound VAC   -cxr wo infiltrates   - wound cx with Klebsiella, abx to cefepime  -add vancomycin pending work up - ID following, await cx   -no fevers but wbc high (chronically elevated but appears worse)  - Ucx with ESBL Klebsiella, however no urinary symptoms, defer abx to ID      *COLLIN - resolving  -possible from dehydration/ infection  -will dc IVF      *RA   -on chronic prednisone  - completed stress dose steroids 7/2 and back to home dose      *anemia  -due to recent abla. Hgb overall stable      *DM 2  -uncontrolled  -ISS      *HTN hx  -hold home meds (at home on atenolol, hydrochlorothiazide, norvasc, losartan)  - will consider resuming tomorrow postop - start atenolol tonight      *HLD  -statin     Scd  Xarelto      DOMINIQUE - 1-2d pending final abx plan and OP wound care management     Bipin Maradiaga MD  [facility] Hospitalist  Message over [email or web address]  Pager: [phone]

## 2024-07-04 NOTE — PLAN OF CARE
Assumed care at approx 1930  A/ox4, RA, VSS on tele.  Pain managed w/ PRN meds.  Wound vac in place.  IV abx infusing per protocol.  Comfort measures maintained. Pt updated on POC.

## 2024-07-04 NOTE — PROGRESS NOTES
[facility]   part of [facility] Infectious Disease Consult    Emilie John Patient Status:  Inpatient    1955 MRN [id number]   Location [facility] 7NE-A Attending Aevl Ram, DO   Hosp Day # 3 PCP MD Emilie Lopez seen and examined,   Afebrile,   Previous entries noted,   No pain,   VAC in place,     History:  Past Medical History:    Diabetes (HCC)    DVT (deep venous thrombosis) (HCC)    Essential hypertension    High blood pressure    Nicotine use disorder    Osteoarthritis    hip, wrist    Peripheral vascular disease (HCC)    Rheumatoid arthritis (HCC)    Visual impairment    glasses     Past Surgical History:   Procedure Laterality Date    Benign biopsy left      Cholecystectomy      Colonoscopy N/A 2019    Procedure: COLONOSCOPY;  Surgeon: Freeman Oshea MD;  Location:  ENDOSCOPY    Colonoscopy      Hip replacement surgery Right 01/10/2020    Knee replacement surgery      Needle biopsy left      Removal gallbladder      Total hip replacement      Total knee replacement Bilateral     Upper gi endoscopy,exam       Family History   Problem Relation Age of Onset    Other (CVA) Father     Cancer Mother 60        STOMACH CANCER    Cancer Brother         LUNG CANCER      reports that she has been smoking cigarettes. She started smoking about 51 years ago. She has a 25.7 pack-year smoking history. She has never used smokeless tobacco. She reports that she does not drink alcohol and does not use drugs.    Allergies:  Allergies   Allergen Reactions    Celecoxib DIARRHEA and RASH     AND DIARRHEA      Esomeprazole DIARRHEA and RASH     DIARRHEA      Etanercept HIVES and RASH    Fish-Derived Products SWELLING    Infliximab HIVES and ANAPHYLAXIS    Other ANAPHYLAXIS, SWELLING and HIVES     Throat closes up  HAD IN CONJUNCTION WITH ASA- SO AVOIDS BOTH OF THESE- FACE SWELLED UP    Oxycodone PAIN and NAUSEA ONLY    Pregabalin DIARRHEA and  RASH     AND DIARRHEA      Enbrel RASH    Orencia [Abatacept] ITCHING    Rice OTHER (SEE COMMENTS)     \"Feels warm\"    Xeljanz [Tofacitinib] DIARRHEA       Medications:    Current Facility-Administered Medications:     ceFEPIme (Maxipime) 1 g in sodium chloride 0.9% 100 mL IVPB-MBP, 1 g, Intravenous, q12h    vancomycin (Vancocin) 1,000 mg in sodium chloride 0.9% 250 mL IVPB-ADDV, 15 mg/kg, Intravenous, Q24H    atenolol (Tenormin) tab 50 mg, 50 mg, Oral, Daily Beta Blocker    predniSONE (Deltasone) tab 5 mg, 5 mg, Oral, BID with meals    HYDROcodone-acetaminophen (Norco) 5-325 MG per tab 1 tablet, 1 tablet, Oral, Q4H PRN    rosuvastatin (Crestor) tab 10 mg, 10 mg, Oral, Nightly    acetaminophen (Tylenol Extra Strength) tab 500 mg, 500 mg, Oral, Q4H PRN    rivaroxaban (Xarelto) tab 2.5 mg, 2.5 mg, Oral, BID with meals    insulin degludec (Tresiba) 100 units/mL flextouch 10 Units, 10 Units, Subcutaneous, Nightly    glucose (Dex4) 15 GM/59ML oral liquid 15 g, 15 g, Oral, Q15 Min PRN **OR** glucose (Glutose) 40% oral gel 15 g, 15 g, Oral, Q15 Min PRN **OR** glucose-vitamin C (Dex-4) chewable tab 4 tablet, 4 tablet, Oral, Q15 Min PRN **OR** dextrose 50% injection 50 mL, 50 mL, Intravenous, Q15 Min PRN **OR** glucose (Dex4) 15 GM/59ML oral liquid 30 g, 30 g, Oral, Q15 Min PRN **OR** glucose (Glutose) 40% oral gel 30 g, 30 g, Oral, Q15 Min PRN **OR** glucose-vitamin C (Dex-4) chewable tab 8 tablet, 8 tablet, Oral, Q15 Min PRN    insulin aspart (NovoLOG) 100 Units/mL FlexPen 1-5 Units, 1-5 Units, Subcutaneous, TID AC and HS    clopidogrel (Plavix) tab 75 mg, 75 mg, Oral, Daily    sodium hypochlorite (Dakin's) 0.25 % external solution, , Topical, BID    Review of Systems:   Constitutional: Negative for anorexia, chills, fatigue, fevers, malaise, night sweats and weight loss.  Eyes: Negative for visual disturbance, irritation and redness.  Ears, nose, mouth, throat, and face: Negative for hearing loss, tinnitus, nasal  congestion, snoring, sore throat, hoarseness and voice change.  Respiratory: Negative for cough, sputum, hemoptysis, chest pain, wheezing, dyspnea on exertion, or stridor.  Cardiovascular: Negative for chest pain, palpitations, irregular heart beats, syncope, fatigue, orthopnea, paroxysmal nocturnal dyspnea, lower extremity edema.  Gastrointestinal: Negative for dysphagia, odynophagia, reflux symptoms, nausea, vomiting, change in bowel habits, diarrhea, constipation and abdominal pain.  Integument/breast: Negative for rash, skin lesions, and pruritus.  Hematologic/lymphatic: Negative for easy bruising, bleeding, and lymphadenopathy.  Musculoskeletal: Negative for myalgias, arthralgias, muscle weakness.  Neurological: Negative for headaches, dizziness, seizures, memory problems, trouble swallowing, speech problems, gait problems and weakness.  Behavioral/Psych: Negative for active tobacco use.  Endocrine: No history of of diabetes, thyroid disorder.  All other review of systems are negative.    Vital signs in last 24 hours:  Patient Vitals for the past 24 hrs:   BP Temp Temp src Pulse Resp SpO2   07/04/24 1200 140/64 99.3 °F (37.4 °C) Oral 96 18 100 %   07/04/24 0935 -- -- -- 101 -- --   07/04/24 0825 145/75 98.7 °F (37.1 °C) Oral 86 20 99 %   07/04/24 0530 129/72 97.5 °F (36.4 °C) Temporal 108 18 98 %   07/03/24 2330 102/63 97.9 °F (36.6 °C) Temporal 99 16 98 %   07/03/24 1900 157/68 98.1 °F (36.7 °C) Temporal -- 15 --   07/03/24 1835 148/61 -- -- 88 17 100 %   07/03/24 1830 142/64 -- -- 90 21 100 %   07/03/24 1825 140/62 -- -- 93 19 100 %   07/03/24 1820 139/80 98.4 °F (36.9 °C) Temporal 94 20 100 %   07/03/24 1817 139/80 98.4 °F (36.9 °C) -- 99 16 100 %   07/03/24 1710 -- -- -- -- -- 100 %   07/03/24 1631 141/78 98 °F (36.7 °C) Oral 94 18 99 %       Physical Exam:   General: alert, cooperative, oriented.  No respiratory distress.   Head: Normocephalic,    Eyes: Conjunctivae/corneas clear.    Nose: Nares  normal.   Throat: Lips, mucosa, and tongue normal.  No thrush noted.   Neck: Soft, supple neck;    Lungs: CTAB, normal and equal bilateral chest rise   Chest wall: No tenderness or deformity.   Heart: Regular rate and rhythm, normal S1S2, no murmur.   Abdomen: soft, non-tender, non-distended, no rebound, positive BS.   Extremity: no edema, R groin VAC in place,    Skin: No rashes or lesions.   Neurological: Alert, interactive, no focal deficits    Labs:  Lab Results   Component Value Date    WBC 10.4 07/04/2024    HGB 8.6 07/04/2024    HCT 26.3 07/04/2024    .0 07/04/2024    CREATSERUM 0.78 07/04/2024    CREATSERUM 0.78 07/04/2024    BUN 5 07/04/2024     07/04/2024    K 3.9 07/04/2024    K 3.9 07/04/2024     07/04/2024    CO2 19.0 07/04/2024    GLU 97 07/04/2024    CA 8.3 07/04/2024    MG 1.8 07/04/2024       Radiology:  Reviewed,       Cultures:  Reviewed,     Assessment and Plan:     Right groin post operative wound infection following vascular ligation,   - Hx of R groin angioplasty, S/P Femoral artery ligation with sartorius muscle myoplasty, 6/15/24.  - S/P evacuation of Hematoma and Pulse lavage with VAC placement on 6/21,  - S/P debridement of skin and Sub Cut fat, pulse lavage and Kerecis skin substitute placement on 6/25.   - S/P Excisional debridement of R groin surgical site on 7/03/24.   - Drainage Cul with Kleb aerogenes on 7/01, Will follow OR cul,   - On IV Vanc and Cefepime, pharm to dose,     2.     Immunocompromsied state: sec to RA, Prednisone,    3.     Leukocytosis: sec to above,     4.     Disposition: In house, an elderly female with R groin post operative infection, VAC in place now,   - Follow pending cul,   - Continue IV Vanc and Cefepime,   - Wound care,     Discussed with patient, RN, all questions answered, further recommendations to follow,   Thank you for consulting [facility] ID for Emilie John.  If you have any questions or concerns please call [facility]  Infectious Disease at [phone].     Rachel Delarosa MD  7/4/2024  3:30 PM

## 2024-07-04 NOTE — PROGRESS NOTES
Assumed care of patient at 0700. Reports very minimal pain to right groin site. Wound vac in place and working well for right groin wound. UTI resulted as ESBl so patient placed on contact iso. Continues x2 iv abx and ID updated. Worked with PT on shift and up to chair/ walked halls. Updated on plan of care and condition update. All needs met on shift.

## 2024-07-05 LAB
ANION GAP SERPL CALC-SCNC: 7 MMOL/L (ref 0–18)
BASOPHILS # BLD AUTO: 0.04 X10(3) UL (ref 0–0.2)
BASOPHILS NFR BLD AUTO: 0.4 %
BUN BLD-MCNC: 5 MG/DL (ref 9–23)
CALCIUM BLD-MCNC: 8.4 MG/DL (ref 8.5–10.1)
CHLORIDE SERPL-SCNC: 110 MMOL/L (ref 98–112)
CO2 SERPL-SCNC: 21 MMOL/L (ref 21–32)
CREAT BLD-MCNC: 0.77 MG/DL
CREAT BLD-MCNC: 0.77 MG/DL
EGFRCR SERPLBLD CKD-EPI 2021: 83 ML/MIN/1.73M2 (ref 60–?)
EGFRCR SERPLBLD CKD-EPI 2021: 83 ML/MIN/1.73M2 (ref 60–?)
EOSINOPHIL # BLD AUTO: 0.23 X10(3) UL (ref 0–0.7)
EOSINOPHIL NFR BLD AUTO: 2.4 %
ERYTHROCYTE [DISTWIDTH] IN BLOOD BY AUTOMATED COUNT: 16 %
GLUCOSE BLD-MCNC: 113 MG/DL (ref 70–99)
GLUCOSE BLD-MCNC: 120 MG/DL (ref 70–99)
GLUCOSE BLD-MCNC: 151 MG/DL (ref 70–99)
GLUCOSE BLD-MCNC: 171 MG/DL (ref 70–99)
GLUCOSE BLD-MCNC: 223 MG/DL (ref 70–99)
HCT VFR BLD AUTO: 24.2 %
HGB BLD-MCNC: 8.1 G/DL
IMM GRANULOCYTES # BLD AUTO: 0.11 X10(3) UL (ref 0–1)
IMM GRANULOCYTES NFR BLD: 1.1 %
LYMPHOCYTES # BLD AUTO: 1.65 X10(3) UL (ref 1–4)
LYMPHOCYTES NFR BLD AUTO: 17.2 %
MAGNESIUM SERPL-MCNC: 1.8 MG/DL (ref 1.6–2.6)
MCH RBC QN AUTO: 29.8 PG (ref 26–34)
MCHC RBC AUTO-ENTMCNC: 33.5 G/DL (ref 31–37)
MCV RBC AUTO: 89 FL
MONOCYTES # BLD AUTO: 1.02 X10(3) UL (ref 0.1–1)
MONOCYTES NFR BLD AUTO: 10.6 %
NEUTROPHILS # BLD AUTO: 6.53 X10 (3) UL (ref 1.5–7.7)
NEUTROPHILS # BLD AUTO: 6.53 X10(3) UL (ref 1.5–7.7)
NEUTROPHILS NFR BLD AUTO: 68.3 %
OSMOLALITY SERPL CALC.SUM OF ELEC: 284 MOSM/KG (ref 275–295)
PLATELET # BLD AUTO: 304 10(3)UL (ref 150–450)
POTASSIUM SERPL-SCNC: 3.3 MMOL/L (ref 3.5–5.1)
POTASSIUM SERPL-SCNC: 4.4 MMOL/L (ref 3.5–5.1)
RBC # BLD AUTO: 2.72 X10(6)UL
SODIUM SERPL-SCNC: 138 MMOL/L (ref 136–145)
WBC # BLD AUTO: 9.6 X10(3) UL (ref 4–11)

## 2024-07-05 PROCEDURE — 85025 COMPLETE CBC W/AUTO DIFF WBC: CPT | Performed by: HOSPITALIST

## 2024-07-05 PROCEDURE — 97535 SELF CARE MNGMENT TRAINING: CPT

## 2024-07-05 PROCEDURE — 82962 GLUCOSE BLOOD TEST: CPT

## 2024-07-05 PROCEDURE — 84132 ASSAY OF SERUM POTASSIUM: CPT | Performed by: HOSPITALIST

## 2024-07-05 PROCEDURE — 80048 BASIC METABOLIC PNL TOTAL CA: CPT | Performed by: HOSPITALIST

## 2024-07-05 PROCEDURE — 83735 ASSAY OF MAGNESIUM: CPT | Performed by: HOSPITALIST

## 2024-07-05 PROCEDURE — 82565 ASSAY OF CREATININE: CPT | Performed by: SURGERY

## 2024-07-05 PROCEDURE — 97165 OT EVAL LOW COMPLEX 30 MIN: CPT

## 2024-07-05 RX ORDER — POTASSIUM CHLORIDE 20 MEQ/1
40 TABLET, EXTENDED RELEASE ORAL EVERY 4 HOURS
Status: COMPLETED | OUTPATIENT
Start: 2024-07-05 | End: 2024-07-05

## 2024-07-05 RX ORDER — ASPIRIN 81 MG/1
81 TABLET ORAL DAILY
Status: DISCONTINUED | OUTPATIENT
Start: 2024-07-05 | End: 2024-07-05

## 2024-07-05 RX ORDER — SULFAMETHOXAZOLE AND TRIMETHOPRIM 800; 160 MG/1; MG/1
1 TABLET ORAL 2 TIMES DAILY
Qty: 24 TABLET | Refills: 0 | Status: SHIPPED | OUTPATIENT
Start: 2024-07-05 | End: 2024-07-08

## 2024-07-05 RX ORDER — METRONIDAZOLE 500 MG/1
500 TABLET ORAL 3 TIMES DAILY
Qty: 36 TABLET | Refills: 0 | Status: SHIPPED | OUTPATIENT
Start: 2024-07-05 | End: 2024-07-08

## 2024-07-05 RX ORDER — METRONIDAZOLE 500 MG/1
500 TABLET ORAL EVERY 12 HOURS SCHEDULED
Status: DISCONTINUED | OUTPATIENT
Start: 2024-07-05 | End: 2024-07-08

## 2024-07-05 RX ORDER — LOSARTAN POTASSIUM 100 MG/1
100 TABLET ORAL DAILY
Status: DISCONTINUED | OUTPATIENT
Start: 2024-07-05 | End: 2024-07-08

## 2024-07-05 RX ORDER — MAGNESIUM OXIDE 400 MG/1
400 TABLET ORAL ONCE
Status: COMPLETED | OUTPATIENT
Start: 2024-07-05 | End: 2024-07-05

## 2024-07-05 NOTE — DISCHARGE SUMMARY
[facility] Hospitalist Discharge Summary     Patient ID:  Emilie John  69 year old  4/7/1955    Admit date: 7/1/2024    Discharge date and time: 07/08/24     Attending Physician: Kiko Maradiaga*     Primary Care Physician: Tay Alexandra MD     Discharge Diagnoses: COLLIN (acute kidney injury) (HCC) [N17.9]  Hypotension, unspecified hypotension type [I95.9]  Sepsis, due to unspecified organism, unspecified whether acute organ dysfunction present (HCC) [A41.9]    Please note that only [facility] and [facility] patients enrolled in the Medicare ACO, [facility] ACO and [facility] HMOs will be handled by the [facility] Care Management team.  For all other patients, please follow usual protocol for discharge care transition.    Discharge Condition: stable    Disposition:  Home/C    Important Follow up:  - PCP within 2 weeks              Hospital Course:        69 year old female with PMH sig for HTN, HLD, DM2, CKD, RA on prednisone, PAD with recent LE vascular procedure complicated by hematoma, ruptured pseudoaneurysm, here for weakness and hypotension     She was admitted 06/13-06/29, underwent angioplasty/ stent of iliac artery 06/13, complicated by hemorrhagic shock. Taken back to OR 06/15 for evacuation of hematoma and R groin pseudoaneurysm rupture and ligation of femoral artery branch. She had another procedure for hematoma evacuation on 06/21 and wound vac placement. On 06/25 she underwent debridement of skin and subcutaneous fat, placement of kerecs skin substitue and mesh. Was discharged with wound vac      She reports that her wound vac at home got disconnected.wound has  been bothering her and she has been scratching it.   When son saw pt today, she was very weak, fatigued. BP was in 70s systolic in ER. Pt states she has not had good po intake, has had diarrhea at home, but no fevers or abd pain. No resp symptoms. No urinary symptoms.   Her BP responded to fluids.          Impression     -hypotension with possible sepsis - resolving   -diarrhea  -COLLIN on CKD 3, baseline Cr ~1.2     -left iliac chronic occlusion with claudication sp balloon angioplasty and stent of left common iliac artery and external iliac artery 06/13  -right common femoral artery occlusion sp thrombectomy, balloon angioplasty, stent; thrombectomy of left SFA and left anterior tibial artery, balloon angioplasty of left TP trunk and posterior tibial 06/13  -sp R groin pseudoaneurysm s/p ligation of femoral artery branch with sartorius muscle myoplasty on 06/15    -evacuation of hematoma and pulse lavage 6/21/24 w/ wound vac placement   -sp debridement of skin and subcutaneous fat, pulse lavage, placement of kerecis skin substitute and mesh 06/25     -anemia, recent abla      -DM2, uncontrolled - A1c 12s     -nicotine dependence, smokes 1ppd     -HTN hx  -HLD     -RA on chronic prednisone      Plan     *hypotension / lethargy   -possible from infection and dehydration  -has had diarrhea at home, BP responded rather rapidly with just fluids but concern for possible infectious source as well  -UA with pyuria however she has no urinary symptoms. Await urine cx  -possible surgical site wound, has foul odor - s/p excisional debridement of R groin 7/3, Pontikis, wound VAC   - OP wound care f/u: Plan [facility]VAC [facility] after hospital discharge   -cxr wo infiltrates   - wound cx with Klebsiella, abx to cefepime  -add vancomycin pending work up - ID following, await cx   -no fevers but wbc high (chronically elevated but appears worse)  - Ucx with ESBL Klebsiella, however no urinary symptoms, defer abx to ID   - final abx with Bactrim DS/Flagyl until 7/16     *COLLIN - resolving  -possible from dehydration/ infection  -will dc IVF      *RA   -on chronic prednisone  - completed stress dose steroids 7/2 and back to home dose      *anemia  -due to recent abla. Hgb overall stable      *DM 2  -uncontrolled  -ISS       *HTN hx  -hold home meds (at home on atenolol, hydrochlorothiazide, norvasc, losartan)  - resumed atenolol and ARB, hold hydrochlorothiazide/amlodipine on dc, OP PCP f/u         Consults: IP CONSULT TO VASCULAR SURGERY  IP CONSULT TO HOSPITALIST  IP CONSULT TO SOCIAL WORK  IP CONSULT TO PHARMACY  IP CONSULT TO INFECTIOUS DISEASE  IP CONSULT TO SOCIAL WORK    Operative Procedures: Procedure(s) (LRB):  RIGHT GROIN IRRIGATION AND DEBRIDMENT (Right)       Patient instructions:      I as the attending physician reconciled the current and discharge medications on day of discharge.     Current Discharge Medication List        START taking these medications    Details   metRONIDAZOLE 500 MG Oral Tab Take 1 tablet (500 mg total) by mouth 3 (three) times daily for 12 days.      sulfamethoxazole-trimethoprim -160 MG Oral Tab per tablet Take 1 tablet by mouth 2 (two) times daily for 12 days.           CONTINUE these medications which have NOT CHANGED    Details   HYDROcodone-acetaminophen 5-325 MG Oral Tab Take 1 tablet by mouth every 4 (four) hours as needed.      clopidogrel 75 MG Oral Tab Take 1 tablet (75 mg total) by mouth daily.      glipiZIDE ER 5 MG Oral Tablet 24 Hr Take 2 tablets (10 mg total) by mouth 2 (two) times daily.      rosuvastatin 10 MG Oral Tab Take 1 tablet (10 mg total) by mouth nightly.      valsartan 160 MG Oral Tab Take 1 tablet (160 mg total) by mouth daily.      atenolol 50 MG Oral Tab Take 1 tablet (50 mg total) by mouth daily.      metFORMIN 500 MG Oral Tab Take 2 tablets (1,000 mg total) by mouth 2 (two) times daily with meals. 2 tab twice a day      predniSONE 5 MG Oral Tab Take 1 tablet (5 mg total) by mouth 2 (two) times daily.      rivaroxaban 2.5 MG Oral Tab Take 1 tablet (2.5 mg total) by mouth 2 (two) times daily with meals.      Glucose Blood (ACCU-CHEK LUPE PLUS) In Vitro Strip Use to check blood sugar before and after breakfast and dinner (4x per day)           STOP taking these  medications       docusate sodium 100 MG Oral Cap        aspirin 81 MG Oral Chew Tab        amLODIPine 10 MG Oral Tab        azaTHIOprine 50 MG Oral Tab        hydroCHLOROthiazide 25 MG Oral Tab              Activity: activity as tolerated  Diet: regular diet  Wound Care: as directed  Code Status: Full Code      Discharge Exam:     General: no acute distress, alert and oriented x 3  Heart: RRR  Lungs: clear bilaterally, no active wheezing  Abdomen: nontender, nondistended, intact BS  Extremities: no pedal edema   Neuro: CN inact, no focal deficits      Total time coordinating care for discharge: Greater than 30 minutes    Bipin Maradiaga MD  [facility] Hospitalist

## 2024-07-05 NOTE — PROGRESS NOTES
Infectious Disease Progress Note      Date of admission: 7/1/2024  1:09 PM     Reason for consult: Right groin surgical wound infection    Subjective: Continues to complain of pain involving her right groin incision.  No nausea or vomiting.  No diarrhea.  No shortness of breath.  No cough or sputum production.    The rest of the systems were reviewed and found to be negative except was mentioned above    Interval events: This is a 69-year-old female patient, with history of peripheral vascular disease, recently had bilateral vascular interventions to both her lower extremities, presenting here with right groin surgical wound infections.  Cultures with Klebsiella aerogenes from both her wound and her urine indicating cross-contamination.  Status post debridement on 7/3.  Infection noted to be superficial without involvement of vascular structures as per operative note    Medications:    potassium chloride    metRONIDAZOLE    cefepime    atenolol    predniSONE    HYDROcodone-acetaminophen    rosuvastatin    acetaminophen    rivaroxaban    insulin degludec    glucose **OR** glucose **OR** glucose-vitamin C **OR** dextrose **OR** glucose **OR** glucose **OR** glucose-vitamin C    insulin aspart    clopidogrel    sodium hypochlorite     Allergies:  Allergies   Allergen Reactions    Celecoxib DIARRHEA and RASH     AND DIARRHEA      Esomeprazole DIARRHEA and RASH     DIARRHEA      Etanercept HIVES and RASH    Fish-Derived Products SWELLING    Infliximab HIVES and ANAPHYLAXIS    Other ANAPHYLAXIS, SWELLING and HIVES     Throat closes up  HAD IN CONJUNCTION WITH ASA- SO AVOIDS BOTH OF THESE- FACE SWELLED UP    Oxycodone PAIN and NAUSEA ONLY    Pregabalin DIARRHEA and RASH     AND DIARRHEA      Enbrel RASH    Orencia [Abatacept] ITCHING    Rice OTHER (SEE COMMENTS)     \"Feels warm\"    Xeljanz [Tofacitinib] DIARRHEA       Physical Exam:  Vitals:    07/05/24 0800   BP: 134/71   Pulse: 74   Resp: 16   Temp: 98.9 °F (37.2 °C)      Vitals signs and nursing note reviewed.   Constitutional:       Appearance: Normal appearance.   HENT:      Head: Normocephalic and atraumatic.      Mouth: Mucous membranes are moist.   Neck:      Musculoskeletal: Neck supple.   Cardiovascular:      Rate and Rhythm: Normal rate.   Pulmonary:      Effort: Pulmonary effort is normal. No respiratory distress.   Musculoskeletal:      Right lower leg: No edema.  Right groin surgical incision noted with wound dehiscence and drainage     Left lower leg: No edema.   Skin:     General: Skin is warm and dry.   Neurological:      General: No focal deficit present.      Mental Status: Alert and oriented to person, place, and time.       Laboratory data:  I have reviewed all the lab results independently.  Lab Results   Component Value Date    WBC 9.6 07/05/2024    HGB 8.1 07/05/2024    HCT 24.2 07/05/2024    .0 07/05/2024    CREATSERUM 0.77 07/05/2024    CREATSERUM 0.77 07/05/2024    BUN 5 07/05/2024     07/05/2024    K 3.3 07/05/2024     07/05/2024    CO2 21.0 07/05/2024     07/05/2024    CA 8.4 07/05/2024    MG 1.8 07/05/2024      Recent Labs   Lab 07/05/24  0620   RBC 2.72*   HGB 8.1*   HCT 24.2*   MCV 89.0   MCH 29.8   MCHC 33.5   RDW 16.0   NEPRELIM 6.53   WBC 9.6   .0      Microbiology data:  Hospital Encounter on 07/01/24   1. Anaerobic Culture     Status: Abnormal    Collection Time: 07/01/24  9:04 PM    Specimen: Groin; Other   Result Value Ref Range    Anaerobic Culture 4+ growth Bacteroides fragilis (A) N/A   2. Aerobic Bacterial Culture     Status: Abnormal    Collection Time: 07/01/24  9:04 PM    Specimen: Groin; Other   Result Value Ref Range    Aerobic Culture Result  N/A     Mixture of organisms suggestive of normal skin kyle    Aerobic Culture Result 4+ growth Klebsiella (Enterobacter) aerogenes (A) N/A    Aerobic Smear 2+ WBCs seen N/A    Aerobic Smear 1+ Gram Positive Cocci N/A    Aerobic Smear 1+ Gram Positive Rods N/A     Aerobic Smear 1+ Gram Negative Rods N/A       Susceptibility    Klebsiella (Enterobacter) aerogenes -  (no method available)     Cefazolin >=64 Resistant      Cefepime <=1 Sensitive      Ceftriaxone <=1 Sensitive      Ciprofloxacin <=0.25 Sensitive      Gentamicin <=1 Sensitive      Meropenem <=0.25 Sensitive      Levofloxacin <=0.12 Sensitive      Piperacillin + Tazobactam <=4 Sensitive      Trimethoprim/Sulfa <=20 Sensitive    3. Urine Culture, Routine     Status: Abnormal    Collection Time: 07/01/24  3:48 PM    Specimen: Urine, clean catch   Result Value Ref Range    Urine Culture (A) N/A     >100,000 CFU/ML Klebsiella (Enterobacter) aerogenes ESBL Pos       Susceptibility    Klebsiella (Enterobacter) aerogenes ESBL Pos -  (no method available)     Cefazolin >=64 Resistant      Cefepime  Resistant      Ceftazidime >=64 Resistant      Ceftriaxone >=64 Resistant      Ciprofloxacin <=0.25 Sensitive      Gentamicin <=1 Sensitive      Meropenem <=0.25 Sensitive      Levofloxacin <=0.12 Sensitive      Nitrofurantoin 128 Resistant      Piperacillin + Tazobactam >=128 Resistant      Trimethoprim/Sulfa <=20 Sensitive    4. Blood Culture     Status: None (Preliminary result)    Collection Time: 07/01/24  3:14 PM    Specimen: Blood,peripheral   Result Value Ref Range    Blood Culture Result No Growth 3 Days N/A     Impression:  Emilie John is a 69 year old female with    Right groin surgical wound infection  Cultures with Klebsiella aerogenes  This is in the setting of vascular surgery on 6/13/2024 followed by multiple evacuations and wound VAC placement  Currently on IV Zosyn and IV vancomycin  That is post debridement without involvement of vascular structures on 7/3  Rheumatoid arthritis  On prednisone and azithromycin.  Immunosuppressed  Leukocytosis  Reactive  Resolved    Recommendations:    Continue IV cefepime while inpatient  Start metronidazole 500 mg twice daily  Discontinue IV vancomycin  Plan to  discharge patient on Bactrim DS 1 tablet twice along with metronidazole 500 mg twice daily daily for 2 weeks, through 2024  Follow-up with infectious disease in the wound clinic next week  Continue to monitor daily labs for antibiotic toxicities  Further recommendations will depend on the above work-up and clinical progress     The plan of care was discussed with the primary hospital team, Kiko Maradiaga*     Recommendations were also discussed with the patient; all questions were answered.     Thank you for this consultation. Please don't hesitate to call the ID team for questions or any acute changes in patient's clinical condition.    Please note that this report has been produced using speech recognition software and may contain errors related to that system including, but not limited to, errors in grammar, punctuation, and spelling, as well as words and phrases that possibly may have been recognized inappropriately.  If there are any questions or concerns, contact the dictating provider for clarification.    The  Cent[employer] Cures Act makes medical notes like these available to patients in the interest of transparency. Please be advised this is a medical document. Medical documents are intended to carry relevant information, facts as evident, and the clinical opinion of the practitioner. The medical note is intended as peer to peer communication and may appear blunt or direct. It is written in medical language and may contain abbreviations or verbiage that are unfamiliar.     Zeeshan Lama MD  [facility] Infectious Disease. Tel: [phone]. Fax: [phone].     Emilie John : 1955 MRN: [id number] [facility]: [id number]

## 2024-07-05 NOTE — CONGREGATE LIVING REVIEW
[facility] Living Authorization    The [facility] Review Committee has reviewed this case and the patient IS APPROVED for discharge to a facility for Short Term Skilled once the following procedure is followed:     - The physician discharge instructions (contained within the [facility]ESICA note for SNF) must inlcude the below appropriate and approved COVID instructions to the facility    For questions regarding CLRC approval process, please contact the CM assigned to the case.  For questions regarding RN discharge workflow, please contact the unit Clinical Leader.

## 2024-07-05 NOTE — CM/SW NOTE
SW sending [facility] referrals as requested by pt/son. Concerned with managing wound care needs at home especially since being readmitted. Referrals sent, informed pt that she could go under her Medicare benefit but undetermined how long she would be approved for.     PT/OT recommends [facility], pt referred to [facility]. Anticipate denial due to lack of skilled therapy needs. Referrals sent in Aidin, awaiting response. Will need PASRR screen.     Addendum:   Orders not in for wound vac/management. Awaiting response from MD. Wound care not consulted post op. RN notified MD,  avoidable day entered.     SW provided accepting referral packet to pt. ([facility], [facility] and [facility])    CHARBEL met with pt/son at bedside. Medically cleared, pending wound care. Spoke with wound care who is looking at availability for someone to see today, if unable to see today then would be seen on Monday. Await response.     Addendum: pt chose [facility]. Notified liaisonIrma. Will notify [facility] if DC over weekend. Notified MD Shelby Nolan, SHAYY

## 2024-07-05 NOTE — PLAN OF CARE
1930: Received patient awake and alert in bed, in room # 7626.  Denies any pain or any shortness of breath.  Maintained on Contact isolation for ESBL in urine.   On IV Vanco and Cefepime.  O2 protocol, room air, clear lungs, sats 100%.  Normal sinus rhythm.  Cardiac electrolyte protocol, K 3.9, Mg 1.8  QID accuchecks, 252 tonight.  Up w/ 1 assist and a walker.   No fever.  Wound vac to right groin intact.     Problem: PAIN - ADULT  Goal: Verbalizes/displays adequate comfort level or patient's stated pain goal  Description: INTERVENTIONS:  - Encourage pt to monitor pain and request assistance  - Assess pain using appropriate pain scale  - Administer analgesics based on type and severity of pain and evaluate response  - Implement non-pharmacological measures as appropriate and evaluate response  - Consider cultural and social influences on pain and pain management  - Manage/alleviate anxiety  - Utilize distraction and/or relaxation techniques  - Monitor for opioid side effects  - Notify MD/LIP if interventions unsuccessful or patient reports new pain  - Anticipate increased pain with activity and pre-medicate as appropriate  Outcome: Progressing     Problem: SKIN/TISSUE INTEGRITY - ADULT  Goal: Skin integrity remains intact  Description: INTERVENTIONS  - Assess and document risk factors for pressure ulcer development  - Assess and document skin integrity  - Monitor for areas of redness and/or skin breakdown  - Initiate interventions, skin care algorithm/standards of care as needed  Outcome: Progressing  Goal: Incision(s), wounds(s) or drain site(s) healing without S/S of infection  Description: INTERVENTIONS:  - Assess and document risk factors for pressure ulcer development  - Assess and document skin integrity  - Assess and document dressing/incision, wound bed, drain sites and surrounding tissue  - Implement wound care per orders  - Initiate isolation precautions as appropriate  - Initiate Pressure Ulcer prevention  bundle as indicated  Outcome: Progressing  Goal: Oral mucous membranes remain intact  Description: INTERVENTIONS  - Assess oral mucosa and hygiene practices  - Implement preventative oral hygiene regimen  - Implement oral medicated treatments as ordered  Outcome: Progressing     Problem: MUSCULOSKELETAL - ADULT  Goal: Return mobility to safest level of function  Description: INTERVENTIONS:  - Assess patient stability and activity tolerance for standing, transferring and ambulating w/ or w/o assistive devices  - Assist with transfers and ambulation using safe patient handling equipment as needed  - Ensure adequate protection for wounds/incisions during mobilization  - Obtain PT/OT consults as needed  - Advance activity as appropriate  - Communicate ordered activity level and limitations with patient/family  Outcome: Progressing  Goal: Maintain proper alignment of affected body part  Description: INTERVENTIONS:  - Support and protect limb and body alignment per provider's orders  - Instruct and reinforce with patient and family use of appropriate assistive device and precautions (e.g. spinal or hip dislocation precautions)  Outcome: Progressing     Problem: RISK FOR INFECTION - ADULT  Goal: Absence of fever/infection during anticipated neutropenic period  Description: INTERVENTIONS  - Monitor WBC  - Administer growth factors as ordered  - Implement neutropenic guidelines  Outcome: Progressing     Problem: SAFETY ADULT - FALL  Goal: Free from fall injury  Description: INTERVENTIONS:  - Assess pt frequently for physical needs  - Identify cognitive and physical deficits and behaviors that affect risk of falls.  - [place] fall precautions as indicated by assessment.  - Educate pt/family on patient safety including physical limitations  - Instruct pt to call for assistance with activity based on assessment  - Modify environment to reduce risk of injury  - Provide assistive devices as appropriate  - Consider OT/PT consult  to assist with strengthening/mobility  - Encourage toileting schedule  Outcome: Progressing

## 2024-07-06 LAB
GLUCOSE BLD-MCNC: 108 MG/DL (ref 70–99)
GLUCOSE BLD-MCNC: 114 MG/DL (ref 70–99)
GLUCOSE BLD-MCNC: 166 MG/DL (ref 70–99)
GLUCOSE BLD-MCNC: 177 MG/DL (ref 70–99)
GLUCOSE BLD-MCNC: 206 MG/DL (ref 70–99)
GLUCOSE BLD-MCNC: 253 MG/DL (ref 70–99)
MAGNESIUM SERPL-MCNC: 1.8 MG/DL (ref 1.6–2.6)

## 2024-07-06 PROCEDURE — 97530 THERAPEUTIC ACTIVITIES: CPT

## 2024-07-06 PROCEDURE — 97116 GAIT TRAINING THERAPY: CPT

## 2024-07-06 PROCEDURE — 97535 SELF CARE MNGMENT TRAINING: CPT

## 2024-07-06 PROCEDURE — 83735 ASSAY OF MAGNESIUM: CPT | Performed by: HOSPITALIST

## 2024-07-06 PROCEDURE — 82962 GLUCOSE BLOOD TEST: CPT

## 2024-07-06 RX ORDER — SULFAMETHOXAZOLE AND TRIMETHOPRIM 800; 160 MG/1; MG/1
1 TABLET ORAL EVERY 12 HOURS SCHEDULED
Status: DISCONTINUED | OUTPATIENT
Start: 2024-07-06 | End: 2024-07-08

## 2024-07-06 RX ORDER — MAGNESIUM OXIDE 400 MG/1
400 TABLET ORAL ONCE
Status: COMPLETED | OUTPATIENT
Start: 2024-07-06 | End: 2024-07-06

## 2024-07-06 RX ORDER — MORPHINE SULFATE 2 MG/ML
2 INJECTION, SOLUTION INTRAMUSCULAR; INTRAVENOUS EVERY 4 HOURS PRN
Status: DISCONTINUED | OUTPATIENT
Start: 2024-07-06 | End: 2024-07-08

## 2024-07-06 NOTE — PROGRESS NOTES
[facility]  Hospitalist Progress Note                                                                     [facility]   part of [facility]        Emilie Ceeerson  4/7/1955    SUBJECTIVE:  Patient seen and examined.  Feeling well no complaints.   Denies CP/SOB.  NAD.       OBJECTIVE:  Temp:  [98.5 °F (36.9 °C)-99.3 °F (37.4 °C)] 99.3 °F (37.4 °C)  Pulse:  [74-94] 76  Resp:  [12-25] 13  BP: (123-141)/(63-96) 132/93  SpO2:  [98 %-100 %] 99 %  Exam  Gen: No acute distress, alert and oriented x3, no focal neurologic deficits  Pulm: Lungs clear bilaterally, normal respiratory effort  CV: Heart with regular rate and rhythm, no murmur.  Normal PMI.    Abd: Abdomen soft, nontender, nondistended, no organomegaly, bowel sounds present  MSK: Full range of motion in extremities, no clubbing, no cyanosis  Skin: no rashes or lesions    Labs:   Recent Labs   Lab 07/01/24  1325 07/02/24  0602 07/03/24  0617 07/04/24  0605 07/05/24  0620   WBC 18.3* 13.2* 10.5 10.4 9.6   HGB 10.0* 8.2* 8.6* 8.6* 8.1*   MCV 91.3 92.8 93.0 91.0 89.0   .0* 371.0 374.0 353.0 304.0   INR 1.09  --   --   --   --        Recent Labs   Lab 07/01/24  1325 07/02/24  0602 07/03/24  0617 07/04/24  0605 07/05/24  0620 07/05/24  1545 07/06/24  0717   * 138 139 139 138  --   --    K 4.4 4.2 3.5 3.9  3.9 3.3* 4.4  --     113* 113* 113* 110  --   --    CO2 18.0* 18.0* 19.0* 19.0* 21.0  --   --    BUN 33* 18 9 5* 5*  --   --    CREATSERUM 1.66* 1.10*  1.10* 0.91  0.91 0.78  0.78 0.77  0.77  --   --    CA 9.2 8.1* 8.4* 8.3* 8.4*  --   --    MG  --  1.8 1.8 1.8 1.8  --  1.8   * 166* 157* 97 113*  --   --        Recent Labs   Lab 07/01/24  1325 07/02/24  0602   ALT 18 17   AST 20 14*   ALB 2.7* 2.2*       Recent Labs   Lab 07/05/24  0551 07/05/24  1240 07/05/24  1556 07/05/24  2113 07/06/24  0601   PGLU 120* 151* 171* 223* 114*       Meds:   Scheduled:     sulfamethoxazole-trimethoprim DS  1 tablet Oral Q12H    metRONIDAZOLE  500 mg Oral 2 times per day    losartan  100 mg Oral Daily    atenolol  50 mg Oral Daily Beta Blocker    predniSONE  5 mg Oral BID with meals    rosuvastatin  10 mg Oral Nightly    rivaroxaban  2.5 mg Oral BID with meals    insulin degludec  10 Units Subcutaneous Nightly    insulin aspart  1-5 Units Subcutaneous TID AC and HS    clopidogrel  75 mg Oral Daily    sodium hypochlorite   Topical BID     Continuous Infusions:       PRN:   HYDROcodone-acetaminophen    acetaminophen    glucose **OR** glucose **OR** glucose-vitamin C **OR** dextrose **OR** glucose **OR** glucose **OR** glucose-vitamin C    Assessment/Plan:  Principal Problem:    Hypotension, unspecified hypotension type  Active Problems:    COLLIN (acute kidney injury) (HCC)    Sepsis, due to unspecified organism, unspecified whether acute organ dysfunction present (HCC)    Patient is a 69 year old female with PMH sig for HTN, HLD, DM2, CKD, RA on prednisone, PAD with recent LE vascular procedure complicated by hematoma, ruptured pseudoaneurysm, here for weakness and hypotension        Impression     -hypotension with possible sepsis - resolving   -diarrhea  -COLLIN on CKD 3, baseline Cr ~1.2     -left iliac chronic occlusion with claudication sp balloon angioplasty and stent of left common iliac artery and external iliac artery 06/13  -right common femoral artery occlusion sp thrombectomy, balloon angioplasty, stent; thrombectomy of left SFA and left anterior tibial artery, balloon angioplasty of left TP trunk and posterior tibial 06/13  -sp R groin pseudoaneurysm s/p ligation of femoral artery branch with sartorius muscle myoplasty on 06/15    -evacuation of hematoma and pulse lavage 6/21/24 w/ wound vac placement   -sp debridement of skin and subcutaneous fat, pulse lavage, placement of kerecis skin substitute and mesh 06/25     -anemia, recent abla      -DM2, uncontrolled - A1c 12s      -nicotine dependence, smokes 1ppd     -HTN hx  -HLD     -RA on chronic prednisone      Plan     *hypotension / lethargy   -possible from infection and dehydration  -has had diarrhea at home, BP responded rather rapidly with just fluids but concern for possible infectious source as well  -UA with pyuria however she has no urinary symptoms. Await urine cx  -possible surgical site wound, has foul odor - s/p excisional debridement of R groin 7/3, Pontikis, wound VAC - OP wound care f/u   -cxr wo infiltrates   - wound cx with Klebsiella, abx to cefepime  -add vancomycin pending work up - ID following, await cx   -no fevers but wbc high (chronically elevated but appears worse)  - Ucx with ESBL Klebsiella, however no urinary symptoms, defer abx to ID   - final abx with Bactrim DS/Flagyl until 7/16     *COLLIN - resolving  -possible from dehydration/ infection  -will dc IVF      *RA   -on chronic prednisone  - completed stress dose steroids 7/2 and back to home dose      *anemia  -due to recent abla. Hgb overall stable      *DM 2  -uncontrolled  -ISS      *HTN hx  -hold home meds (at home on atenolol, hydrochlorothiazide, norvasc, losartan)  - resumed atenolol and ARB, hold hydrochlorothiazide/amlodipine on dc, OP PCP f/u      *HLD  -statin     Scd  Xarelto      DOMINIQUE - medically stable, awaiting woundVAC delivery, possible dc Monday      Bipin Maradiaga MD  [facility]  Message over [employer]  Pager: [phone]

## 2024-07-06 NOTE — PLAN OF CARE
Assumed care at 0730  Pt A&O x4  RA   NSR   VSS  Pain managed via PRN's     Wound vac removed per Vasc. Surg. d/t stool infiltration   Wet-to-dry dressing placed inside wound   Covered w/Abd pad & Paper tape

## 2024-07-06 NOTE — PROGRESS NOTES
[facility]  Hospitalist Progress Note                                                                     [facility]   part of [facility]        Emilie Ceeerson  4/7/1955    SUBJECTIVE:  Patient seen and examined.  Feeling well no complaints.   Denies CP/SOB.  NAD.       OBJECTIVE:  Temp:  [98.5 °F (36.9 °C)-99.3 °F (37.4 °C)] 99.3 °F (37.4 °C)  Pulse:  [74-94] 76  Resp:  [12-25] 13  BP: (123-141)/(63-96) 132/93  SpO2:  [98 %-100 %] 99 %  Exam  Gen: No acute distress, alert and oriented x3, no focal neurologic deficits  Pulm: Lungs clear bilaterally, normal respiratory effort  CV: Heart with regular rate and rhythm, no murmur.  Normal PMI.    Abd: Abdomen soft, nontender, nondistended, no organomegaly, bowel sounds present  MSK: Full range of motion in extremities, no clubbing, no cyanosis  Skin: no rashes or lesions    Labs:   Recent Labs   Lab 07/01/24  1325 07/02/24  0602 07/03/24  0617 07/04/24  0605 07/05/24  0620   WBC 18.3* 13.2* 10.5 10.4 9.6   HGB 10.0* 8.2* 8.6* 8.6* 8.1*   MCV 91.3 92.8 93.0 91.0 89.0   .0* 371.0 374.0 353.0 304.0   INR 1.09  --   --   --   --        Recent Labs   Lab 07/01/24  1325 07/02/24  0602 07/03/24  0617 07/04/24  0605 07/05/24  0620 07/05/24  1545 07/06/24  0717   * 138 139 139 138  --   --    K 4.4 4.2 3.5 3.9  3.9 3.3* 4.4  --     113* 113* 113* 110  --   --    CO2 18.0* 18.0* 19.0* 19.0* 21.0  --   --    BUN 33* 18 9 5* 5*  --   --    CREATSERUM 1.66* 1.10*  1.10* 0.91  0.91 0.78  0.78 0.77  0.77  --   --    CA 9.2 8.1* 8.4* 8.3* 8.4*  --   --    MG  --  1.8 1.8 1.8 1.8  --  1.8   * 166* 157* 97 113*  --   --        Recent Labs   Lab 07/01/24  1325 07/02/24  0602   ALT 18 17   AST 20 14*   ALB 2.7* 2.2*       Recent Labs   Lab 07/05/24  0551 07/05/24  1240 07/05/24  1556 07/05/24  2113 07/06/24  0601   PGLU 120* 151* 171* 223* 114*       Meds:   Scheduled:     sulfamethoxazole-trimethoprim DS  1 tablet Oral Q12H    metRONIDAZOLE  500 mg Oral 2 times per day    losartan  100 mg Oral Daily    atenolol  50 mg Oral Daily Beta Blocker    predniSONE  5 mg Oral BID with meals    rosuvastatin  10 mg Oral Nightly    rivaroxaban  2.5 mg Oral BID with meals    insulin degludec  10 Units Subcutaneous Nightly    insulin aspart  1-5 Units Subcutaneous TID AC and HS    clopidogrel  75 mg Oral Daily    sodium hypochlorite   Topical BID     Continuous Infusions:       PRN:   HYDROcodone-acetaminophen    acetaminophen    glucose **OR** glucose **OR** glucose-vitamin C **OR** dextrose **OR** glucose **OR** glucose **OR** glucose-vitamin C    Assessment/Plan:  Principal Problem:    Hypotension, unspecified hypotension type  Active Problems:    COLLIN (acute kidney injury) (HCC)    Sepsis, due to unspecified organism, unspecified whether acute organ dysfunction present (HCC)    Patient is a 69 year old female with PMH sig for HTN, HLD, DM2, CKD, RA on prednisone, PAD with recent LE vascular procedure complicated by hematoma, ruptured pseudoaneurysm, here for weakness and hypotension        Impression     -hypotension with possible sepsis - resolving   -diarrhea  -COLLIN on CKD 3, baseline Cr ~1.2     -left iliac chronic occlusion with claudication sp balloon angioplasty and stent of left common iliac artery and external iliac artery 06/13  -right common femoral artery occlusion sp thrombectomy, balloon angioplasty, stent; thrombectomy of left SFA and left anterior tibial artery, balloon angioplasty of left TP trunk and posterior tibial 06/13  -sp R groin pseudoaneurysm s/p ligation of femoral artery branch with sartorius muscle myoplasty on 06/15    -evacuation of hematoma and pulse lavage 6/21/24 w/ wound vac placement   -sp debridement of skin and subcutaneous fat, pulse lavage, placement of kerecis skin substitute and mesh 06/25     -anemia, recent abla      -DM2, uncontrolled - A1c 12s      -nicotine dependence, smokes 1ppd     -HTN hx  -HLD     -RA on chronic prednisone      Plan     *hypotension / lethargy   -possible from infection and dehydration  -has had diarrhea at home, BP responded rather rapidly with just fluids but concern for possible infectious source as well  -UA with pyuria however she has no urinary symptoms. Await urine cx  -possible surgical site wound, has foul odor - s/p excisional debridement of R groin 7/3, Pontikis, wound VAC - OP wound care f/u   -cxr wo infiltrates   - wound cx with Klebsiella, abx to cefepime  -add vancomycin pending work up - ID following, await cx   -no fevers but wbc high (chronically elevated but appears worse)  - Ucx with ESBL Klebsiella, however no urinary symptoms, defer abx to ID   - final abx with Bactrim DS/Flagyl until 7/16     *COLLIN - resolving  -possible from dehydration/ infection  -will dc IVF      *RA   -on chronic prednisone  - completed stress dose steroids 7/2 and back to home dose      *anemia  -due to recent abla. Hgb overall stable      *DM 2  -uncontrolled  -ISS      *HTN hx  -hold home meds (at home on atenolol, hydrochlorothiazide, norvasc, losartan)  - resumed atenolol and ARB, hold hydrochlorothiazide/amlodipine on dc, OP PCP f/u      *HLD  -statin     Scd  Xarelto      DOMINIQUE - medically stable, awaiting woundVAC delivery, possible dc today     Bipin Maradiaga MD  [facility]  Message over [employer]  Pager: [phone]

## 2024-07-06 NOTE — PLAN OF CARE
Assumed care at 1900.   Alert and oriented x4.   NSR; RA; denies pain.   Purewick in place; bowel movement noted on shift.   Wound vac to R groin intact and set to negative pressure; bloody output noted in collection container.   Antibiotics administered per orders; pending placement to [facility].   Fall precautions in place and call light within reach.     Problem: SKIN/TISSUE INTEGRITY - ADULT  Goal: Skin integrity remains intact  Description: INTERVENTIONS  - Assess and document risk factors for pressure ulcer development  - Assess and document skin integrity  - Monitor for areas of redness and/or skin breakdown  - Initiate interventions, skin care algorithm/standards of care as needed  Outcome: Progressing  Goal: Incision(s), wounds(s) or drain site(s) healing without S/S of infection  Description: INTERVENTIONS:  - Assess and document risk factors for pressure ulcer development  - Assess and document skin integrity  - Assess and document dressing/incision, wound bed, drain sites and surrounding tissue  - Implement wound care per orders  - Initiate isolation precautions as appropriate  - Initiate Pressure Ulcer prevention bundle as indicated  Outcome: Progressing  Goal: Oral mucous membranes remain intact  Description: INTERVENTIONS  - Assess oral mucosa and hygiene practices  - Implement preventative oral hygiene regimen  - Implement oral medicated treatments as ordered  Outcome: Progressing     Problem: SAFETY ADULT - FALL  Goal: Free from fall injury  Description: INTERVENTIONS:  - Assess pt frequently for physical needs  - Identify cognitive and physical deficits and behaviors that affect risk of falls.  - [place] fall precautions as indicated by assessment.  - Educate pt/family on patient safety including physical limitations  - Instruct pt to call for assistance with activity based on assessment  - Modify environment to reduce risk of injury  - Provide assistive devices as appropriate  - Consider OT/PT consult  to assist with strengthening/mobility  - Encourage toileting schedule  Outcome: Progressing

## 2024-07-06 NOTE — PROGRESS NOTES
Vascular Surgery Progress Note    /63 (BP Location: Left arm)   Pulse 94   Temp 98.7 °F (37.1 °C) (Oral)   Resp 22   Wt 163 lb 2.3 oz (74 kg)   SpO2 99%   BMI 30.83 kg/m²     Recent Labs   Lab 07/03/24 0617 07/04/24 0605 07/05/24 0620   RBC 2.85* 2.89* 2.72*   HGB 8.6* 8.6* 8.1*   HCT 26.5* 26.3* 24.2*   MCV 93.0 91.0 89.0   MCH 30.2 29.8 29.8   MCHC 32.5 32.7 33.5   RDW 16.0 16.2 16.0   NEPRELIM 7.19 7.33 6.53   WBC 10.5 10.4 9.6   .0 353.0 304.0       Recent Labs   Lab 07/03/24 0617 07/04/24 0605 07/05/24 0620 07/05/24  1545   * 97 113*  --    BUN 9 5* 5*  --    CREATSERUM 0.91  0.91 0.78  0.78 0.77  0.77  --    CA 8.4* 8.3* 8.4*  --     139 138  --    K 3.5 3.9  3.9 3.3* 4.4   * 113* 110  --    CO2 19.0* 19.0* 21.0  --        Patient seen and examined.  Wound VAC holding suction nicely.  No erythema, induration, fluctuance or drainage.  Informed her of the findings in the operating room.  I informed her that her wound overall is progressing.  She will remain in house this weekend.  I emphasized the paramount importance of working on improving her nutrition and her functional status.  She will need to be seen by physical therapy while in house.  Will plan for wound care team to see the patient on Monday and to transition to wound VAC therapy for discharge at the skilled nursing facility.  I informed her that she will need weekly follow-up in the clinic until she is fully healed.  Further management per hospitalist and other consulting services.    Howard Taylor MD  [facility]  Vascular Surgery

## 2024-07-06 NOTE — PHYSICAL THERAPY NOTE
PHYSICAL THERAPY TREATMENT NOTE - INPATIENT    Room Number: 7626/7626-A     Session: 1     Number of Visits to Meet Established Goals: 4    Presenting Problem: Weakness, hypotension, R groin wound infection and VAC disconnection at home s/p excisional debridement R groin 7/3  Co-Morbidities : HTN, HLD, DM2, CKD, RA, PAD    PHYSICAL THERAPY MEDICAL/SOCIAL HISTORY  History related to current admission: Patient is a 69 year old female admitted on 7/1/2024 from home for weakness, hypotension, R groin wound infection.  Pt s/p excisional debridement R groin with wound vac placement 7/3.     Previous Admission:   6/13-6/29/2024: R common femoral artery occlusion s/p angiogram with embolization, L SFA embolization and L common femoral artery dissection 6/13, post operative groin hematoma, R common femoral pseudo aneurysm s/p R groin exploration , ligation of femoral artery branch, sartorius muscle myoplasty 6/15; DC [facility]     HOME SITUATION  Type of Home: Apartment   Home Layout: One level;Elevator     Lives With: Alone  Drives: No (lyft, uber, son)  Patient Owned Equipment: Rolling walker;Cane  Patient Regularly Uses: Glasses     Prior Level of Taylor: Pt typically ambulates with RW, son and sister able to assist as needed.     ASSESSMENT   Patient demonstrates good  progress this session, goals   achieved .    Patient currently does not meet criteria for skilled inpatient physical therapy services, however patient will remain on Inpatient Mobility Team and will continue with ambulation with RW to maintain current level of mobility.   The rehab aide will perform treatment activities prescribed by this physical therapist. The rehab aide will communicate with overseeing PT regarding any change in functional mobility. RN aware.   Per chart - Pt plans to dc to SNF to optimize wound care needs.     PLAN  PT Treatment Plan: Transfer training;Bed mobility;Body mechanics;Endurance;Energy conservation;Family  education;Patient education;Gait training;Strengthening;Balance training  Rehab Potential : Good  Frequency (Obs): 3x/week    CURRENT GOALS     Goal #1 Patient is able to demonstrate supine - sit EOB @ level: modified independent      Goal #2 Patient is able to demonstrate transfers EOB to/from Chair/Wheelchair at assistance level: modified independent      Goal #3 Patient is able to ambulate 150 feet with assist device: walker - rolling at assistance level: supervision      Goal #4     Goal #5     Goal #6     Goal Comments: Goals established on 2024 all goals achieved    SUBJECTIVE  \"I am doing what I can\"    OBJECTIVE  Precautions: Bed/chair alarm (R groin wound vac)    WEIGHT BEARING RESTRICTION  Weight Bearing Restriction: None                PAIN ASSESSMENT   Ratin  Location: DENIED       BALANCE                                                                                                                       Static Sitting: Fair +  Dynamic Sitting: Fair           Static Standing: Fair -  Dynamic Standing: Poor +    ACTIVITY TOLERANCE                         O2 WALK         AM-PAC '6-Clicks' INPATIENT SHORT FORM - BASIC MOBILITY  How much difficulty does the patient currently have...  Patient Difficulty: Turning over in bed (including adjusting bedclothes, sheets and blankets)?: A Little   Patient Difficulty: Sitting down on and standing up from a chair with arms (e.g., wheelchair, bedside commode, etc.): A Little   Patient Difficulty: Moving from lying on back to sitting on the side of the bed?: A Little   How much help from another person does the patient currently need...   Help from Another: Moving to and from a bed to a chair (including a wheelchair)?: A Little   Help from Another: Need to walk in hospital room?: A Little   Help from Another: Climbing 3-5 steps with a railing?: A Little       AM-PAC Score:  Raw Score: 18   Approx Degree of Impairment: 46.58%   Standardized Score  (AM-PAC Scale): 43.63   CMS Modifier (G-Code): CK    FUNCTIONAL ABILITY STATUS  Gait Assessment   Functional Mobility/Gait Assessment  Gait Assistance: Supervision  Distance (ft): 30  Assistive Device: Rolling walker  Pattern:  (waddle type gait, poor  on RW due to RA)  Stairs:  (NO STAIRS - ELEVATOR ACCESS)    Skilled Therapy Provided     Bed Mobility:  Rolling: NT   Supine<>Sit: SBA   Sit<>Supine: NT     Transfer Mobility:  Sit<>Stand: SBA   Stand<>Sit: SBA   Gait: SBA with RW    Therapist's Comments: NA    Patient End of Session: Up in chair;Needs met;Call light within reach;RN aware of session/findings;All patient questions and concerns addressed;Alarm set    PT Session Time: 15 minutes  Gait Training: 15 minutes  Therapeutic Activity: 0 minutes  Therapeutic Exercise: 0 minutes   Neuromuscular Re-education: 0 minutes

## 2024-07-06 NOTE — PLAN OF CARE
Assumed care of patient at 0730. Pt A/Ox4. PITTS. Pt on room air. LS clear. NSR w/BBB on tele. SBP normotensive. Rt groin CDI, wound vac in place with suction. Pedal pulses +2 palpable. Pt incontinent of urine. Multiple stools. Up in chair for meals. Pt and pt's son updated with plan of care. Dr Taylor and Dr Maradiaga notified and aware of son's concerns. Plan for LINDA on Monday. Wound care still to see pt prior to discharge.    Problem: PAIN - ADULT  Goal: Verbalizes/displays adequate comfort level or patient's stated pain goal  Description: INTERVENTIONS:  - Encourage pt to monitor pain and request assistance  - Assess pain using appropriate pain scale  - Administer analgesics based on type and severity of pain and evaluate response  - Implement non-pharmacological measures as appropriate and evaluate response  - Consider cultural and social influences on pain and pain management  - Manage/alleviate anxiety  - Utilize distraction and/or relaxation techniques  - Monitor for opioid side effects  - Notify MD/LIP if interventions unsuccessful or patient reports new pain  - Anticipate increased pain with activity and pre-medicate as appropriate  Outcome: Progressing     Problem: SKIN/TISSUE INTEGRITY - ADULT  Goal: Skin integrity remains intact  Description: INTERVENTIONS  - Assess and document risk factors for pressure ulcer development  - Assess and document skin integrity  - Monitor for areas of redness and/or skin breakdown  - Initiate interventions, skin care algorithm/standards of care as needed  Outcome: Progressing  Goal: Incision(s), wounds(s) or drain site(s) healing without S/S of infection  Description: INTERVENTIONS:  - Assess and document risk factors for pressure ulcer development  - Assess and document skin integrity  - Assess and document dressing/incision, wound bed, drain sites and surrounding tissue  - Implement wound care per orders  - Initiate isolation precautions as appropriate  - Initiate  Pressure Ulcer prevention bundle as indicated  Outcome: Progressing  Goal: Oral mucous membranes remain intact  Description: INTERVENTIONS  - Assess oral mucosa and hygiene practices  - Implement preventative oral hygiene regimen  - Implement oral medicated treatments as ordered  Outcome: Progressing     Problem: MUSCULOSKELETAL - ADULT  Goal: Return mobility to safest level of function  Description: INTERVENTIONS:  - Assess patient stability and activity tolerance for standing, transferring and ambulating w/ or w/o assistive devices  - Assist with transfers and ambulation using safe patient handling equipment as needed  - Ensure adequate protection for wounds/incisions during mobilization  - Obtain PT/OT consults as needed  - Advance activity as appropriate  - Communicate ordered activity level and limitations with patient/family  Outcome: Progressing  Goal: Maintain proper alignment of affected body part  Description: INTERVENTIONS:  - Support and protect limb and body alignment per provider's orders  - Instruct and reinforce with patient and family use of appropriate assistive device and precautions (e.g. spinal or hip dislocation precautions)  Outcome: Progressing     Problem: RISK FOR INFECTION - ADULT  Goal: Absence of fever/infection during anticipated neutropenic period  Description: INTERVENTIONS  - Monitor WBC  - Administer growth factors as ordered  - Implement neutropenic guidelines  Outcome: Progressing     Problem: SAFETY ADULT - FALL  Goal: Free from fall injury  Description: INTERVENTIONS:  - Assess pt frequently for physical needs  - Identify cognitive and physical deficits and behaviors that affect risk of falls.  - [place] fall precautions as indicated by assessment.  - Educate pt/family on patient safety including physical limitations  - Instruct pt to call for assistance with activity based on assessment  - Modify environment to reduce risk of injury  - Provide assistive devices as  appropriate  - Consider OT/PT consult to assist with strengthening/mobility  - Encourage toileting schedule  Outcome: Progressing

## 2024-07-06 NOTE — OCCUPATIONAL THERAPY NOTE
OCCUPATIONAL THERAPY EVALUATION - INPATIENT     Room Number: 7626/7626-A  Evaluation Date: 7/5/2024  Type of Evaluation: Initial  Presenting Problem: Weakness, hypotension, R groin wound infection and VAC disconnection at home s/p excisional debridement R groin 7/3    Physician Order: IP Consult to Occupational Therapy  Reason for Therapy: ADL/IADL Dysfunction and Discharge Planning    OCCUPATIONAL THERAPY ASSESSMENT   Patient is currently functioning below baseline with toileting, lower body dressing, bed mobility, transfers, and functional standing tolerance. Prior to admission, patient's baseline is mod independent w use of RW.  Patient is requiring moderate assist as a result of the following impairments: decreased functional strength, decreased functional reach, decreased endurance, and pain. Occupational Therapy will continue to follow for duration of hospitalization.    Patient will benefit from continued skilled OT Services at discharge to promote functional independence in home.  Anticipate patient will return home with home health OT      History Related to Current Admission: Patient is a 69 year old female admitted on 7/1/2024 with Presenting Problem: Weakness, hypotension, R groin wound infection and VAC disconnection at home s/p excisional debridement R groin 7/3.     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 , ligation of femoral artery branch, sartorius muscle myoplasty 6/15; DC [facility]      Recommendations for nursing staff:   Transfers: one person and rw  Toileting location: toilet w/ riser    EVALUATION SESSION  Patient Start of Session: supine  FUNCTIONAL TRANSFER ASSESSMENT  Sit to Stand: Edge of Bed; Chair  Edge of Bed: Contact Guard  Assist  Chair: Contact Guard Assist  Toilet Transfer: Contact Guard Assist    BED MOBILITY  Rolling: Stand-by Assist  Supine to Sit : Contact Guard Assist  Sit to Supine (OT): Contact Guard Assist  Scooting: CGA    BALANCE ASSESSMENT  Static Sitting: Supervision  Static Standing: Contact Guard Assist    FUNCTIONAL ADL ASSESSMENT  Eating: Independent  Grooming Seated: Independent  LB Dressing Seated: Moderate Assist      ACTIVITY TOLERANCE:   Tolerated x 3 min in standing during light activity  BP:129/76                           O2 SATURATIONS   94% RA    Cognition  Oriented x 4  Follows simple motor commands    Upper extremity  BUE AROM and strength WFL  Pt has arthritic changes B hands d/t RA    EDUCATION PROVIDED  Patient: Role of Occupational Therapy; Plan of Care; Discharge Recommendations  Patient's Response to Education: Verbalized Understanding; Returned Demonstration      Equipment used: RW  Would benefit from additional trial     Therapist comments: patient having difficulty reaching to clarice socks.  Educated using AE to complete LBD. patient will need further instruction.    Patient End of Session: Up in chair;With  staff;Needs met;Call light within reach;RN aware of session/findings;All patient questions and concerns addressed;Alarm set;Discussed recommendations with /    OCCUPATIONAL PROFILE    HOME SITUATION  Type of Home: Apartment  Home Layout: One level;Elevator  Lives With: Alone    Toilet and Equipment: Comfort height toilet  Shower/Tub and Equipment: Walk-in shower;Grab bar  Other Equipment:  (RW)    Occupation/Status: retired  Hand Dominance: Right  Drives: No  Patient Regularly Uses: Glasses    Prior Level of Function: Mod I with ADLs and functional mobility with use of walker.  Patient states she has children in the area that are supportive and able to assist prn during recovery period.    SUBJECTIVE   PAIN ASSESSMENT  Ratin  Location: denies        OBJECTIVE  Precautions: Bed/chair alarm (R groin wound vac)  Fall Risk: High fall risk    WEIGHT BEARING RESTRICTION  Weight Bearing Restriction: None                ASSESSMENTS    AM-PAC ‘6-Clicks’ Inpatient Daily Activity Short Form  -   Putting on and taking off regular lower body clothing?: A Lot  -   Bathing (including washing, rinsing, drying)?: A Lot  -   Toileting, which includes using toilet, bedpan or urinal? : A Little  -   Putting on and taking off regular upper body clothing?: A Little  -   Taking care of personal grooming such as brushing teeth?: None  -   Eating meals?: None    AM-PAC Score:  Score: 18  Approx Degree of Impairment: 46.65%  Standardized Score (AM-PAC Scale): 38.66    PLAN  OT Treatment Plan: Balance activities;Energy conservation/work simplification techniques;ADL training;Functional transfer training;Endurance training;UE strengthening/ROM;Patient/Family education;Patient/Family training;Equipment eval/education;Compensatory technique education  Rehab Potential : Good  Frequency: 3-5x/week  Number of Visits to Meet Established Goals: 5    ADL Goals  Patient will perform toileting with supervision and AE PRN  Patient will perform LB dressing with supervision and AE PRN    Functional Transfer Goals  Patient will perform bed mobility supine to sit with supervision  Patient will perform bed mobility sit to supine with supervision  Patient will perform toilet transfer with supervision    Additional Goals:  Patient will state precautions and maintain during ADL      Patient Evaluation Complexity Level:   Occupational Profile/Medical History MODERATE - Expanded review of history including review of medical or therapy record   Specific performance deficits impacting engagement in ADL/IADL MODERATE  3 - 5 performance deficits   Client Assessment/Performance Deficits MODERATE - Comorbidities and min to mod modifications of tasks    Clinical Decision Making MODERATE - Analysis of  occupational profile, detailed assessments, several treatment options    Overall Complexity MODERATE     OT Session Time: 30 minutes  Self-Care Home Management: 15 minutes

## 2024-07-06 NOTE — DISCHARGE INSTRUCTIONS
You may shower with soap and water starting on the day of wound vac changes.Wound vac can then be applied.Ok to get the wound wet on day of vac change and then pat dry prior to wound vac placement .  No soaking in bath or pool for 2 weeks.    You should resume all home medications as previously.    Take tylenol as needed for pain.    Drink plenty of fluids to stay well hydrated.     You will need to followup with Dr. Taylor/Florian in the vascular clinic in 1-2 weeks. Please call [phone] to make an appt.    If you have any fevers, chills, chest pain, shortness of breath, drainage, swelling or bleeding, please call the office in order to return to clinic sooner for evaluation.

## 2024-07-06 NOTE — OCCUPATIONAL THERAPY NOTE
OCCUPATIONAL THERAPY TREATMENT NOTE - INPATIENT     Room Number: 7626/7626-A  Session: 1   Number of Visits to Meet Established Goals: 5    Presenting Problem: Weakness, hypotension, R groin wound infection and VAC disconnection at home s/p excisional debridement R groin 7/3      ASSESSMENT   Patient demonstrates good  progress this session, goals remain in progress.    Patient continues to function near baseline with self-care and functional transfers.   Contributing factors to remaining limitations include decreased functional strength, decreased functional reach, and impaired dynamic balance.  Next session anticipate patient to progress lower body dressing.  Occupational Therapy will continue to follow patient for duration of hospitalization.    Patient continues to benefit from continued skilled OT services: at discharge to promote functional independence in home.  Anticipate patient will return home with home health OT.          OT Device Recommendations: TBD    History: Patient is a 69 year old female admitted on 7/1/2024 with Presenting Problem: Weakness, hypotension, R groin wound infection and VAC disconnection at home s/p excisional debridement R groin 7/3.      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 , ligation of femoral artery branch, sartorius muscle myoplasty 6/15; [facility]       WEIGHT BEARING RESTRICTION  Weight Bearing Restriction: None        Recommendations for nursing staff:   Transfers: one person and RW  Toileting location: toilet w/ riser    TREATMENT SESSION:  Patient Start of Session: supine  FUNCTIONAL TRANSFER ASSESSMENT  Sit to Stand: Edge of Bed; Chair  Edge of Bed: Stand-by Assist  Chair: Stand-by Assist  Toilet Transfer:  Stand-by Assist    BED MOBILITY  Rolling: Modified Independent  Supine to Sit : Modified Independent  Sit to Supine (OT): Not Tested  Scooting: mod I    BALANCE ASSESSMENT  Static Sitting: Modified Independent  Static Standing: Stand-by Assist    FUNCTIONAL ADL ASSESSMENT  Eating: Independent  Grooming Seated: Modified Independent  Grooming Standing: Modified Independent  LB Dressing Seated: Minimal Assist  Toileting Seated: Minimal Assist      ACTIVITY TOLERANCE: WFL                         O2 SATURATIONS       EDUCATION PROVIDED  Patient: Role of Occupational Therapy; Plan of Care; Discharge Recommendations  Patient's Response to Education: Returned Demonstration; Verbalized Understanding      Equipment used: RW  Demonstrates functional use, Would benefit from additional trial      Exercises:    Exercises Repetitions Comments   Scapular elevation     Scapular retraction     Shoulder rolls 10    Shoulder flexion 10    Shoulder abduction 10    Shoulder internal/external rotation     Forward punch 10    Elbow flexion 10    Elbow extension     Forearm pronation/supination     Wrist flexion/extension     Gross grasp/fist pumps 10    Ankle pumps     Knee extension     Marching       Therapist comments: Patient educated on OT role, goals, plan of care, recommendations and safety.  OT educated patient on safety,  sequencing, energy conservation, pain management, home modifications and adaptive equipment to increase independence with ADLs.  Educated on energy conservation, work simplification and adaptive techniques.  Patient able to verbalize 3 strategies to complete ADL while incorporating modified strategies.  Patient End of Session: Up in chair;With  staff;Needs met;Call light within reach;RN aware of session/findings;All patient questions and concerns addressed;Discussed recommendations with /;Alarm set    SUBJECTIVE  PAIN ASSESSMENT  Ratin  Location: denies         OBJECTIVE  Precautions: Bed/chair alarm (R groin wound vac)    AM-PAC ‘6-Clicks’ Inpatient Daily Activity Short Form  -   Putting on and taking off regular lower body clothing?: A Little  -   Bathing (including washing, rinsing, drying)?: A Little  -   Toileting, which includes using toilet, bedpan or urinal? : A Little  -   Putting on and taking off regular upper body clothing?: None  -   Taking care of personal grooming such as brushing teeth?: None  -   Eating meals?: None    AM-PAC Score:  Score: 21  Approx Degree of Impairment: 32.79%  Standardized Score (AM-PAC Scale): 44.27    PLAN  OT Treatment Plan: Balance activities;Energy conservation/work simplification techniques;ADL training;Functional transfer training;Endurance training;UE strengthening/ROM;Patient/Family education;Patient/Family training;Equipment eval/education;Compensatory technique education  Rehab Potential : Good  Frequency: 3-5x/week    (ongoing 7/6/2024)  ADL Goals  Patient will perform toileting with supervision and AE PRN  Patient will perform LB dressing with supervision and AE PRN     Functional Transfer Goals  Patient will perform bed mobility supine to sit with supervision  Patient will perform bed mobility sit to supine with supervision  Patient will perform toilet transfer with supervision     Additional Goals:  Patient will state precautions and maintain during ADL     OT Session Time: 30 minutes  Self-Care Home Management: 15 minutes  Therapeutic Activity: 15 minutes

## 2024-07-06 NOTE — PROGRESS NOTES
Infectious Disease Progress Note      Date of admission: 7/1/2024  1:09 PM     Reason for consult: Right groin surgical wound infection    Subjective: Continues to complain of pain involving her right groin incision.  No nausea or vomiting.  No diarrhea.  No shortness of breath.  No cough or sputum production.    The rest of the systems were reviewed and found to be negative except was mentioned above    Interval events: This is a 69-year-old female patient, with history of peripheral vascular disease, recently had bilateral vascular interventions to both her lower extremities, presenting here with right groin surgical wound infections.  Cultures with Klebsiella aerogenes from both her wound and her urine indicating cross-contamination.  Status post debridement on 7/3.  Infection noted to be superficial without involvement of vascular structures as per operative note    Medications:    sulfamethoxazole-trimethoprim DS    magnesium oxide    metRONIDAZOLE    losartan    atenolol    predniSONE    HYDROcodone-acetaminophen    rosuvastatin    acetaminophen    rivaroxaban    insulin degludec    glucose **OR** glucose **OR** glucose-vitamin C **OR** dextrose **OR** glucose **OR** glucose **OR** glucose-vitamin C    insulin aspart    clopidogrel    sodium hypochlorite     Allergies:  Allergies   Allergen Reactions    Celecoxib DIARRHEA and RASH     AND DIARRHEA      Esomeprazole DIARRHEA and RASH     DIARRHEA      Etanercept HIVES and RASH    Fish-Derived Products SWELLING    Infliximab HIVES and ANAPHYLAXIS    Other ANAPHYLAXIS, SWELLING and HIVES     Throat closes up  HAD IN CONJUNCTION WITH ASA- SO AVOIDS BOTH OF THESE- FACE SWELLED UP    Oxycodone PAIN and NAUSEA ONLY    Pregabalin DIARRHEA and RASH     AND DIARRHEA      Enbrel RASH    Orencia [Abatacept] ITCHING    Rice OTHER (SEE COMMENTS)     \"Feels warm\"    Xeljanz [Tofacitinib] DIARRHEA       Physical Exam:  Vitals:    07/06/24 0757   BP: (!) 132/93   Pulse: 84    Resp: 17   Temp: 99.3 °F (37.4 °C)     Vitals signs and nursing note reviewed.   Constitutional:       Appearance: Normal appearance.   HENT:      Head: Normocephalic and atraumatic.      Mouth: Mucous membranes are moist.   Neck:      Musculoskeletal: Neck supple.   Cardiovascular:      Rate and Rhythm: Normal rate.   Pulmonary:      Effort: Pulmonary effort is normal. No respiratory distress.   Musculoskeletal:      Right lower leg: No edema.  Right groin surgical incision noted with wound dehiscence and drainage     Left lower leg: No edema.   Skin:     General: Skin is warm and dry.   Neurological:      General: No focal deficit present.      Mental Status: Alert and oriented to person, place, and time.       Laboratory data:  I have reviewed all the lab results independently.  Lab Results   Component Value Date    K 4.4 07/05/2024    MG 1.8 07/06/2024      Recent Labs   Lab 07/05/24  0620   RBC 2.72*   HGB 8.1*   HCT 24.2*   MCV 89.0   MCH 29.8   MCHC 33.5   RDW 16.0   NEPRELIM 6.53   WBC 9.6   .0      Microbiology data:  Hospital Encounter on 07/01/24   1. Anaerobic Culture     Status: Abnormal    Collection Time: 07/01/24  9:04 PM    Specimen: Groin; Other   Result Value Ref Range    Anaerobic Culture 4+ growth Bacteroides fragilis (A) N/A   2. Aerobic Bacterial Culture     Status: Abnormal    Collection Time: 07/01/24  9:04 PM    Specimen: Groin; Other   Result Value Ref Range    Aerobic Culture Result  N/A     Mixture of organisms suggestive of normal skin kyle    Aerobic Culture Result 4+ growth Klebsiella (Enterobacter) aerogenes (A) N/A    Aerobic Smear 2+ WBCs seen N/A    Aerobic Smear 1+ Gram Positive Cocci N/A    Aerobic Smear 1+ Gram Positive Rods N/A    Aerobic Smear 1+ Gram Negative Rods N/A       Susceptibility    Klebsiella (Enterobacter) aerogenes -  (no method available)     Cefazolin >=64 Resistant      Cefepime <=1 Sensitive      Ceftriaxone <=1 Sensitive      Ciprofloxacin <=0.25  Sensitive      Gentamicin <=1 Sensitive      Meropenem <=0.25 Sensitive      Levofloxacin <=0.12 Sensitive      Piperacillin + Tazobactam <=4 Sensitive      Trimethoprim/Sulfa <=20 Sensitive    3. Urine Culture, Routine     Status: Abnormal    Collection Time: 07/01/24  3:48 PM    Specimen: Urine, clean catch   Result Value Ref Range    Urine Culture (A) N/A     >100,000 CFU/ML Klebsiella (Enterobacter) aerogenes ESBL Pos       Susceptibility    Klebsiella (Enterobacter) aerogenes ESBL Pos -  (no method available)     Cefazolin >=64 Resistant      Cefepime  Resistant      Ceftazidime >=64 Resistant      Ceftriaxone >=64 Resistant      Ciprofloxacin <=0.25 Sensitive      Gentamicin <=1 Sensitive      Meropenem <=0.25 Sensitive      Levofloxacin <=0.12 Sensitive      Nitrofurantoin 128 Resistant      Piperacillin + Tazobactam >=128 Resistant      Trimethoprim/Sulfa <=20 Sensitive    4. Blood Culture     Status: None (Preliminary result)    Collection Time: 07/01/24  3:14 PM    Specimen: Blood,peripheral   Result Value Ref Range    Blood Culture Result No Growth 4 Days N/A     Impression:  Emilie John is a 69 year old female with    Right groin surgical wound infection  Cultures with Klebsiella aerogenes and Bacteroides  This is in the setting of vascular surgery on 6/13/2024 followed by multiple evacuations and wound VAC placement  Currently on IV cefepime and metronidazole  That is post debridement without involvement of vascular structures on 7/3  Rheumatoid arthritis  On prednisone and azithromycin.  Immunosuppressed  Leukocytosis  Reactive  Resolved    Recommendations:    Discontinue IV cefepime  Start Bactrim DS 1 tablet twice daily  Continue metronidazole 500 mg twice daily  Plan to discharge patient on Bactrim DS 1 tablet twice along with metronidazole 500 mg twice daily daily for 2 weeks, through 7/16/2024  Wound care as per vascular surgeon  Follow-up with infectious disease in the wound clinic next  week  Continue to monitor daily labs for antibiotic toxicities  Further recommendations will depend on the above work-up and clinical progress     The plan of care was discussed with the primary hospital team, Kiko Maradiaga*     Recommendations were also discussed with the patient; all questions were answered.     Thank you for this consultation. Please don't hesitate to call the ID team for questions or any acute changes in patient's clinical condition.    Please note that this report has been produced using speech recognition software and may contain errors related to that system including, but not limited to, errors in grammar, punctuation, and spelling, as well as words and phrases that possibly may have been recognized inappropriately.  If there are any questions or concerns, contact the dictating provider for clarification.    The 21st Century Cures Act makes medical notes like these available to patients in the interest of transparency. Please be advised this is a medical document. Medical documents are intended to carry relevant information, facts as evident, and the clinical opinion of the practitioner. The medical note is intended as peer to peer communication and may appear blunt or direct. It is written in medical language and may contain abbreviations or verbiage that are unfamiliar.     Zeeshan Lama MD  [facility]. Tel: [phone]. Fax: [phone].     Emilie Alvaro : 1955 MRN: [id number] [facility]: [id number]

## 2024-07-07 LAB
GLUCOSE BLD-MCNC: 122 MG/DL (ref 70–99)
GLUCOSE BLD-MCNC: 123 MG/DL (ref 70–99)
GLUCOSE BLD-MCNC: 144 MG/DL (ref 70–99)
GLUCOSE BLD-MCNC: 227 MG/DL (ref 70–99)
MAGNESIUM SERPL-MCNC: 1.9 MG/DL (ref 1.6–2.6)

## 2024-07-07 PROCEDURE — 82962 GLUCOSE BLOOD TEST: CPT

## 2024-07-07 PROCEDURE — 83735 ASSAY OF MAGNESIUM: CPT | Performed by: HOSPITALIST

## 2024-07-07 NOTE — PLAN OF CARE
Assumed care at 1900.   Alert and oriented x4.   NSR; RA; denies pain.   Purewick in place; bowel movement noted on shift.   Dressing changed to R groin; wet to dry, ABD and tape.   Antibiotics administered per orders.   Fall precautions in place and call light within reach.

## 2024-07-07 NOTE — PROGRESS NOTES
Infectious Disease Progress Note      Date of admission: 7/1/2024  1:09 PM     Reason for consult: Right groin surgical wound infection    Subjective: Continues to complain of pain involving her right groin incision.  No nausea or vomiting.  No diarrhea.  No shortness of breath.  No cough or sputum production.    The rest of the systems were reviewed and found to be negative except was mentioned above    Interval events: This is a 69-year-old female patient, with history of peripheral vascular disease, recently had bilateral vascular interventions to both her lower extremities, presenting here with right groin surgical wound infections.  Cultures with Klebsiella aerogenes from both her wound and her urine indicating cross-contamination; however, different antibiotic susceptibility profile.  Status post debridement on 7/3.  Infection noted to be superficial without involvement of vascular structures as per operative note.  The patient continues to have issues with general hygiene, wound recently noted to be contaminated with stools    Medications:    sulfamethoxazole-trimethoprim DS    morphINE    metRONIDAZOLE    losartan    atenolol    predniSONE    HYDROcodone-acetaminophen    rosuvastatin    acetaminophen    rivaroxaban    insulin degludec    glucose **OR** glucose **OR** glucose-vitamin C **OR** dextrose **OR** glucose **OR** glucose **OR** glucose-vitamin C    insulin aspart    clopidogrel    sodium hypochlorite     Allergies:  Allergies   Allergen Reactions    Celecoxib DIARRHEA and RASH     AND DIARRHEA      Esomeprazole DIARRHEA and RASH     DIARRHEA      Etanercept HIVES and RASH    Fish-Derived Products SWELLING    Infliximab HIVES and ANAPHYLAXIS    Other ANAPHYLAXIS, SWELLING and HIVES     Throat closes up  HAD IN CONJUNCTION WITH ASA- SO AVOIDS BOTH OF THESE- FACE SWELLED UP    Oxycodone PAIN and NAUSEA ONLY    Pregabalin DIARRHEA and RASH     AND DIARRHEA      Enbrel RASH    Orencia [Abatacept]  ITCHING    Rice OTHER (SEE COMMENTS)     \"Feels warm\"    Xeljanz [Tofacitinib] DIARRHEA       Physical Exam:  Vitals:    07/07/24 0620   BP:    Pulse: 82   Resp: 13   Temp:      Vitals signs and nursing note reviewed.   Constitutional:       Appearance: Normal appearance.   HENT:      Head: Normocephalic and atraumatic.      Mouth: Mucous membranes are moist.   Neck:      Musculoskeletal: Neck supple.   Cardiovascular:      Rate and Rhythm: Normal rate.   Pulmonary:      Effort: Pulmonary effort is normal. No respiratory distress.   Musculoskeletal:      Right lower leg: No edema.  Right groin surgical incision noted with wound dehiscence and drainage     Left lower leg: No edema.   Skin:     General: Skin is warm and dry.   Neurological:      General: No focal deficit present.      Mental Status: Alert and oriented to person, place, and time.       Laboratory data:  I have reviewed all the lab results independently.  Lab Results   Component Value Date    MG 1.9 07/07/2024      Recent Labs   Lab 07/05/24  0620   RBC 2.72*   HGB 8.1*   HCT 24.2*   MCV 89.0   MCH 29.8   MCHC 33.5   RDW 16.0   NEPRELIM 6.53   WBC 9.6   .0      Microbiology data:  Hospital Encounter on 07/01/24   1. Anaerobic Culture     Status: Abnormal    Collection Time: 07/01/24  9:04 PM    Specimen: Groin; Other   Result Value Ref Range    Anaerobic Culture 4+ growth Bacteroides fragilis (A) N/A   2. Aerobic Bacterial Culture     Status: Abnormal    Collection Time: 07/01/24  9:04 PM    Specimen: Groin; Other   Result Value Ref Range    Aerobic Culture Result  N/A     Mixture of organisms suggestive of normal skin kyle    Aerobic Culture Result 4+ growth Klebsiella (Enterobacter) aerogenes (A) N/A    Aerobic Smear 2+ WBCs seen N/A    Aerobic Smear 1+ Gram Positive Cocci N/A    Aerobic Smear 1+ Gram Positive Rods N/A    Aerobic Smear 1+ Gram Negative Rods N/A       Susceptibility    Klebsiella (Enterobacter) aerogenes -  (no method  available)     Cefazolin >=64 Resistant      Cefepime <=1 Sensitive      Ceftriaxone <=1 Sensitive      Ciprofloxacin <=0.25 Sensitive      Gentamicin <=1 Sensitive      Meropenem <=0.25 Sensitive      Levofloxacin <=0.12 Sensitive      Piperacillin + Tazobactam <=4 Sensitive      Trimethoprim/Sulfa <=20 Sensitive    3. Urine Culture, Routine     Status: Abnormal    Collection Time: 07/01/24  3:48 PM    Specimen: Urine, clean catch   Result Value Ref Range    Urine Culture (A) N/A     >100,000 CFU/ML Klebsiella (Enterobacter) aerogenes ESBL Pos       Susceptibility    Klebsiella (Enterobacter) aerogenes ESBL Pos -  (no method available)     Cefazolin >=64 Resistant      Cefepime  Resistant      Ceftazidime >=64 Resistant      Ceftriaxone >=64 Resistant      Ciprofloxacin <=0.25 Sensitive      Gentamicin <=1 Sensitive      Meropenem <=0.25 Sensitive      Levofloxacin <=0.12 Sensitive      Nitrofurantoin 128 Resistant      Piperacillin + Tazobactam >=128 Resistant      Trimethoprim/Sulfa <=20 Sensitive    4. Blood Culture     Status: None    Collection Time: 07/01/24  3:14 PM    Specimen: Blood,peripheral   Result Value Ref Range    Blood Culture Result No Growth 5 Days N/A     Impression:  Emilie John is a 69 year old female with    Right groin surgical wound infection  Cultures with Klebsiella aerogenes and Bacteroides  This is in the setting of vascular surgery on 6/13/2024 followed by multiple evacuations and wound VAC placement  Currently on p.o. Bactrim and metronidazole  That is post debridement without involvement of vascular structures on 7/3  Rheumatoid arthritis  On prednisone and azithromycin.  Immunosuppressed  Leukocytosis  Reactive  Resolved    Recommendations:    Continue Bactrim DS 1 tablet twice daily  Continue metronidazole 500 mg twice daily  Plan to discharge patient on Bactrim DS 1 tablet twice along with metronidazole 500 mg twice daily daily for 2 weeks, through 7/16/2024, final course  will depend on clinical progress.  It is going to be a challenge to get this wound heal given poor hygiene and recurrent contamination of her wound  Wound care as per vascular surgeon  Follow-up with infectious disease in the wound clinic next week  Continue to monitor daily labs for antibiotic toxicities  Further recommendations will depend on the above work-up and clinical progress     The plan of care was discussed with the primary hospital team, Kiko Maradiaga*     Recommendations were also discussed with the patient; all questions were answered.     Thank you for this consultation. Please don't hesitate to call the ID team for questions or any acute changes in patient's clinical condition.    Please note that this report has been produced using speech recognition software and may contain errors related to that system including, but not limited to, errors in grammar, punctuation, and spelling, as well as words and phrases that possibly may have been recognized inappropriately.  If there are any questions or concerns, contact the dictating provider for clarification.    The  Cures Act makes medical notes like these available to patients in the interest of transparency. Please be advised this is a medical document. Medical documents are intended to carry relevant information, facts as evident, and the clinical opinion of the practitioner. The medical note is intended as peer to peer communication and may appear blunt or direct. It is written in medical language and may contain abbreviations or verbiage that are unfamiliar.     Zeeshan Lama MD  [facility]. Tel: [phone]. Fax: [phone].     Emilie John : 1955 MRN: [id number] [facility]: [id number]

## 2024-07-07 NOTE — PLAN OF CARE
Pt has been stable, up to chair with walker and assistance, resting in bed now. Dressing changed on R groin, wet-dry with Dakins solution. Site bloody, open area pinkish-red. She declined pain medications at first but after wound was cleansed and dressed she was given Norco for pain relief. Wound care to see her tomorrow to reapply wound vac.

## 2024-07-07 NOTE — PROGRESS NOTES
[facility]  Hospitalist Progress Note                                                                     [facility]   part of [facility]        Emilie Ceeerson  4/7/1955    SUBJECTIVE:  Patient seen and examined.  Feeling well no complaints.   WoundVAC infiltrated with stool yesterday.   Denies CP/SOB.  NAD.       OBJECTIVE:  Temp:  [98.1 °F (36.7 °C)-98.7 °F (37.1 °C)] 98.7 °F (37.1 °C)  Pulse:  [74-89] 74  Resp:  [10-19] 10  BP: (104-131)/(57-84) 118/77  SpO2:  [99 %-100 %] 100 %  Exam  Gen: No acute distress, alert and oriented x3, no focal neurologic deficits  Pulm: Lungs clear bilaterally, normal respiratory effort  CV: Heart with regular rate and rhythm, no murmur.  Normal PMI.    Abd: Abdomen soft, nontender, nondistended, no organomegaly, bowel sounds present  MSK: Full range of motion in extremities, no clubbing, no cyanosis  Skin: no rashes or lesions    Labs:   Recent Labs   Lab 07/01/24  1325 07/02/24  0602 07/03/24  0617 07/04/24  0605 07/05/24  0620   WBC 18.3* 13.2* 10.5 10.4 9.6   HGB 10.0* 8.2* 8.6* 8.6* 8.1*   MCV 91.3 92.8 93.0 91.0 89.0   .0* 371.0 374.0 353.0 304.0   INR 1.09  --   --   --   --        Recent Labs   Lab 07/01/24  1325 07/01/24  1325 07/02/24  0602 07/03/24  0617 07/04/24  0605 07/05/24  0620 07/05/24  1545 07/06/24  0717 07/07/24  0547   *  --  138 139 139 138  --   --   --    K 4.4  --  4.2 3.5 3.9  3.9 3.3* 4.4  --   --      --  113* 113* 113* 110  --   --   --    CO2 18.0*  --  18.0* 19.0* 19.0* 21.0  --   --   --    BUN 33*  --  18 9 5* 5*  --   --   --    CREATSERUM 1.66*  --  1.10*  1.10* 0.91  0.91 0.78  0.78 0.77  0.77  --   --   --    CA 9.2  --  8.1* 8.4* 8.3* 8.4*  --   --   --    MG  --    < > 1.8 1.8 1.8 1.8  --  1.8 1.9   *  --  166* 157* 97 113*  --   --   --     < > = values in this interval not displayed.       Recent Labs   Lab 07/01/24  1325 07/02/24  0602   ALT  18 17   AST 20 14*   ALB 2.7* 2.2*       Recent Labs   Lab 07/06/24  1435 07/06/24  1802 07/06/24  2155 07/06/24  2327 07/07/24  0459   PGLU 177* 253* 206* 166* 122*       Meds:   Scheduled:    sulfamethoxazole-trimethoprim DS  1 tablet Oral Q12H    metRONIDAZOLE  500 mg Oral 2 times per day    losartan  100 mg Oral Daily    atenolol  50 mg Oral Daily Beta Blocker    predniSONE  5 mg Oral BID with meals    rosuvastatin  10 mg Oral Nightly    rivaroxaban  2.5 mg Oral BID with meals    insulin degludec  10 Units Subcutaneous Nightly    insulin aspart  1-5 Units Subcutaneous TID AC and HS    clopidogrel  75 mg Oral Daily    sodium hypochlorite   Topical BID     Continuous Infusions:       PRN:   morphINE    HYDROcodone-acetaminophen    acetaminophen    glucose **OR** glucose **OR** glucose-vitamin C **OR** dextrose **OR** glucose **OR** glucose **OR** glucose-vitamin C    Assessment/Plan:  Principal Problem:    Hypotension, unspecified hypotension type  Active Problems:    COLLIN (acute kidney injury) (HCC)    Sepsis, due to unspecified organism, unspecified whether acute organ dysfunction present (HCC)    Patient is a 69 year old female with PMH sig for HTN, HLD, DM2, CKD, RA on prednisone, PAD with recent LE vascular procedure complicated by hematoma, ruptured pseudoaneurysm, here for weakness and hypotension        Impression     -hypotension with possible sepsis - resolving   -diarrhea  -COLLIN on CKD 3, baseline Cr ~1.2     -left iliac chronic occlusion with claudication sp balloon angioplasty and stent of left common iliac artery and external iliac artery 06/13  -right common femoral artery occlusion sp thrombectomy, balloon angioplasty, stent; thrombectomy of left SFA and left anterior tibial artery, balloon angioplasty of left TP trunk and posterior tibial 06/13  -sp R groin pseudoaneurysm s/p ligation of femoral artery branch with sartorius muscle myoplasty on 06/15    -evacuation of hematoma and pulse lavage  6/21/24 w/ wound vac placement   -sp debridement of skin and subcutaneous fat, pulse lavage, placement of kerecis skin substitute and mesh 06/25     -anemia, recent abla      -DM2, uncontrolled - A1c 12s     -nicotine dependence, smokes 1ppd     -HTN hx  -HLD     -RA on chronic prednisone      Plan     *hypotension / lethargy   -possible from infection and dehydration  -has had diarrhea at home, BP responded rather rapidly with just fluids but concern for possible infectious source as well  -UA with pyuria however she has no urinary symptoms. Await urine cx  -possible surgical site wound, has foul odor - s/p excisional debridement of R groin 7/3, Pontikis, wound VAC - OP wound care f/u   -cxr wo infiltrates   - wound cx with Klebsiella, abx to cefepime  -add vancomycin pending work up - ID following, await cx   -no fevers but wbc high (chronically elevated but appears worse)  - Ucx with ESBL Klebsiella, however no urinary symptoms, defer abx to ID   - final abx with Bactrim DS/Flagyl until 7/16     *COLLIN - resolving  -possible from dehydration/ infection  -will dc IVF      *RA   -on chronic prednisone  - completed stress dose steroids 7/2 and back to home dose      *anemia  -due to recent abla. Hgb overall stable      *DM 2  -uncontrolled  -ISS      *HTN hx  -hold home meds (at home on atenolol, hydrochlorothiazide, norvasc, losartan)  - resumed atenolol and ARB, hold hydrochlorothiazide/amlodipine on dc, OP PCP f/u      *HLD  -statin     Scd  Xarelto      DOMINIQUE - medically stable, awaiting woundVAC delivery and final instructions from woundcare RN, likely dc Monday  to LINDA Maradiaga MD  [facility]  Message over Mob[employer]/FromUs/Meograph  Pager: [phone]

## 2024-07-07 NOTE — CM/SW NOTE
CM confirmed with Hospitalist patient discharge is pending Wound Care Consult for Wound Vac management/Wound care instructions for discharge.  CM attached clinical updates to SNF referral in aidin system.  CM/SW to follow up tomorrow for wound care consult status and further dc planning.      Tessa Bruce RN Case Manager [phone]

## 2024-07-08 ENCOUNTER — INITIAL APN SNF VISIT (OUTPATIENT)
Dept: INTERNAL MEDICINE CLINIC | Age: 69
End: 2024-07-08

## 2024-07-08 VITALS
BODY MASS INDEX: 31 KG/M2 | TEMPERATURE: 98 F | WEIGHT: 163.13 LBS | HEART RATE: 74 BPM | OXYGEN SATURATION: 100 % | RESPIRATION RATE: 15 BRPM | DIASTOLIC BLOOD PRESSURE: 75 MMHG | SYSTOLIC BLOOD PRESSURE: 136 MMHG

## 2024-07-08 DIAGNOSIS — I10 PRIMARY HYPERTENSION: ICD-10-CM

## 2024-07-08 DIAGNOSIS — N18.30 STAGE 3 CHRONIC KIDNEY DISEASE, UNSPECIFIED WHETHER STAGE 3A OR 3B CKD (HCC): ICD-10-CM

## 2024-07-08 DIAGNOSIS — S31.109A RIGHT GROIN WOUND, INITIAL ENCOUNTER: ICD-10-CM

## 2024-07-08 DIAGNOSIS — E11.9 TYPE 2 DIABETES MELLITUS WITHOUT COMPLICATION, UNSPECIFIED WHETHER LONG TERM INSULIN USE (HCC): ICD-10-CM

## 2024-07-08 DIAGNOSIS — M06.9 RHEUMATOID ARTHRITIS, INVOLVING UNSPECIFIED SITE, UNSPECIFIED WHETHER RHEUMATOID FACTOR PRESENT (HCC): ICD-10-CM

## 2024-07-08 DIAGNOSIS — E78.5 HYPERLIPIDEMIA, UNSPECIFIED HYPERLIPIDEMIA TYPE: ICD-10-CM

## 2024-07-08 DIAGNOSIS — R19.7 DIARRHEA OF PRESUMED INFECTIOUS ORIGIN: ICD-10-CM

## 2024-07-08 DIAGNOSIS — A49.9 ESBL (EXTENDED SPECTRUM BETA-LACTAMASE) PRODUCING BACTERIA INFECTION: ICD-10-CM

## 2024-07-08 DIAGNOSIS — D50.9 IRON DEFICIENCY ANEMIA, UNSPECIFIED IRON DEFICIENCY ANEMIA TYPE: ICD-10-CM

## 2024-07-08 DIAGNOSIS — Z16.12 ESBL (EXTENDED SPECTRUM BETA-LACTAMASE) PRODUCING BACTERIA INFECTION: ICD-10-CM

## 2024-07-08 DIAGNOSIS — Z51.89 AFTERCARE: Primary | ICD-10-CM

## 2024-07-08 DIAGNOSIS — R53.1 WEAKNESS: ICD-10-CM

## 2024-07-08 LAB
GLUCOSE BLD-MCNC: 100 MG/DL (ref 70–99)
GLUCOSE BLD-MCNC: 136 MG/DL (ref 70–99)

## 2024-07-08 PROCEDURE — 99310 SBSQ NF CARE HIGH MDM 45: CPT | Performed by: NURSE PRACTITIONER

## 2024-07-08 PROCEDURE — 99213 OFFICE O/P EST LOW 20 MIN: CPT

## 2024-07-08 PROCEDURE — 82962 GLUCOSE BLOOD TEST: CPT

## 2024-07-08 RX ORDER — SULFAMETHOXAZOLE AND TRIMETHOPRIM 800; 160 MG/1; MG/1
1 TABLET ORAL 2 TIMES DAILY
Qty: 24 TABLET | Refills: 0 | Status: SHIPPED | OUTPATIENT
Start: 2024-07-08 | End: 2024-07-20

## 2024-07-08 RX ORDER — METRONIDAZOLE 500 MG/1
500 TABLET ORAL 3 TIMES DAILY
Qty: 36 TABLET | Refills: 0 | Status: SHIPPED | OUTPATIENT
Start: 2024-07-08 | End: 2024-07-20

## 2024-07-08 NOTE — CONSULTS
[facility]  Report of Inpatient Wound Care Consultation    Emilie John Patient Status:  Inpatient    1955 MRN [id number]   Location [facility] 7NE-A Attending Kiko Maradiaga*   Hosp Day # 7 PCP Tay Alexandra MD     Reason for Consultation:  \"wound vac placement\" - measurements for LINDA    History of Present Illness:  Emilie John is a a(n) 69 year old female. Patient states pain is moderate when changing the dressing, states she is waiting on discharge to [facility] today. Patient with multiple comorbidities, with skin breakdown described below.       History:  Past Medical History:    Diabetes (HCC)    DVT (deep venous thrombosis) (HCC)    Essential hypertension    High blood pressure    Nicotine use disorder    Osteoarthritis    hip, wrist    Peripheral vascular disease (HCC)    Rheumatoid arthritis (HCC)    Visual impairment    glasses     Past Surgical History:   Procedure Laterality Date    Benign biopsy left      Cholecystectomy      Colonoscopy N/A 2019    Procedure: COLONOSCOPY;  Surgeon: Freeman Oshea MD;  Location:  ENDOSCOPY    Colonoscopy      Hip replacement surgery Right 01/10/2020    Knee replacement surgery      Needle biopsy left  2017    Removal gallbladder      Total hip replacement      Total knee replacement Bilateral     Upper gi endoscopy,exam        reports that she has been smoking cigarettes. She started smoking about 51 years ago. She has a 25.7 pack-year smoking history. She has never used smokeless tobacco. She reports that she does not drink alcohol and does not use drugs.      Allergies:  @ALLERGY    Laboratory Data:    Recent Labs   Lab 24  1325 24  2122 24  0602 24  1130 24  0617 24  1228 24  0605 24  1203 24  0620 24  1240 24  1641 24  2102 24  0508   WBC 18.3*  --  13.2*  --  10.5  --  10.4  --  9.6  --   --   --   --    HGB 10.0*  --  8.2*  --  8.6*  --   8.6*  --  8.1*  --   --   --   --    HCT 30.4*  --  25.9*  --  26.5*  --  26.3*  --  24.2*  --   --   --   --    .0*  --  371.0  --  374.0  --  353.0  --  304.0  --   --   --   --    CREATSERUM 1.66*  --  1.10*  1.10*  --  0.91  0.91  --  0.78  0.78  --  0.77  0.77  --   --   --   --    BUN 33*  --  18  --  9  --  5*  --  5*  --   --   --   --    *  --  166*  --  157*  --  97  --  113*  --   --   --   --    CA 9.2  --  8.1*  --  8.4*  --  8.3*  --  8.4*  --   --   --   --    ALB 2.7*  --  2.2*  --   --   --   --   --   --   --   --   --   --    TP 7.7  --  6.2*  --   --   --   --   --   --   --   --   --   --    PTT 25.9  --   --   --   --   --   --   --   --   --   --   --   --    INR 1.09  --   --   --   --   --   --   --   --   --   --   --   --    PGLU  --    < >  --    < >  --    < >  --    < >  --    < > 123* 144* 100*    < > = values in this interval not displayed.         ASSESSMENT:  Wound 06/21/24 Leg Anterior;Proximal;Right;Upper (Active)   Date First Assessed/Time First Assessed: 06/21/24 1320   Primary Wound Type: Incision  Location: Leg  Wound Location Orientation: Anterior;Proximal;Right;Upper      Assessments 7/8/2024  9:16 AM   Wound Image     Drainage Amount Moderate   Drainage Description Sanguineous   Treatments Cleansed   Dressing Aquacel;ABD Pad;Tape   Dressing Changed Changed   Dressing Status Clean;Dry;Intact   Wound Length (cm) 5.5 cm   Wound Width (cm) 8 cm   Wound Surface Area (cm^2) 44 cm^2   Wound Depth (cm) 1.8 cm   Wound Volume (cm^3) 79.2 cm^3   Margins Well-defined edges   Non-staged Wound Description Full thickness   Jojo-wound Assessment Clean;Dry;Intact   Wound Granulation Tissue Red;Firm   Wound Bed Granulation (%) 95 %   Wound Bed Slough (%) 5 %        Edema : None    Wound Cleaning and Dressings (prior to NPWT):  Wound cleansing:  normal saline  Wound cleaning frequency:   Wound product: Aquacel Ag  Dressing change frequency:  Change dressing daily and/or  PRN    Negative Pressure Wound Therapy - to be applied at LINDA:  NPWT: KCI vac therapy, black form at 125 mmHg continuous. RN to change dressing 3x/week unless indicated below.     STAFF RN'S ARE RESPONSIBLE TO ASSESS THE WOUND VAC EACH SHIFT TO ENSURE THAT IT IS PLUGGED IN, TURNED ON, AND THE CANISTER IS NOT FILLED WITH LIANNA BLOOD. REPORT ISSUES TO SURGEON/ATTENDING DOCTOR OR WOUND CARE STAFF IF AVAILABLE.    If patient is Diabetic: want to make sure blood sugars are within a controled range for wound healing     Protein intake: depending on providers recommendations and patients kidney functions - if kidneys are good then recommend patient to increase protein intake (Boost, Gregory, Ensure, Premiere Protein)     Recommendations: patient advised to use Gregory to promote wound healing and to increase protein.     Thank you for this consultation and for allowing me to participate in the care of your patient.  Please call [phone] if you have any questions about this consultation and plan of care.     Time Spent 30 Minutes.    Thank you,  Harrison Bronson RN  Wound/Ostomy/Continence nurse    7/8/2024  10:03 AM

## 2024-07-08 NOTE — PLAN OF CARE
Pt discharged via [facility] to [facility]. Report given to nursing staff at receiving facility.

## 2024-07-08 NOTE — PLAN OF CARE
Received pt at 1930  Pt AOx3, NSR, RA, VSS  (+) BM  R groin dressing changed w/ Dakins. Pt premedicated for dressing change  D/c Planning: WC to reapply wound vac to R groin 7/8 & Pt to dc to [facility]  Call light within reach.  Needs currently met      Problem: PAIN - ADULT  Goal: Verbalizes/displays adequate comfort level or patient's stated pain goal  Description: INTERVENTIONS:  - Encourage pt to monitor pain and request assistance  - Assess pain using appropriate pain scale  - Administer analgesics based on type and severity of pain and evaluate response  - Implement non-pharmacological measures as appropriate and evaluate response  - Consider cultural and social influences on pain and pain management  - Manage/alleviate anxiety  - Utilize distraction and/or relaxation techniques  - Monitor for opioid side effects  - Notify MD/LIP if interventions unsuccessful or patient reports new pain  - Anticipate increased pain with activity and pre-medicate as appropriate  Outcome: Progressing     Problem: SKIN/TISSUE INTEGRITY - ADULT  Goal: Skin integrity remains intact  Description: INTERVENTIONS  - Assess and document risk factors for pressure ulcer development  - Assess and document skin integrity  - Monitor for areas of redness and/or skin breakdown  - Initiate interventions, skin care algorithm/standards of care as needed  Outcome: Progressing  Goal: Incision(s), wounds(s) or drain site(s) healing without S/S of infection  Description: INTERVENTIONS:  - Assess and document risk factors for pressure ulcer development  - Assess and document skin integrity  - Assess and document dressing/incision, wound bed, drain sites and surrounding tissue  - Implement wound care per orders  - Initiate isolation precautions as appropriate  - Initiate Pressure Ulcer prevention bundle as indicated  Outcome: Progressing  Goal: Oral mucous membranes remain intact  Description: INTERVENTIONS  - Assess oral mucosa and hygiene  practices  - Implement preventative oral hygiene regimen  - Implement oral medicated treatments as ordered  Outcome: Progressing     Problem: MUSCULOSKELETAL - ADULT  Goal: Return mobility to safest level of function  Description: INTERVENTIONS:  - Assess patient stability and activity tolerance for standing, transferring and ambulating w/ or w/o assistive devices  - Assist with transfers and ambulation using safe patient handling equipment as needed  - Ensure adequate protection for wounds/incisions during mobilization  - Obtain PT/OT consults as needed  - Advance activity as appropriate  - Communicate ordered activity level and limitations with patient/family  Outcome: Progressing  Goal: Maintain proper alignment of affected body part  Description: INTERVENTIONS:  - Support and protect limb and body alignment per provider's orders  - Instruct and reinforce with patient and family use of appropriate assistive device and precautions (e.g. spinal or hip dislocation precautions)  Outcome: Progressing     Problem: RISK FOR INFECTION - ADULT  Goal: Absence of fever/infection during anticipated neutropenic period  Description: INTERVENTIONS  - Monitor WBC  - Administer growth factors as ordered  - Implement neutropenic guidelines  Outcome: Progressing     Problem: SAFETY ADULT - FALL  Goal: Free from fall injury  Description: INTERVENTIONS:  - Assess pt frequently for physical needs  - Identify cognitive and physical deficits and behaviors that affect risk of falls.  - [place] fall precautions as indicated by assessment.  - Educate pt/family on patient safety including physical limitations  - Instruct pt to call for assistance with activity based on assessment  - Modify environment to reduce risk of injury  - Provide assistive devices as appropriate  - Consider OT/PT consult to assist with strengthening/mobility  - Encourage toileting schedule  Outcome: Progressing

## 2024-07-08 NOTE — CM/SW NOTE
07/08/24 1000   Discharge disposition   Expected discharge disposition subacute   Post Acute Care Provider [facility]   Discharge transportation [facility]     Wound care consult/recs sent to [facility]. Pt able to DC today, SW scheduled [facility] transport for 1:00PM. PCS completed. Notified RN.     RN to call [facility] at [phone] for report.       SHAYY Perkins

## 2024-07-08 NOTE — PROGRESS NOTES
[facility]   part of [facility] Infectious Disease Progress Note    Emilie John Patient Status:  Inpatient    1955 MRN [id number]   Location [facility] 7NE-A Attending Kiko Maradiaga*   Hosp Day # 7 PCP Tay Alexandra MD     Subjective:  Pt states pain is well controlled.      Objective:    Allergies:  Allergies   Allergen Reactions    Celecoxib DIARRHEA and RASH     AND DIARRHEA      Esomeprazole DIARRHEA and RASH     DIARRHEA      Etanercept HIVES and RASH    Fish-Derived Products SWELLING    Infliximab HIVES and ANAPHYLAXIS    Other ANAPHYLAXIS, SWELLING and HIVES     Throat closes up  HAD IN CONJUNCTION WITH ASA- SO AVOIDS BOTH OF THESE- FACE SWELLED UP    Oxycodone PAIN and NAUSEA ONLY    Pregabalin DIARRHEA and RASH     AND DIARRHEA      Enbrel RASH    Orencia [Abatacept] ITCHING    Rice OTHER (SEE COMMENTS)     \"Feels warm\"    Xeljanz [Tofacitinib] DIARRHEA       Medications:    Current Facility-Administered Medications:     sulfamethoxazole-trimethoprim DS (Bactrim DS) 800-160 MG per tab 1 tablet, 1 tablet, Oral, Q12H    morphINE PF 2 MG/ML injection 2 mg, 2 mg, Intravenous, Q4H PRN    metRONIDAZOLE (Flagyl) tab 500 mg, 500 mg, Oral, 2 times per day    losartan (Cozaar) tab 100 mg, 100 mg, Oral, Daily    atenolol (Tenormin) tab 50 mg, 50 mg, Oral, Daily Beta Blocker    predniSONE (Deltasone) tab 5 mg, 5 mg, Oral, BID with meals    HYDROcodone-acetaminophen (Norco) 5-325 MG per tab 1 tablet, 1 tablet, Oral, Q4H PRN    rosuvastatin (Crestor) tab 10 mg, 10 mg, Oral, Nightly    acetaminophen (Tylenol Extra Strength) tab 500 mg, 500 mg, Oral, Q4H PRN    rivaroxaban (Xarelto) tab 2.5 mg, 2.5 mg, Oral, BID with meals    insulin degludec (Tresiba) 100 units/mL flextouch 10 Units, 10 Units, Subcutaneous, Nightly    glucose (Dex4) 15 GM/59ML oral liquid 15 g, 15 g, Oral, Q15 Min PRN **OR** glucose (Glutose) 40% oral gel 15 g, 15 g, Oral, Q15 Min PRN **OR** glucose-vitamin  C (Dex-4) chewable tab 4 tablet, 4 tablet, Oral, Q15 Min PRN **OR** dextrose 50% injection 50 mL, 50 mL, Intravenous, Q15 Min PRN **OR** glucose (Dex4) 15 GM/59ML oral liquid 30 g, 30 g, Oral, Q15 Min PRN **OR** glucose (Glutose) 40% oral gel 30 g, 30 g, Oral, Q15 Min PRN **OR** glucose-vitamin C (Dex-4) chewable tab 8 tablet, 8 tablet, Oral, Q15 Min PRN    insulin aspart (NovoLOG) 100 Units/mL FlexPen 1-5 Units, 1-5 Units, Subcutaneous, TID AC and HS    clopidogrel (Plavix) tab 75 mg, 75 mg, Oral, Daily    sodium hypochlorite (Dakin's) 0.25 % external solution, , Topical, BID    Physical Exam:  General: Alert, orientated x3.  Cooperative.  No apparent distress.  Vital Signs:  Blood pressure (!) 120/105, pulse 66, temperature 98.1 °F (36.7 °C), temperature source Oral, resp. rate 14, weight 163 lb 2.3 oz (74 kg), SpO2 99%, not currently breastfeeding.   Temp (24hrs), Av.4 °F (36.9 °C), Min:98.1 °F (36.7 °C), Max:98.8 °F (37.1 °C)      HEENT: Exam is unremarkable.  Without scleral icterus.  Mucous membranes are moist. PERRLA.  Oropharynx is clear.  Neck: No tenderness to palpitation.  Full range of motion to flexion and extension, lateral rotation and lateral flexion of cervical spine.  No JVD. Supple.   Lungs: Clear to auscultation bilaterally.  Cardiac: Regular rate and rhythm. No murmur.  Abdomen:  Soft, non-distended, non-tender, with no rebound or guarding.   Extremities:  No lower extremity edema noted.  Without clubbing or cyanosis.    Skin: Groin wound appears clean  Neurologic: Cranial nerves are grossly intact.  Motor strength and sensory examination is grossly normal.  No focal neurologic deficit.      Assessment/Plan:    1.  Post-op R groin wound infection  -cultures with klebsiella aerogenes and bacteroides  -hx of vascular surgery on 24 with NPWT placement  -s/p I&D on 7/3  -on PO bactrim and metronidazole  2.  Immuncompromised  -hx of RA, on prednisone and azithromycin prophylaxis  3.   Leukocytosis  -normalized  4.  Bacteriuria  -urine cultures with ESBL klebsiella/k aerogenes  -asymptomatic  -on abx above  5.  Dispo  -dc on PO bactrim DS BID and metronidazole 500mg BID through 7/16  -will continue to follow at [facility]  -stable for dc to SNF from ID standpoint      If you have any questions or concerns please call [facility] Infectious Disease at [phone].     Johnny Rodriguez APRN

## 2024-07-09 VITALS
OXYGEN SATURATION: 97 % | DIASTOLIC BLOOD PRESSURE: 64 MMHG | WEIGHT: 137.63 LBS | SYSTOLIC BLOOD PRESSURE: 120 MMHG | HEART RATE: 94 BPM | TEMPERATURE: 98 F | BODY MASS INDEX: 26 KG/M2 | RESPIRATION RATE: 18 BRPM

## 2024-07-09 RX ORDER — GARLIC EXTRACT 500 MG
1 CAPSULE ORAL 2 TIMES DAILY WITH MEALS
COMMUNITY

## 2024-07-09 RX ORDER — ACETAMINOPHEN 325 MG/1
650 TABLET ORAL EVERY 6 HOURS PRN
COMMUNITY

## 2024-07-10 ENCOUNTER — APPOINTMENT (OUTPATIENT)
Dept: WOUND CARE | Facility: HOSPITAL | Age: 69
End: 2024-07-10
Attending: INTERNAL MEDICINE
Payer: MEDICARE

## 2024-07-10 ENCOUNTER — PATIENT OUTREACH (OUTPATIENT)
Dept: CASE MANAGEMENT | Age: 69
End: 2024-07-10

## 2024-07-10 NOTE — PROGRESS NOTES
1st attempt [facility] apt request  VASC SURG -decline, pt stated she will f/up w/   WC instead  DM -existing apt (9/11), pt doesn't want sooner apt    Tay Alexandra  PCP  [address] [address] [phone]  Office to call -availability    Confirmed w/ pt  Closing encounter

## 2024-07-10 NOTE — PROGRESS NOTES
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, balloon angioplasty, stent; thrombectomy of left SFA and left anterior tibial artery, balloon angioplasty of left TP trunk and posterior tibial on   -S/p Right Groin Pseudoaneurysm s/p ligation of femoral artery branch with sartorius muscle myoplasty on 6/15  -Evacuation of hematoma and pulse lavage 24 with wound vac placement  -S/p debridement of skin and subcutaneous fat, pulse lavage, placement of kerecis skin substitute and mesh on   -anemia, recent abla  -DMT2, uncontrolled  -Nicotine Dependence  -HTN  -HLD  -RA       HPI:  Emilie John  : 1955, Age 69 year old female patient with PMH sig for HTN, HLD, DMT2, CKD RA on prednisone, PAD with recent LE vascular procedure complicated by hematoma, ruptured pseudoaneurysm, presented to the ER for weakness and hypotension.  Admitted from  to .  The patient underwent an angioplasty/stent of iliac artery on , complicated by hemorrhagic shock.  The patient was taken back to the OR on 6/15 for evacuation of hematoma and Right groin pseudoaneurysm rupture and ligation of femoral artery branch.  She had another procedure for hematoma evacuation on  and wound vac placement.  On  the patient underwent debridement of skin and subcutaneus fat, placement of kerecs skin substitute and mesh.  Was discharged with a wound vac.  Per patient, she reported that her wound vac got disconnected at home.   Wound has been bother her and she had been scratching it.  When her son saw the patient on the day of admission he found the patient to be very weak and fatigued.   Brought to the ER.  Her Sbp was in the 70s.  Per patient, she has had poor oral intake and diarrhea at home.  No fevers or abdominal pain.  No respiratory symptoms.  No urinary symptoms.  Given IVFs and her BP responded well.  She was admitted for possible infection and dehydration.  Workup was done for UTI.  ID consulted.  UA showed pyuria.  Urine culture showed ESBL Klebsiella.  The surgical site had a foul odor.  She was a s/p excisional debridement of right groin on 7/3.  Had a wound vac placed.  CXR w/o infiltrates, wound cx with Klebsiella, placed on IV Cefepime and Vanco.  Placed on oral Bactrim DS/Flagyl until 7/16.  Her COLLIN resolving, d/t possible dehydration/infection.  IVFs discontinued.  For her RA, she completed stress dose steroids on 7/2 and placed back on home dosage of prednisone.  Hgb stable.  Diabetes uncontrolled, placed on insulin sliding scale.  Holding home medications of Atenolol, Hydrochlorothiazide, Norvasc, and Losartan).  Resumed Atenolol and ARB upon discharge, however will hold hydrochlorothiazide/amlodipine upon discharge.  The patient was discharged in stable condition to [facility] for rehabilitation and medical management.    Today:  Seen in room.  Doing well.  Lethargic, however, able to answer questions.  Assessed wound on right groin, the wound vac not placed yet.  However, will be placed after this writer's assessment.  C/D/I.    Chief Complaint   Patient presents with    Follow - Up     Weakness, Hypotension, DMT2, Right Groin Wound and ESBL in urine     ALLERGIES    She is allergic to celecoxib, esomeprazole, etanercept, fish-derived products, infliximab, other, oxycodone, pregabalin, enbrel, orencia [abatacept], rice, and xeljanz [tofacitinib].      CURRENT MEDS:    Current Outpatient Medications on File Prior to Visit    Medication Sig    sulfamethoxazole-trimethoprim -160 MG Oral Tab per tablet Take 1 tablet by mouth 2 (two) times daily for 12 days.    metRONIDAZOLE 500 MG Oral Tab Take 1 tablet (500 mg total) by mouth 3 (three) times daily for 12 days.    HYDROcodone-acetaminophen 5-325 MG Oral Tab Take 1 tablet by mouth every 4 (four) hours as needed.    rivaroxaban 2.5 MG Oral Tab Take 1 tablet (2.5 mg total) by mouth 2 (two) times daily with meals.    clopidogrel 75 MG Oral Tab Take 1 tablet (75 mg total) by mouth daily. (Patient taking differently: Take 1 tablet (75 mg total) by mouth daily. Stop on 9/11/24)    glipiZIDE ER 5 MG Oral Tablet 24 Hr Take 2 tablets (10 mg total) by mouth 2 (two) times daily.    rosuvastatin 10 MG Oral Tab Take 1 tablet (10 mg total) by mouth nightly.    valsartan 160 MG Oral Tab Take 1 tablet (160 mg total) by mouth daily.    atenolol 50 MG Oral Tab Take 1 tablet (50 mg total) by mouth daily.    metFORMIN 500 MG Oral Tab Take 2 tablets (1,000 mg total) by mouth 2 (two) times daily with meals. 2 tab twice a day    predniSONE 5 MG Oral Tab Take 1 tablet (5 mg total) by mouth 2 (two) times daily.    Glucose Blood (ACCU-CHEK LUPE PLUS) In Vitro Strip Use to check blood sugar before and after breakfast and dinner (4x per day)     No current facility-administered medications on file prior to visit.         HISTORY:    She  has a past medical history of Diabetes ([facility]), DVT (deep venous thrombosis) ([facility]), Essential hypertension, High blood pressure, Nicotine use disorder, Osteoarthritis, Peripheral vascular disease (HCC), Rheumatoid arthritis (HCC), and Visual impairment.    She  has a past surgical history that includes removal gallbladder; benign biopsy left (1991); needle biopsy left (2017); cholecystectomy; colonoscopy (N/A, 05/06/2019); upper gi endoscopy,exam; total knee replacement (Bilateral); colonoscopy; hip replacement surgery (Right, 01/10/2020); knee replacement surgery; and total  hip replacement.      CODE STATUS VERIFIED: Full Code    SUBJECTIVE:    REVIEW OF SYSTEMS:  GENERAL HEALTH:feels well otherwise  SKIN: denies any unusual skin lesions or rashes  WOUNDS: +Right Groin   EYES:no visual complaints or deficits  HENT: denies nasal congestion, sinus pain or sore throat; and hearing loss negative  RESPIRATORY: denies shortness of breath, wheezing or cough   CARDIOVASCULAR:denies chest pain, no palpitations , denies syncope, denies orthopnea, denies cough  GI: denies nausea, vomiting, constipation, diarrhea; no rectal bleeding; no heartburn  :no dysuria, urgency or frequency; no vaginal discharge; no urinary incontinence; no hematuria  MUSCULOSKELETAL:no joint complaints upper or lower extremities  NEURO:no sensory or motor complaint, denies seizures, denies vertigo, denies tinnitus, and denies tremors  PSYCHE: no symptoms of depression or anxiety  HEMATOLOGY:denies hx anemia, denies bruising, denies excessive bleeding  ENDOCRINE: denies excessive thirst or urination; denies unexpected wt gain or wt loss  ALLERGY/IMM.: denies food or seasonal allergies      OBJECTIVE:  VITALS:  /64   Pulse 94   Temp 97.5 °F (36.4 °C)   Resp 18   Wt 137 lb 9.6 oz (62.4 kg)   SpO2 97%   BMI 26.00 kg/m²     PHYSICAL EXAM:  GENERAL HEALTH: well developed, well nourished, in no apparent distress  LINES, TUBES, DRAINS:  none  SKIN: pale, warm, dry-->Toenails unkept  WOUND: +Right Groin Wound, C/D/I  EYES: PERRLA, conjunctiva normal; no drainage from eyes  HENT: normocephalic; normal nose, no nasal drainage, mucous membranes pink, moist  NECK: full range of motion observed  RESPIRATORY:clear to percussion and auscultation, No wheezing/cough/accessory muscle use; on room air  CARDIOVASCULAR: S1, S2 normal, RRR; no S3, no S4; , no click, no murmur  ABDOMEN: normal active BS+, soft, non-distended; no apparent masses; observed, non-tender, no guarding during physical exam  : Deferred  LYMPHATIC: no  lymphedema  MUSCULOSKELETAL: no acute synovitis upper or lower extremity.  Weakness R/T recent hospitalization/diagnoses/sequelae; will undergo therapies to rehab and improve strength, endurance and independence w/ ADLs as able  EXTREMITIES/VASCULAR: no cyanosis, clubbing or edema, radial pulses 2+, and dorsalis pedal pulses 2+  NEUROLOGIC: intact; no sensorimotor deficit, reflexes normal, cranial nerves intact II-XII, follows commands  PSYCHIATRIC: alert and oriented x 3; affect appropriate    DIAGNOSTICS REVIEWED AT THIS VISIT:  Vital signs reviewed in [facility] EMR.  [facility] medical records, notes, lab and imaging results reviewed. And diagnostics available in rehab records/[facility].  Medication reconciliation completed.        Lab Results   Component Value Date     (H) 07/05/2024    BUN 5 (L) 07/05/2024    BUNCREA 14.0 06/15/2021    CREATSERUM 0.77 07/05/2024    CREATSERUM 0.77 07/05/2024    ANIONGAP 7 07/05/2024    GFR 71 09/12/2017    GFRNAA 71 08/22/2021    GFRAA 81 08/22/2021    CA 8.4 (L) 07/05/2024    OSMOCALC 284 07/05/2024    ALKPHO 91 07/02/2024    AST 14 (L) 07/02/2024    ALT 17 07/02/2024    BILT 1.2 07/02/2024    TP 6.2 (L) 07/02/2024    ALB 2.2 (L) 07/02/2024    GLOBULIN 4.0 07/02/2024     07/05/2024    K 4.4 07/05/2024     07/05/2024    CO2 21.0 07/05/2024       Lab Results   Component Value Date    WBC 9.6 07/05/2024    RBC 2.72 (L) 07/05/2024    HGB 8.1 (L) 07/05/2024    HCT 24.2 (L) 07/05/2024    .0 07/05/2024    MCV 89.0 07/05/2024    MCH 29.8 07/05/2024    MCHC 33.5 07/05/2024    RDW 16.0 07/05/2024    NEPRELIM 6.53 07/05/2024    NEUTABS 5.21 06/23/2020    LYMPHABS 5.81 (H) 06/23/2020    EOSABS 0.48 (H) 06/23/2020    BASABS 0.00 06/23/2020    NEPERCENT 68.3 07/05/2024    LYPERCENT 17.2 07/05/2024    MOPERCENT 10.6 07/05/2024    EOPERCENT 2.4 07/05/2024    BAPERCENT 0.4 07/05/2024    NE 6.53 07/05/2024    LYMABS 1.65 07/05/2024    MOABSO 1.02 (H) 07/05/2024     EOABSO 0.23 07/05/2024    BAABSO 0.04 07/05/2024       SEE PLAN BELOW  Physical Deconditioning/Impaired mobility and ADLs/At risk for falling/Weakness  -Fall Precautions  -PT/OT/ST to evaluate and treat  -Add Tylenol 650 mg q6h prn for fever/pain, if given for fever, notify MD/NP  -Norco 5/325 mg q4h prn  -Podiatry Consult  -[facility] team to establish care plan meeting with patient and POA/family as appropriate  -Anticipate DC on or before TBD; SW to assist patient/family w/ DC planning  -DC Plan:  TBD    Hypotension with possible sepsis  -Monitor vitals    Diarrhea  -Flagyl 500 mg tid-->stop on 7/20/24  -Bactrim -160 mg bid-->stop on 7/20/24  -Add Probiotic bid    COLLIN on CKD3  -Monitor labs    Left Iliac Chronic Occlusion with claudication/S/p balloon angioplasty and stent of left common iliac artery and external iliac artery on 6/13/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, balloon angioplasty, stent; thrombectomy of left SFA and left anterior tibial artery, balloon angioplasty of left TP trunk and posterior tibial on 6/13/S/p Right Groin Pseudoaneurysm s/p ligation of femoral artery branch with sartorius muscle myoplasty on 6/15/Evacuation of hematoma and pulse lavage 6/21/24 with wound vac placement  -Plavix 75 mg qd    S/p debridement of skin and subcutaneous fat, pulse lavage, placement of kerecis skin substitute and mesh on 6/25  -Wound Care Consult  -Wound Vac Placement  -F/u with PIETRO Armstrong as OP    Anemia  -recent abla  -Monitor labs    DMT2, uncontrolled  -A1c-->12.3 last taken on 6/13/24  -Blood sugars ACHS, notify MD/NP for blood sugar less than 70 or greater than 350  -ISS discontinued in hospital  -Glipizide ER 10 mg bid  -Metformin 1000 mg bid  -Monitor sugars closely, patient on Prednisone as listed below    RA  -Completed stress dose steroids on 7/2  -Restart home dosage of Prednisone 5 mg bid    Nicotine Dependence  -no treatment at this time    HTN/HLD  -Vitals q  shift  -Valsartan 160 mg every day  -Off Hydrochlorothiazide, Norvasc and Losartan  -Vitals stable   -Crestor 10 mg every day  -Xarelto 2.5 mg bid    DVT Prophylaxis   -On Xarelto  -Encourage early mobilization and participation in PT/OT as able     LABS  -CBC/CMP weekly while in [facility]    Follow Up Appointments  -Dr. Tay Alexandra,  PCP within 1-2 weeks following [facility] discharge.   *Follow-Up with specialists as recommended.  Future Appointments   Date Time Provider Department Center   7/18/2024  1:00 PM Edita Gipson APRN  W[facility]       *Greater than 65 minutes spent w/ patient and family, reviewing medical records, labs, completing medication reconciliation and entering orders to establish plan of care in [facility].    SHARYN Caceres  07/08/24   8:45 PM

## 2024-07-11 ENCOUNTER — SNF VISIT (OUTPATIENT)
Dept: INTERNAL MEDICINE CLINIC | Age: 69
End: 2024-07-11

## 2024-07-11 DIAGNOSIS — E11.9 TYPE 2 DIABETES MELLITUS WITHOUT COMPLICATION, UNSPECIFIED WHETHER LONG TERM INSULIN USE (HCC): ICD-10-CM

## 2024-07-11 DIAGNOSIS — R11.0 NAUSEA: ICD-10-CM

## 2024-07-11 DIAGNOSIS — S31.109A RIGHT GROIN WOUND, INITIAL ENCOUNTER: ICD-10-CM

## 2024-07-11 DIAGNOSIS — Z51.89 AFTERCARE: Primary | ICD-10-CM

## 2024-07-11 DIAGNOSIS — R53.1 WEAKNESS: ICD-10-CM

## 2024-07-11 DIAGNOSIS — M06.9 RHEUMATOID ARTHRITIS, INVOLVING UNSPECIFIED SITE, UNSPECIFIED WHETHER RHEUMATOID FACTOR PRESENT (HCC): ICD-10-CM

## 2024-07-11 PROCEDURE — 99309 SBSQ NF CARE MODERATE MDM 30: CPT | Performed by: NURSE PRACTITIONER

## 2024-07-12 VITALS
HEART RATE: 79 BPM | DIASTOLIC BLOOD PRESSURE: 71 MMHG | TEMPERATURE: 97 F | RESPIRATION RATE: 18 BRPM | OXYGEN SATURATION: 95 % | SYSTOLIC BLOOD PRESSURE: 122 MMHG

## 2024-07-12 RX ORDER — ONDANSETRON 4 MG/1
4 TABLET, ORALLY DISINTEGRATING ORAL EVERY 8 HOURS PRN
COMMUNITY

## 2024-07-12 RX ORDER — OMEPRAZOLE 20 MG/1
20 CAPSULE, DELAYED RELEASE ORAL EVERY MORNING
COMMUNITY

## 2024-07-12 NOTE — PROGRESS NOTES
Emilie John, 1955, 69 year old, female    Chief Complaint   Patient presents with    Follow - Up     Nausea, weakness, right groin wound        Subjective:  Emilie John  : 1955, Age 69 year old female patient with PMH sig for HTN, HLD, DMT2, CKD RA on prednisone, PAD with recent LE vascular procedure complicated by hematoma, ruptured pseudoaneurysm, presented to the ER for weakness and hypotension.  Admitted from  to .  The patient underwent an angioplasty/stent of iliac artery on , complicated by hemorrhagic shock.  The patient was taken back to the OR on 6/15 for evacuation of hematoma and Right groin pseudoaneurysm rupture and ligation of femoral artery branch.  She had another procedure for hematoma evacuation on  and wound vac placement.  On  the patient underwent debridement of skin and subcutaneus fat, placement of kerecs skin substitute and mesh.  Was discharged with a wound vac.  Per patient, she reported that her wound vac got disconnected at home.  Wound has been bother her and she had been scratching it.  When her son saw the patient on the day of admission he found the patient to be very weak and fatigued.   Brought to the ER.  Her Sbp was in the 70s.  Per patient, she has had poor oral intake and diarrhea at home.  No fevers or abdominal pain.  No respiratory symptoms.  No urinary symptoms.  Given IVFs and her BP responded well.  She was admitted for possible infection and dehydration.  Workup was done for UTI.  ID consulted.  UA showed pyuria.  Urine culture showed ESBL Klebsiella.  The surgical site had a foul odor.  She was a s/p excisional debridement of right groin on 7/3.  Had a wound vac placed.  CXR w/o infiltrates, wound cx with Klebsiella, placed on IV Cefepime and Vanco.  Placed on oral Bactrim DS/Flagyl until .  Her COLLIN resolving, d/t possible dehydration/infection.  IVFs discontinued.  For her RA, she completed stress dose steroids on  and  placed back on home dosage of prednisone.  Hgb stable.  Diabetes uncontrolled, placed on insulin sliding scale.  Holding home medications of Atenolol, Hydrochlorothiazide, Norvasc, and Losartan).  Resumed Atenolol and ARB upon discharge, however will hold hydrochlorothiazide/amlodipine upon discharge.  The patient was discharged in stable condition to [facility] for rehabilitation and medical management.     Today:  Seen in room.  Had some nausea.  Started Zofran as listed below.  Assessed wound site.  Has Wound vac on draining appropriately.  Wound care monitor.  In-house ID monitoring as well.  Will continue to monitor.    Denies insomnia, fatigue, fever/chills, cough, SOB, dyspnea, angina, palpitations, diarrhea, constipation, and urinary sxs;+nausea, no vomiting    Objective:  /71   Pulse 79   Temp 97 °F (36.1 °C)   Resp 18   SpO2 95%   PHYSICAL EXAM:  GENERAL HEALTH: well developed, well nourished, in no apparent distress  LINES, TUBES, DRAINS:  none  SKIN: pale, warm, dry-->Toenails unkept  WOUND: +Right Groin Wound-->+Wound Vac in place, draining appropriately  EYES: PERRLA, conjunctiva normal; no drainage from eyes  HENT: normocephalic; normal nose, no nasal drainage, mucous membranes pink, moist  NECK: full range of motion observed  RESPIRATORY:clear to percussion and auscultation, No wheezing/cough/accessory muscle use; on room air  CARDIOVASCULAR: S1, S2 normal, RRR; no S3, no S4; , no click, no murmur  ABDOMEN: normal active BS+, soft, non-distended; no apparent masses; observed, non-tender, no guarding during physical exam  : Deferred  LYMPHATIC: no lymphedema  MUSCULOSKELETAL: no acute synovitis upper or lower extremity.  Weakness R/T recent hospitalization/diagnoses/sequelae; will undergo therapies to rehab and improve strength, endurance and independence w/ ADLs as able  EXTREMITIES/VASCULAR: no cyanosis, clubbing or edema, radial pulses 2+, and dorsalis pedal pulses 2+  NEUROLOGIC: intact; no  sensorimotor deficit, reflexes normal, cranial nerves intact II-XII, follows commands  PSYCHIATRIC: alert and oriented x 3; affect appropriate    Medications reviewed: Yes    Diagnostics reviewed:  dated:  7/9/24  CBC W/DIFF AND PLATELETS  WHITE BLOOD CELLS 12.12 THO/mm3 4.80 - 10.80 H Final  RED BLOOD CELLS 3.31 MIL/mm3 4.20 - 5.40 L Final  HEMOGLOBIN 9.7 g/dL 12.0 - 16.0 L Final  HEMATOCRIT 31.4 % 37.0 - 47.0 L Final  MCV 94.9 fL 81.0 - 99.0 Final  MCH 29.3 pg 27.0 - 33.0 Final  MCHC 30.9 g/dL 32.0 - 36.0 L Final  RDW-CV 17.7 % 11.5 - 15.2 H Final  PLATELET COUNT 500 THO/mm3 150 - 400 H Final  MPV 8.6 fL 9.5 - 13.1 L Final  NEUTROPHILS, ABS 7.94 THO/mm3 1.40 - 6.80 H Final  LYMPHOCYTES, ABS 2.44 THO/mm3 0.80 - 3.00 Final  MONOCYTES, ABS 1.31 THO/mm3 0.20 - 1.00 H Final  EOSINOPHILS, ABS 0.11 THO/mm3 0.00 - 0.50 Final  BASOPHILS, ABS 0.06 THO/mm3 0.00 - 0.10 Final  IMMATURE GRANS, ABS 0.26 THO/mm3 0.00 - 0.10 H Final  NEUTROPHILS % 65.6 % Final  LYMPHOCYTES % 20.1 % Final  MONOCYTES % 10.8 % Final  EOSINOPHILS % 0.9 % Final  BASOPHILS % 0.5 % Final  IMMATURE GRANS % 2.1 % Final    COMPREHENSIVE METABOLIC  GLUCOSE 38 mg/dL 70 - 110 LL Final  RESULT VERIFIED, SUGGEST REPEAT TESTING IF NECESSARY.  BUN 14 mg/dL 7 - 28 Final  CREATININE 1.39 mg/dL 0.44 - 1.32 H Final  BILIRUBIN, TOTAL 0.43 mg/dL 0.16 - 1.30 Final  PROTEIN, TOTAL 6.6 g/dL 5.6 - 8.2 Final  ALBUMIN 3.7 g/dL 3.2 - 4.9 Final  SODIUM 137 mEq/L 138 - 147 L Final  POTASSIUM 4.3 mEq/L 3.6 - 5.0 Final  CHLORIDE 107 mEq/L 99 - 110 Final  CARBON DIOXIDE 20 mmol/L 18 - 30 Final  SGPT(ALT) 14 U/L 0 - 42 Final  SGOT(AST) 17 U/L 9 - 35 Final  ALK. PHOSPHATASE 90 U/L 34 - 143 Final  CALCIUM 9.5 mg/dL 8.7 - 10.5 Final  eGFR If African American 45 mL/m/1.73m  2  >=60 L Final  eGFR If Non-African American 38 mL/m/1.73m  2  >=60 L Final    Dated:  7/11/24  CBC W/DIFF AND PLATELETS  WHITE BLOOD CELLS 13.18 THO/mm3 4.80 - 10.80 H Final  RED BLOOD CELLS 3.22 MIL/mm3 4.20 -  5.40 L Final  HEMOGLOBIN 9.6 g/dL 12.0 - 16.0 L Final  HEMATOCRIT 31.6 % 37.0 - 47.0 L Final  MCV 98.1 fL 81.0 - 99.0 Final  MCH 29.8 pg 27.0 - 33.0 Final  MCHC 30.4 g/dL 32.0 - 36.0 L Final  RDW-CV 18.1 % 11.5 - 15.2 H Final  PLATELET COUNT 488 THO/mm3 150 - 400 H Final  MPV 8.5 fL 9.5 - 13.1 L Final  NEUTROPHILS, ABS 9.22 THO/mm3 1.40 - 6.80 H Final  LYMPHOCYTES, ABS 2.22 THO/mm3 0.80 - 3.00 Final  MONOCYTES, ABS 1.10 THO/mm3 0.20 - 1.00 H Final  EOSINOPHILS, ABS 0.17 THO/mm3 0.00 - 0.50 Final  BASOPHILS, ABS 0.10 THO/mm3 0.00 - 0.10 Final  IMMATURE GRANS, ABS 0.37 THO/mm3 0.00 - 0.10 H Final  NEUTROPHILS % 70.0 % Final  LYMPHOCYTES % 16.8 % Final  MONOCYTES % 8.3 % Final  EOSINOPHILS % 1.3 % Final  BASOPHILS % 0.8 % Final  IMMATURE GRANS % 2.8 % Final    Assessment and plan:  Nausea  -Start Zofran 4 mg q8h prn    Physical Deconditioning/Impaired mobility and ADLs/At risk for falling/Weakness  -Fall Precautions  -PT/OT/ST to evaluate and treat  -Add Tylenol 650 mg q6h prn for fever/pain, if given for fever, notify MD/NP  -Norco 5/325 mg q4h prn  -Podiatry Consult  -[facility] team to establish care plan meeting with patient and POA/family as appropriate  -Anticipate DC on or before TBD; SW to assist patient/family w/ DC planning  -DC Plan:  TBD     Hypotension with possible sepsis  -Monitor vitals    Leukocytosis  -WBCs-->12.12-->13.1  -On prednisone  -In-house ID following  -continues on Flagyl and Bactrim for now    Diarrhea  -Resolving  -Flagyl 500 mg tid-->stop on 7/20/24  -Bactrim -160 mg bid-->stop on 7/20/24  -Probiotic bid     COLLIN on CKD3  -Monitor labs     Left Iliac Chronic Occlusion with claudication/S/p balloon angioplasty and stent of left common iliac artery and external iliac artery on 6/13/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, balloon angioplasty, stent; thrombectomy of left SFA and left anterior tibial artery, balloon angioplasty of left TP trunk and posterior tibial on 6/13/S/p  Right Groin Pseudoaneurysm s/p ligation of femoral artery branch with sartorius muscle myoplasty on 6/15/Evacuation of hematoma and pulse lavage 24 with wound vac placement  -Plavix 75 mg qd     S/p debridement of skin and subcutaneous fat, pulse lavage, placement of kerecis skin substitute and mesh on   -Wound Care Consult  -Wound Vac Placement  -F/u with PIETRO Armstrong as OP     Anemia  -recent abla  -Monitor labs     DMT2, uncontrolled  -A1c-->12.3 last taken on 24  -Blood sugars ACHS, notify MD/NP for blood sugar less than 70 or greater than 350  -ISS discontinued in hospital  -Glipizide ER 10 mg bid-->on hold  -Metformin 1000 mg bid-->on hold  -Added Humalog SS  -Sliding Scale:  141-180=1 unit; 181-220=2 units; 221-260=3 units; 261-300=4 units; 301-350=5 units, notify MD/NP for blood sugars less than 70 or greater than 350  -Accuchecks from -24  -Fastin-141; PP:   -Monitor sugars closely, patient on Prednisone as listed below  -[facility]     RA  -Completed stress dose steroids on   -Restart home dosage of Prednisone 5 mg bid     Nicotine Dependence  -no treatment at this time     HTN/HLD  -Vitals q shift  -Valsartan 160 mg every day  -Off Hydrochlorothiazide, Norvasc and Losartan  -Vitals stable   -Crestor 10 mg every day  -Xarelto 2.5 mg bid    GI prophylaxis  -Add Omeprazole 20 mg qd     DVT Prophylaxis   -On Xarelto  -Encourage early mobilization and participation in PT/OT as able     LABS  -CBC/CMP weekly while in [facility]     Follow Up Appointments  -Dr. Tay Alexandra,  PCP within 1-2 weeks following [facility] discharge.   -[facility] as directed    Future Appointments   Date Time Provider Department Center   2024  1:00 PM Edita Gipson APRN  [facility]ound [facility] SHARYN Greenwood  2024  11:08 AM

## 2024-07-17 NOTE — PROGRESS NOTES
CHIEF COMPLAINT:   No chief complaint on file.    HPI:   Information obtained from patient and chart  7-18-24 INITIAL:  Patient is a 68 yo female with pmhx of RA (on chronic prednisone), htn, hl, uncontrolled dm (a1c 12.3 on 6-13-24), 1 ppd cigarette smoker. She was admitted on 6-13 for left iliac chronic occlusion with claudication sp balloon angioplasty and stent of left common iliac artery and external iliac artery and right common femoral artery occlusion sp thrombectomy, balloon angioplasty, stent; thrombectomy of left SFA and left anterior tibial artery, balloon angioplasty of left TP trunk and posterior tibial.  Patient subsequently developed a large right inguinal ruptured pseudoaneurysm noted on 6-15 and underwent a right groin exploration, ligation of femoral artery branch and sartorious muscle myoplasty by dr. charles.  On 6-21 patient returned to OR with dr. Du for evacuation of hematoma and the right, debridement and wound vac placement.  4 days later she was taken back to the OR for debridement and placement of keracis over the intact myoplasty site and myoplasty harvest site. Wound vac was placed in house prior to discharge and the day before patient was to come to wound clinic she was readmitted from 7-1 to 7-8-24 for hypotension and possible sepsis.  She was discharged to [facility].  She presents today with ***. Her last albumin on July 2 was low at 2.2/tp 6.2Abx continue? Discuss a1c ***    MEDICATIONS:     Current Outpatient Medications:     Insulin Lispro (HUMALOG IJ), Inject 1-5 Units as directed 3 (three) times daily as needed (hyperglycemia). Sliding Scale:  141-180=1 unit; 181-220=2 units; 221-260=3 units; 261-300=4 units and 301-350=5 units, notify MD/NP for blood sugars less than 70 or greater than 350, Disp: , Rfl:     ondansetron 4 MG Oral Tablet Dispersible, Take 1 tablet (4 mg total) by mouth every 8 (eight) hours as needed for Nausea., Disp: , Rfl:     omeprazole 20 MG Oral  Capsule Delayed Release, Take 1 capsule (20 mg total) by mouth every morning., Disp: , Rfl:     acetaminophen 325 MG Oral Tab, Take 2 tablets (650 mg total) by mouth every 6 (six) hours as needed for Pain or Fever., Disp: , Rfl:     acidophilus-pectin Oral Cap, Take 1 capsule by mouth 2 (two) times daily with meals., Disp: , Rfl:     sulfamethoxazole-trimethoprim -160 MG Oral Tab per tablet, Take 1 tablet by mouth 2 (two) times daily for 12 days., Disp: 24 tablet, Rfl: 0    metRONIDAZOLE 500 MG Oral Tab, Take 1 tablet (500 mg total) by mouth 3 (three) times daily for 12 days., Disp: 36 tablet, Rfl: 0    HYDROcodone-acetaminophen 5-325 MG Oral Tab, Take 1 tablet by mouth every 4 (four) hours as needed., Disp: 30 tablet, Rfl: 0    rivaroxaban 2.5 MG Oral Tab, Take 1 tablet (2.5 mg total) by mouth 2 (two) times daily with meals., Disp: 60 tablet, Rfl: 0    clopidogrel 75 MG Oral Tab, Take 1 tablet (75 mg total) by mouth daily. (Patient taking differently: Take 1 tablet (75 mg total) by mouth daily. Stop on 9/11/24), Disp: 90 tablet, Rfl: 0    glipiZIDE ER 5 MG Oral Tablet 24 Hr, Take 2 tablets (10 mg total) by mouth 2 (two) times daily. (Patient not taking: Reported on 7/12/2024), Disp: , Rfl:     rosuvastatin 10 MG Oral Tab, Take 1 tablet (10 mg total) by mouth nightly., Disp: , Rfl:     valsartan 160 MG Oral Tab, Take 1 tablet (160 mg total) by mouth daily., Disp: 90 tablet, Rfl: 1    atenolol 50 MG Oral Tab, Take 1 tablet (50 mg total) by mouth daily., Disp: 90 tablet, Rfl: 1    metFORMIN 500 MG Oral Tab, Take 2 tablets (1,000 mg total) by mouth 2 (two) times daily with meals. 2 tab twice a day (Patient not taking: Reported on 7/12/2024), Disp: 180 tablet, Rfl: 3    predniSONE 5 MG Oral Tab, Take 1 tablet (5 mg total) by mouth 2 (two) times daily., Disp: 60 tablet, Rfl: 3    Glucose Blood (ACCU-CHEK LUPE PLUS) In Vitro Strip, Use to check blood sugar before and after breakfast and dinner (4x per day), Disp:  400 strip, Rfl: 3  ALLERGIES:     Allergies   Allergen Reactions    Celecoxib DIARRHEA and RASH     AND DIARRHEA      Esomeprazole DIARRHEA and RASH     DIARRHEA      Etanercept HIVES and RASH    Fish-Derived Products SWELLING    Infliximab HIVES and ANAPHYLAXIS    Other ANAPHYLAXIS, SWELLING and HIVES     Throat closes up  HAD IN CONJUNCTION WITH ASA- SO AVOIDS BOTH OF THESE- FACE SWELLED UP    Oxycodone PAIN and NAUSEA ONLY    Pregabalin DIARRHEA and RASH     AND DIARRHEA      Enbrel RASH    Orencia [Abatacept] ITCHING    Rice OTHER (SEE COMMENTS)     \"Feels warm\"    Xeljanz [Tofacitinib] DIARRHEA      REVIEW OF SYSTEMS:   This information was obtained from the patient/family and chart.    See HPI for pertinent positives, otherwise 10 pt ROS negative.  Review of Systems   HISTORY:     Past Medical History:    Diabetes (HCC)    DVT (deep venous thrombosis) (HCC)    Essential hypertension    High blood pressure    Nicotine use disorder    Osteoarthritis    hip, wrist    Peripheral vascular disease (HCC)    Rheumatoid arthritis (HCC)    Visual impairment    glasses     Past Surgical History:   Procedure Laterality Date    Benign biopsy left  1991    Cholecystectomy      Colonoscopy N/A 05/06/2019    Procedure: COLONOSCOPY;  Surgeon: Freeman Oshea MD;  Location:  ENDOSCOPY    Colonoscopy      Hip replacement surgery Right 01/10/2020    Knee replacement surgery      Needle biopsy left  2017    Removal gallbladder      Total hip replacement      Total knee replacement Bilateral     Upper gi endoscopy,exam        Social History     Socioeconomic History    Marital status: Single   Tobacco Use    Smoking status: Every Day     Current packs/day: 0.50     Average packs/day: 0.5 packs/day for 51.5 years (25.7 ttl pk-yrs)     Types: Cigarettes     Start date: 1/29/1973    Smokeless tobacco: Never   Vaping Use    Vaping status: Never Used   Substance and Sexual Activity    Alcohol use: No    Drug use: No    Sexual  activity: Not Currently     Social Determinants of Health     Financial Resource Strain: Medium Risk (3/9/2022)    [facility]    Overall Financial Resource Strain (CARDIA)     Difficulty of Paying Living Expenses: Somewhat hard   Food Insecurity: No Food Insecurity (7/1/2024)    Food Insecurity     Food Insecurity: Never true   Transportation Needs: No Transportation Needs (7/1/2024)    Transportation Needs     Lack of Transportation: No   Physical Activity: Insufficiently Active (3/9/2022)    Exercise Vital Sign     Days of Exercise per Week: 2 days     Minutes of Exercise per Session: 30 min   Stress: No Stress Concern Present (3/9/2022)    [facility], [facility]    [employer] [place] [employer] - Occupational Stress Questionnaire     Feeling of Stress : Not at all    Social Connections   Housing Stability: Low Risk  (7/1/2024)    Housing Stability     Housing Instability: No     PHYSICAL EXAM:   There were no vitals filed for this visit.   Estimated body mass index is 26 kg/m² as calculated from the following:    Height as of 6/6/24: 61\".    Weight as of 7/8/24: 137 lb 9.6 oz (62.4 kg).   POC Glucose   Date Value Ref Range Status   07/08/2024 136 (H) 70 - 99 mg/dL Final   07/08/2024 100 (H) 70 - 99 mg/dL Final   07/07/2024 144 (H) 70 - 99 mg/dL Final       Vital signs reviewed.Appears stated age, well groomed.    Constitutional:  Bp ***wnl for patient. Pulse Regular and wnl for patient. Respirations easy and unlabored. Temperature wnl. Weight ***normal for height. Appearance neat and clean. Appears in no acute distress. Well nourished and well developed.    Respiratory: Respiratory ***effort is easy and symmetric bilaterally. Rate is normal at rest and on ***room air.  Bilateral breath sounds are clear and equal w/ no wheezes, rales or rhonchi.    Cardiovascular:  Heart rhythm and rate regular, without murmur or gallop.  ***Abnormal : irregularly irregular, +murmur    Lower extremity:  dp/pt palpable *** bilaterally. ***Extremities free of varicosities, edema. Capillary refill < 3 seconds. Digits are warm. toenails are wnl for color, thickness and hygeine. Skin hydration wnl. + hairgrowth on legs.    Gastrointestinal: ***Abdomen is soft and non-distended without tenderness. Bowel sounds active.  Musculoskeletal:  Gait and station stable ***  Gait independent with assistance of (cane, walker, crutches, knee roller)  Patient is in wheelchair propelled by others, able to transfer with assist  Patient is in a motor scooter/chair, able to transfer with assist  Patient is dependent for all transfers and mobility  Independent with assistance of a wheelchair, able to transfer with slideboard  NWB, pivot transfer with assist  Integumentary:  refer to wound characteristics and images   Psychiatric:  Judgment and insight intact. Alert and oriented times 3. No evidence of depression, anxiety, or agitation. Calm, cooperative, and communicative. ***Appropriate interactions and affect.  EDEMA:   Calf                    Ankle                          DIAGNOSTICS:     Lab Results   Component Value Date    BUN 5 (L) 07/05/2024    CREATSERUM 0.77 07/05/2024    CREATSERUM 0.77 07/05/2024    GFRCKDEPI 79.47 02/22/2022    ALB 2.2 (L) 07/02/2024    TP 6.2 (L) 07/02/2024    A1C 12.3 (H) 06/13/2024       WOUND ASSESSMENT:     Wound 06/15/24 Leg Anterior;Proximal;Right;Upper (Active)   Date First Assessed: 06/15/24   Primary Wound Type: Incision  Location: Leg  Wound Location Orientation: Anterior;Proximal;Right;Upper      Assessments 6/15/2024  5:00 PM 6/20/2024 10:45 PM   Site Assessment Unable to assess Hematoma;Painful;Purple   Closure Unable to assess Sutures   Drainage Amount None Moderate   Drainage Description -- Sanguineous   Dressing Coverlet 4x4s;ABD Pad;Tape   Dressing Changed New Changed   Dressing Status Clean;Dry;Intact Dressing  Changed;Clean;Dry;Intact   Jojo-wound Assessment -- Dry;Hematoma;Excoriated       Inactive Orders   Date Order Priority Status Authorizing Provider   07/08/24 1015 Wound care Routine Discontinued Kiko Maradiaga MD   07/08/24 1015 Wound care Routine Discontinued Kiko Maradiaga MD       Wound 06/21/24 Leg Anterior;Proximal;Right;Upper (Active)   Date First Assessed/Time First Assessed: 06/21/24 1320   Primary Wound Type: Incision  Location: Leg  Wound Location Orientation: Anterior;Proximal;Right;Upper      Assessments 6/21/2024  1:55 PM 7/8/2024  9:16 AM   Wound Image      Site Assessment Clean;Dry;Intact --   Drainage Amount Scant Moderate   Drainage Description Serosanguineous Sanguineous   Treatments -- Cleansed   Dressing Wound vac sponge;Duoderm;Adaptic Aquacel;ABD Pad;Tape   Dressing Changed -- Changed   Dressing Status Clean;Dry;Intact Clean;Dry;Intact   Wound Length (cm) -- 5.5 cm   Wound Width (cm) -- 8 cm   Wound Surface Area (cm^2) -- 44 cm^2   Wound Depth (cm) -- 1.8 cm   Wound Volume (cm^3) -- 79.2 cm^3   Margins -- Well-defined edges   Non-staged Wound Description -- Full thickness   Jojo-wound Assessment -- Clean;Dry;Intact   Wound Granulation Tissue -- Red;Firm   Wound Bed Granulation (%) -- 95 %   Wound Bed Slough (%) -- 5 %       Inactive Orders   Date Order Priority Status Authorizing Provider   07/08/24 1015 Wound care Routine Discontinued Kiko Maradiaga MD   07/08/24 1015 Wound care Routine Discontinued Kiko Maradiaga MD   07/01/24 2127 sodium hypochlorite (Dakin's) 0.25 % external solution Routine Discontinued Howard Taylor MD     - Please indicate site of application:    Groin          ASSESSMENT AND PLAN:    There are no diagnoses linked to this encounter.    Discussed with patient the aspects of wound healing including:  blood flow in/out (arterial vs venous vs lymph) and managing edema with appropriate compression, wound bed optimization  including moist wound healing, removal of necrosis, bioburden control, monitoring for infection, offloading and finally the patient as a whole including nutrition and increased protein intake and blood glucose control.      Risks, benefits, and alternatives of current treatment plan discussed in detail.  Questions and concerns addressed. Red flags to RTC or ED reviewed.  Patient (or parent) agrees to plan.      NOTE TO PATIENT: The 21st Century Cures Act makes clinical notes like these available to patients in the interest of transparency. Clinical notes are medical documents used by physicians and care providers to communicate with each other. These documents include medical language and terminology, abbreviations, and treatment information that may sound technical and at times possibly unfamiliar. In addition, at times, the verbiage may appear blunt or direct. These documents are one tool providers use to communicate relevant information and clinical opinions of the care providers in a way that allows common understanding of the clinical context.   I spent   ***   minutes with the patient. This time included:    preparing to see the patient (eg, review notes and recent diagnostics),  seeing the patient, obtaining and/or reviewing separately obtained history, performing a medically appropriate examination and/or evaluation, counseling and educating the patient, documenting in the record   DISCHARGE:    There are no Patient Instructions on file for this visit.   Edita Gipson FNP-C, CWCN-AP, CFCN, CSWS, WCC, DWC  7/18/2024

## 2024-07-18 ENCOUNTER — SNF VISIT (OUTPATIENT)
Dept: INTERNAL MEDICINE CLINIC | Age: 69
End: 2024-07-18

## 2024-07-18 ENCOUNTER — APPOINTMENT (OUTPATIENT)
Dept: WOUND CARE | Facility: HOSPITAL | Age: 69
End: 2024-07-18
Attending: NURSE PRACTITIONER
Payer: MEDICARE

## 2024-07-18 DIAGNOSIS — R53.1 WEAKNESS: ICD-10-CM

## 2024-07-18 DIAGNOSIS — E11.9 TYPE 2 DIABETES MELLITUS WITHOUT COMPLICATION, UNSPECIFIED WHETHER LONG TERM INSULIN USE (HCC): ICD-10-CM

## 2024-07-18 DIAGNOSIS — S31.109A RIGHT GROIN WOUND, INITIAL ENCOUNTER: ICD-10-CM

## 2024-07-18 DIAGNOSIS — Z51.89 AFTERCARE: Primary | ICD-10-CM

## 2024-07-18 PROCEDURE — 99307 SBSQ NF CARE SF MDM 10: CPT | Performed by: NURSE PRACTITIONER

## 2024-07-19 VITALS
SYSTOLIC BLOOD PRESSURE: 142 MMHG | TEMPERATURE: 97 F | WEIGHT: 136.5 LBS | RESPIRATION RATE: 18 BRPM | HEART RATE: 88 BPM | OXYGEN SATURATION: 97 % | DIASTOLIC BLOOD PRESSURE: 79 MMHG | BODY MASS INDEX: 26 KG/M2

## 2024-07-19 NOTE — PROGRESS NOTES
Emilie John, 1955, 69 year old, female    Chief Complaint   Patient presents with    Follow - Up     Weakness, Right Groin wound        Subjective:  Emilie John  : 1955, Age 69 year old female patient with PMH sig for HTN, HLD, DMT2, CKD RA on prednisone, PAD with recent LE vascular procedure complicated by hematoma, ruptured pseudoaneurysm, presented to the ER for weakness and hypotension.  Admitted from  to .  The patient underwent an angioplasty/stent of iliac artery on , complicated by hemorrhagic shock.  The patient was taken back to the OR on 6/15 for evacuation of hematoma and Right groin pseudoaneurysm rupture and ligation of femoral artery branch.  She had another procedure for hematoma evacuation on  and wound vac placement.  On  the patient underwent debridement of skin and subcutaneus fat, placement of kerecs skin substitute and mesh.  Was discharged with a wound vac.  Per patient, she reported that her wound vac got disconnected at home.  Wound has been bother her and she had been scratching it.  When her son saw the patient on the day of admission he found the patient to be very weak and fatigued.   Brought to the ER.  Her Sbp was in the 70s.  Per patient, she has had poor oral intake and diarrhea at home.  No fevers or abdominal pain.  No respiratory symptoms.  No urinary symptoms.  Given IVFs and her BP responded well.  She was admitted for possible infection and dehydration.  Workup was done for UTI.  ID consulted.  UA showed pyuria.  Urine culture showed ESBL Klebsiella.  The surgical site had a foul odor.  She was a s/p excisional debridement of right groin on 7/3.  Had a wound vac placed.  CXR w/o infiltrates, wound cx with Klebsiella, placed on IV Cefepime and Vanco.  Placed on oral Bactrim DS/Flagyl until .  Her COLLIN resolving, d/t possible dehydration/infection.  IVFs discontinued.  For her RA, she completed stress dose steroids on  and placed  back on home dosage of prednisone.  Hgb stable.  Diabetes uncontrolled, placed on insulin sliding scale.  Holding home medications of Atenolol, Hydrochlorothiazide, Norvasc, and Losartan).  Resumed Atenolol and ARB upon discharge, however will hold hydrochlorothiazide/amlodipine upon discharge.  The patient was discharged in stable condition to [facility] for rehabilitation and medical management.     Today:  Seen in room.  Nausea resolved. Assessed wound site.  Appears dry.  Wound care team following.  Was to see wound doctor today, however, was cancelled.  Wound care team to reapply the wound vac.  Wound care monitor.  In-house ID monitoring as well.  Will continue to monitor.    Denies insomnia, fatigue, fever/chills, cough, SOB, dyspnea, angina, palpitations, diarrhea, constipation, and urinary sxs.    Objective:  /79   Pulse 88   Temp 97.3 °F (36.3 °C)   Resp 18   Wt 136 lb 8 oz (61.9 kg)   SpO2 97%   BMI 25.79 kg/m²   PHYSICAL EXAM:  GENERAL HEALTH: well developed, well nourished, in no apparent distress  LINES, TUBES, DRAINS:  none  SKIN: pale, warm, dry-->Toenails unkept  WOUND: +Right Groin Wound-->stable  EYES: PERRLA, conjunctiva normal; no drainage from eyes  HENT: normocephalic; normal nose, no nasal drainage, mucous membranes pink, moist  NECK: full range of motion observed  RESPIRATORY:clear to percussion and auscultation, No wheezing/cough/accessory muscle use; on room air  CARDIOVASCULAR: S1, S2 normal, RRR; no S3, no S4; , no click, no murmur  ABDOMEN: normal active BS+, soft, non-distended; no apparent masses; observed, non-tender, no guarding during physical exam  : Deferred  LYMPHATIC: no lymphedema  MUSCULOSKELETAL: no acute synovitis upper or lower extremity.  Weakness R/T recent hospitalization/diagnoses/sequelae; will undergo therapies to rehab and improve strength, endurance and independence w/ ADLs as able  EXTREMITIES/VASCULAR: no cyanosis, clubbing or edema, radial pulses 2+, and  dorsalis pedal pulses 2+  NEUROLOGIC: intact; no sensorimotor deficit, reflexes normal, cranial nerves intact II-XII, follows commands  PSYCHIATRIC: alert and oriented x 3; affect appropriate    Medications reviewed: Yes    Diagnostics reviewed:  dated:  7/16/24  CBC W/DIFF AND PLATELETS  WHITE BLOOD CELLS 7.28 THO/mm3 4.80 - 10.80 Final  RED BLOOD CELLS 3.13 MIL/mm3 4.20 - 5.40 L Final  HEMOGLOBIN 9.4 g/dL 12.0 - 16.0 L Final  HEMATOCRIT 30.5 % 37.0 - 47.0 L Final  MCV 97.4 fL 81.0 - 99.0 Final  MCH 30.0 pg 27.0 - 33.0 Final  MCHC 30.8 g/dL 32.0 - 36.0 L Final  RDW-CV 18.0 % 11.5 - 15.2 H Final  PLATELET COUNT 401 THO/mm3 150 - 400 H Final  MPV 8.6 fL 9.5 - 13.1 L Final  NEUTROPHILS, ABS 4.24 THO/mm3 1.40 - 6.80 Final  LYMPHOCYTES, ABS 1.84 THO/mm3 0.80 - 3.00 Final  MONOCYTES, ABS 0.93 THO/mm3 0.20 - 1.00 Final  EOSINOPHILS, ABS 0.11 THO/mm3 0.00 - 0.50 Final  BASOPHILS, ABS 0.06 THO/mm3 0.00 - 0.10 Final  IMMATURE GRANS, ABS 0.10 THO/mm3 0.00 - 0.10 Final  NEUTROPHILS % 58.2 % Final  LYMPHOCYTES % 25.3 % Final  MONOCYTES % 12.8 % Final  EOSINOPHILS % 1.5 % Final  BASOPHILS % 0.8 % Final  IMMATURE GRANS % 1.4 % Final    COMPREHENSIVE METABOLIC  GLUCOSE 127 mg/dL 70 - 110 H Final  BUN 19 mg/dL 7 - 28 Final  CREATININE 1.51 mg/dL 0.44 - 1.32 H Final  BILIRUBIN, TOTAL 0.28 mg/dL 0.16 - 1.30 Final  PROTEIN, TOTAL 6.0 g/dL 5.6 - 8.2 Final  ALBUMIN 3.4 g/dL 3.2 - 4.9 Final  SODIUM 137 mEq/L 138 - 147 L Final  POTASSIUM 5.2 mEq/L 3.6 - 5.0 H Final  CHLORIDE 110 mEq/L 99 - 110 Final  CARBON DIOXIDE 17 mmol/L 18 - 30 L Final  SGPT(ALT) 12 U/L 0 - 42 Final  SGOT(AST) 12 U/L 9 - 35 Final  ALK. PHOSPHATASE 85 U/L 34 - 143 Final  CALCIUM 9.3 mg/dL 8.7 - 10.5 Final  eGFR If  41 mL/m/1.73m  2  >=60 L Final  eGFR If Non-African American 34 mL/m/1.73m  2  >=60 L Final  > INDICATES GREATER THAN  eGFR reference range is Greater than or equal to 60  mL/m/1.73m2    HEMOGLOBIN A1C 5.5 % 4.4 - 6.3 Final    Repeat stat:  Potassium level 4.6    Assessment and plan:  Nausea-resolved  -Zofran 4 mg q8h prn    Physical Deconditioning/Impaired mobility and ADLs/At risk for falling/Weakness  -Fall Precautions  -PT/OT/ST to evaluate and treat  -Add Tylenol 650 mg q6h prn for fever/pain, if given for fever, notify MD/NP  -Norco 5/325 mg q4h prn  -Podiatry Consult  -[facility] team to establish care plan meeting with patient and POA/family as appropriate  -Anticipate DC on or before TBD; SW to assist patient/family w/ DC planning  -DC Plan:  TBD     Hypotension with possible sepsis  -Monitor vitals    Leukocytosis-resolved  -WBCs-->12.12-->13.1-->7.2  -On prednisone  -In-house ID following  -continues on Flagyl and Bactrim for now    Diarrhea  -Resolving  -Flagyl 500 mg tid-->stop on 7/20/24  -Bactrim -160 mg bid-->stop on 7/20/24  -Probiotic bid     COLLIN on CKD3  -Monitor labs     Left Iliac Chronic Occlusion with claudication/S/p balloon angioplasty and stent of left common iliac artery and external iliac artery on 6/13/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, balloon angioplasty, stent; thrombectomy of left SFA and left anterior tibial artery, balloon angioplasty of left TP trunk and posterior tibial on 6/13/S/p Right Groin Pseudoaneurysm s/p ligation of femoral artery branch with sartorius muscle myoplasty on 6/15/Evacuation of hematoma and pulse lavage 6/21/24 with wound vac placement  -Plavix 75 mg qd     S/p debridement of skin and subcutaneous fat, pulse lavage, placement of kerecis skin substitute and mesh on 6/25  -Wound Care Consult  -Wound Vac Placement  -F/u with PIETRO Armstrong as OP     Anemia  -recent abla  -Monitor labs     DMT2, uncontrolled  -A1c-->12.3 last taken on 6/13/24; A1c-->5.5 taken on 7/19/24  -Blood sugars ACHS, notify MD/NP for blood sugar less than 70 or greater than 350  -ISS discontinued in hospital  -Glipizide ER 10 mg bid-->on hold  -Metformin 1000 mg bid-->on hold  -Humalog SS  -Sliding  Scale:  141-180=1 unit; 181-220=2 units; 221-260=3 units; 261-300=4 units; 301-350=5 units, notify MD/NP for blood sugars less than 70 or greater than 350  -Accuchecks from -24  -Fastin-159; PP: 118-264  -Monitor sugars closely, patient on Prednisone as listed below  -[facility]     RA  -Completed stress dose steroids on   -Restart home dosage of Prednisone 5 mg bid     Nicotine Dependence  -no treatment at this time     HTN/HLD  -Vitals q shift  -Valsartan 160 mg every day  -Off Hydrochlorothiazide, Norvasc and Losartan  -Vitals stable   -Crestor 10 mg every day  -Xarelto 2.5 mg bid    GI prophylaxis  -Omeprazole 20 mg every day, changed to Esomeprazole 20 mg qd     DVT Prophylaxis   -On Xarelto  -Encourage early mobilization and participation in PT/OT as able     LABS  -CBC/CMP weekly while in [facility]     Follow Up Appointments  -Dr. Tay Alexandra,  PCP within 1-2 weeks following [facility] discharge.   -[facility] as directed    SHARYN Caceres  2024  6 p.m.

## 2024-07-22 ENCOUNTER — SNF VISIT (OUTPATIENT)
Dept: INTERNAL MEDICINE CLINIC | Age: 69
End: 2024-07-22

## 2024-07-22 VITALS
SYSTOLIC BLOOD PRESSURE: 158 MMHG | HEART RATE: 74 BPM | OXYGEN SATURATION: 100 % | DIASTOLIC BLOOD PRESSURE: 69 MMHG | TEMPERATURE: 97 F | RESPIRATION RATE: 18 BRPM

## 2024-07-22 DIAGNOSIS — R53.1 WEAKNESS: ICD-10-CM

## 2024-07-22 DIAGNOSIS — S31.109A RIGHT GROIN WOUND, INITIAL ENCOUNTER: ICD-10-CM

## 2024-07-22 DIAGNOSIS — E11.9 TYPE 2 DIABETES MELLITUS WITHOUT COMPLICATION, UNSPECIFIED WHETHER LONG TERM INSULIN USE (HCC): ICD-10-CM

## 2024-07-22 DIAGNOSIS — Z51.89 AFTERCARE: Primary | ICD-10-CM

## 2024-07-23 NOTE — PROGRESS NOTES
Emilie John, 1955, 69 year old, female    Chief Complaint   Patient presents with    Follow - Up     Right groin wound, hyperglycemia        Subjective:  Emilie John  : 1955, Age 69 year old female patient with PMH sig for HTN, HLD, DMT2, CKD RA on prednisone, PAD with recent LE vascular procedure complicated by hematoma, ruptured pseudoaneurysm, presented to the ER for weakness and hypotension.  Admitted from  to .  The patient underwent an angioplasty/stent of iliac artery on , complicated by hemorrhagic shock.  The patient was taken back to the OR on 6/15 for evacuation of hematoma and Right groin pseudoaneurysm rupture and ligation of femoral artery branch.  She had another procedure for hematoma evacuation on  and wound vac placement.  On  the patient underwent debridement of skin and subcutaneus fat, placement of kerecs skin substitute and mesh.  Was discharged with a wound vac.  Per patient, she reported that her wound vac got disconnected at home.  Wound has been bother her and she had been scratching it.  When her son saw the patient on the day of admission he found the patient to be very weak and fatigued.   Brought to the ER.  Her Sbp was in the 70s.  Per patient, she has had poor oral intake and diarrhea at home.  No fevers or abdominal pain.  No respiratory symptoms.  No urinary symptoms.  Given IVFs and her BP responded well.  She was admitted for possible infection and dehydration.  Workup was done for UTI.  ID consulted.  UA showed pyuria.  Urine culture showed ESBL Klebsiella.  The surgical site had a foul odor.  She was a s/p excisional debridement of right groin on 7/3.  Had a wound vac placed.  CXR w/o infiltrates, wound cx with Klebsiella, placed on IV Cefepime and Vanco.  Placed on oral Bactrim DS/Flagyl until .  Her COLLIN resolving, d/t possible dehydration/infection.  IVFs discontinued.  For her RA, she completed stress dose steroids on  and  placed back on home dosage of prednisone.  Hgb stable.  Diabetes uncontrolled, placed on insulin sliding scale.  Holding home medications of Atenolol, Hydrochlorothiazide, Norvasc, and Losartan).  Resumed Atenolol and ARB upon discharge, however will hold hydrochlorothiazide/amlodipine upon discharge.  The patient was discharged in stable condition to [facility] for rehabilitation and medical management.     Today:  Seen in room with wound care team.  Assessed wound site during dressing change.  Very much Improved.  Will continue to monitor.  Blood sugars elevated, will add a mealtime dosage of Humalog as listed below.    Denies insomnia, fatigue, fever/chills, cough, SOB, dyspnea, angina, palpitations, diarrhea, constipation, and urinary sxs.    Objective:  /69   Pulse 74   Temp 97 °F (36.1 °C)   Resp 18   SpO2 100%   PHYSICAL EXAM:  GENERAL HEALTH: well developed, well nourished, in no apparent distress  LINES, TUBES, DRAINS:  none  SKIN: pale, warm, dry-->Toenails unkept  WOUND: +Right Groin Wound-->resolving  EYES: PERRLA, conjunctiva normal; no drainage from eyes  HENT: normocephalic; normal nose, no nasal drainage, mucous membranes pink, moist  NECK: full range of motion observed  RESPIRATORY:clear to percussion and auscultation, No wheezing/cough/accessory muscle use; on room air  CARDIOVASCULAR: S1, S2 normal, RRR; no S3, no S4; , no click, no murmur  ABDOMEN: normal active BS+, soft, non-distended; no apparent masses; observed, non-tender, no guarding during physical exam  : Deferred  LYMPHATIC: no lymphedema  MUSCULOSKELETAL: no acute synovitis upper or lower extremity.  Weakness R/T recent hospitalization/diagnoses/sequelae; will undergo therapies to rehab and improve strength, endurance and independence w/ ADLs as able  EXTREMITIES/VASCULAR: no cyanosis, clubbing or edema, radial pulses 2+, and dorsalis pedal pulses 2+  NEUROLOGIC: intact; no sensorimotor deficit, reflexes normal, cranial nerves  intact II-XII, follows commands  PSYCHIATRIC: alert and oriented x 3; affect appropriate    Medications reviewed: Yes    Diagnostics reviewed:  dated:      Repeat stat: Potassium level 4.6    Assessment and plan:  Nausea-resolved  -Zofran 4 mg q8h prn    Physical Deconditioning/Impaired mobility and ADLs/At risk for falling/Weakness  -Fall Precautions  -PT/OT/ST to evaluate and treat  -Add Tylenol 650 mg q6h prn for fever/pain, if given for fever, notify MD/NP  -Norco 5/325 mg q4h prn  -Podiatry Consult  -[facility] team to establish care plan meeting with patient and POA/family as appropriate  -Anticipate DC on or before TBD; SW to assist patient/family w/ DC planning  -DC Plan:  TBD     Hypotension with possible sepsis  -Monitor vitals    Leukocytosis-resolved  -WBCs-->12.12-->13.1-->7.2  -On prednisone  -In-house ID following  -continues on Flagyl and Bactrim for now    Diarrhea  -Resolved, no complaints today  -Flagyl-->completed  -Bactrim DS-->completed  -Probiotic bid     COLLIN on CKD3  -Monitor labs     Left Iliac Chronic Occlusion with claudication/S/p balloon angioplasty and stent of left common iliac artery and external iliac artery on 6/13/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, balloon angioplasty, stent; thrombectomy of left SFA and left anterior tibial artery, balloon angioplasty of left TP trunk and posterior tibial on 6/13/S/p Right Groin Pseudoaneurysm s/p ligation of femoral artery branch with sartorius muscle myoplasty on 6/15/Evacuation of hematoma and pulse lavage 6/21/24 with wound vac placement  -Plavix 75 mg qd     S/p debridement of skin and subcutaneous fat, pulse lavage, placement of kerecis skin substitute and mesh on 6/25  -Wound Care Consult  -Wound Vac Placement  -F/u with PIETRO Armstrong as OP     Anemia  -recent abla  -Monitor labs     DMT2, uncontrolled  -A1c-->12.3 last taken on 6/13/24; A1c-->5.5 taken on 7/19/24  -Blood sugars ACHS, notify MD/NP for blood sugar less  than 70 or greater than 350  -ISS discontinued in hospital  -Glipizide ER 10 mg bid-->on hold  -Metformin 1000 mg bid-->on hold  -Humalog SS  -Sliding Scale:  141-180=1 unit; 181-220=2 units; 221-260=3 units; 261-300=4 units; 301-350=5 units, notify MD/NP for blood sugars less than 70 or greater than 350  -Accuchecks from -24  -Fastin-224; PP: 142-246  -Add Mealtime dosage of Humalog 2 units with meals, hold for blood sugar <120  -Monitor sugars closely, patient on Prednisone as listed below  -[facility]     RA  -Completed stress dose steroids on   -Restart home dosage of Prednisone 5 mg bid     Nicotine Dependence  -no treatment at this time     HTN/HLD  -Vitals q shift  -Valsartan 160 mg every day  -Off Hydrochlorothiazide, Norvasc and Losartan  -Vitals stable   -Crestor 10 mg every day  -Xarelto 2.5 mg bid    GI prophylaxis  -Omeprazole 20 mg every day, changed to Esomeprazole 20 mg qd     DVT Prophylaxis   -On Xarelto  -Encourage early mobilization and participation in PT/OT as able     LABS  -CBC/CMP weekly while in [facility]     Follow Up Appointments  -Dr. Tay Alexandra,  PCP within 1-2 weeks following [facility] discharge.   -[facility]    SHARYN Caceres  2024  7:34 p.m.

## 2024-07-24 ENCOUNTER — APPOINTMENT (OUTPATIENT)
Dept: WOUND CARE | Facility: HOSPITAL | Age: 69
End: 2024-07-24
Attending: NURSE PRACTITIONER
Payer: MEDICARE

## 2024-07-25 ENCOUNTER — SNF VISIT (OUTPATIENT)
Dept: INTERNAL MEDICINE CLINIC | Age: 69
End: 2024-07-25

## 2024-07-25 VITALS
TEMPERATURE: 97 F | HEART RATE: 81 BPM | WEIGHT: 137.31 LBS | RESPIRATION RATE: 18 BRPM | OXYGEN SATURATION: 99 % | BODY MASS INDEX: 26 KG/M2 | SYSTOLIC BLOOD PRESSURE: 154 MMHG | DIASTOLIC BLOOD PRESSURE: 78 MMHG

## 2024-07-25 DIAGNOSIS — S31.109A RIGHT GROIN WOUND, INITIAL ENCOUNTER: ICD-10-CM

## 2024-07-25 DIAGNOSIS — Z51.89 AFTERCARE: Primary | ICD-10-CM

## 2024-07-25 DIAGNOSIS — Z79.899 MEDICATION MANAGEMENT: ICD-10-CM

## 2024-07-25 DIAGNOSIS — E11.9 TYPE 2 DIABETES MELLITUS WITHOUT COMPLICATION, UNSPECIFIED WHETHER LONG TERM INSULIN USE (HCC): ICD-10-CM

## 2024-07-25 PROCEDURE — 99309 SBSQ NF CARE MODERATE MDM 30: CPT | Performed by: NURSE PRACTITIONER

## 2024-07-25 NOTE — PROGRESS NOTES
Emilie John, 1955, 69 year old, female    Chief Complaint   Patient presents with    Follow - Up     Right groin wound, Diabetes medication management          Subjective:  Emilie John  : 1955, Age 69 year old female patient with PMH sig for HTN, HLD, DMT2, CKD RA on prednisone, PAD with recent LE vascular procedure complicated by hematoma, ruptured pseudoaneurysm, presented to the ER for weakness and hypotension.  Admitted from  to .  The patient underwent an angioplasty/stent of iliac artery on , complicated by hemorrhagic shock.  The patient was taken back to the OR on 6/15 for evacuation of hematoma and Right groin pseudoaneurysm rupture and ligation of femoral artery branch.  She had another procedure for hematoma evacuation on  and wound vac placement.  On  the patient underwent debridement of skin and subcutaneus fat, placement of kerecs skin substitute and mesh.  Was discharged with a wound vac.  Per patient, she reported that her wound vac got disconnected at home.  Wound has been bother her and she had been scratching it.  When her son saw the patient on the day of admission he found the patient to be very weak and fatigued.   Brought to the ER.  Her Sbp was in the 70s.  Per patient, she has had poor oral intake and diarrhea at home.  No fevers or abdominal pain.  No respiratory symptoms.  No urinary symptoms.  Given IVFs and her BP responded well.  She was admitted for possible infection and dehydration.  Workup was done for UTI.  ID consulted.  UA showed pyuria.  Urine culture showed ESBL Klebsiella.  The surgical site had a foul odor.  She was a s/p excisional debridement of right groin on 7/3.  Had a wound vac placed.  CXR w/o infiltrates, wound cx with Klebsiella, placed on IV Cefepime and Vanco.  Placed on oral Bactrim DS/Flagyl until .  Her COLLIN resolving, d/t possible dehydration/infection.  IVFs discontinued.  For her RA, she completed stress dose  steroids on 7/2 and placed back on home dosage of prednisone.  Hgb stable.  Diabetes uncontrolled, placed on insulin sliding scale.  Holding home medications of Atenolol, Hydrochlorothiazide, Norvasc, and Losartan).  Resumed Atenolol and ARB upon discharge, however will hold hydrochlorothiazide/amlodipine upon discharge.  The patient was discharged in stable condition to [facility] for rehabilitation and medical management.     Today:  Seen in therapy today.  Doing very well ambulating with walker with CGA.  Wound continues to improve.  Will continue to monitor.  Blood sugars improving.  Restarted on Glypizide ER 10 mg qam and 5 mg qpm, stopped mealtime dosage of Humalog, but will continue sliding scale for now.   Patient in agreement with POC.  She wants to be off the sliding scale completely before discharge.  Will continue to monitor.    Therapy update:7/23/24  Bed mobility--CGA  Transfers--SBA/CGA  Ambulation--90  FT RW CGB/SBA-->did more today  UE ADLs--min a  LE ADLs--mod a  Toileting--min a  Bathing--Max a    Plan:  Patient lives alone.  Referral made for [facility]    Denies insomnia, fatigue, fever/chills, cough, SOB, dyspnea, angina, palpitations, diarrhea, constipation, and urinary sxs.    Objective:  /78   Pulse 81   Temp 97 °F (36.1 °C)   Resp 18   Wt 137 lb 4.8 oz (62.3 kg)   SpO2 99%   BMI 25.94 kg/m²   PHYSICAL EXAM:  GENERAL HEALTH: well developed, well nourished, in no apparent distress  LINES, TUBES, DRAINS:  none  SKIN: pale, warm, dry-->Toenails unkept  WOUND: +Right Groin Wound-->resolving  EYES: PERRLA, conjunctiva normal; no drainage from eyes  HENT: normocephalic; normal nose, no nasal drainage, mucous membranes pink, moist  NECK: full range of motion observed  RESPIRATORY:clear to percussion and auscultation, No wheezing/cough/accessory muscle use; on room air  CARDIOVASCULAR: S1, S2 normal, RRR; no S3, no S4; , no click, no murmur  ABDOMEN: normal active BS+, soft,  non-distended; no apparent masses; observed, non-tender, no guarding during physical exam  : Deferred  LYMPHATIC: no lymphedema  MUSCULOSKELETAL: no acute synovitis upper or lower extremity.  Weakness R/T recent hospitalization/diagnoses/sequelae; will undergo therapies to rehab and improve strength, endurance and independence w/ ADLs as able  EXTREMITIES/VASCULAR: no cyanosis, clubbing or edema, radial pulses 2+, and dorsalis pedal pulses 2+  NEUROLOGIC: intact; no sensorimotor deficit, reflexes normal, cranial nerves intact II-XII, follows commands  PSYCHIATRIC: alert and oriented x 3; affect appropriate    Medications reviewed: Yes    Diagnostics reviewed:  Dated:  7/23/24  CBC W/DIFF AND PLATELETS  WHITE BLOOD CELLS 6.68 THO/mm3 4.80 - 10.80 Final  RED BLOOD CELLS 3.26 MIL/mm3 4.20 - 5.40 L Final  HEMOGLOBIN 9.8 g/dL 12.0 - 16.0 L Final  HEMATOCRIT 32.4 % 37.0 - 47.0 L Final  MCV 99.4 fL 81.0 - 99.0 H Final  MCH 30.1 pg 27.0 - 33.0 Final  MCHC 30.2 g/dL 32.0 - 36.0 L Final  RDW-CV 17.5 % 11.5 - 15.2 H Final  PLATELET COUNT 276 THO/mm3 150 - 400 Final  MPV 9.4 fL 9.5 - 13.1 L Final  NEUTROPHILS, ABS 3.06 THO/mm3 1.40 - 6.80 Final  LYMPHOCYTES, ABS 2.67 THO/mm3 0.80 - 3.00 Final  MONOCYTES, ABS 0.79 THO/mm3 0.20 - 1.00 Final  EOSINOPHILS, ABS 0.07 THO/mm3 0.00 - 0.50 Final  BASOPHILS, ABS 0.05 THO/mm3 0.00 - 0.10 Final  IMMATURE GRANS, ABS 0.04 THO/mm3 0.00 - 0.10 Final  NEUTROPHILS % 45.9 % Final  LYMPHOCYTES % 40.0 % Final  MONOCYTES % 11.8 % Final  EOSINOPHILS % 1.0 % Final  BASOPHILS % 0.7 % Final  IMMATURE GRANS % 0.6 % Final    COMPREHENSIVE METABOLIC dated:  7/23/24  GLUCOSE 170 mg/dL 70 - 110 H Final  BUN 21 mg/dL 7 - 28 Final  CREATININE 0.93 mg/dL 0.44 - 1.32 Final  BILIRUBIN, TOTAL 0.37 mg/dL 0.16 - 1.30 Final  PROTEIN, TOTAL 6.0 g/dL 5.6 - 8.2 Final  ALBUMIN 3.3 g/dL 3.2 - 4.9 Final  SODIUM 139 mEq/L 138 - 147 Final  POTASSIUM 4.2 mEq/L 3.6 - 5.0 Final  CHLORIDE 110 mEq/L 99 - 110 Final  CARBON  DIOXIDE 18 mmol/L 18 - 30 Final  SGPT(ALT) 22 U/L 0 - 42 Final  SGOT(AST) 15 U/L 9 - 35 Final  ALK. PHOSPHATASE 89 U/L 34 - 143 Final  CALCIUM 8.9 mg/dL 8.7 - 10.5 Final  eGFR If African American > 60 mL/m/1.73m  2  >=60 Final  eGFR If Non-African American 60 mL/m/1.73m  2  >=60 L Final    Assessment and plan:  Nausea-resolved  -Zofran 4 mg q8h prn    Physical Deconditioning/Impaired mobility and ADLs/At risk for falling/Weakness  -Fall Precautions  -PT/OT/ST to evaluate and treat  -Tylenol 650 mg q6h prn for fever/pain, if given for fever, notify MD/NP  -Norco 5/325 mg q4h prn  -Podiatry Consult  -[facility] team to establish care plan meeting with patient and POA/family as appropriate  -Anticipate DC on or before TBD; SW to assist patient/family w/ DC planning  -DC Plan:  Referral for [facility]     Hypotension with possible sepsis  -Monitor vitals    Leukocytosis-resolved  -WBCs-->12.12-->13.1-->7.2-->6.68  -On prednisone  -In-house ID following  -completed Flagyl and Bactrim     Diarrhea  -Resolved, no complaints today  -Flagyl-->completed  -Bactrim DS-->completed  -Probiotic bid     COLLIN on CKD3  -Monitor labs     Left Iliac Chronic Occlusion with claudication/S/p balloon angioplasty and stent of left common iliac artery and external iliac artery on 6/13/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 Howard Taylor Vas. Surg as directed [phone]     S/p debridement of skin and subcutaneous fat, pulse lavage, placement of kerecis skin substitute and mesh on 6/25  -Wound Care Consult  -Wound Vac Placement  -F/u with Edita  PIETRO Gipson as OP  -F/u with Rachele Swanson, Wound Care     Anemia  -recent abla  -Monitor labs     DMT2, uncontrolled  -A1c-->12.3 last taken on 24; A1c-->5.5 taken on 24  -Blood sugars ACHS, notify MD/NP for blood sugar less than 70 or greater than 350  -ISS discontinued in hospital  -Start Glipizide ER 10 mg qam and 5 mg qpm  -Metformin 1000 mg bid-->on hold  -Continue Humalog SS  -Sliding Scale:  141-180=1 unit; 181-220=2 units; 221-260=3 units; 261-300=4 units; 301-350=5 units, notify MD/NP for blood sugars less than 70 or greater than 350  -Accuchecks from -24  -Fastin-179; PP: 144-199  -Stop Mealtime dosage of Humalog   -Monitor sugars closely, patient on Prednisone as listed below  -[facility]     RA  -Completed stress dose steroids on   -Taking home dosage of Prednisone 5 mg bid     Nicotine Dependence  -no treatment at this time     HTN/HLD  -Vitals q shift  -Valsartan 160 mg every day  -Off Hydrochlorothiazide, Norvasc and Losartan  -Vitals stable   -Crestor 10 mg every day  -Xarelto 2.5 mg bid    GI prophylaxis  -Esomeprazole 20 mg qd     DVT Prophylaxis   -On Xarelto  -Encourage early mobilization and participation in PT/OT as able     LABS  -CBC/CMP weekly while in [facility]     Follow Up Appointments  -Dr. Tay Alexandra,  PCP within 1-2 weeks following [facility] discharge.   -Dr. Howard Taylor Vas. Surg as directed [phone]  -PIETRO Armstrong as OP  -Rachele Swanson, Wound Care  -[facility] as [facility]    SHARYN Caceres  2024  6:18 p.m.

## 2024-07-29 ENCOUNTER — SNF VISIT (OUTPATIENT)
Dept: INTERNAL MEDICINE CLINIC | Age: 69
End: 2024-07-29

## 2024-07-29 VITALS
OXYGEN SATURATION: 97 % | SYSTOLIC BLOOD PRESSURE: 159 MMHG | TEMPERATURE: 97 F | HEART RATE: 70 BPM | RESPIRATION RATE: 20 BRPM | DIASTOLIC BLOOD PRESSURE: 74 MMHG

## 2024-07-29 DIAGNOSIS — R53.1 WEAKNESS: ICD-10-CM

## 2024-07-29 DIAGNOSIS — E11.9 TYPE 2 DIABETES MELLITUS WITHOUT COMPLICATION, UNSPECIFIED WHETHER LONG TERM INSULIN USE (HCC): ICD-10-CM

## 2024-07-29 DIAGNOSIS — R73.9 HYPERGLYCEMIA: ICD-10-CM

## 2024-07-29 DIAGNOSIS — M06.9 RHEUMATOID ARTHRITIS, INVOLVING UNSPECIFIED SITE, UNSPECIFIED WHETHER RHEUMATOID FACTOR PRESENT (HCC): ICD-10-CM

## 2024-07-29 DIAGNOSIS — S31.109A RIGHT GROIN WOUND, INITIAL ENCOUNTER: ICD-10-CM

## 2024-07-29 DIAGNOSIS — Z51.89 AFTERCARE: Primary | ICD-10-CM

## 2024-07-29 NOTE — PROGRESS NOTES
Emilie John, 1955, 69 year old, female    Chief Complaint   Patient presents with    Follow - Up     Right sided groin wound; hyperglycemia        Subjective:  Emilie John  : 1955, Age 69 year old female patient with PMH sig for HTN, HLD, DMT2, CKD RA on prednisone, PAD with recent LE vascular procedure complicated by hematoma, ruptured pseudoaneurysm, presented to the ER for weakness and hypotension.  Admitted from  to .  The patient underwent an angioplasty/stent of iliac artery on , complicated by hemorrhagic shock.  The patient was taken back to the OR on 6/15 for evacuation of hematoma and Right groin pseudoaneurysm rupture and ligation of femoral artery branch.  She had another procedure for hematoma evacuation on  and wound vac placement.  On  the patient underwent debridement of skin and subcutaneus fat, placement of kerecs skin substitute and mesh.  Was discharged with a wound vac.  Per patient, she reported that her wound vac got disconnected at home.  Wound has been bother her and she had been scratching it.  When her son saw the patient on the day of admission he found the patient to be very weak and fatigued.   Brought to the ER.  Her Sbp was in the 70s.  Per patient, she has had poor oral intake and diarrhea at home.  No fevers or abdominal pain.  No respiratory symptoms.  No urinary symptoms.  Given IVFs and her BP responded well.  She was admitted for possible infection and dehydration.  Workup was done for UTI.  ID consulted.  UA showed pyuria.  Urine culture showed ESBL Klebsiella.  The surgical site had a foul odor.  She was a s/p excisional debridement of right groin on 7/3.  Had a wound vac placed.  CXR w/o infiltrates, wound cx with Klebsiella, placed on IV Cefepime and Vanco.  Placed on oral Bactrim DS/Flagyl until .  Her COLLIN resolving, d/t possible dehydration/infection.  IVFs discontinued.  For her RA, she completed stress dose steroids on  and  placed back on home dosage of prednisone.  Hgb stable.  Diabetes uncontrolled, placed on insulin sliding scale.  Holding home medications of Atenolol, Hydrochlorothiazide, Norvasc, and Losartan).  Resumed Atenolol and ARB upon discharge, however will hold hydrochlorothiazide/amlodipine upon discharge.  The patient was discharged in stable condition to [facility] for rehabilitation and medical management.     Today:  Seen in room today.  Wound continues to improve.  Will continue to monitor. Blood sugars improving.  Will increase the Glipizide ER 10 mg bid, will continue sliding scale for now.   Patient in agreement with POC.  The patient's goal is to be off off the sliding scale completely before discharge.  Will continue to monitor.    Plan:  Patient lives alone.  Referral made for [facility] for [facility].    Denies insomnia, fatigue, fever/chills, cough, SOB, dyspnea, angina, palpitations, diarrhea, constipation, and urinary sxs.    Objective:  /74   Pulse 70   Temp 97.3 °F (36.3 °C)   Resp 20   SpO2 97%   PHYSICAL EXAM:  GENERAL HEALTH: well developed, well nourished, in no apparent distress  LINES, TUBES, DRAINS:  none  SKIN: pale, warm, dry-->Toenails unkept  WOUND: +Right Groin Wound-->resolving  EYES: PERRLA, conjunctiva normal; no drainage from eyes  HENT: normocephalic; normal nose, no nasal drainage, mucous membranes pink, moist  NECK: full range of motion observed  RESPIRATORY:clear to percussion and auscultation, No wheezing/cough/accessory muscle use; on room air  CARDIOVASCULAR: S1, S2 normal, RRR; no S3, no S4; , no click, no murmur  ABDOMEN: normal active BS+, soft, non-distended; no apparent masses; observed, non-tender, no guarding during physical exam  : Deferred  LYMPHATIC: no lymphedema  MUSCULOSKELETAL: no acute synovitis upper or lower extremity.  Weakness R/T recent hospitalization/diagnoses/sequelae; will undergo therapies to rehab and improve strength, endurance and independence w/ ADLs  as able  EXTREMITIES/VASCULAR: no cyanosis, clubbing or edema, radial pulses 2+, and dorsalis pedal pulses 2+  NEUROLOGIC: intact; no sensorimotor deficit, reflexes normal, cranial nerves intact II-XII, follows commands  PSYCHIATRIC: alert and oriented x 3; affect appropriate    Medications reviewed: Yes    Diagnostics reviewed:   None on this visit    Assessment and plan:  Nausea-resolved  -Zofran 4 mg q8h prn    Physical Deconditioning/Impaired mobility and ADLs/At risk for falling/Weakness  -Fall Precautions  -PT/OT/ST to evaluate and treat  -Tylenol 650 mg q6h prn for fever/pain, if given for fever, notify MD/NP  -Norco 5/325 mg q4h prn  -Podiatry Consult  -[facility] team to establish care plan meeting with patient and POA/family as appropriate  -Anticipate DC on or before TBD; SW to assist patient/family w/ DC planning  -DC Plan:  Referral for [facility] Services for [facility]     Hypotension with possible sepsis  -Monitor vitals    Leukocytosis-resolved  -WBCs-->12.12-->13.1-->7.2-->6.68  -On prednisone  -In-house ID following  -completed Flagyl and Bactrim     Diarrhea  -Resolved, no complaints today  -Flagyl-->completed  -Bactrim DS-->completed  -Probiotic bid     COLLIN on CKD3  -Monitor labs     Left Iliac Chronic Occlusion with claudication/S/p balloon angioplasty and stent of left common iliac artery and external iliac artery on 6/13/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 Howard Taylor Vas. Surg as directed [phone]  -NPO after MN on 8/27/24 for US of Abd and Lower Extremity at 9 am     S/p debridement of skin and subcutaneous fat,  pulse lavage, placement of kerecis skin substitute and mesh on   -Wound Care Consult  -Wound Vac Placement  -F/u with PIETRO Armstrong as OP  -F/u with Rachele Swanson, Wound Care     Anemia  -recent abla  -Monitor labs     DMT2, uncontrolled  -A1c-->12.3 last taken on 24; A1c-->5.5 taken on 24  -Blood sugars ACHS, notify MD/NP for blood sugar less than 70 or greater than 350  -ISS discontinued in hospital  -Change dosage of Glipizide ER 10 mg bid  -Metformin 1000 mg bid-->on hold  -Continue Humalog SS  -Sliding Scale:  141-180=1 unit; 181-220=2 units; 221-260=3 units; 261-300=4 units; 301-350=5 units, notify MD/NP for blood sugars less than 70 or greater than 350  -Accuchecks from -24  -Fastin-162; PP: 103-194  -Stop Mealtime dosage of Humalog   -Monitor sugars closely, patient on Prednisone as listed below  -[facility]  -Offer a bedtime snack around 11 p.m.     RA  -Completed stress dose steroids on   -Taking home dosage of Prednisone 5 mg bid     Nicotine Dependence  -no treatment at this time     HTN/HLD  -Vitals q shift  -Valsartan 160 mg every day  -Off Hydrochlorothiazide, Norvasc and Losartan  -Vitals stable   -Crestor 10 mg every day  -Xarelto 2.5 mg bid    GI prophylaxis  -Esomeprazole 20 mg qd     DVT Prophylaxis   -On Xarelto  -Encourage early mobilization and participation in PT/OT as able     LABS  -CBC/CMP weekly while in [facility]     Follow Up Appointments  -Dr. Tay Alexandra,  PCP within 1-2 weeks following [facility] discharge.   -Dr. Howard Taylor Vas. Surg as directed [phone]  -PIETRO Armstrong as OP  -Rachele Swanson, Wound Care  -[facility] as [facility]    SHARYN Caceres  2024  12:23 p.m.

## 2024-08-01 ENCOUNTER — SNF VISIT (OUTPATIENT)
Dept: INTERNAL MEDICINE CLINIC | Age: 69
End: 2024-08-01

## 2024-08-01 VITALS
SYSTOLIC BLOOD PRESSURE: 152 MMHG | DIASTOLIC BLOOD PRESSURE: 88 MMHG | TEMPERATURE: 97 F | RESPIRATION RATE: 18 BRPM | OXYGEN SATURATION: 97 % | HEART RATE: 73 BPM

## 2024-08-01 DIAGNOSIS — R53.1 WEAKNESS: ICD-10-CM

## 2024-08-01 DIAGNOSIS — M06.9 RHEUMATOID ARTHRITIS, INVOLVING UNSPECIFIED SITE, UNSPECIFIED WHETHER RHEUMATOID FACTOR PRESENT (HCC): ICD-10-CM

## 2024-08-01 DIAGNOSIS — Z51.89 AFTERCARE: Primary | ICD-10-CM

## 2024-08-01 DIAGNOSIS — S31.109A RIGHT GROIN WOUND, INITIAL ENCOUNTER: ICD-10-CM

## 2024-08-01 PROCEDURE — 99307 SBSQ NF CARE SF MDM 10: CPT | Performed by: NURSE PRACTITIONER

## 2024-08-01 NOTE — PROGRESS NOTES
Emilie John, 1955, 69 year old, female    Chief Complaint   Patient presents with    Follow - Up     Right groin wound, weakness        Subjective:  Emilie John  : 1955, Age 69 year old female patient with PMH sig for HTN, HLD, DMT2, CKD RA on prednisone, PAD with recent LE vascular procedure complicated by hematoma, ruptured pseudoaneurysm, presented to the ER for weakness and hypotension.  Admitted from  to .  The patient underwent an angioplasty/stent of iliac artery on , complicated by hemorrhagic shock.  The patient was taken back to the OR on 6/15 for evacuation of hematoma and Right groin pseudoaneurysm rupture and ligation of femoral artery branch.  She had another procedure for hematoma evacuation on  and wound vac placement.  On  the patient underwent debridement of skin and subcutaneus fat, placement of kerecs skin substitute and mesh.  Was discharged with a wound vac.  Per patient, she reported that her wound vac got disconnected at home.  Wound has been bother her and she had been scratching it.  When her son saw the patient on the day of admission he found the patient to be very weak and fatigued.   Brought to the ER.  Her Sbp was in the 70s.  Per patient, she has had poor oral intake and diarrhea at home.  No fevers or abdominal pain.  No respiratory symptoms.  No urinary symptoms.  Given IVFs and her BP responded well.  She was admitted for possible infection and dehydration.  Workup was done for UTI.  ID consulted.  UA showed pyuria.  Urine culture showed ESBL Klebsiella.  The surgical site had a foul odor.  She was a s/p excisional debridement of right groin on 7/3.  Had a wound vac placed.  CXR w/o infiltrates, wound cx with Klebsiella, placed on IV Cefepime and Vanco.  Placed on oral Bactrim DS/Flagyl until .  Her COLLIN resolving, d/t possible dehydration/infection.  IVFs discontinued.  For her RA, she completed stress dose steroids on  and placed  back on home dosage of prednisone.  Hgb stable.  Diabetes uncontrolled, placed on insulin sliding scale.  Holding home medications of Atenolol, Hydrochlorothiazide, Norvasc, and Losartan).  Resumed Atenolol and ARB upon discharge, however will hold hydrochlorothiazide/amlodipine upon discharge.  The patient was discharged in stable condition to [facility] for rehabilitation and medical management.     Today:  Seen in room today.  Wound continues to improve.  Will continue to monitor. Blood sugars improving.  Continuing Glipizide ER 10 mg bid, and restart Metformin 500 mg in am to gradually increase to her maintenance dose, will continue sliding scale for now.   Patient in agreement with POC.  The patient's goal is to be off off the sliding scale completely before discharge.  Will continue to monitor.     Therapy update: 7/30/24  Bed mobility--CGA  Transfers--SBA  Ambulation--120 ft SBA  UE ADLs--SBA  LE ADLs--Mod/min a  Toileting--Min/CGA  Bathing--Mod a    Plan:  Patient lives alone.  Referral made for [facility] Services for [facility].    Denies insomnia, fatigue, fever/chills, cough, SOB, dyspnea, angina, palpitations, diarrhea, constipation, and urinary sxs.    Objective:  /88   Pulse 73   Temp 97.1 °F (36.2 °C)   Resp 18   SpO2 97%   PHYSICAL EXAM:  GENERAL HEALTH: well developed, well nourished, in no apparent distress  LINES, TUBES, DRAINS:  none  SKIN: pale, warm, dry-->Toenails unkept  WOUND: +Right Groin Wound-->resolving  EYES: PERRLA, conjunctiva normal; no drainage from eyes  HENT: normocephalic; normal nose, no nasal drainage, mucous membranes pink, moist  NECK: full range of motion observed  RESPIRATORY:clear to percussion and auscultation, No wheezing/cough/accessory muscle use; on room air  CARDIOVASCULAR: S1, S2 normal, RRR; no S3, no S4; , no click, no murmur  ABDOMEN: normal active BS+, soft, non-distended; no apparent masses; observed, non-tender, no guarding during physical exam  :  Deferred  LYMPHATIC: no lymphedema  MUSCULOSKELETAL: no acute synovitis upper or lower extremity.  Weakness R/T recent hospitalization/diagnoses/sequelae; will undergo therapies to rehab and improve strength, endurance and independence w/ ADLs as able  EXTREMITIES/VASCULAR: no cyanosis, clubbing or edema, radial pulses 2+, and dorsalis pedal pulses 2+  NEUROLOGIC: intact; no sensorimotor deficit, reflexes normal, cranial nerves intact II-XII, follows commands  PSYCHIATRIC: alert and oriented x 3; affect appropriate    Medications reviewed: Yes    Diagnostics reviewed:    Dated:  7/29/24  CBC W/DIFF AND PLATELETS  WHITE BLOOD CELLS 10.06 THO/mm3 4.80 - 10.80 Final  RED BLOOD CELLS 3.68 MIL/mm3 4.20 - 5.40 L Final  HEMOGLOBIN 10.9 g/dL 12.0 - 16.0 L Final  HEMATOCRIT 35.8 % 37.0 - 47.0 L Final  MCV 97.3 fL 81.0 - 99.0 Final  MCH 29.6 pg 27.0 - 33.0 Final  MCHC 30.4 g/dL 32.0 - 36.0 L Final  RDW-CV 17.0 % 11.5 - 15.2 H Final  PLATELET COUNT 363 THO/mm3 150 - 400 Final  MPV 9.2 fL 9.5 - 13.1 L Final  NEUTROPHILS, ABS 4.85 THO/mm3 1.40 - 6.80 Final  LYMPHOCYTES, ABS 3.60 THO/mm3 0.80 - 3.00 H Final  MONOCYTES, ABS 1.29 THO/mm3 0.20 - 1.00 H Final  EOSINOPHILS, ABS 0.18 THO/mm3 0.00 - 0.50 Final  BASOPHILS, ABS 0.08 THO/mm3 0.00 - 0.10 Final  IMMATURE GRANS, ABS 0.06 THO/mm3 0.00 - 0.10 Final  NEUTROPHILS % 48.2 % Final  LYMPHOCYTES % 35.8 % Final  MONOCYTES % 12.8 % Final  EOSINOPHILS % 1.8 % Final  BASOPHILS % 0.8 % Final  IMMATURE GRANS % 0.6 % Final    COMPREHENSIVE METABOLIC  GLUCOSE 58 mg/dL 70 - 110 L Final  BUN 18 mg/dL 7 - 28 Final  CREATININE 0.89 mg/dL 0.44 - 1.32 Final  BILIRUBIN, TOTAL 0.32 mg/dL 0.16 - 1.30 Final  PROTEIN, TOTAL 6.4 g/dL 5.6 - 8.2 Final  1 of 4  ALBUMIN 3.6 g/dL 3.2 - 4.9 Final  SODIUM 141 mEq/L 138 - 147 Final  POTASSIUM 4.1 mEq/L 3.6 - 5.0 Final  CHLORIDE 109 mEq/L 99 - 110 Final  CARBON DIOXIDE 23 mmol/L 18 - 30 Final  SGPT(ALT) 20 U/L 0 - 42 Final  SGOT(AST) 16 U/L 9 - 35 Final  ALK.  PHOSPHATASE 83 U/L 34 - 143 Final  CALCIUM 9.4 mg/dL 8.7 - 10.5 Final  eGFR If African American > 60 mL/m/1.73m  2  >=60 Final  eGFR If Non-African American > 60 mL/m/1.73m  2  >=60 Final    Assessment and plan:  Nausea-resolved  -Zofran 4 mg q8h prn    Physical Deconditioning/Impaired mobility and ADLs/At risk for falling/Weakness  -Fall Precautions  -PT/OT/ST to evaluate and treat  -Tylenol 650 mg q6h prn for fever/pain, if given for fever, notify MD/NP  -Norco 5/325 mg q4h prn  -Podiatry Consult  -[facility] team to establish care plan meeting with patient and POA/family as appropriate  -Anticipate DC on or before TBD; SW to assist patient/family w/ DC planning  -DC Plan:  Referral for [facility] for [facility]     Hypotension with possible sepsis  -Monitor vitals    Leukocytosis-resolved  -WBCs-->12.12-->13.1-->7.2-->6.68-->10.06  -On prednisone  -In-house ID following  -completed Flagyl and Bactrim     Diarrhea  -Resolved, no complaints today  -Flagyl-->completed  -Bactrim DS-->completed  -Probiotic bid     COLLIN on CKD3  -Monitor labs     Left Iliac Chronic Occlusion with claudication/S/p balloon angioplasty and stent of left common iliac artery and external iliac artery on 6/13/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 Howard Taylor Vas. Surg as directed [phone]  -NPO after MN on 8/27/24 for US of Abd and Lower Extremity at 9 am     S/p debridement of skin and subcutaneous fat, pulse lavage, placement of kerecis skin substitute and mesh on 6/25  -Wound Care Consult  -Wound Vac Placement  -F/u with PIETRO Armstrong as OP  -F/u with  Rachele Swanson, Wound Care     Anemia  -recent abla  -Monitor labs     DMT2, uncontrolled  -A1c-->12.3 last taken on 24; A1c-->5.5 taken on 24  -Blood sugars ACHS, notify MD/NP for blood sugar less than 70 or greater than 350  -ISS discontinued in hospital  -Continue Glipizide ER 10 mg bid  -Start Metformin 500 mg qam  -Continue Humalog SS  -Sliding Scale:  141-180=1 unit; 181-220=2 units; 221-260=3 units; 261-300=4 units; 301-350=5 units, notify MD/NP for blood sugars less than 70 or greater than 350  -Accuchecks from -24  -Fastin-163; PP: 114-189  -Stop Mealtime dosage of Humalog   -Monitor sugars closely, patient on Prednisone as listed below  -[facility]  -Offer a bedtime snack around 11 p.m.     RA  -Completed stress dose steroids on   -Taking home dosage of Prednisone 5 mg bid     Nicotine Dependence  -no treatment at this time     HTN/HLD  -Vitals q shift  -Valsartan 160 mg every day  -Off Hydrochlorothiazide, Norvasc and Losartan  -Vitals stable   -Crestor 10 mg every day  -Xarelto 2.5 mg bid    GI prophylaxis  -Esomeprazole 20 mg qd     DVT Prophylaxis   -On Xarelto  -Encourage early mobilization and participation in PT/OT as able     LABS  -CBC/CMP weekly while in [facility]     Follow Up Appointments  -Dr. Tay Alexandra,  PCP within 1-2 weeks following [facility] discharge.   -Dr. Howard Taylor Vas. Surg as directed [phone]  -PIETRO Armstrong as OP  -Rachele Swanson, Wound Care  -[facility] as [facility]    SHARYN Caceres  2024  11:38 a.m.

## 2024-08-05 ENCOUNTER — SNF VISIT (OUTPATIENT)
Dept: INTERNAL MEDICINE CLINIC | Age: 69
End: 2024-08-05

## 2024-08-05 VITALS
OXYGEN SATURATION: 98 % | DIASTOLIC BLOOD PRESSURE: 70 MMHG | SYSTOLIC BLOOD PRESSURE: 168 MMHG | TEMPERATURE: 97 F | RESPIRATION RATE: 18 BRPM | HEART RATE: 82 BPM

## 2024-08-05 DIAGNOSIS — Z79.899 MEDICATION MANAGEMENT: ICD-10-CM

## 2024-08-05 DIAGNOSIS — Z51.89 AFTERCARE: Primary | ICD-10-CM

## 2024-08-05 DIAGNOSIS — R03.0 ELEVATED BLOOD PRESSURE READING: ICD-10-CM

## 2024-08-05 DIAGNOSIS — I10 PRIMARY HYPERTENSION: ICD-10-CM

## 2024-08-05 DIAGNOSIS — S31.109A RIGHT GROIN WOUND, INITIAL ENCOUNTER: ICD-10-CM

## 2024-08-05 RX ORDER — HYDRALAZINE HYDROCHLORIDE 25 MG/1
25 TABLET, FILM COATED ORAL 2 TIMES DAILY
COMMUNITY
Start: 2024-08-05

## 2024-08-06 NOTE — PROGRESS NOTES
Emilie John, 1955, 69 year old, female    Chief Complaint   Patient presents with    Follow - Up     Aftercare for Right groin wound; Elevated blood pressure        Subjective:  Emilie John  : 1955, Age 69 year old female patient with PMH sig for HTN, HLD, DMT2, CKD RA on prednisone, PAD with recent LE vascular procedure complicated by hematoma, ruptured pseudoaneurysm, presented to the ER for weakness and hypotension.  Admitted from  to .  The patient underwent an angioplasty/stent of iliac artery on , complicated by hemorrhagic shock.  The patient was taken back to the OR on 6/15 for evacuation of hematoma and Right groin pseudoaneurysm rupture and ligation of femoral artery branch.  She had another procedure for hematoma evacuation on  and wound vac placement.  On  the patient underwent debridement of skin and subcutaneus fat, placement of kerecs skin substitute and mesh.  Was discharged with a wound vac.  Per patient, she reported that her wound vac got disconnected at home.  Wound has been bother her and she had been scratching it.  When her son saw the patient on the day of admission he found the patient to be very weak and fatigued.   Brought to the ER.  Her Sbp was in the 70s.  Per patient, she has had poor oral intake and diarrhea at home.  No fevers or abdominal pain.  No respiratory symptoms.  No urinary symptoms.  Given IVFs and her BP responded well.  She was admitted for possible infection and dehydration.  Workup was done for UTI.  ID consulted.  UA showed pyuria.  Urine culture showed ESBL Klebsiella.  The surgical site had a foul odor.  She was a s/p excisional debridement of right groin on 7/3.  Had a wound vac placed.  CXR w/o infiltrates, wound cx with Klebsiella, placed on IV Cefepime and Vanco.  Placed on oral Bactrim DS/Flagyl until .  Her COLLIN resolving, d/t possible dehydration/infection.  IVFs discontinued.  For her RA, she completed stress dose  steroids on 7/2 and placed back on home dosage of prednisone.  Hgb stable.  Diabetes uncontrolled, placed on insulin sliding scale.  Holding home medications of Atenolol, Hydrochlorothiazide, Norvasc, and Losartan).  Resumed Atenolol and ARB upon discharge, however will hold hydrochlorothiazide/amlodipine upon discharge.  The patient was discharged in stable condition to [facility] for rehabilitation and medical management.     Today:  Seen in room today.  Wound continues to improve.  Has a followup appointment with MD in am.  Will continue to monitor. Blood sugars improving.  Continuing Glipizide ER 10 mg bid; stopped the Metformin patient reported getting too shaky and felt as if her sugar was low at 91.  Still has sliding scale.  The patient's goal is to be off the sliding scale completely before discharge.  Will continue to monitor.     Plan:  Patient lives alone.  Referral made for [facility].    Denies insomnia, fatigue, fever/chills, cough, SOB, dyspnea, angina, palpitations, diarrhea, constipation, and urinary sxs.    Objective:  BP (!) 168/70   Pulse 82   Temp 97.2 °F (36.2 °C)   Resp 18   SpO2 98%   PHYSICAL EXAM:  GENERAL HEALTH: well developed, well nourished, in no apparent distress  LINES, TUBES, DRAINS:  none  SKIN: pale, warm, dry-->Toenails unkept  WOUND: +Right Groin Wound-->resolving  EYES: PERRLA, conjunctiva normal; no drainage from eyes  HENT: normocephalic; normal nose, no nasal drainage, mucous membranes pink, moist  NECK: full range of motion observed  RESPIRATORY:clear to percussion and auscultation, No wheezing/cough/accessory muscle use; on room air  CARDIOVASCULAR: S1, S2 normal, RRR; no S3, no S4; , no click, no murmur  ABDOMEN: normal active BS+, soft, non-distended; no apparent masses; observed, non-tender, no guarding during physical exam  : Deferred  LYMPHATIC: no lymphedema  MUSCULOSKELETAL: no acute synovitis upper or lower extremity.  Weakness R/T recent  hospitalization/diagnoses/sequelae; will undergo therapies to rehab and improve strength, endurance and independence w/ ADLs as able  EXTREMITIES/VASCULAR: no cyanosis, clubbing or edema, radial pulses 2+, and dorsalis pedal pulses 2+  NEUROLOGIC: intact; no sensorimotor deficit, reflexes normal, cranial nerves intact II-XII, follows commands  PSYCHIATRIC: alert and oriented x 3; affect appropriate    Medications reviewed: Yes    Diagnostics reviewed:    Dated 8/5/24  CBC W/DIFF AND PLATELETS  WHITE BLOOD CELLS 9.07 THO/mm3 4.80 - 10.80 Final  RED BLOOD CELLS 3.97 MIL/mm3 4.20 - 5.40 L Final  HEMOGLOBIN 11.6 g/dL 12.0 - 16.0 L Final  HEMATOCRIT 38.2 % 37.0 - 47.0 Final  MCV 96.2 fL 81.0 - 99.0 Final  MCH 29.2 pg 27.0 - 33.0 Final  MCHC 30.4 g/dL 32.0 - 36.0 L Final  RDW-CV 16.0 % 11.5 - 15.2 H Final  PLATELET COUNT 348 THO/mm3 150 - 400 Final  MPV 9.0 fL 9.5 - 13.1 L Final  NEUTROPHILS, ABS 4.99 THO/mm3 1.40 - 6.80 Final  LYMPHOCYTES, ABS 2.91 THO/mm3 0.80 - 3.00 Final  MONOCYTES, ABS 0.94 THO/mm3 0.20 - 1.00 Final  EOSINOPHILS, ABS 0.13 THO/mm3 0.00 - 0.50 Final  BASOPHILS, ABS 0.05 THO/mm3 0.00 - 0.10 Final  IMMATURE GRANS, ABS 0.05 THO/mm3 0.00 - 0.10 Final  NEUTROPHILS % 54.9 % Final  LYMPHOCYTES % 32.1 % Final  MONOCYTES % 10.4 % Final  EOSINOPHILS % 1.4 % Final  BASOPHILS % 0.6 % Final  IMMATURE GRANS % 0.6 % Final    COMPREHENSIVE METABOLIC  GLUCOSE 90 mg/dL 70 - 110 Final  BUN 23 mg/dL 7 - 28 Final  CREATININE 0.91 mg/dL 0.44 - 1.32 Final  BILIRUBIN, TOTAL 0.30 mg/dL 0.16 - 1.30 Final  PROTEIN, TOTAL 6.4 g/dL 5.6 - 8.2 Final  ALBUMIN 3.8 g/dL 3.2 - 4.9 Final  SODIUM 138 mEq/L 138 - 147 Final  POTASSIUM 3.9 mEq/L 3.6 - 5.0 Final  CHLORIDE 106 mEq/L 99 - 110 Final  CARBON DIOXIDE 21 mmol/L 18 - 30 Final  SGPT(ALT) 12 U/L 0 - 42 Final  SGOT(AST) 12 U/L 9 - 35 Final  ALK. PHOSPHATASE 98 U/L 34 - 143 Final  CALCIUM 9.0 mg/dL 8.7 - 10.5 Final  eGFR If African American > 60 mL/m/1.73m  2  >=60 Final  eGFR If  Non- > 60 mL/m/1.73m  2  >=60 Final    Assessment and plan:  Nausea-resolved  -Zofran 4 mg q8h prn    Physical Deconditioning/Impaired mobility and ADLs/At risk for falling/Weakness  -Fall Precautions  -PT/OT/ST to evaluate and treat  -Tylenol 650 mg q6h prn for fever/pain, if given for fever, notify MD/NP  -Norco 5/325 mg q4h prn  -Podiatry Consult  -[facility] team to establish care plan meeting with patient and POA/family as appropriate  -Anticipate DC on or before TBD; SW to assist patient/family w/ DC planning  -DC Plan:  Referral for [facility] for [facility]     Hypotension with possible sepsis  -Monitor vitals    Leukocytosis-resolved  -WBCs-->9.07  -On prednisone  -In-house ID following  -completed Flagyl and Bactrim     Diarrhea  -Resolved, no complaints today  -Flagyl-->completed  -Bactrim DS-->completed  -Probiotic bid     COLLIN on CKD3  -Monitor labs     Left Iliac Chronic Occlusion with claudication/S/p balloon angioplasty and stent of left common iliac artery and external iliac artery on 6/13/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 Howard Taylor Vas. Surg as directed [phone] on 8/6/24  -NPO after MN on 8/27/24 for US of Abd and Lower Extremity at 9 am     S/p debridement of skin and subcutaneous fat, pulse lavage, placement of kerecis skin substitute and mesh on 6/25  -Wound Care Consult  -Wound Vac Placement  -F/u with PIETRO Armstrong as OP  -F/u with Rachele Swanson, Wound Care     Anemia  -recent abla  -Monitor labs     DMT2, uncontrolled  -A1c-->12.3 last taken on 6/13/24; A1c-->5.5 taken on  24  -Blood sugars ACHS, notify MD/NP for blood sugar less than 70 or greater than 350  -ISS discontinued in hospital  -Continue Glipizide ER 10 mg bid  -Metformin-->discontinued sugars too low  -Continue Humalog SS  -Sliding Scale:  141-180=1 unit; 181-220=2 units; 221-260=3 units; 261-300=4 units; 301-350=5 units, notify MD/NP for blood sugars less than 70 or greater than 350  -Accuchecks from 24-24  -Fastin-132; PP: 104-162  -Stop Mealtime dosage of Humalog   -Monitor sugars closely, patient on Prednisone as listed below  -[facility]  -Offer a bedtime snack around 11 p.m.     RA  -Completed stress dose steroids on   -Taking home dosage of Prednisone 5 mg bid     Nicotine Dependence  -no treatment at this time     HTN/HLD  -Vitals q shift  -Losartan 100 mg every day  -Atenolol 50 mg every day  -Add Hydralazine 25 mg bid for sbp >170  -Vitals stable   -Crestor 10 mg every day  -Xarelto 2.5 mg bid    GI prophylaxis  -Esomeprazole 20 mg qd     DVT Prophylaxis   -On Xarelto  -Encourage early mobilization and participation in PT/OT as able     LABS  -CBC/CMP weekly while in [facility]     Follow Up Appointments  -Dr. Tay Alexandra,  PCP within 1-2 weeks following [facility] discharge.   -Dr. Howard Taylor Vas. Surg as directed [phone]  -PIETRO Armstrong as OP  -Rachele Swanson, Wound Care  -[facility] as directed    SHARYN Caceres  2024  7 p.m.

## 2024-08-08 ENCOUNTER — SNF VISIT (OUTPATIENT)
Dept: INTERNAL MEDICINE CLINIC | Age: 69
End: 2024-08-08

## 2024-08-08 VITALS
DIASTOLIC BLOOD PRESSURE: 65 MMHG | BODY MASS INDEX: 26 KG/M2 | SYSTOLIC BLOOD PRESSURE: 117 MMHG | TEMPERATURE: 97 F | HEART RATE: 75 BPM | WEIGHT: 137.81 LBS | RESPIRATION RATE: 18 BRPM | OXYGEN SATURATION: 99 %

## 2024-08-08 DIAGNOSIS — E11.9 TYPE 2 DIABETES MELLITUS WITHOUT COMPLICATION, UNSPECIFIED WHETHER LONG TERM INSULIN USE (HCC): ICD-10-CM

## 2024-08-08 DIAGNOSIS — R53.1 WEAKNESS: ICD-10-CM

## 2024-08-08 DIAGNOSIS — S31.109A RIGHT GROIN WOUND, INITIAL ENCOUNTER: ICD-10-CM

## 2024-08-08 DIAGNOSIS — Z51.89 AFTERCARE: Primary | ICD-10-CM

## 2024-08-08 PROCEDURE — 99308 SBSQ NF CARE LOW MDM 20: CPT | Performed by: NURSE PRACTITIONER

## 2024-08-08 RX ORDER — CYCLOBENZAPRINE HCL 5 MG
2.5 TABLET ORAL NIGHTLY
COMMUNITY

## 2024-08-08 RX ORDER — LOSARTAN POTASSIUM 100 MG/1
100 TABLET ORAL DAILY
COMMUNITY

## 2024-08-08 NOTE — PROGRESS NOTES
Emilie John, 1955, 69 year old, female    Chief Complaint   Patient presents with    Follow - Up     Groin wound, left leg bruise, small bump        Subjective:  Emilie John  : 1955, Age 69 year old female patient with PMH sig for HTN, HLD, DMT2, CKD RA on prednisone, PAD with recent LE vascular procedure complicated by hematoma, ruptured pseudoaneurysm, presented to the ER for weakness and hypotension.  Admitted from  to .  The patient underwent an angioplasty/stent of iliac artery on , complicated by hemorrhagic shock.  The patient was taken back to the OR on 6/15 for evacuation of hematoma and Right groin pseudoaneurysm rupture and ligation of femoral artery branch.  She had another procedure for hematoma evacuation on  and wound vac placement.  On  the patient underwent debridement of skin and subcutaneus fat, placement of kerecs skin substitute and mesh.  Was discharged with a wound vac.  Per patient, she reported that her wound vac got disconnected at home.  Wound has been bother her and she had been scratching it.  When her son saw the patient on the day of admission he found the patient to be very weak and fatigued.   Brought to the ER.  Her Sbp was in the 70s.  Per patient, she has had poor oral intake and diarrhea at home.  No fevers or abdominal pain.  No respiratory symptoms.  No urinary symptoms.  Given IVFs and her BP responded well.  She was admitted for possible infection and dehydration.  Workup was done for UTI.  ID consulted.  UA showed pyuria.  Urine culture showed ESBL Klebsiella.  The surgical site had a foul odor.  She was a s/p excisional debridement of right groin on 7/3.  Had a wound vac placed.  CXR w/o infiltrates, wound cx with Klebsiella, placed on IV Cefepime and Vanco.  Placed on oral Bactrim DS/Flagyl until .  Her COLLIN resolving, d/t possible dehydration/infection.  IVFs discontinued.  For her RA, she completed stress dose steroids on   and placed back on home dosage of prednisone.  Hgb stable.  Diabetes uncontrolled, placed on insulin sliding scale.  Holding home medications of Atenolol, Hydrochlorothiazide, Norvasc, and Losartan).  Resumed Atenolol and ARB upon discharge, however will hold hydrochlorothiazide/amlodipine upon discharge.  The patient was discharged in stable condition to [facility] for rehabilitation and medical management.     Today:  Seen in room today.  Wound continues to improve.  Patient reported going to the vein clinic yesterday.  Will follow up in 3 weeks.  Doing well.  Assessed groin site.  Wound Vac in place.  No c/o pain.  She does have a left shin bump, bruised.  No pain.   Will continue to monitor.     Blood sugars stable.  Continuing Glipizide ER 10 mg bid; stopped the Metformin patient reported getting too shaky and felt as if her sugar was low at 91.  Still has sliding scale.  The patient's goal is to be off the sliding scale completely before discharge.  Will continue to monitor.      Therapy update: 8/6/24  Bed mobility--Supervision  Transfers--CGA  Ambulation--10 ft part weightbearing  UE ADLs--SBA  LE ADLs--Mod a  Toileting--Mod a  Bathing--Max a    Physiatry added flexeril for pain as listed below.    Denies insomnia, fatigue, fever/chills, cough, SOB, dyspnea, angina, palpitations, diarrhea, constipation, and urinary sxs.    Objective:  /65   Pulse 75   Temp 97.1 °F (36.2 °C)   Resp 18   Wt 137 lb 12.8 oz (62.5 kg)   SpO2 99%   BMI 26.04 kg/m²   PHYSICAL EXAM:  GENERAL HEALTH: well developed, well nourished, in no apparent distress  LINES, TUBES, DRAINS:  none  SKIN: pale, warm, dry  WOUND: +Right Groin Wound-->resolving; Left shin small bump, bruised  EYES: PERRLA, conjunctiva normal; no drainage from eyes  HENT: normocephalic; normal nose, no nasal drainage, mucous membranes pink, moist  NECK: full range of motion observed  RESPIRATORY:clear to percussion and auscultation, No wheezing/cough/accessory  muscle use; on room air  CARDIOVASCULAR: S1, S2 normal, RRR; no S3, no S4; , no click, no murmur  ABDOMEN: normal active BS+, soft, non-distended; no apparent masses; observed, non-tender, no guarding during physical exam  : Deferred  LYMPHATIC: no lymphedema  MUSCULOSKELETAL: no acute synovitis upper or lower extremity.  Weakness R/T recent hospitalization/diagnoses/sequelae; will undergo therapies to rehab and improve strength, endurance and independence w/ ADLs as able  EXTREMITIES/VASCULAR: no cyanosis, clubbing or edema, radial pulses 2+, and dorsalis pedal pulses 2+  NEUROLOGIC: intact; no sensorimotor deficit, reflexes normal, cranial nerves intact II-XII, follows commands  PSYCHIATRIC: alert and oriented x 3; affect appropriate    Medications reviewed: Yes    Diagnostics reviewed:    Dated 8/5/24  CBC W/DIFF AND PLATELETS  WHITE BLOOD CELLS 9.07 THO/mm3 4.80 - 10.80 Final  RED BLOOD CELLS 3.97 MIL/mm3 4.20 - 5.40 L Final  HEMOGLOBIN 11.6 g/dL 12.0 - 16.0 L Final  HEMATOCRIT 38.2 % 37.0 - 47.0 Final  MCV 96.2 fL 81.0 - 99.0 Final  MCH 29.2 pg 27.0 - 33.0 Final  MCHC 30.4 g/dL 32.0 - 36.0 L Final  RDW-CV 16.0 % 11.5 - 15.2 H Final  PLATELET COUNT 348 THO/mm3 150 - 400 Final  MPV 9.0 fL 9.5 - 13.1 L Final  NEUTROPHILS, ABS 4.99 THO/mm3 1.40 - 6.80 Final  LYMPHOCYTES, ABS 2.91 THO/mm3 0.80 - 3.00 Final  MONOCYTES, ABS 0.94 THO/mm3 0.20 - 1.00 Final  EOSINOPHILS, ABS 0.13 THO/mm3 0.00 - 0.50 Final  BASOPHILS, ABS 0.05 THO/mm3 0.00 - 0.10 Final  IMMATURE GRANS, ABS 0.05 THO/mm3 0.00 - 0.10 Final  NEUTROPHILS % 54.9 % Final  LYMPHOCYTES % 32.1 % Final  MONOCYTES % 10.4 % Final  EOSINOPHILS % 1.4 % Final  BASOPHILS % 0.6 % Final  IMMATURE GRANS % 0.6 % Final    COMPREHENSIVE METABOLIC  GLUCOSE 90 mg/dL 70 - 110 Final  BUN 23 mg/dL 7 - 28 Final  CREATININE 0.91 mg/dL 0.44 - 1.32 Final  BILIRUBIN, TOTAL 0.30 mg/dL 0.16 - 1.30 Final  PROTEIN, TOTAL 6.4 g/dL 5.6 - 8.2 Final  ALBUMIN 3.8 g/dL 3.2 - 4.9 Final  SODIUM  138 mEq/L 138 - 147 Final  POTASSIUM 3.9 mEq/L 3.6 - 5.0 Final  CHLORIDE 106 mEq/L 99 - 110 Final  CARBON DIOXIDE 21 mmol/L 18 - 30 Final  SGPT(ALT) 12 U/L 0 - 42 Final  SGOT(AST) 12 U/L 9 - 35 Final  ALK. PHOSPHATASE 98 U/L 34 - 143 Final  CALCIUM 9.0 mg/dL 8.7 - 10.5 Final  eGFR If African American > 60 mL/m/1.73m  2  >=60 Final  eGFR If Non-African American > 60 mL/m/1.73m  2  >=60 Final    Assessment and plan:  Small bump on left shin  -Bruised  -Monitor    Nausea-resolved  -Zofran 4 mg q8h prn    Physical Deconditioning/Impaired mobility and ADLs/At risk for falling/Weakness  -Fall Precautions  -PT/OT/ST to evaluate and treat  -Tylenol 650 mg q6h prn for fever/pain, if given for fever, notify MD/NP  -Norco 5/325 mg q4h prn  -Add Flexeril 2.5 mg at bedtime, per physiatry  -Podiatry Consult  -[facility] team to establish care plan meeting with patient and POA/family as appropriate  -Anticipate DC on or before TBD; SW to assist patient/family w/ DC planning  -DC Plan:  Referral for [facility] for [facility]     Hypotension with possible sepsis-resolved  -Monitor vitals    Leukocytosis-resolved  -WBCs-->9.07  -On prednisone  -In-house ID following  -completed Flagyl and Bactrim     Diarrhea-resolved  -Resolved, no complaints today  -Flagyl-->completed  -Bactrim DS-->completed  -Probiotic bid     COLLIN on CKD3  -Monitor labs     Left Iliac Chronic Occlusion with claudication/S/p balloon angioplasty and stent of left common iliac artery and external iliac artery on 6/13/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 Howard Taylor Vas. Surg as directed  [phone] in 3 weeks  -NPO after MN on 24 for US of Abd and Lower Extremity at 9 am     S/p debridement of skin and subcutaneous fat, pulse lavage, placement of kerecis skin substitute and mesh on   -Wound Care Consult  -Wound Vac-stable  -F/u with Halley Norwood DNP, SHARYN, JESUS-BC, Vascular Surgery in 3 weeks     Anemia  -recent abla  -Monitor labs     DMT2, uncontrolled  -A1c-->12.3 last taken on 24; A1c-->5.5 taken on 24  -Blood sugars ACHS, notify MD/NP for blood sugar less than 70 or greater than 350  -ISS discontinued in hospital  -Continue Glipizide ER 10 mg bid  -Metformin-->discontinued sugars too low  -Continue Humalog SS  -Sliding Scale:  141-180=1 unit; 181-220=2 units; 221-260=3 units; 261-300=4 units; 301-350=5 units, notify MD/NP for blood sugars less than 70 or greater than 350  -Accuchecks from 24-24  -Fastin-171; PP:   -Stop Mealtime dosage of Humalog   -Monitor sugars closely, patient on Prednisone as listed below  -[facility]  -Offer a bedtime snack around 11 p.m.     RA  -Completed stress dose steroids on   -Taking home dosage of Prednisone 5 mg bid     Nicotine Dependence  -no treatment at this time     HTN/HLD  -Vitals q shift  -Losartan 100 mg every day  -Atenolol 50 mg every day  -Hydralazine 25 mg bid for sbp >170  -Vitals stable   -Crestor 10 mg every day  -Xarelto 2.5 mg bid    GI prophylaxis  -Esomeprazole 20 mg qd     DVT Prophylaxis   -On Xarelto  -Encourage early mobilization and participation in PT/OT as able     LABS  -CBC/CMP weekly while in [facility]     Follow Up Appointments  -Dr. Tay Alexandra,  PCP within 1-2 weeks following [facility] discharge.   -Dr. Howard Taylor Vas. Surg as directed [phone]  -F/u with Halley Norwood DNP, SHARYN, AGPCNP-BC, Vascular Surgery in 3 weeks  -[facility] as [facility]    Howard Greenwood, SHARYN  2024  12:47  p.m.

## 2024-08-12 ENCOUNTER — SNF VISIT (OUTPATIENT)
Dept: INTERNAL MEDICINE CLINIC | Age: 69
End: 2024-08-12

## 2024-08-12 VITALS
DIASTOLIC BLOOD PRESSURE: 81 MMHG | OXYGEN SATURATION: 99 % | RESPIRATION RATE: 18 BRPM | HEART RATE: 75 BPM | SYSTOLIC BLOOD PRESSURE: 163 MMHG | TEMPERATURE: 97 F

## 2024-08-12 DIAGNOSIS — R53.1 WEAKNESS: ICD-10-CM

## 2024-08-12 DIAGNOSIS — S31.109A RIGHT GROIN WOUND, INITIAL ENCOUNTER: ICD-10-CM

## 2024-08-12 DIAGNOSIS — Z79.899 MEDICATION MANAGEMENT: ICD-10-CM

## 2024-08-12 DIAGNOSIS — R03.0 ELEVATED BLOOD PRESSURE READING: ICD-10-CM

## 2024-08-12 DIAGNOSIS — Z51.89 AFTERCARE: Primary | ICD-10-CM

## 2024-08-12 RX ORDER — ERGOCALCIFEROL 1.25 MG/1
50000 CAPSULE ORAL WEEKLY
COMMUNITY
Start: 2024-07-10 | End: 2024-09-18

## 2024-08-12 RX ORDER — AMLODIPINE BESYLATE 5 MG/1
5 TABLET ORAL DAILY
COMMUNITY

## 2024-08-12 RX ORDER — ATORVASTATIN CALCIUM 40 MG/1
40 TABLET, FILM COATED ORAL NIGHTLY
COMMUNITY

## 2024-08-13 NOTE — PROGRESS NOTES
Emilie John, 1955, 69 year old, female    Chief Complaint   Patient presents with    Follow - Up     Aftercare for Right Groin Wound        Subjective:  Emilie John  : 1955, Age 69 year old female patient with PMH sig for HTN, HLD, DMT2, CKD RA on prednisone, PAD with recent LE vascular procedure complicated by hematoma, ruptured pseudoaneurysm, presented to the ER for weakness and hypotension.  Admitted from  to .  The patient underwent an angioplasty/stent of iliac artery on , complicated by hemorrhagic shock.  The patient was taken back to the OR on 6/15 for evacuation of hematoma and Right groin pseudoaneurysm rupture and ligation of femoral artery branch.  She had another procedure for hematoma evacuation on  and wound vac placement.  On  the patient underwent debridement of skin and subcutaneus fat, placement of kerecs skin substitute and mesh.  Was discharged with a wound vac.  Per patient, she reported that her wound vac got disconnected at home.  Wound has been bother her and she had been scratching it.  When her son saw the patient on the day of admission he found the patient to be very weak and fatigued.   Brought to the ER.  Her Sbp was in the 70s.  Per patient, she has had poor oral intake and diarrhea at home.  No fevers or abdominal pain.  No respiratory symptoms.  No urinary symptoms.  Given IVFs and her BP responded well.  She was admitted for possible infection and dehydration.  Workup was done for UTI.  ID consulted.  UA showed pyuria.  Urine culture showed ESBL Klebsiella.  The surgical site had a foul odor.  She was a s/p excisional debridement of right groin on 7/3.  Had a wound vac placed.  CXR w/o infiltrates, wound cx with Klebsiella, placed on IV Cefepime and Vanco.  Placed on oral Bactrim DS/Flagyl until .  Her COLLIN resolving, d/t possible dehydration/infection.  IVFs discontinued.  For her RA, she completed stress dose steroids on  and placed  back on home dosage of prednisone.  Hgb stable.  Diabetes uncontrolled, placed on insulin sliding scale.  Holding home medications of Atenolol, Hydrochlorothiazide, Norvasc, and Losartan).  Resumed Atenolol and ARB upon discharge, however will hold hydrochlorothiazide/amlodipine upon discharge.  The patient was discharged in stable condition to [facility] for rehabilitation and medical management.     Today:  Seen in room today.  Wound continues to improve.  [facility]   Doing well.  Assessed groin site.  Wound is improving.  No c/o pain.   Will continue to monitor.     Blood sugars stable.  Continuing Glipizide ER 10 mg bid; stopped the Metformin patient reported getting too shaky and felt as if her sugar was low at 91.  Still has sliding scale.  The patient's goal is to be off the sliding scale completely before discharge.  Will continue to monitor.      Patient continues on Flexeril for pain as listed below.    Blood pressure elevated.  In-house PCP added Amlodipine as listed below.    Denies insomnia, fatigue, fever/chills, cough, SOB, dyspnea, angina, palpitations, diarrhea, constipation, and urinary sxs.    Objective:  BP (!) 163/81   Pulse 75   Temp 97 °F (36.1 °C)   Resp 18   SpO2 99%   PHYSICAL EXAM:  GENERAL HEALTH: well developed, well nourished, in no apparent distress  LINES, TUBES, DRAINS:  none  SKIN: pale, warm, dry  WOUND: +Right Groin Wound-->resolving; Left shin small bump, bruised-resolving  EYES: PERRLA, conjunctiva normal; no drainage from eyes  HENT: normocephalic; normal nose, no nasal drainage, mucous membranes pink, moist  NECK: full range of motion observed  RESPIRATORY:clear to percussion and auscultation, No wheezing/cough/accessory muscle use; on room air  CARDIOVASCULAR: S1, S2 normal, RRR; no S3, no S4; , no click, no murmur  ABDOMEN: normal active BS+, soft, non-distended; no apparent masses; observed, non-tender, no guarding during physical  exam  : Deferred  LYMPHATIC: no lymphedema  MUSCULOSKELETAL: no acute synovitis upper or lower extremity.  Weakness R/T recent hospitalization/diagnoses/sequelae; will undergo therapies to rehab and improve strength, endurance and independence w/ ADLs as able  EXTREMITIES/VASCULAR: no cyanosis, clubbing or edema, radial pulses 2+, and dorsalis pedal pulses 2+  NEUROLOGIC: intact; no sensorimotor deficit, reflexes normal, cranial nerves intact II-XII, follows commands  PSYCHIATRIC: alert and oriented x 3; affect appropriate    Medications reviewed: Yes    Diagnostics reviewed:  None on this visit    Assessment and plan:  Small bump on left shin  -Resolving  -Bruise resolving  -Monitor    Nausea-resolved  -Zofran 4 mg q8h prn    Physical Deconditioning/Impaired mobility and ADLs/At risk for falling/Weakness  -Fall Precautions  -PT/OT/ST to evaluate and treat  -Tylenol 650 mg q6h prn for fever/pain, if given for fever, notify MD/NP  -Norco 5/325 mg q4h prn  -Continues on Flexeril 2.5 mg at bedtime, per physiatry  -Podiatry Consult  -[facility] team to establish care plan meeting with patient and POA/family as appropriate  -Anticipate DC on or before TBD; SW to assist patient/family w/ DC planning  -DC Plan:  Referral for [facility] for [facility]     Hypotension with possible sepsis-resolved  -Monitor vitals    Leukocytosis-resolved  -WBCs-->9.07  -On prednisone  -In-house ID following  -completed Flagyl and Bactrim     Diarrhea-resolved  -Resolved, no complaints today  -Flagyl-->completed  -Bactrim DS-->completed  -Probiotic bid     COLLIN on CKD3  -Monitor labs     Left Iliac Chronic Occlusion with claudication/S/p balloon angioplasty and stent of left common iliac artery and external iliac artery on 6/13/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, balloon angioplasty, stent; thrombectomy of left SFA and left anterior tibial artery, balloon angioplasty of left TP trunk and posterior tibial on 6/13/S/p Right Groin  Pseudoaneurysm s/p ligation of femoral artery branch with sartorius muscle myoplasty on 6/15/Evacuation of hematoma and pulse lavage 24 with wound vac placement  -Plavix 75 mg every day  -F/u with Dr. Howard Taylor Vas. Surg as directed [phone] in 3 weeks  -NPO after MN on 24 for US of Abd and Lower Extremity at 9 am     S/p debridement of skin and subcutaneous fat, pulse lavage, placement of kerecis skin substitute and mesh on   -Wound Care Consult  -Wound Vac-stable  -F/u with Halley Norwood, DNP, APRN, AGPCNP-BC, Vascular Surgery on .     Anemia  -recent abla  -Monitor labs     DMT2, uncontrolled  -A1c-->12.3 last taken on 24; A1c-->5.5 taken on 24  -Blood sugars ACHS, notify MD/NP for blood sugar less than 70 or greater than 350  -ISS discontinued in hospital  -Continue Glipizide ER 10 mg bid  -Metformin-->discontinued sugars too low  -Continue Humalog SS  -Sliding Scale:  141-180=1 unit; 181-220=2 units; 221-260=3 units; 261-300=4 units; 301-350=5 units, notify MD/NP for blood sugars less than 70 or greater than 350  -Accuchecks from 24-24  -Fastin-217; PP:   -Stop Mealtime dosage of Humalog   -Monitor sugars closely, patient on Prednisone as listed below  -[facility]  -Offer a bedtime snack around 11 p.m.     RA  -Completed stress dose steroids on   -Taking home dosage of Prednisone 5 mg bid     Nicotine Dependence  -no treatment at this time     HTN/HLD  -Vitals q shift  -Amlodipine 5 mg every day, added by in-house PCP  -Losartan 100 mg every day  -Atenolol 50 mg every day  -Hydralazine 25 mg bid for sbp >170  -Vitals stable   -Crestor changed to Atorvastatin 40 mg at bedtime, per facility formulary  -Xarelto 2.5 mg bid    Vitamin D Def  -Ergocalciferol 89852 units q weekly-->stop on 24    GI prophylaxis  -Esomeprazole 20 mg qd     DVT Prophylaxis   -On Xarelto  -Encourage early  mobilization and participation in PT/OT as able     LABS  -CBC/CMP weekly while in [facility]     Follow Up Appointments  -Dr. Tay Alexandra,  PCP within 1-2 weeks following [facility] discharge.   -Dr. Howard Taylor Vas. Surg as directed [phone]  -F/u with Halley Norwood, BOBBI, APRN, AGPCNP-BC, Vascular Surgery in 3 weeks  -[facility] as directed    SHARYN Caceres  8/12/2024  4 p.m.

## 2024-08-19 ENCOUNTER — SNF DISCHARGE (OUTPATIENT)
Dept: INTERNAL MEDICINE CLINIC | Age: 69
End: 2024-08-19

## 2024-08-19 VITALS
OXYGEN SATURATION: 98 % | RESPIRATION RATE: 19 BRPM | SYSTOLIC BLOOD PRESSURE: 123 MMHG | TEMPERATURE: 97 F | HEART RATE: 83 BPM | DIASTOLIC BLOOD PRESSURE: 70 MMHG

## 2024-08-19 DIAGNOSIS — N18.30 STAGE 3 CHRONIC KIDNEY DISEASE, UNSPECIFIED WHETHER STAGE 3A OR 3B CKD (HCC): ICD-10-CM

## 2024-08-19 DIAGNOSIS — S31.109D WOUND OF RIGHT GROIN, SUBSEQUENT ENCOUNTER: Primary | ICD-10-CM

## 2024-08-19 DIAGNOSIS — R53.1 WEAKNESS: ICD-10-CM

## 2024-08-19 DIAGNOSIS — E11.9 TYPE 2 DIABETES MELLITUS WITHOUT COMPLICATION, UNSPECIFIED WHETHER LONG TERM INSULIN USE (HCC): ICD-10-CM

## 2024-08-19 DIAGNOSIS — M06.9 RHEUMATOID ARTHRITIS, INVOLVING UNSPECIFIED SITE, UNSPECIFIED WHETHER RHEUMATOID FACTOR PRESENT (HCC): ICD-10-CM

## 2024-08-19 DIAGNOSIS — I10 PRIMARY HYPERTENSION: ICD-10-CM

## 2024-08-19 DIAGNOSIS — D50.9 IRON DEFICIENCY ANEMIA, UNSPECIFIED IRON DEFICIENCY ANEMIA TYPE: ICD-10-CM

## 2024-08-19 PROCEDURE — 99316 NF DSCHRG MGMT 30 MIN+: CPT | Performed by: NURSE PRACTITIONER

## 2024-08-19 NOTE — PROGRESS NOTES
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, balloon angioplasty, stent; thrombectomy of left SFA and left anterior tibial artery, balloon angioplasty of left TP trunk and posterior tibial on   -S/p Right Groin Pseudoaneurysm s/p ligation of femoral artery branch with sartorius muscle myoplasty on 6/15  -Evacuation of hematoma and pulse lavage 24 with wound vac placement  -S/p debridement of skin and subcutaneous fat, pulse lavage, placement of kerecis skin substitute and mesh on   -anemia, recent abla  -DMT2, uncontrolled  -Nicotine Dependence  -HTN  -HLD  -RA       HPI  Emilie John  : 1955, Age 69 year old female patient with PMH sig for HTN, HLD, DMT2, CKD RA on prednisone, PAD with recent LE vascular procedure complicated by hematoma, ruptured pseudoaneurysm, presented to the ER for weakness and hypotension.  Admitted from  to .  The patient underwent an angioplasty/stent of iliac artery on , complicated by hemorrhagic shock.  The patient was taken back to the OR on 6/15 for evacuation of hematoma and Right groin pseudoaneurysm rupture and ligation of femoral artery branch.  She had another procedure for hematoma evacuation on  and wound vac placement.  On  the patient underwent debridement of skin and subcutaneus fat, placement of kerecs skin substitute and mesh.  Was discharged with a wound vac.  Per patient, she reported that her wound vac got disconnected at home.  Wound has been  bother her and she had been scratching it.  When her son saw the patient on the day of admission he found the patient to be very weak and fatigued.   Brought to the ER.  Her Sbp was in the 70s.  Per patient, she has had poor oral intake and diarrhea at home.  No fevers or abdominal pain.  No respiratory symptoms.  No urinary symptoms.  Given IVFs and her BP responded well.  She was admitted for possible infection and dehydration.  Workup was done for UTI.  ID consulted.  UA showed pyuria.  Urine culture showed ESBL Klebsiella.  The surgical site had a foul odor.  She was a s/p excisional debridement of right groin on 7/3.  Had a wound vac placed.  CXR w/o infiltrates, wound cx with Klebsiella, placed on IV Cefepime and Vanco.  Placed on oral Bactrim DS/Flagyl until 7/16.  Her COLLIN resolving, d/t possible dehydration/infection.  IVFs discontinued.  For her RA, she completed stress dose steroids on 7/2 and placed back on home dosage of prednisone.  Hgb stable.  Diabetes uncontrolled, placed on insulin sliding scale.  Holding home medications of Atenolol, Hydrochlorothiazide, Norvasc, and Losartan).  Resumed Atenolol and ARB upon discharge, however will hold hydrochlorothiazide/amlodipine upon discharge.  The patient was discharged in stable condition to [facility] for rehabilitation and medical management.       Reason for Admission:  Subacute Rehab and Medical Management    Subjective:  Patient seen in therapy room today   Discussed with nurse and scripts signed for patients discharge   She has follow up appts made with vascular/Urology   Nurse states groin wound improving   Blood sugars remain stable- fasting glucose slightly elevated- on Glipizide Metformin stopped due to low sugars/diarrhea. She's on sliding scale- she plans to follow up with Endocrinologist   No other discharge questions or concerns         ROS and Physical Exam:         REVIEW OF SYSTEMS:  GENERAL HEALTH:feels well otherwise  SKIN: denies any unusual  skin lesions or rashes  WOUNDS: +Right Groin   EYES:no visual complaints or deficits  HENT: denies nasal congestion, sinus pain or sore throat; and hearing loss negative  RESPIRATORY: denies shortness of breath, wheezing or cough   CARDIOVASCULAR:denies chest pain, no palpitations , denies syncope, denies orthopnea, denies cough  GI: denies nausea, vomiting, constipation, diarrhea  :no dysuria, urgency or frequency  MUSCULOSKELETAL:no joint complaints upper or lower extremities  PSYCHE: no symptoms of depression or anxiety  ENDOCRINE: + diabetes   ALLERGY/IMM.: + multiple drug allergies     PHYSICAL EXAM:  /70   Pulse 83   Temp 97.3 °F (36.3 °C)   Resp 19   SpO2 98%     GENERAL HEALTH: well developed, well nourished, in no apparent distress  LINES, TUBES, DRAINS:  none  SKIN: pale, warm, dry  WOUND: +Right Groin Wound-->resolving; Left shin small bump, bruised-resolving  EYES: PERRLA, conjunctiva normal; no drainage from eyes  HENT: normocephalic; normal nose, no nasal drainage, mucous membranes pink, moist  RESPIRATORY:clear to percussion and auscultation, No wheezing/cough/accessory muscle use; on room air  CARDIOVASCULAR: S1, S2 normal, RRR; no S3, no S4; , no click, no murmur  ABDOMEN: normal active BS+, soft, non-distended; no apparent masses; observed, non-tender, no guarding during physical exam  MUSCULOSKELETAL: no acute synovitis upper or lower extremity.  Weakness R/T recent hospitalization/diagnoses/sequelae; will undergo therapies to rehab and improve strength, endurance and independence w/ ADLs as able  EXTREMITIES/VASCULAR: no cyanosis, clubbing or edema, radial pulses 2+, and dorsalis pedal pulses 2+  NEUROLOGIC: intact; no sensorimotor deficit, reflexes normal, cranial nerves intact II-XII, follows commands  PSYCHIATRIC: alert and oriented x 3; affect appropriate    Skilled Nursing Facility Course:    Emilie John has done well and is progressing well with PT/OT.  Medically cleared  for anticipated discharge  She was admitted after s/p debridement fat, pulse lavage, placement of kerecis skin substitute and mesh- wound vac. Has follow up with vascular on 8/27. Diabetes metformin was stopped due to low sugars/diarrhea- she remained on glipizide and sliding scale- she is familiar with sliding scale but does not feel she needs so is following up with her Endocrinologist.  She is also on chronic prednisone due to RA. She also had elevated blood pressure in facility and primary physician added Amlodipine to blood pressure regimen with good control.   IL  checked and OK for narcotics prescribed.  Home with home care services RN/PT/OT/Aide  Equipment per PT recommendations    Most recent lab results:  8/13/24  Wbc 8.73 hgb 11.2 hematocrit 36.5 plt 303   Glucose 214 bun 19 crt 0.92  bili 0.27 prot 6.5 alb 3.7 sodium 138 potassium 3.9 chl 105 co2 21 alt 9 ast 11 alk phos 93 ca 9.2 gfr > 60     Discharge Diagnoses w/ current management:     Left Iliac Chronic Occlusion with claudication/S/p balloon angioplasty and stent of left common iliac artery and external iliac artery on 6/13/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 Howard Taylor Vas. Surg as directed [phone] in 3 weeks  -NPO after MN on 8/27/24 for US of Abd and Lower Extremity at 9 am     S/p debridement of skin and subcutaneous fat, pulse lavage, placement of kerecis skin substitute and mesh on 6/25  -Wound Care Consult  -Wound Vac-stable  -F/u with Halley Norwood, DNP, APRN, AGPCNP-BC, Vascular Surgery on 8/27.     Leukocytosis-resolved  -WBCs-->8.73-  8/13/24   -On prednisone  -In-house ID following  -completed Flagyl and Bactrim      Diarrhea-resolved  -Flagyl-->completed  -Bactrim DS-->completed  -Probiotic bid     COLLIN on CKD3  -Monitor labs     Anemia  -recent abla  -Monitor labs     DMT2, uncontrolled  -A1c-->12.3 last taken on 6/13/24; A1c-->5.5 taken on 7/19/24  -Blood sugars ACHS, notify MD/NP for blood sugar less than 70 or greater than 350  -Continue Glipizide ER 10 mg bid  -Metformin-->discontinued sugars too low/diarrhea   -Continue Humalog SS  -Sliding Scale:  141-180=1 unit; 181-220=2 units; 221-260=3 units; 261-300=4 units; 301-350=5 units, notify MD/NP for blood sugars less than 70 or greater than 350  -Monitor sugars closely, patient on Prednisone as listed below  -[facility]       RA  -Completed stress dose steroids on 7/2  -Taking home dosage of Prednisone 5 mg bid     Nicotine Dependence  -no treatment at this time     HTN/HLD  -Vitals q shift  -Amlodipine 5 mg every day  -Losartan 100 mg every day  -Atenolol 50 mg every day  -Hydralazine 25 mg bid for sbp >170  -Vitals stable   -Crestor changed to Atorvastatin 40 mg at bedtime, per facility formulary  -Xarelto 2.5 mg bid     Vitamin D Def  -Ergocalciferol 45781 units q weekly-->stop on 9/18/24          Medication Reconciliation Completed:  Yes    Follow Up Visits:  PCP within 7 days of Discharge  - Vascular Dr Du 8/27/24   -Dr Soares Endocrinology 9/11/24   - Dr Schmidt Urology 9/16/24  -Dr. Tay Alexandra,  PCP within 1-2 weeks following [facility] discharge.   -Dr. Howard Taylor Vas. Surg as directed [phone]  -F/u with Halley Norwood, DNP, APRN, AGPCNP-BC, Vascular Surgery in 3 weeks  -[facility] as [facility]        Greater than 30 min  spent in coordination of care in preparation for discharge.    Note to patient: The 21st Century Cures Act makes medical notes like these available to patients in  the interest of transparency. However, this is a medical document intended as peer to peer communication. It is written in medical language and may contain abbreviations or verbiage that are unfamiliar. It may appear blunt or direct. Medical documents are intended to carry relevant information, facts as evident, and the clinical opinion of the practitioner who signs the document.      Elisa Mora, APRN  8/19/2024

## 2024-10-04 LAB
GAMMA INTERFERON BACKGROUND BLD IA-ACNC: 0.1 IU/ML
M TB IFN-G BLD-IMP: NEGATIVE
M TB IFN-G CD4+ BCKGRND COR BLD-ACNC: 0.04 IU/ML
M TB IFN-G CD4+CD8+ BCKGRND COR BLD-ACNC: <0 IU/ML
MITOGEN IGNF BCKGRD COR BLD-ACNC: 6.33 IU/ML

## (undated) DEVICE — Device

## (undated) DEVICE — INDICATED FOR SURGICAL CLAMPING DURING CARDIOVASCULAR PERIPHERAL VASCULAR, AND GENERAL SURGERY.: Brand: SOFT/FIBRA® SPRING CLIP

## (undated) DEVICE — FILTERLINE NASAL ADULT O2/CO2

## (undated) DEVICE — 3M™ IOBAN™ 2 ANTIMICROBIAL INCISE DRAPE 6650EZ: Brand: IOBAN™ 2

## (undated) DEVICE — 60 ML SYRINGE REGULAR TIP: Brand: MONOJECT

## (undated) DEVICE — 3M™ BAIR HUGGER® UNDERBODY BLANKET, FULL ACCESS, 10 PER CASE 63500: Brand: BAIR HUGGER™

## (undated) DEVICE — PIN STEINMAN SMOOTH 1/8 9

## (undated) DEVICE — 3M™ STERI-DRAPE™ U-DRAPE 1015: Brand: STERI-DRAPE™

## (undated) DEVICE — MEDI-VAC NON-CONDUCTIVE SUCTION TUBING: Brand: CARDINAL HEALTH

## (undated) DEVICE — SUT PROL 6-0 30IN C-1 NABSRB BLU 13MM 3/8 CIR

## (undated) DEVICE — 3M™ RED DOT™ MONITORING ELECTRODE WITH FOAM TAPE AND STICKY GEL, 50/BAG, 20/CASE, 72/PLT 2570: Brand: RED DOT™

## (undated) DEVICE — CLIP RESOLUTION 235CM

## (undated) DEVICE — CHLORAPREP ORANGE TINT 10.5ML

## (undated) DEVICE — EVACUATOR SUR 100CC SIL BLB WND

## (undated) DEVICE — GEL US 20 GM PKT ST AQSNC 100

## (undated) DEVICE — MASK ETCO2 PANORAMIC

## (undated) DEVICE — SOLUTION IV 500ML 0.9% NACL FLX CONT

## (undated) DEVICE — AV FISTULA PACK: Brand: MEDLINE INDUSTRIES, INC.

## (undated) DEVICE — SLEEVE COMPR MD KNEE LEN SGL USE KENDALL SCD

## (undated) DEVICE — FORCEP RADIAL JAW 4

## (undated) DEVICE — THE EXACTO COLD SNARE IS INTENDED TO BE USED WITHOUT DIATHERMIC ENERGY FOR THE ENDOSCOPIC RESECTION OF POLYP TISSUE IN THE GASTROINTESTINAL TRACT.: Brand: EXACTO

## (undated) DEVICE — SUT COAT VCRL 3-0 18IN SH ABSRB UD CR 26MM

## (undated) DEVICE — SUT VCRL 2-0 36IN CT-1 ABSRB UD L36MM 1/2 CIR

## (undated) DEVICE — MLPD DISPOSABLE PAD (6' ROLL) 3 ROLLS: Brand: SCHAERER MEDICAL USA

## (undated) DEVICE — 1200CC GUARDIAN II: Brand: GUARDIAN

## (undated) DEVICE — STOCK ORTH TUB 72X8IN NLTX

## (undated) DEVICE — Device: Brand: STABLECUT®

## (undated) DEVICE — Device: Brand: DEFENDO AIR/WATER/SUCTION AND BIOPSY VALVE

## (undated) DEVICE — SUT CHRM GUT 3-0 27IN SH ABSRB UD 26MM 1/2

## (undated) DEVICE — SUTURE VICRYL 0 CP-1

## (undated) DEVICE — SYRINGE 10ML SLIP TIP

## (undated) DEVICE — GOWN,SIRUS,FABRIC-REINFORCED,X-LARGE: Brand: MEDLINE

## (undated) DEVICE — STANDARD HYPODERMIC NEEDLE,POLYPROPYLENE HUB: Brand: MONOJECT

## (undated) DEVICE — HOOD, PEEL-AWAY: Brand: FLYTE

## (undated) DEVICE — 2C14 #2 PDO 45 X 45: Brand: 2C14 #2 PDO 45 X 45

## (undated) DEVICE — SUT ETHLN 2-0 18IN FS NABSRB BLK 26MM 3/8 CIR

## (undated) DEVICE — TRAY CATH 16FR F INCL BARDX IC COMPLT CARE

## (undated) DEVICE — MASK ISOLATION

## (undated) DEVICE — GAMMEX® PI HYBRID SIZE 8.5, STERILE POWDER-FREE SURGICAL GLOVE, POLYISOPRENE AND NEOPRENE BLEND: Brand: GAMMEX

## (undated) DEVICE — SUT MCRYL 4-0 18IN PS-2 ABSRB UD 19MM 3/8 CIR

## (undated) DEVICE — SPONGE GZ 4X4IN COT 12 PLY TYP VII WVN C

## (undated) DEVICE — OR TOWEL, 17" X 26" STERILE, BLUE: Brand: PREMIERPRO

## (undated) DEVICE — TOTAL HIP CDS: Brand: MEDLINE INDUSTRIES, INC.

## (undated) DEVICE — PROXIMATE RH ROTATING HEAD SKIN STAPLERS (35 WIDE) CONTAINS 35 STAINLESS STEEL STAPLES: Brand: PROXIMATE

## (undated) DEVICE — SPECIMEN CONTAINER,POSITIVE SEAL INDICATOR, OR PACKAGED: Brand: PRECISION

## (undated) DEVICE — ADHESIVE SKIN TOP FOR WND CLSR DERMBND ADV

## (undated) DEVICE — ENDOSCOPY PACK UPPER: Brand: MEDLINE INDUSTRIES, INC.

## (undated) DEVICE — Device: Brand: INTELLICART™

## (undated) DEVICE — ANTIBACTERIAL UNDYED BRAIDED (POLYGLACTIN 910), SYNTHETIC ABSORBABLE SUTURE: Brand: COATED VICRYL

## (undated) DEVICE — SOLUTION IRRIG 1000ML 0.9% NACL USP BTL

## (undated) DEVICE — INTENDED TO BE USED TO OCCLUDE, RETRACT AND IDENTIFY ARTERIES, VEINS, TENDONS AND NERVES IN SURGICAL PROCEDURES: Brand: STERION®  VESSEL LOOP

## (undated) DEVICE — ENDOSCOPY PACK - LOWER: Brand: MEDLINE INDUSTRIES, INC.

## (undated) DEVICE — STERILE SYNTHETIC POLYISOPRENE POWDER-FREE SURGICAL GLOVES WITH HYDROGEL COATING, SMOOTH FINISH, STRAIGHT FINGER: Brand: PROTEXIS

## (undated) DEVICE — STERILE POLYISOPRENE POWDER-FREE SURGICAL GLOVES: Brand: PROTEXIS

## (undated) DEVICE — FORCEPS BX 100CM 1.8MM RJ STD

## (undated) DEVICE — KIT HEMSTAT MTRX 8ML PORCINE GEL HUM THROM

## (undated) DEVICE — SUTURE VICRYL 2-0 FSL

## (undated) DEVICE — 3M™ IOBAN™ 2 ANTIMICROBIAL INCISE DRAPE 6651EZ: Brand: IOBAN™ 2

## (undated) DEVICE — SINGLE USE BIOPSY VALVE MAJ-210: Brand: SINGLE USE BIOPSY VALVE (STERILE)

## (undated) DEVICE — DRESSING AQUACEL AG 3.5 X 10

## (undated) DEVICE — TRAP 4 CPTR CHMBR N EZ INLN

## (undated) DEVICE — 3M™ STERI-STRIP™ REINFORCED ADHESIVE SKIN CLOSURES, R1547, 1/2 IN X 4 IN (12 MM X 100 MM), 6 STRIPS/ENVELOPE: Brand: 3M™ STERI-STRIP™

## (undated) DEVICE — SINGLE USE SUCTION VALVE MAJ-209: Brand: SINGLE USE SUCTION VALVE (STERILE)

## (undated) DEVICE — SUTURE ETHIBOND 5 CCS

## (undated) DEVICE — AIRLIFE&#8482 MISTY MAX 10 NEBULIZER W 7 (2.1 M) CRUSH RESISTANT OXYGEN TUBING BAFFLED TEE ADAPTER, MOUTHPIECE: Brand: AIRLIFE

## (undated) DEVICE — GAMMEX® PI HYBRID SIZE 6, STERILE POWDER-FREE SURGICAL GLOVE, POLYISOPRENE AND NEOPRENE BLEND: Brand: GAMMEX

## (undated) DEVICE — SUT PROL 5-0 24IN NABSRB BLU 13MM C-1 3/8

## (undated) DEVICE — SOL  .9 3000ML

## (undated) DEVICE — DECANTER BAG 9": Brand: MEDLINE INDUSTRIES, INC.

## (undated) DEVICE — SUT PERMA- 3-0 30IN NABSRB BLK TIE SILK

## (undated) DEVICE — SUT PERMA- 2-0 30IN NABSRB BLK TIE SILK

## (undated) DEVICE — 450 ML BOTTLE OF 0.05% CHLORHEXIDINE GLUCONATE IN 99.95% STERILE WATER FOR IRRIGATION, USP AND APPLICATOR.: Brand: IRRISEPT ANTIMICROBIAL WOUND LAVAGE

## (undated) DEVICE — BLAKE SILICONE DRAIN, 19 FR ROUND, HUBLESS WITH 1/4" BENDABLE TROCAR: Brand: BLAKE

## (undated) DEVICE — BOWLS UTILITY 16OZ

## (undated) DEVICE — KENDALL SCD EXPRESS SLEEVES, KNEE LENGTH, MEDIUM: Brand: KENDALL SCD

## (undated) DEVICE — DRAPE,EXTREMITY,89X128,STERILE: Brand: MEDLINE

## (undated) DEVICE — MEDI-VAC SUCTION HANDLE REGULAR CAPACITY: Brand: CARDINAL HEALTH

## (undated) DEVICE — CURITY NON-ADHERENT STRIPS: Brand: CURITY

## (undated) DEVICE — REM POLYHESIVE ADULT PATIENT RETURN ELECTRODE: Brand: VALLEYLAB

## (undated) DEVICE — SUT PROL 6-0 24IN C-1 NABSRB BLU 13MM 3/8 CIR

## (undated) DEVICE — SUTURE ETHIBOND 1 OS-6

## (undated) DEVICE — 3M™ TEGADERM™ TRANSPARENT FILM DRESSING FRAME STYLE, 1626W, 4 IN X 4-3/4 IN (10 CM X 12 CM), 50/CT 4CT/CASE: Brand: 3M™ TEGADERM™

## (undated) DEVICE — CV PACK-LF: Brand: MEDLINE INDUSTRIES, INC.

## (undated) NOTE — LETTER
[facility]  [address] [address]  [phone]    Blood Transfusion Consent    In the course of your treatment, it may become necessary to administer a transfusion of blood or blood components. This form provides basic information concerning this procedure and, if signed by you, authorizes its administration. By signing this form, you agree that all of your questions about the administration of blood or blood products have been answered by the ordering medical professional or designee.    Description of Procedure  Blood is introduced into one of your veins, commonly in the arm, using a sterilized disposable needle. The amount of blood transfused, and whether the transfusion will be of blood or blood components is a judgement the physician will make based on your particular needs.    Risks  The transfusion is a common procedure of low risk.  MINOR AND TEMPORARY REACTIONS ARE NOT UNCOMMON, including a slight bruise, swelling or local reaction in the area where the needle pierces your skin, or a nonserious reaction to the transfused material itself, including headache, fever or mild skin reaction, such as rash.  Serious reactions are possible, though very unlikely, and include severe allergic reaction (shock) and destruction (hemolysis) of transfused blood cells.  Infectious diseases which are known to be transmitted by blood transfusion include certain types of viral Hepatitis(liver infection from a virus), Human Immunodeficiency Virus (HIV-1,2) infection, a viral infection known to cause Acquired Immunodeficiency Syndrome (AIDS), as well as certain other bacterial, viral, and parasitic diseases. While a minimal risk of acquiring an infectious disease from transfused blood exists, in accordance with the Federal and State law, all due care has been taken in donor selection and testing to avoid transmission of disease.    Alternatives  If loss of blood poses serious threats during your  treatment, THERE IS NO EFFECTIVE ALTERNATIVE TO BLOOD TRANSFUSION. However, if you have any further questions on this matter, your provider will fully explain the alternatives to you if it has not already been done.    I, ______________________________, have read/had read to me the above. I understand the matters bearing on the decision whether or not to authorize a transfusion of blood or blood components. I have no questions which have not been answered to my full satisfaction. I hereby consent to such transfusion as my physician may deem necessary or advisable in the course of my treatment.    ______________________________________________                    ___________________________  (Signature of Patient or Responsible party in case of minor,                 (Printed Name of Patient or incompetent, or unconscious patient)              Responsible Party)    ___________________________               _____________________  (Relationship to Patient if not self)                                    (Date and Time)    __________________________                                                           ______________________              (Signature of Witness)               (Printed Name of Witness)     Language line ()    Telephone/Verbal/Video Consent    __________________________                     ____________________  (Signature of 2nd Witness           (Printed Name of 2nd  Telephone/Verbal/Video Consent)           Witness)    Patient Name: Emilie John     : 1955                 Printed: 2024     Medical Record #: [id number]      Rev: 2023

## (undated) NOTE — ED AVS SNAPSHOT
Kelly Quesada   MRN: [id number]    Department:  [facility] Emergency Department   Date of Visit:  2/26/2018           Disclosure     Insurance plans vary and the physician(s) referred by the ER may not be covered by your plan.  Please contact yo tell this physician (or your personal doctor if your instructions are to return to your personal doctor) about any new or lasting problems. The primary care or specialist physician will see patients referred from the [facility] Emergency Department.  Robin Brown

## (undated) NOTE — IP AVS SNAPSHOT
Patient Demographics     Address  [address] [place] [id number] Phone  [phone] [facility])  [phone] (Mobile) *Preferred* E-mail Address  [email or web address]      Emergency Contact(s)     Name Relation Home Work Jose Luis Gaytan o Do not wash incision. o Remove entire wrapping and old dressing (if Medipore/coverlet) after showering. Pat dry with a CLEAN TOWEL if necessary and cover incision with new Medipore/coverlet. For other types of dressings, follow surgeon’s orders. MEDIPORE /COVERLET      Medication  Anticoagulants = blood thinners (Xarelto, Eliquis, Lovenox, Coumadin or Aspirin)  ? Pill or shot form depending on what your physician orders.    ? IF placed on Coumadin, you may also need l you focus on something else, you do not experience the pain the same. Take advantage of everything available to your to help control you discomfort. ? Contact physician if discomfort does not respond to pain medication. Body changes  ?  Constipation i We don’t want those other health issues to cause you to get readmitted to the hospital; much better for you to catch developing problems and prevent them from becoming larger ones.   ? CHUCHO HOSE – IF ordered by your surgeon, wear these during the day and off and get up to walk up and down the aisles…this helps prevent blood clots and stiffness.   ? TEMPORARY HANDICAP PARKING APPLICATION  (good for 3-6 months)  – At Surgeon or PCP visit, request they fill out the form, then go to [facility] (only time you do not wait i Do not drive when taking pain medications. Do not take pain medication when sedated. do not drink alcohol when taking pain medication. Do not combine pain medications. Do not take other drugs when taking pain medication.  Seek medical attention if any quest metFORMIN HCl 500 MG Tabs  Commonly known as:  GLUCOPHAGE  Next dose due:  THIS EVENING      Take 1 tablet (500 mg total) by mouth 2 (two) times daily.    Gayle Crowe MD         Omeprazole 40 MG Cpdr  Next dose due:  TOMORROW MORNING 1/14/20 [id number] docusate sodium (COLACE) cap 100 mg 01/13/20 0839 Given      [id number] hydrochlorothiazide (HYDRODIURIL) tab 25 mg (And Linked Group #1) 01/13/20 0840 Given      [id number] losartan (COZAAR) tab 100 mg (And Linked Group #1) 01/13/20 0840 Given CO2 24.0 21.0 - 32.0 mmol/L — [facility]   Anion Gap 9 0 - 18 mmol/L — [facility]   BUN 17 7 - 18 mg/dL [facility]   Creatinine 0.83 0.55 - 1.02 mg/dL — [facility]   BUN/CREA Ratio 20.5 10.0 - 20.0 [facility]   Calcium, Total 9.4 8.5 - 10.1 mg/dL — [facility] MEDICAL RECORD #:   [id number]       YOB: 1955  ADMISSION DATE:       01/10/2020    HISTORY AND PHYSICAL EXAMINATION    HISTORY OF PRESENT ILLNESS:  This is a 20-year-old female who initially presented to my office on 08/15/2019 with a [place] MEDICATIONS:  Atenolol, prednisone, Norvasc, Prilosec, vitamin D, metformin, aspirin.     ALLERGIES:  Celecoxib, diarrhea, rash; Prilosec, diarrhea, rash; etanercept, hives, rash; infliximab, hives, anaphylaxis; oxycodone, pain, nausea; pregabalin, diarrhea Consults signed by Iliana Calabrese DO at 1/10/2020  4:46 PM     Author:  Iliana Calabrese DO Service:  Hospitalist Author Type:  Physician    Filed:  1/10/2020  4:46 PM Date of Service:  1/10/2020  4:44 PM Status:  Signed    :  SYD Dumont Oxycodone               PAIN, NAUSEA ONLY  Pregabalin              DIARRHEA, RASH    Comment:AND DIARRHEA  Enbrel                  RASH  Fish-Derived Produc*    SWELLING  Orencia [Abatacept]     ITCHING  Rice                    HIVES   Past Medical History PSYCHIATRIC:  No disorder of thought or mood. ENDOCRINE:  No heat or cold intolerance, polyuria or polydipsia. HEMATOLOGICAL:  No easy bruising or bleeding.      OBJECTIVE:  /67 (BP Location: Left arm)   Pulse 76   Temp 97.9 °F (36.6 °C) (Oral)   Re Physical Therapy Notes (last 72 hours) (Notes from 1/10/2020 12:59 PM through 1/13/2020 12:59 PM)      Physical Therapy Note signed by Holly Chinchilla PT at 1/12/2020  3:12 PM  Version 1 of 1    Author:  Holly Chinchilla, PT Service:  Rehab Author Type Weight Bearing Restriction: R lower extremity        R Lower Extremity: Weight Bearing as Tolerated[CM.2]       PAIN ASSESSMENT[CM.1]   Ratin  Location: R hip  Management Techniques: Activity promotion;Relaxation;Repositioning[CM. 2]    BALANCE[CM.1]  S through pattern. Pt required cueing for gait sequencing. Pt completed activities while maintaining hip precautions,. Hip precautions reviewed. Pt able to teach back precautions.     Exercises AM Session   Ankle Pumps     20 reps   Quad Sets 20 reps   Glut S Patient is able to ambulate 100 feet with assistive device at assistance level: supervision   Goal #4        Goal #5     Patient verbalizes and/or demonstrates all precautions and safety concerns independently   Goal #6           Goal Comments: Goals es • UPPER GI ENDOSCOPY,EXAM         SUBJECTIVE  \"I just can't take a step\"    Patient’s self-stated goal is to go home, discussed again recommendation for LINDA.     OBJECTIVE  Precautions: PUNEET - posterior    WEIGHT BEARING RESTRICTION  Weight Bearing Restric flexibility per PUNEET protocol via verbal and written instructions. Pt completed exercises as instructed with cues for proper technique and rest breaks as needed to recover from pain and fatigue. Pt required max a to stand from chair to rw. [SP.1]  Pt has RA transporter returning to room via recliner)    ASSESSMENT[SP.1]   Pt participated in group PT BID today with fair tolerance. Pt cont to present with right hip pain, dec rom, strength, balance, activity tolerance.   Patient will benefit from continued inpat :  Jesica Ferrer PT (Physical Therapist)       PHYSICAL THERAPY HIP EVALUATION - INPATIENT     Room Number: 373/373-A  Evaluation Date: 1/11/2020  Type of Evaluation: Initial[SP.1]  Physician Order: PT Eval and Treat    Presenting Problem: s/p ri Pt reports she amb with rw or crutches at her baseline stating \"whichever is closer\". Pt does not drive, uses delivery service for groceries.     SUBJECTIVE[SP.1]  \"I am afraid to move that leg\"[SP.3]    Patient self-stated goal is[SP.1] to go home wit How much help from another person does the patient currently need. ..[SP.1]   -   Moving to and from a bed to a chair (including a wheelchair)?: A Lot   -   Need to walk in hospital room?: A Lot   -   Climbing 3-5 steps with a railing?: Total[SP.2]       AM Patient End of Session: Up in chair;Needs met;Call light within reach;RN aware of session/findings; All patient questions and concerns addressed;SCDs in place; Ice applied; With [facility] staff[SP.2]    ASSESSMENT   Patient is a[SP.3 59year old[SP.2] female admitt Patient is able to ambulate[SP.1] 1[SP.3]00 feet with assistive device at assistance level:[SP.1] supervision[SP. 3]   Goal #4      Goal #5    Patient verbalizes and/or demonstrates all precautions and safety concerns independently   Goal #6        Goal C 1/10/20.        H/O: RA (viral in hands), HTN, DM      Problem List  Active Problems:    Primary osteoarthritis of right hip      Past Medical History  Past Medical History:   Diagnosis Date   • Diabetes ([facility] [address]    • Essential hypertension    • High blood press Approx Degree of Impairment: 50.11%  Standardized Score (AM-PAC Scale): 37.26  CMS Modifier (G-Code): CK    FUNCTIONAL TRANSFER ASSESSMENT  Supine to Sit : Minimum assistance  Sit to Stand: Minimum assistance    Skilled Therapy Provided:[MM.1]   Patient ab going 1/13     Functional Transfer Goals  Pt will complete sit to supine transfer w/ mod ind keeping hip precautions -on going 1/13  Pt will complete supine to sit transfer w/ mod ind keeping hip precautions - on going 1/13  Pt will complete sit-stand sauer • ESOPHAGOGASTRODUODENOSCOPY (EGD) N/A 5/6/2019    Performed by Nino Sorto MD at [facility] ENDOSCOPY   • NEEDLE BIOPSY LEFT  2017   • REMOVAL GALLBLADDER     • TOTAL KNEE REPLACEMENT Bilateral    • UPPER GI ENDOSCOPY,EXAM         SUBJECTIVE  \"I am better t LB Dress (donned pants with reacher with min assist to thread feet and min assist for stand balance, max cueing). Patient End of Session: Up in chair;Needs met;Call light within reach;RN aware of session/findings; All patient questions and concerns add MR.1 Elizabeth Hardy OT on 1/12/2020  3:55 PM               Occupational Therapy Note signed by Eleonora Tucker OT at 1/11/2020  5:02 PM  Version 1 of 1    Author:  Eleonora Tucker OT Service:  — Author Type:  Occupational Therapist    Filed:  1/11/2020  5:02 PM Toilet and Equipment: Standard height toilet(sink in arm's reach)  Shower/Tub and Equipment: Tub-shower combo(no bar, no seat)  Other Equipment: None    Occupation/Status: not working  Hand Dominance: Right  Drives: No(has not driven in 5 yrs, uses taxi or Neurological Findings: None                ACTIVITY TOLERANCE  Pulse: 92  Heart Rate Source: Monitor     BP: (!) 150/104  BP Location: Left arm  BP Method: Automatic  Patient Position: Sitting    O2 SATURATIONS  SPO2 on Room Air at Rest: 99             ACT PUNEET[MR.3]. Complete medical history and occupational profile noted above.  Functional outcome measures completed include[MR.1] : AM- PAC, pt with 53.32% impairment in basic ADL and raw score 16/ 24 (research showing that score of  17.81 indicating home w/ h commode;Sock aid;Long-handled shoehorn;Long-handled sponge(dressing stick)    PLAN  OT Treatment Plan: Balance activities; Energy conservation/work simplification techniques;ADL training; Compensatory technique education;Patient/Family training;Equipment jessica -maintain proper body alignment; maintain hip precautions  -use proper hand hygiene for dressing changes  -eat a healthy diet to promote healing; maintain blood glucose levels WNL  -participate in pt/ot and do exercises and movements as directed  -follow u

## (undated) NOTE — ED AVS SNAPSHOT
Cornelia Chopra   MRN: [id number]    Department:  [facility] Emergency Department   Date of Visit:  11/28/2018           Disclosure     Insurance plans vary and the physician(s) referred by the ER may not be covered by your plan.  Please contact y tell this physician (or your personal doctor if your instructions are to return to your personal doctor) about any new or lasting problems. The primary care or specialist physician will see patients referred from the [facility] Emergency Department.  Judi Bolden

## (undated) NOTE — LETTER
Shasha Chen 182 6 [address]  [place], [address] [place] [address]    Consent for Operation  Date: __________________                                Time: _______________    1.  I authorize the performance upon Ava Jenkins the following operation:  Procedcarly procedure has been videotaped, the surgeon will obtain the original videotape. The hospital will not be responsible for storage or maintenance of this tape.   7. For the purpose of advancing medical education, I consent to the admittance of observers to the STATEMENTS REQUIRING INSERTION OR COMPLETION WERE FILLED IN.     Signature of Patient:   ___________________________    When the patient is a minor or mentally incompetent to give consent:  Signature of person authorized to consent for patient: ____________ supplements, and pills I can buy without a prescription (including street drugs/illegal medications). Failure to inform my anesthesiologist about these medicines may increase my risk of anesthetic complications. iv.  If I am allergic to anything or have ha Anesthesiologist Signature     Date   Time  I have discussed the procedure and information above with the patient (or patient’s representative) and answered their questions. The patient or their representative has agreed to have anesthesia services.     ___

## (undated) NOTE — LETTER
[address]  [address]  Authorization for Surgical Operation and Procedure     Date:___________                                                                                                         Time:__________  I hereby authorize Dr. Taylor or Dr. Mcneil, my physician and his/her assistants (if applicable), which may include medical students, residents, and/or fellows, to perform the following surgical operation/ procedure and administer such anesthesia as may be determined necessary by my physician:  Right groin washout and debridement on Emilie John   2.   I recognize that during the surgical operation/procedure, unforeseen conditions may necessitate additional or different procedures than those listed above.  I, therefore, further authorize and request that the above-named surgeon, assistants, or designees perform such procedures as are, in their judgment, necessary and desirable.    3.   My surgeon/physician has discussed prior to my surgery the potential benefits, risks and side effects of this procedure; the likelihood of achieving goals; and potential problems that might occur during recuperation.  They also discussed reasonable alternatives to the procedure, including risks, benefits, and side effects related to the alternatives and risks related to not receiving this procedure.  I have had all my questions answered and I acknowledge that no guarantee has been made as to the result that may be obtained.    4.   Should the need arise during my operation/procedure, which includes change of level of care prior to discharge, I also consent to the administration of blood and/or blood products.  Further, I understand that despite careful testing and screening of blood or blood products by collecting agencies, I may still be subject to ill effects as a result of receiving a blood transfusion and/or blood products.  The following are some, but not all, of the  potential risks that can occur: fever and allergic reactions, hemolytic reactions, transmission of diseases such as Hepatitis, AIDS and Cytomegalovirus (CMV) and fluid overload.  In the event that I wish to have an autologous transfusion of my own blood, or a directed donor transfusion, I will discuss this with my physician.  Check only if Refusing Blood or Blood Products  I understand refusal of blood or blood products as deemed necessary by my physician may have serious consequences to my condition to include possible death. I hereby assume responsibility for my refusal and release the hospital, its personnel, and my physicians from any responsibility for the consequences of my refusal.          o  Refuse      5.   I authorize the use of any specimen, organs, tissues, body parts or foreign objects that may be removed from my body during the operation/procedure for diagnosis, research or teaching purposes and their subsequent disposal by hospital authorities.  I also authorize the release of specimen test results and/or written reports to my treating physician on the hospital medical staff or other referring or consulting physicians involved in my care, at the discretion of the Pathologist or my treating physician.    6.   I consent to the photographing or videotaping of the operations or procedures to be performed, including appropriate portions of my body for medical, scientific, or educational purposes, provided my identity is not revealed by the pictures or by descriptive texts accompanying them.  If the procedure has been photographed/videotaped, the surgeon will obtain the original picture, image, videotape or CD.  The hospital will not be responsible for storage, release or maintenance of the picture, image, tape or CD.    7.   I consent to the presence of a  or observers in the operating room as deemed necessary by my physician or their designees.    8.   I recognize that in the event my  procedure results in extended X-Ray/fluoroscopy time, I may develop a skin reaction.    9. If I have a Do Not Attempt Resuscitation (DNAR) order in place, that status will be suspended while in the operating room, procedural suite, and during the recovery period unless otherwise explicitly stated by me (or a person authorized to consent on my behalf). The surgeon or my attending physician will determine when the applicable recovery period ends for purposes of reinstating the DNAR order.  10. Patients having a sterilization procedure: I understand that if the procedure is successful the results will be permanent and it will therefore be impossible for me to inseminate, conceive, or bear children.  I also understand that the procedure is intended to result in sterility, although the result has not been guaranteed.   11. I acknowledge that my physician has explained sedation/analgesia administration to me including the risk and benefits I consent to the administration of sedation/analgesia as may be necessary or desirable in the judgment of my physician.    I CERTIFY THAT I HAVE READ AND FULLY UNDERSTAND THE ABOVE CONSENT TO OPERATION and/or OTHER PROCEDURE.    _________________________________________  __________________________________  Signature of Patient     Signature of Responsible Person         ___________________________________         Printed Name of Responsible Person           _________________________________                 Relationship to Patient  _________________________________________  ______________________________  Signature of Witness          Date  Time      Patient Name: Emilie John     : 1955                 Printed: 2024     Medical Record #: [id number]                     Page 1 [address]  [address]    Consent for Anesthesia    I, Emilie Alvaro agree to be cared for by an anesthesiologist,  who is specially trained to monitor me and give me medicine to put me to sleep or keep me comfortable during my procedure    I understand that my anesthesiologist is not an employee or agent of [facility] or [employer]. He or she works for [employer]    As the patient asking for anesthesia services, I agree to:  Allow the anesthesiologist (anesthesia doctor) to give me medicine and do additional procedures as necessary. Some examples are: Starting or using an “IV” to give me medicine, fluids or blood during my procedure, and having a breathing tube placed to help me breathe when I’m asleep (intubation). In the event that my heart stops working properly, I understand that my anesthesiologist will make every effort to sustain my life, unless otherwise directed by [facility] Do Not Resuscitate documents.  Tell my anesthesia doctor before my procedure:  If I am pregnant.  The last time that I ate or drank.  All of the medicines I take (including prescriptions, herbal supplements, and pills I can buy without a prescription (including street drugs/illegal medications). Failure to inform my anesthesiologist about these medicines may increase my risk of anesthetic complications.  If I am allergic to anything or have had a reaction to anesthesia before.  I understand how the anesthesia medicine will help me (benefits).  I understand that with any type of anesthesia medicine there are risks:  The most common risks are: nausea, vomiting, sore throat, muscle soreness, damage to my eyes, mouth, or teeth (from breathing tube placement).  Rare risks include: remembering what happened during my procedure, allergic reactions to medications, injury to my airway, heart, lungs, vision, nerves, or muscles and in extremely rare instances death.  My doctor has explained to me other choices available to me for my care (alternatives).  Pregnant Patients (“epidural”):  I understand that the risks of  having an epidural (medicine given into my back to help control pain during labor), include itching, low blood pressure, difficulty urinating, headache or slowing of the baby’s heart. Very rare risks include infection, bleeding, seizure, irregular heart rhythms and nerve injury.  Regional Anesthesia (“spinal”, “epidural”, & “nerve blocks”):  I understand that rare but potential complications include headache, bleeding, infection, seizure, irregular heart rhythms, and nerve injury.    I can change my mind about having anesthesia services at any time before I get the medicine.    _____________________________________________________________________________  Patient (or Representative) Signature/Relationship to Patient  Date   Time    _____________________________________________________________________________   Name (if used)    Language/Organization   Time    _____________________________________________________________________________  Anesthesiologist Signature     Date   Time  I have discussed the procedure and information above with the patient (or patient’s representative) and answered their questions. The patient or their representative has agreed to have anesthesia services.    _____________________________________________________________________________  Witness        Date   Time  I have verified that the signature is that of the patient or patient’s representative, and that it was signed before the procedure  Patient Name: Emilie John     : 1955                 Printed: 2024     Medical Record #: [id number]                     Page 2 of 2

## (undated) NOTE — IP AVS SNAPSHOT
[address]            (For Outpatient Use Only) Initial Admit Date: 1/10/2020   Inpt/Obs Admit Date: Inpt: 1/10/20 / Obs: N/A   Discharge Date:    Link Pattee:  [de-identified]   MRN: [de-identified]   CSN: [id number]   CEID: [id number] Subscriber Name:  Mason Bill :    Subscriber ID:  Pt Rel to Subscriber:    Hospital Account Financial Class: Medicare    2020

## (undated) NOTE — LETTER
[address]  [address]  Authorization for Surgical Operation and Procedure     Date:___________                                                                                                         Time:__________  I hereby authorize Dr. Yariel Du, my physician and his/her assistants (if applicable), which may include medical students, residents, and/or fellows, to perform the following surgical operation/ procedure and administer such anesthesia as may be determined necessary by my physician:  Operation/Procedure name (s) Washout of right groin incision on Emilie John   2.   I recognize that during the surgical operation/procedure, unforeseen conditions may necessitate additional or different procedures than those listed above.  I, therefore, further authorize and request that the above-named surgeon, assistants, or designees perform such procedures as are, in their judgment, necessary and desirable.    3.   My surgeon/physician has discussed prior to my surgery the potential benefits, risks and side effects of this procedure; the likelihood of achieving goals; and potential problems that might occur during recuperation.  They also discussed reasonable alternatives to the procedure, including risks, benefits, and side effects related to the alternatives and risks related to not receiving this procedure.  I have had all my questions answered and I acknowledge that no guarantee has been made as to the result that may be obtained.    4.   Should the need arise during my operation/procedure, which includes change of level of care prior to discharge, I also consent to the administration of blood and/or blood products.  Further, I understand that despite careful testing and screening of blood or blood products by collecting agencies, I may still be subject to ill effects as a result of receiving a blood transfusion and/or blood products.  The following are some, but not  all, of the potential risks that can occur: fever and allergic reactions, hemolytic reactions, transmission of diseases such as Hepatitis, AIDS and Cytomegalovirus (CMV) and fluid overload.  In the event that I wish to have an autologous transfusion of my own blood, or a directed donor transfusion, I will discuss this with my physician.  Check only if Refusing Blood or Blood Products  I understand refusal of blood or blood products as deemed necessary by my physician may have serious consequences to my condition to include possible death. I hereby assume responsibility for my refusal and release the hospital, its personnel, and my physicians from any responsibility for the consequences of my refusal.          o  Refuse      5.   I authorize the use of any specimen, organs, tissues, body parts or foreign objects that may be removed from my body during the operation/procedure for diagnosis, research or teaching purposes and their subsequent disposal by hospital authorities.  I also authorize the release of specimen test results and/or written reports to my treating physician on the hospital medical staff or other referring or consulting physicians involved in my care, at the discretion of the Pathologist or my treating physician.    6.   I consent to the photographing or videotaping of the operations or procedures to be performed, including appropriate portions of my body for medical, scientific, or educational purposes, provided my identity is not revealed by the pictures or by descriptive texts accompanying them.  If the procedure has been photographed/videotaped, the surgeon will obtain the original picture, image, videotape or CD.  The hospital will not be responsible for storage, release or maintenance of the picture, image, tape or CD.    7.   I consent to the presence of a  or observers in the operating room as deemed necessary by my physician or their designees.    8.   I recognize that in  the event my procedure results in extended X-Ray/fluoroscopy time, I may develop a skin reaction.    9. If I have a Do Not Attempt Resuscitation (DNAR) order in place, that status will be suspended while in the operating room, procedural suite, and during the recovery period unless otherwise explicitly stated by me (or a person authorized to consent on my behalf). The surgeon or my attending physician will determine when the applicable recovery period ends for purposes of reinstating the DNAR order.  10. Patients having a sterilization procedure: I understand that if the procedure is successful the results will be permanent and it will therefore be impossible for me to inseminate, conceive, or bear children.  I also understand that the procedure is intended to result in sterility, although the result has not been guaranteed.   11. I acknowledge that my physician has explained sedation/analgesia administration to me including the risk and benefits I consent to the administration of sedation/analgesia as may be necessary or desirable in the judgment of my physician.    I CERTIFY THAT I HAVE READ AND FULLY UNDERSTAND THE ABOVE CONSENT TO OPERATION and/or OTHER PROCEDURE.    _________________________________________  __________________________________  Signature of Patient     Signature of Responsible Person         ___________________________________         Printed Name of Responsible Person           _________________________________                 Relationship to Patient  _________________________________________  ______________________________  Signature of Witness          Date  Time      Patient Name: Emilie John     : 1955                 Printed: 2024     Medical Record #: [id number]                     Page [address] of [address] [place], [place] [id number]    Consent for Anesthesia    I, Emilie John agree to be cared for by an  anesthesiologist, who is specially trained to monitor me and give me medicine to put me to sleep or keep me comfortable during my procedure    I understand that my anesthesiologist is not an employee or agent of [facility] or [employer]. He or she works for [email or web address].    As the patient asking for anesthesia services, I agree to:  Allow the anesthesiologist (anesthesia doctor) to give me medicine and do additional procedures as necessary. Some examples are: Starting or using an “IV” to give me medicine, fluids or blood during my procedure, and having a breathing tube placed to help me breathe when I’m asleep (intubation). In the event that my heart stops working properly, I understand that my anesthesiologist will make every effort to sustain my life, unless otherwise directed by [facility] Do Not Resuscitate documents.  Tell my anesthesia doctor before my procedure:  If I am pregnant.  The last time that I ate or drank.  All of the medicines I take (including prescriptions, herbal supplements, and pills I can buy without a prescription (including street drugs/illegal medications). Failure to inform my anesthesiologist about these medicines may increase my risk of anesthetic complications.  If I am allergic to anything or have had a reaction to anesthesia before.  I understand how the anesthesia medicine will help me (benefits).  I understand that with any type of anesthesia medicine there are risks:  The most common risks are: nausea, vomiting, sore throat, muscle soreness, damage to my eyes, mouth, or teeth (from breathing tube placement).  Rare risks include: remembering what happened during my procedure, allergic reactions to medications, injury to my airway, heart, lungs, vision, nerves, or muscles and in extremely rare instances death.  My doctor has explained to me other choices available to me for my care (alternatives).  Pregnant Patients (“epidural”):  I understand  that the risks of having an epidural (medicine given into my back to help control pain during labor), include itching, low blood pressure, difficulty urinating, headache or slowing of the baby’s heart. Very rare risks include infection, bleeding, seizure, irregular heart rhythms and nerve injury.  Regional Anesthesia (“spinal”, “epidural”, & “nerve blocks”):  I understand that rare but potential complications include headache, bleeding, infection, seizure, irregular heart rhythms, and nerve injury.    I can change my mind about having anesthesia services at any time before I get the medicine.    _____________________________________________________________________________  Patient (or Representative) Signature/Relationship to Patient  Date   Time    _____________________________________________________________________________   Name (if used)    Language/Organization   Time    _____________________________________________________________________________  Anesthesiologist Signature     Date   Time  I have discussed the procedure and information above with the patient (or patient’s representative) and answered their questions. The patient or their representative has agreed to have anesthesia services.    _____________________________________________________________________________  Witness        Date   Time  I have verified that the signature is that of the patient or patient’s representative, and that it was signed before the procedure  Patient Name: Emilie John     : 1955                 Printed: 2024     Medical Record #: [id number]                     Page 2 of 2

## (undated) NOTE — LETTER
[address]  [address]  Authorization for Surgical Operation and Procedure     Date:___________                                                                                                         Time:__________    I hereby authorize Dr. Du, my physician and his/her assistants (if applicable), which may include medical students, residents, and/or fellows, to perform the following surgical operation/ procedure and administer such anesthesia as may be determined necessary by my physician:  Operation/Procedure name (s) debridement of right groin incision and kerecis placement on Emilie John   2.   I recognize that during the surgical operation/procedure, unforeseen conditions may necessitate additional or different procedures than those listed above.  I, therefore, further authorize and request that the above-named surgeon, assistants, or designees perform such procedures as are, in their judgment, necessary and desirable.    3.   My surgeon/physician has discussed prior to my surgery the potential benefits, risks and side effects of this procedure; the likelihood of achieving goals; and potential problems that might occur during recuperation.  They also discussed reasonable alternatives to the procedure, including risks, benefits, and side effects related to the alternatives and risks related to not receiving this procedure.  I have had all my questions answered and I acknowledge that no guarantee has been made as to the result that may be obtained.    4.   Should the need arise during my operation/procedure, which includes change of level of care prior to discharge, I also consent to the administration of blood and/or blood products.  Further, I understand that despite careful testing and screening of blood or blood products by collecting agencies, I may still be subject to ill effects as a result of receiving a blood transfusion and/or blood products.  The  following are some, but not all, of the potential risks that can occur: fever and allergic reactions, hemolytic reactions, transmission of diseases such as Hepatitis, AIDS and Cytomegalovirus (CMV) and fluid overload.  In the event that I wish to have an autologous transfusion of my own blood, or a directed donor transfusion, I will discuss this with my physician.  Check only if Refusing Blood or Blood Products  I understand refusal of blood or blood products as deemed necessary by my physician may have serious consequences to my condition to include possible death. I hereby assume responsibility for my refusal and release the hospital, its personnel, and my physicians from any responsibility for the consequences of my refusal.          o  Refuse      5.   I authorize the use of any specimen, organs, tissues, body parts or foreign objects that may be removed from my body during the operation/procedure for diagnosis, research or teaching purposes and their subsequent disposal by hospital authorities.  I also authorize the release of specimen test results and/or written reports to my treating physician on the hospital medical staff or other referring or consulting physicians involved in my care, at the discretion of the Pathologist or my treating physician.    6.   I consent to the photographing or videotaping of the operations or procedures to be performed, including appropriate portions of my body for medical, scientific, or educational purposes, provided my identity is not revealed by the pictures or by descriptive texts accompanying them.  If the procedure has been photographed/videotaped, the surgeon will obtain the original picture, image, videotape or CD.  The hospital will not be responsible for storage, release or maintenance of the picture, image, tape or CD.    7.   I consent to the presence of a  or observers in the operating room as deemed necessary by my physician or their designees.     8.   I recognize that in the event my procedure results in extended X-Ray/fluoroscopy time, I may develop a skin reaction.    9. If I have a Do Not Attempt Resuscitation (DNAR) order in place, that status will be suspended while in the operating room, procedural suite, and during the recovery period unless otherwise explicitly stated by me (or a person authorized to consent on my behalf). The surgeon or my attending physician will determine when the applicable recovery period ends for purposes of reinstating the DNAR order.  10. Patients having a sterilization procedure: I understand that if the procedure is successful the results will be permanent and it will therefore be impossible for me to inseminate, conceive, or bear children.  I also understand that the procedure is intended to result in sterility, although the result has not been guaranteed.   11. I acknowledge that my physician has explained sedation/analgesia administration to me including the risk and benefits I consent to the administration of sedation/analgesia as may be necessary or desirable in the judgment of my physician.    I CERTIFY THAT I HAVE READ AND FULLY UNDERSTAND THE ABOVE CONSENT TO OPERATION and/or OTHER PROCEDURE.        _________________________________________  __________________________________  Signature of Patient     Signature of Responsible Person         ___________________________________         Printed Name of Responsible Person           _________________________________                 Relationship to Patient  _________________________________________  ______________________________  Signature of Witness          Date  Time      Patient Name: Emilie John     : 1955                 Printed: 2024     Medical Record #: [id number]                     Page [address] of [address] [place] [address]  [place]  [address]    Consent for Anesthesia    I, Emilie John  agree to be cared for by an anesthesiologist, who is specially trained to monitor me and give me medicine to put me to sleep or keep me comfortable during my procedure    I understand that my anesthesiologist is not an employee or agent of [facility] or [employer]. He or she works for [employer].    As the patient asking for anesthesia services, I agree to:  Allow the anesthesiologist (anesthesia doctor) to give me medicine and do additional procedures as necessary. Some examples are: Starting or using an “IV” to give me medicine, fluids or blood during my procedure, and having a breathing tube placed to help me breathe when I’m asleep (intubation). In the event that my heart stops working properly, I understand that my anesthesiologist will make every effort to sustain my life, unless otherwise directed by [facility] Do Not Resuscitate documents.  Tell my anesthesia doctor before my procedure:  If I am pregnant.  The last time that I ate or drank.  All of the medicines I take (including prescriptions, herbal supplements, and pills I can buy without a prescription (including street drugs/illegal medications). Failure to inform my anesthesiologist about these medicines may increase my risk of anesthetic complications.  If I am allergic to anything or have had a reaction to anesthesia before.  I understand how the anesthesia medicine will help me (benefits).  I understand that with any type of anesthesia medicine there are risks:  The most common risks are: nausea, vomiting, sore throat, muscle soreness, damage to my eyes, mouth, or teeth (from breathing tube placement).  Rare risks include: remembering what happened during my procedure, allergic reactions to medications, injury to my airway, heart, lungs, vision, nerves, or muscles and in extremely rare instances death.  My doctor has explained to me other choices available to me for my care (alternatives).  Pregnant Patients  (“epidural”):  I understand that the risks of having an epidural (medicine given into my back to help control pain during labor), include itching, low blood pressure, difficulty urinating, headache or slowing of the baby’s heart. Very rare risks include infection, bleeding, seizure, irregular heart rhythms and nerve injury.  Regional Anesthesia (“spinal”, “epidural”, & “nerve blocks”):  I understand that rare but potential complications include headache, bleeding, infection, seizure, irregular heart rhythms, and nerve injury.    I can change my mind about having anesthesia services at any time before I get the medicine.    _____________________________________________________________________________  Patient (or Representative) Signature/Relationship to Patient  Date   Time    _____________________________________________________________________________   Name (if used)    Language/Organization   Time    _____________________________________________________________________________  Anesthesiologist Signature     Date   Time  I have discussed the procedure and information above with the patient (or patient’s representative) and answered their questions. The patient or their representative has agreed to have anesthesia services.    _____________________________________________________________________________  Witness        Date   Time  I have verified that the signature is that of the patient or patient’s representative, and that it was signed before the procedure  Patient Name: Emilie John     : 1955                 Printed: 2024     Medical Record #: [id number]                     Page 2 of 2

## (undated) NOTE — ED AVS SNAPSHOT
Yaminitiffanie Gregory   MRN: [id number]    Department:  [facility] Emergency Department   Date of Visit:  9/20/2018           Disclosure     Insurance plans vary and the physician(s) referred by the ER may not be covered by your plan.  Please contact yo tell this physician (or your personal doctor if your instructions are to return to your personal doctor) about any new or lasting problems. The primary care or specialist physician will see patients referred from the [facility] Emergency Department.  Salvadore Skiff

## (undated) NOTE — LETTER
[address]  [address]  Authorization for Surgical Operation and Procedure     Date: 06/15/2024                                                                                                         Time:1347  I hereby authorize Dr. 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   I recognize that during the surgical operation/procedure, unforeseen conditions may necessitate additional or different procedures than those listed above.  I, therefore, further authorize and request that the above-named surgeon, assistants, or designees perform such procedures as are, in their judgment, necessary and desirable.    3.   My surgeon/physician has discussed prior to my surgery the potential benefits, risks and side effects of this procedure; the likelihood of achieving goals; and potential problems that might occur during recuperation.  They also discussed reasonable alternatives to the procedure, including risks, benefits, and side effects related to the alternatives and risks related to not receiving this procedure.  I have had all my questions answered and I acknowledge that no guarantee has been made as to the result that may be obtained.    4.   Should the need arise during my operation/procedure, which includes change of level of care prior to discharge, I also consent to the administration of blood and/or blood products.  Further, I understand that despite careful testing and screening of blood or blood products by collecting agencies, I may still be subject to ill effects as a result of receiving a blood transfusion and/or blood products.  The following are some, but not all, of the potential risks  that can occur: fever and allergic reactions, hemolytic reactions, transmission of diseases such as Hepatitis, AIDS and Cytomegalovirus (CMV) and fluid overload.  In the event that I wish to have an autologous transfusion of my own blood, or a directed donor transfusion, I will discuss this with my physician.  Check only if Refusing Blood or Blood Products  I understand refusal of blood or blood products as deemed necessary by my physician may have serious consequences to my condition to include possible death. I hereby assume responsibility for my refusal and release the hospital, its personnel, and my physicians from any responsibility for the consequences of my refusal.          o  Refuse      5.   I authorize the use of any specimen, organs, tissues, body parts or foreign objects that may be removed from my body during the operation/procedure for diagnosis, research or teaching purposes and their subsequent disposal by hospital authorities.  I also authorize the release of specimen test results and/or written reports to my treating physician on the hospital medical staff or other referring or consulting physicians involved in my care, at the discretion of the Pathologist or my treating physician.    6.   I consent to the photographing or videotaping of the operations or procedures to be performed, including appropriate portions of my body for medical, scientific, or educational purposes, provided my identity is not revealed by the pictures or by descriptive texts accompanying them.  If the procedure has been photographed/videotaped, the surgeon will obtain the original picture, image, videotape or CD.  The hospital will not be responsible for storage, release or maintenance of the picture, image, tape or CD.    7.   I consent to the presence of a  or observers in the operating room as deemed necessary by my physician or their designees.    8.   I recognize that in the event my procedure results  in extended X-Ray/fluoroscopy time, I may develop a skin reaction.    9. If I have a Do Not Attempt Resuscitation (DNAR) order in place, that status will be suspended while in the operating room, procedural suite, and during the recovery period unless otherwise explicitly stated by me (or a person authorized to consent on my behalf). The surgeon or my attending physician will determine when the applicable recovery period ends for purposes of reinstating the DNAR order.  10. Patients having a sterilization procedure: I understand that if the procedure is successful the results will be permanent and it will therefore be impossible for me to inseminate, conceive, or bear children.  I also understand that the procedure is intended to result in sterility, although the result has not been guaranteed.   11. I acknowledge that my physician has explained sedation/analgesia administration to me including the risk and benefits I consent to the administration of sedation/analgesia as may be necessary or desirable in the judgment of my physician.    I CERTIFY THAT I HAVE READ AND FULLY UNDERSTAND THE ABOVE CONSENT TO OPERATION and/or OTHER PROCEDURE.    _________________________________________  __________________________________  Signature of Patient     Signature of Responsible Person         ___________________________________         Printed Name of Responsible Person           _________________________________                 Relationship to Patient  _________________________________________  ______________________________  Signature of Witness          Date  Time      Patient Name: Emilie John     : 1955                 Printed: Yareli 15, 2024     Medical Record #: [id number]                     Page 1 [address]  [address]    Consent for Anesthesia    I, Emilie John agree to be cared for by an anesthesiologist, who is specially  trained to monitor me and give me medicine to put me to sleep or keep me comfortable during my procedure    I understand that my anesthesiologist is not an employee or agent of [facility] or [employer]. He or she works for [employer].    As the patient asking for anesthesia services, I agree to:  Allow the anesthesiologist (anesthesia doctor) to give me medicine and do additional procedures as necessary. Some examples are: Starting or using an “IV” to give me medicine, fluids or blood during my procedure, and having a breathing tube placed to help me breathe when I’m asleep (intubation). In the event that my heart stops working properly, I understand that my anesthesiologist will make every effort to sustain my life, unless otherwise directed by [facility] Do Not Resuscitate documents.  Tell my anesthesia doctor before my procedure:  If I am pregnant.  The last time that I ate or drank.  All of the medicines I take (including prescriptions, herbal supplements, and pills I can buy without a prescription (including street drugs/illegal medications). Failure to inform my anesthesiologist about these medicines may increase my risk of anesthetic complications.  If I am allergic to anything or have had a reaction to anesthesia before.  I understand how the anesthesia medicine will help me (benefits).  I understand that with any type of anesthesia medicine there are risks:  The most common risks are: nausea, vomiting, sore throat, muscle soreness, damage to my eyes, mouth, or teeth (from breathing tube placement).  Rare risks include: remembering what happened during my procedure, allergic reactions to medications, injury to my airway, heart, lungs, vision, nerves, or muscles and in extremely rare instances death.  My doctor has explained to me other choices available to me for my care (alternatives).  Pregnant Patients (“epidural”):  I understand that the risks of having an  epidural (medicine given into my back to help control pain during labor), include itching, low blood pressure, difficulty urinating, headache or slowing of the baby’s heart. Very rare risks include infection, bleeding, seizure, irregular heart rhythms and nerve injury.  Regional Anesthesia (“spinal”, “epidural”, & “nerve blocks”):  I understand that rare but potential complications include headache, bleeding, infection, seizure, irregular heart rhythms, and nerve injury.    I can change my mind about having anesthesia services at any time before I get the medicine.    _____________________________________________________________________________  Patient (or Representative) Signature/Relationship to Patient  Date   Time    _____________________________________________________________________________   Name (if used)    Language/Organization   Time    _____________________________________________________________________________  Anesthesiologist Signature     Date   Time  I have discussed the procedure and information above with the patient (or patient’s representative) and answered their questions. The patient or their representative has agreed to have anesthesia services.    _____________________________________________________________________________  Witness        Date   Time  I have verified that the signature is that of the patient or patient’s representative, and that it was signed before the procedure  Patient Name: Emilie John     : 1955                 Printed: Yareli 15, 2024     Medical Record #: [id number]                     Page 2 of 2

## (undated) NOTE — ED AVS SNAPSHOT
Janet Fragoso   MRN: [id number]    Department:  [facility] Emergency Department   Date of Visit:  9/12/2017           Disclosure     Insurance plans vary and the physician(s) referred by the ER may not be covered by your plan.  Please contact yo If you have been prescribed any medication(s), please fill your prescription right away and begin taking the medication(s) as directed    If the emergency physician has read X-rays, these will be re-interpreted by a radiologist.  If there is a significant